# Patient Record
Sex: FEMALE | Race: WHITE | NOT HISPANIC OR LATINO | Employment: UNEMPLOYED | URBAN - METROPOLITAN AREA
[De-identification: names, ages, dates, MRNs, and addresses within clinical notes are randomized per-mention and may not be internally consistent; named-entity substitution may affect disease eponyms.]

---

## 2020-07-23 ENCOUNTER — HISTORICAL (OUTPATIENT)
Dept: ADMINISTRATIVE | Facility: HOSPITAL | Age: 60
End: 2020-07-23

## 2020-11-16 ENCOUNTER — HISTORICAL (OUTPATIENT)
Dept: ADMINISTRATIVE | Facility: HOSPITAL | Age: 60
End: 2020-11-16

## 2021-01-28 LAB
HUMAN PAPILLOMAVIRUS (HPV): NORMAL
PAP RECOMMENDATION EXT: NORMAL
PAP SMEAR: NORMAL

## 2021-06-09 ENCOUNTER — HISTORICAL (OUTPATIENT)
Dept: RADIOLOGY | Facility: HOSPITAL | Age: 61
End: 2021-06-09

## 2021-06-09 LAB — POC CREATININE: 0.7 MG/DL (ref 0.6–1.3)

## 2021-08-04 ENCOUNTER — HISTORICAL (OUTPATIENT)
Dept: RADIOLOGY | Facility: HOSPITAL | Age: 61
End: 2021-08-04

## 2021-08-04 LAB — POC CREATININE: 0.7 MG/DL (ref 0.6–1.3)

## 2021-08-09 ENCOUNTER — HISTORICAL (OUTPATIENT)
Dept: RADIATION THERAPY | Facility: HOSPITAL | Age: 61
End: 2021-08-09

## 2021-08-09 ENCOUNTER — HISTORICAL (OUTPATIENT)
Dept: ADMINISTRATIVE | Facility: HOSPITAL | Age: 61
End: 2021-08-09

## 2021-08-09 LAB
ABS NEUT (OLG): 2.61 X10(3)/MCL (ref 2.1–9.2)
ALBUMIN SERPL-MCNC: 4 GM/DL (ref 3.4–4.8)
ALBUMIN/GLOB SERPL: 1.4 RATIO (ref 1.1–2)
ALP SERPL-CCNC: 84 UNIT/L (ref 40–150)
ALT SERPL-CCNC: 19 UNIT/L (ref 0–55)
AST SERPL-CCNC: 15 UNIT/L (ref 5–34)
BASOPHILS # BLD AUTO: 0 X10(3)/MCL (ref 0–0.2)
BASOPHILS NFR BLD AUTO: 0.8 %
BILIRUB SERPL-MCNC: 0.3 MG/DL
BILIRUBIN DIRECT+TOT PNL SERPL-MCNC: 0.1 MG/DL (ref 0–0.5)
BILIRUBIN DIRECT+TOT PNL SERPL-MCNC: 0.2 MG/DL (ref 0–0.8)
BUN SERPL-MCNC: 15.8 MG/DL (ref 9.8–20.1)
CALCIUM SERPL-MCNC: 9.9 MG/DL (ref 8.4–10.2)
CHLORIDE SERPL-SCNC: 106 MMOL/L (ref 98–107)
CO2 SERPL-SCNC: 26 MMOL/L (ref 23–31)
CREAT SERPL-MCNC: 0.66 MG/DL (ref 0.55–1.02)
EOSINOPHIL # BLD AUTO: 0.3 X10(3)/MCL (ref 0–0.9)
EOSINOPHIL NFR BLD AUTO: 5.2 %
ERYTHROCYTE [DISTWIDTH] IN BLOOD BY AUTOMATED COUNT: 13 % (ref 11.5–17)
GLOBULIN SER-MCNC: 2.8 GM/DL (ref 2.4–3.5)
GLUCOSE SERPL-MCNC: 91 MG/DL (ref 82–115)
HCT VFR BLD AUTO: 41.5 % (ref 37–47)
HGB BLD-MCNC: 13.2 GM/DL (ref 12–16)
LYMPHOCYTES # BLD AUTO: 2.8 X10(3)/MCL (ref 0.6–4.6)
LYMPHOCYTES NFR BLD AUTO: 44.6 %
MCH RBC QN AUTO: 29.5 PG (ref 27–31)
MCHC RBC AUTO-ENTMCNC: 31.8 GM/DL (ref 33–36)
MCV RBC AUTO: 92.6 FL (ref 80–94)
MONOCYTES # BLD AUTO: 0.4 X10(3)/MCL (ref 0.1–1.3)
MONOCYTES NFR BLD AUTO: 7.2 %
NEUTROPHILS # BLD AUTO: 2.6 X10(3)/MCL (ref 2.1–9.2)
NEUTROPHILS NFR BLD AUTO: 42 %
PLATELET # BLD AUTO: 209 X10(3)/MCL (ref 130–400)
PMV BLD AUTO: 10.2 FL (ref 9.4–12.4)
POTASSIUM SERPL-SCNC: 4.5 MMOL/L (ref 3.5–5.1)
PROT SERPL-MCNC: 6.8 GM/DL (ref 5.8–7.6)
RBC # BLD AUTO: 4.48 X10(6)/MCL (ref 4.2–5.4)
SODIUM SERPL-SCNC: 142 MMOL/L (ref 136–145)
WBC # SPEC AUTO: 6.2 X10(3)/MCL (ref 4.5–11.5)

## 2021-08-12 ENCOUNTER — HISTORICAL (OUTPATIENT)
Dept: RADIATION THERAPY | Facility: HOSPITAL | Age: 61
End: 2021-08-12

## 2021-08-25 ENCOUNTER — HISTORICAL (OUTPATIENT)
Dept: RADIATION THERAPY | Facility: HOSPITAL | Age: 61
End: 2021-08-25

## 2021-08-26 ENCOUNTER — HISTORICAL (OUTPATIENT)
Dept: RADIATION THERAPY | Facility: HOSPITAL | Age: 61
End: 2021-08-26

## 2021-08-27 ENCOUNTER — HISTORICAL (OUTPATIENT)
Dept: RADIATION THERAPY | Facility: HOSPITAL | Age: 61
End: 2021-08-27

## 2021-08-31 ENCOUNTER — HISTORICAL (OUTPATIENT)
Dept: RADIATION THERAPY | Facility: HOSPITAL | Age: 61
End: 2021-08-31

## 2021-09-01 ENCOUNTER — HISTORICAL (OUTPATIENT)
Dept: RADIATION THERAPY | Facility: HOSPITAL | Age: 61
End: 2021-09-01

## 2021-09-02 ENCOUNTER — HISTORICAL (OUTPATIENT)
Dept: RADIATION THERAPY | Facility: HOSPITAL | Age: 61
End: 2021-09-02

## 2021-09-03 ENCOUNTER — HISTORICAL (OUTPATIENT)
Dept: RADIATION THERAPY | Facility: HOSPITAL | Age: 61
End: 2021-09-03

## 2021-09-07 ENCOUNTER — HISTORICAL (OUTPATIENT)
Dept: RADIATION THERAPY | Facility: HOSPITAL | Age: 61
End: 2021-09-07

## 2021-09-08 ENCOUNTER — HISTORICAL (OUTPATIENT)
Dept: RADIATION THERAPY | Facility: HOSPITAL | Age: 61
End: 2021-09-08

## 2021-09-09 ENCOUNTER — HISTORICAL (OUTPATIENT)
Dept: RADIATION THERAPY | Facility: HOSPITAL | Age: 61
End: 2021-09-09

## 2021-09-10 ENCOUNTER — HISTORICAL (OUTPATIENT)
Dept: RADIATION THERAPY | Facility: HOSPITAL | Age: 61
End: 2021-09-10

## 2021-09-13 ENCOUNTER — HISTORICAL (OUTPATIENT)
Dept: RADIATION THERAPY | Facility: HOSPITAL | Age: 61
End: 2021-09-13

## 2021-09-14 ENCOUNTER — HISTORICAL (OUTPATIENT)
Dept: RADIATION THERAPY | Facility: HOSPITAL | Age: 61
End: 2021-09-14

## 2021-09-15 ENCOUNTER — HISTORICAL (OUTPATIENT)
Dept: RADIATION THERAPY | Facility: HOSPITAL | Age: 61
End: 2021-09-15

## 2021-09-16 ENCOUNTER — HISTORICAL (OUTPATIENT)
Dept: RADIATION THERAPY | Facility: HOSPITAL | Age: 61
End: 2021-09-16

## 2021-09-16 ENCOUNTER — HISTORICAL (OUTPATIENT)
Dept: ADMINISTRATIVE | Facility: HOSPITAL | Age: 61
End: 2021-09-16

## 2021-09-16 LAB
ABS NEUT (OLG): 3.21 X10(3)/MCL (ref 2.1–9.2)
ALBUMIN SERPL-MCNC: 3.6 GM/DL (ref 3.4–4.8)
ALBUMIN/GLOB SERPL: 1.3 RATIO (ref 1.1–2)
ALP SERPL-CCNC: 175 UNIT/L (ref 40–150)
ALT SERPL-CCNC: 665 UNIT/L (ref 0–55)
AST SERPL-CCNC: 493 UNIT/L (ref 5–34)
BASOPHILS # BLD AUTO: 0 X10(3)/MCL (ref 0–0.2)
BASOPHILS NFR BLD AUTO: 0.7 %
BILIRUB SERPL-MCNC: 0.3 MG/DL
BILIRUBIN DIRECT+TOT PNL SERPL-MCNC: 0.1 MG/DL (ref 0–0.5)
BILIRUBIN DIRECT+TOT PNL SERPL-MCNC: 0.2 MG/DL (ref 0–0.8)
BUN SERPL-MCNC: 11.4 MG/DL (ref 9.8–20.1)
CALCIUM SERPL-MCNC: 9.5 MG/DL (ref 8.4–10.2)
CHLORIDE SERPL-SCNC: 103 MMOL/L (ref 98–107)
CHOLEST SERPL-MCNC: 210 MG/DL
CHOLEST/HDLC SERPL: 4 {RATIO} (ref 0–5)
CO2 SERPL-SCNC: 29 MMOL/L (ref 23–31)
CREAT SERPL-MCNC: 0.61 MG/DL (ref 0.55–1.02)
EOSINOPHIL # BLD AUTO: 0.3 X10(3)/MCL (ref 0–0.9)
EOSINOPHIL NFR BLD AUTO: 6.1 %
ERYTHROCYTE [DISTWIDTH] IN BLOOD BY AUTOMATED COUNT: 13.8 % (ref 11.5–17)
GLOBULIN SER-MCNC: 2.7 GM/DL (ref 2.4–3.5)
GLUCOSE SERPL-MCNC: 96 MG/DL (ref 82–115)
HCT VFR BLD AUTO: 39.1 % (ref 37–47)
HDLC SERPL-MCNC: 52 MG/DL (ref 35–60)
HGB BLD-MCNC: 12.4 GM/DL (ref 12–16)
LDLC SERPL CALC-MCNC: 117 MG/DL (ref 50–140)
LYMPHOCYTES # BLD AUTO: 0.3 X10(3)/MCL (ref 0.6–4.6)
LYMPHOCYTES NFR BLD AUTO: 5.9 %
MCH RBC QN AUTO: 30.4 PG (ref 27–31)
MCHC RBC AUTO-ENTMCNC: 31.7 GM/DL (ref 33–36)
MCV RBC AUTO: 95.8 FL (ref 80–94)
MONOCYTES # BLD AUTO: 0.6 X10(3)/MCL (ref 0.1–1.3)
MONOCYTES NFR BLD AUTO: 14.1 %
NEUTROPHILS # BLD AUTO: 3.2 X10(3)/MCL (ref 2.1–9.2)
NEUTROPHILS NFR BLD AUTO: 72.7 %
PLATELET # BLD AUTO: 192 X10(3)/MCL (ref 130–400)
PMV BLD AUTO: 9.7 FL (ref 9.4–12.4)
POTASSIUM SERPL-SCNC: 4.2 MMOL/L (ref 3.5–5.1)
PROT SERPL-MCNC: 6.3 GM/DL (ref 5.8–7.6)
RBC # BLD AUTO: 4.08 X10(6)/MCL (ref 4.2–5.4)
SODIUM SERPL-SCNC: 142 MMOL/L (ref 136–145)
T4 FREE SERPL-MCNC: 0.72 NG/DL (ref 0.7–1.48)
TRIGL SERPL-MCNC: 205 MG/DL (ref 37–140)
TSH SERPL-ACNC: 0.83 UIU/ML (ref 0.35–4.94)
VLDLC SERPL CALC-MCNC: 41 MG/DL
WBC # SPEC AUTO: 4.4 X10(3)/MCL (ref 4.5–11.5)

## 2021-09-17 ENCOUNTER — HISTORICAL (OUTPATIENT)
Dept: RADIATION THERAPY | Facility: HOSPITAL | Age: 61
End: 2021-09-17

## 2021-09-20 ENCOUNTER — HISTORICAL (OUTPATIENT)
Dept: RADIATION THERAPY | Facility: HOSPITAL | Age: 61
End: 2021-09-20

## 2021-09-21 ENCOUNTER — HISTORICAL (OUTPATIENT)
Dept: RADIATION THERAPY | Facility: HOSPITAL | Age: 61
End: 2021-09-21

## 2021-09-21 ENCOUNTER — HISTORICAL (OUTPATIENT)
Dept: HEMATOLOGY/ONCOLOGY | Facility: CLINIC | Age: 61
End: 2021-09-21

## 2021-09-22 ENCOUNTER — HISTORICAL (OUTPATIENT)
Dept: RADIATION THERAPY | Facility: HOSPITAL | Age: 61
End: 2021-09-22

## 2021-09-23 ENCOUNTER — HISTORICAL (OUTPATIENT)
Dept: RADIATION THERAPY | Facility: HOSPITAL | Age: 61
End: 2021-09-23

## 2021-09-23 ENCOUNTER — HISTORICAL (OUTPATIENT)
Dept: HEMATOLOGY/ONCOLOGY | Facility: CLINIC | Age: 61
End: 2021-09-23

## 2021-09-23 LAB
ALBUMIN SERPL-MCNC: 3.5 GM/DL (ref 3.4–4.8)
ALBUMIN/GLOB SERPL: 1.3 RATIO (ref 1.1–2)
ALP SERPL-CCNC: 248 UNIT/L (ref 40–150)
ALT SERPL-CCNC: 675 UNIT/L (ref 0–55)
AST SERPL-CCNC: 330 UNIT/L (ref 5–34)
BILIRUB SERPL-MCNC: 0.3 MG/DL
BILIRUBIN DIRECT+TOT PNL SERPL-MCNC: 0.1 MG/DL (ref 0–0.5)
BILIRUBIN DIRECT+TOT PNL SERPL-MCNC: 0.2 MG/DL (ref 0–0.8)
BUN SERPL-MCNC: 10.9 MG/DL (ref 9.8–20.1)
CALCIUM SERPL-MCNC: 10.5 MG/DL (ref 8.4–10.2)
CHLORIDE SERPL-SCNC: 105 MMOL/L (ref 98–107)
CO2 SERPL-SCNC: 28 MMOL/L (ref 23–31)
CREAT SERPL-MCNC: 0.61 MG/DL (ref 0.55–1.02)
GLOBULIN SER-MCNC: 2.7 GM/DL (ref 2.4–3.5)
GLUCOSE SERPL-MCNC: 103 MG/DL (ref 82–115)
POTASSIUM SERPL-SCNC: 4.5 MMOL/L (ref 3.5–5.1)
PROT SERPL-MCNC: 6.2 GM/DL (ref 5.8–7.6)
SODIUM SERPL-SCNC: 142 MMOL/L (ref 136–145)

## 2021-09-24 ENCOUNTER — HISTORICAL (OUTPATIENT)
Dept: RADIATION THERAPY | Facility: HOSPITAL | Age: 61
End: 2021-09-24

## 2021-09-27 ENCOUNTER — HISTORICAL (OUTPATIENT)
Dept: RADIATION THERAPY | Facility: HOSPITAL | Age: 61
End: 2021-09-27

## 2021-09-28 ENCOUNTER — HISTORICAL (OUTPATIENT)
Dept: RADIATION THERAPY | Facility: HOSPITAL | Age: 61
End: 2021-09-28

## 2021-09-29 ENCOUNTER — HISTORICAL (OUTPATIENT)
Dept: RADIATION THERAPY | Facility: HOSPITAL | Age: 61
End: 2021-09-29

## 2021-09-30 ENCOUNTER — HISTORICAL (OUTPATIENT)
Dept: RADIATION THERAPY | Facility: HOSPITAL | Age: 61
End: 2021-09-30

## 2021-09-30 ENCOUNTER — HISTORICAL (OUTPATIENT)
Dept: HEMATOLOGY/ONCOLOGY | Facility: CLINIC | Age: 61
End: 2021-09-30

## 2021-09-30 LAB
ALBUMIN SERPL-MCNC: 3.7 GM/DL (ref 3.4–4.8)
ALBUMIN/GLOB SERPL: 1.4 RATIO (ref 1.1–2)
ALP SERPL-CCNC: 202 UNIT/L (ref 40–150)
ALT SERPL-CCNC: 317 UNIT/L (ref 0–55)
AST SERPL-CCNC: 141 UNIT/L (ref 5–34)
BILIRUB SERPL-MCNC: 0.5 MG/DL
BILIRUBIN DIRECT+TOT PNL SERPL-MCNC: 0.2 MG/DL (ref 0–0.5)
BILIRUBIN DIRECT+TOT PNL SERPL-MCNC: 0.3 MG/DL (ref 0–0.8)
BUN SERPL-MCNC: 13.9 MG/DL (ref 9.8–20.1)
CALCIUM SERPL-MCNC: 9.5 MG/DL (ref 8.4–10.2)
CHLORIDE SERPL-SCNC: 106 MMOL/L (ref 98–107)
CO2 SERPL-SCNC: 27 MMOL/L (ref 23–31)
CREAT SERPL-MCNC: 0.63 MG/DL (ref 0.55–1.02)
GLOBULIN SER-MCNC: 2.6 GM/DL (ref 2.4–3.5)
GLUCOSE SERPL-MCNC: 96 MG/DL (ref 82–115)
POTASSIUM SERPL-SCNC: 4.5 MMOL/L (ref 3.5–5.1)
PROT SERPL-MCNC: 6.3 GM/DL (ref 5.8–7.6)
SODIUM SERPL-SCNC: 141 MMOL/L (ref 136–145)

## 2021-10-01 ENCOUNTER — HISTORICAL (OUTPATIENT)
Dept: RADIATION THERAPY | Facility: HOSPITAL | Age: 61
End: 2021-10-01

## 2021-10-04 ENCOUNTER — HISTORICAL (OUTPATIENT)
Dept: RADIATION THERAPY | Facility: HOSPITAL | Age: 61
End: 2021-10-04

## 2021-10-07 ENCOUNTER — HISTORICAL (OUTPATIENT)
Dept: ADMINISTRATIVE | Facility: HOSPITAL | Age: 61
End: 2021-10-07

## 2021-10-07 LAB
ABS NEUT (OLG): 3.49 X10(3)/MCL (ref 2.1–9.2)
ALBUMIN SERPL-MCNC: 3.6 GM/DL (ref 3.4–4.8)
ALBUMIN/GLOB SERPL: 1.4 RATIO (ref 1.1–2)
ALP SERPL-CCNC: 172 UNIT/L (ref 40–150)
ALT SERPL-CCNC: 182 UNIT/L (ref 0–55)
AST SERPL-CCNC: 96 UNIT/L (ref 5–34)
BASOPHILS # BLD AUTO: 0 X10(3)/MCL (ref 0–0.2)
BASOPHILS NFR BLD AUTO: 0.9 %
BILIRUB SERPL-MCNC: 0.3 MG/DL
BILIRUBIN DIRECT+TOT PNL SERPL-MCNC: <0.1 MG/DL (ref 0–0.5)
BILIRUBIN DIRECT+TOT PNL SERPL-MCNC: >0.2 MG/DL (ref 0–0.8)
BUN SERPL-MCNC: 17.5 MG/DL (ref 9.8–20.1)
CALCIUM SERPL-MCNC: 9.6 MG/DL (ref 8.4–10.2)
CHLORIDE SERPL-SCNC: 107 MMOL/L (ref 98–107)
CO2 SERPL-SCNC: 25 MMOL/L (ref 23–31)
CREAT SERPL-MCNC: 0.64 MG/DL (ref 0.55–1.02)
EOSINOPHIL # BLD AUTO: 0.1 X10(3)/MCL (ref 0–0.9)
EOSINOPHIL NFR BLD AUTO: 2.3 %
ERYTHROCYTE [DISTWIDTH] IN BLOOD BY AUTOMATED COUNT: 14.4 % (ref 11.5–17)
GLOBULIN SER-MCNC: 2.6 GM/DL (ref 2.4–3.5)
GLUCOSE SERPL-MCNC: 108 MG/DL (ref 82–115)
HCT VFR BLD AUTO: 39.4 % (ref 37–47)
HGB BLD-MCNC: 12.6 GM/DL (ref 12–16)
LYMPHOCYTES # BLD AUTO: 0.2 X10(3)/MCL (ref 0.6–4.6)
LYMPHOCYTES NFR BLD AUTO: 4.1 %
MCH RBC QN AUTO: 30.7 PG (ref 27–31)
MCHC RBC AUTO-ENTMCNC: 32 GM/DL (ref 33–36)
MCV RBC AUTO: 96.1 FL (ref 80–94)
MONOCYTES # BLD AUTO: 0.6 X10(3)/MCL (ref 0.1–1.3)
MONOCYTES NFR BLD AUTO: 12.6 %
NEUTROPHILS # BLD AUTO: 3.5 X10(3)/MCL (ref 2.1–9.2)
NEUTROPHILS NFR BLD AUTO: 79.6 %
PLATELET # BLD AUTO: 201 X10(3)/MCL (ref 130–400)
PMV BLD AUTO: 10.5 FL (ref 9.4–12.4)
POTASSIUM SERPL-SCNC: 4.5 MMOL/L (ref 3.5–5.1)
PROT SERPL-MCNC: 6.2 GM/DL (ref 5.8–7.6)
RBC # BLD AUTO: 4.1 X10(6)/MCL (ref 4.2–5.4)
SODIUM SERPL-SCNC: 144 MMOL/L (ref 136–145)
WBC # SPEC AUTO: 4.4 X10(3)/MCL (ref 4.5–11.5)

## 2021-10-20 ENCOUNTER — HISTORICAL (OUTPATIENT)
Dept: ADMINISTRATIVE | Facility: HOSPITAL | Age: 61
End: 2021-10-20

## 2021-10-20 LAB
ALBUMIN SERPL-MCNC: 3.5 GM/DL (ref 3.4–4.8)
ALBUMIN/GLOB SERPL: 1.5 RATIO (ref 1.1–2)
ALP SERPL-CCNC: 161 UNIT/L (ref 40–150)
ALT SERPL-CCNC: 306 UNIT/L (ref 0–55)
AST SERPL-CCNC: 208 UNIT/L (ref 5–34)
BILIRUB SERPL-MCNC: 0.4 MG/DL
BILIRUBIN DIRECT+TOT PNL SERPL-MCNC: 0.2 MG/DL (ref 0–0.5)
BILIRUBIN DIRECT+TOT PNL SERPL-MCNC: 0.2 MG/DL (ref 0–0.8)
BUN SERPL-MCNC: 13.5 MG/DL (ref 9.8–20.1)
CALCIUM SERPL-MCNC: 9.2 MG/DL (ref 8.7–10.5)
CHLORIDE SERPL-SCNC: 106 MMOL/L (ref 98–107)
CO2 SERPL-SCNC: 29 MMOL/L (ref 23–31)
CREAT SERPL-MCNC: 0.58 MG/DL (ref 0.55–1.02)
GLOBULIN SER-MCNC: 2.3 GM/DL (ref 2.4–3.5)
GLUCOSE SERPL-MCNC: 97 MG/DL (ref 82–115)
POTASSIUM SERPL-SCNC: 4.1 MMOL/L (ref 3.5–5.1)
PROT SERPL-MCNC: 5.8 GM/DL (ref 5.8–7.6)
SODIUM SERPL-SCNC: 142 MMOL/L (ref 136–145)

## 2021-10-21 ENCOUNTER — HISTORICAL (OUTPATIENT)
Dept: HEMATOLOGY/ONCOLOGY | Facility: CLINIC | Age: 61
End: 2021-10-21

## 2021-10-21 LAB
ANTINUCLEAR ANTIBODY SCREEN (OHS): NEGATIVE
DSDNA ANTIBODY (OHS): POSITIVE
FERRITIN SERPL-MCNC: 755.82 NG/ML (ref 4.63–204)
HAV IGM SERPL QL IA: NONREACTIVE
HBV CORE IGM SERPL QL IA: NONREACTIVE
HBV SURFACE AG SERPL QL IA: NONREACTIVE
HCV AB SERPL QL IA: NONREACTIVE
IRON SATN MFR SERPL: 37 % (ref 20–50)
IRON SERPL-MCNC: 110 UG/DL (ref 50–170)
TIBC SERPL-MCNC: 189 UG/DL (ref 70–310)
TIBC SERPL-MCNC: 299 UG/DL (ref 250–450)
TRANSFERRIN SERPL-MCNC: 275 MG/DL (ref 173–360)

## 2021-10-28 ENCOUNTER — HISTORICAL (OUTPATIENT)
Dept: RADIOLOGY | Facility: HOSPITAL | Age: 61
End: 2021-10-28

## 2021-10-28 ENCOUNTER — HISTORICAL (OUTPATIENT)
Dept: HEMATOLOGY/ONCOLOGY | Facility: CLINIC | Age: 61
End: 2021-10-28

## 2021-10-28 LAB
ALBUMIN SERPL-MCNC: 3.8 GM/DL (ref 3.4–4.8)
ALBUMIN/GLOB SERPL: 1.5 RATIO (ref 1.1–2)
ALP SERPL-CCNC: 197 UNIT/L (ref 40–150)
ALT SERPL-CCNC: 475 UNIT/L (ref 0–55)
AST SERPL-CCNC: 333 UNIT/L (ref 5–34)
BILIRUB SERPL-MCNC: 0.3 MG/DL
BILIRUBIN DIRECT+TOT PNL SERPL-MCNC: 0.1 MG/DL (ref 0–0.8)
BILIRUBIN DIRECT+TOT PNL SERPL-MCNC: 0.2 MG/DL (ref 0–0.5)
BUN SERPL-MCNC: 12.3 MG/DL (ref 9.8–20.1)
CALCIUM SERPL-MCNC: 9.6 MG/DL (ref 8.7–10.5)
CHLORIDE SERPL-SCNC: 109 MMOL/L (ref 98–107)
CO2 SERPL-SCNC: 26 MMOL/L (ref 23–31)
CREAT SERPL-MCNC: 0.61 MG/DL (ref 0.55–1.02)
GLOBULIN SER-MCNC: 2.5 GM/DL (ref 2.4–3.5)
GLUCOSE SERPL-MCNC: 93 MG/DL (ref 82–115)
POTASSIUM SERPL-SCNC: 5.1 MMOL/L (ref 3.5–5.1)
PROT SERPL-MCNC: 6.3 GM/DL (ref 5.8–7.6)
SODIUM SERPL-SCNC: 143 MMOL/L (ref 136–145)

## 2021-11-04 ENCOUNTER — HISTORICAL (OUTPATIENT)
Dept: ADMINISTRATIVE | Facility: HOSPITAL | Age: 61
End: 2021-11-04

## 2021-11-04 LAB
ABS NEUT (OLG): 1.29 X10(3)/MCL (ref 2.1–9.2)
ALBUMIN SERPL-MCNC: 3.7 GM/DL (ref 3.4–4.8)
ALBUMIN/GLOB SERPL: 1.5 RATIO (ref 1.1–2)
ALP SERPL-CCNC: 190 UNIT/L (ref 40–150)
ALT SERPL-CCNC: 495 UNIT/L (ref 0–55)
AST SERPL-CCNC: 331 UNIT/L (ref 5–34)
BASOPHILS # BLD AUTO: 0 X10(3)/MCL (ref 0–0.2)
BASOPHILS NFR BLD AUTO: 1.5 %
BILIRUB SERPL-MCNC: 0.3 MG/DL
BILIRUBIN DIRECT+TOT PNL SERPL-MCNC: 0.1 MG/DL (ref 0–0.5)
BILIRUBIN DIRECT+TOT PNL SERPL-MCNC: 0.2 MG/DL (ref 0–0.8)
BUN SERPL-MCNC: 13.1 MG/DL (ref 9.8–20.1)
CALCIUM SERPL-MCNC: 9.6 MG/DL (ref 8.7–10.5)
CHLORIDE SERPL-SCNC: 108 MMOL/L (ref 98–107)
CO2 SERPL-SCNC: 27 MMOL/L (ref 23–31)
CREAT SERPL-MCNC: 0.61 MG/DL (ref 0.55–1.02)
EOSINOPHIL # BLD AUTO: 0.2 X10(3)/MCL (ref 0–0.9)
EOSINOPHIL NFR BLD AUTO: 4.7 %
ERYTHROCYTE [DISTWIDTH] IN BLOOD BY AUTOMATED COUNT: 14 % (ref 11.5–17)
GLOBULIN SER-MCNC: 2.5 GM/DL (ref 2.4–3.5)
GLUCOSE SERPL-MCNC: 102 MG/DL (ref 82–115)
HCT VFR BLD AUTO: 37.8 % (ref 37–47)
HGB BLD-MCNC: 12.3 GM/DL (ref 12–16)
LYMPHOCYTES # BLD AUTO: 1.4 X10(3)/MCL (ref 0.6–4.6)
LYMPHOCYTES NFR BLD AUTO: 39.9 %
MCH RBC QN AUTO: 30.8 PG (ref 27–31)
MCHC RBC AUTO-ENTMCNC: 32.5 GM/DL (ref 33–36)
MCV RBC AUTO: 94.7 FL (ref 80–94)
MONOCYTES # BLD AUTO: 0.6 X10(3)/MCL (ref 0.1–1.3)
MONOCYTES NFR BLD AUTO: 16 %
NEUTROPHILS # BLD AUTO: 1.3 X10(3)/MCL (ref 2.1–9.2)
NEUTROPHILS NFR BLD AUTO: 37.6 %
PLATELET # BLD AUTO: 183 X10(3)/MCL (ref 130–400)
PMV BLD AUTO: 10 FL (ref 9.4–12.4)
POTASSIUM SERPL-SCNC: 4.1 MMOL/L (ref 3.5–5.1)
PROT SERPL-MCNC: 6.2 GM/DL (ref 5.8–7.6)
RBC # BLD AUTO: 3.99 X10(6)/MCL (ref 4.2–5.4)
SODIUM SERPL-SCNC: 144 MMOL/L (ref 136–145)
WBC # SPEC AUTO: 3.4 X10(3)/MCL (ref 4.5–11.5)

## 2021-11-08 ENCOUNTER — HISTORICAL (OUTPATIENT)
Dept: ADMINISTRATIVE | Facility: HOSPITAL | Age: 61
End: 2021-11-08

## 2021-11-08 LAB — CORTIS SERPL-SCNC: 4.7 UG/DL

## 2021-11-17 ENCOUNTER — HISTORICAL (OUTPATIENT)
Dept: ADMINISTRATIVE | Facility: HOSPITAL | Age: 61
End: 2021-11-17

## 2021-11-17 LAB
ALBUMIN SERPL-MCNC: 3.6 GM/DL (ref 3.4–4.8)
ALBUMIN/GLOB SERPL: 1.6 RATIO (ref 1.1–2)
ALP SERPL-CCNC: 146 UNIT/L (ref 40–150)
ALT SERPL-CCNC: 191 UNIT/L (ref 0–55)
AST SERPL-CCNC: 105 UNIT/L (ref 5–34)
BILIRUB SERPL-MCNC: 0.4 MG/DL
BILIRUBIN DIRECT+TOT PNL SERPL-MCNC: 0.2 MG/DL (ref 0–0.5)
BILIRUBIN DIRECT+TOT PNL SERPL-MCNC: 0.2 MG/DL (ref 0–0.8)
BUN SERPL-MCNC: 16.6 MG/DL (ref 9.8–20.1)
CALCIUM SERPL-MCNC: 9.1 MG/DL (ref 8.7–10.5)
CHLORIDE SERPL-SCNC: 107 MMOL/L (ref 98–107)
CO2 SERPL-SCNC: 30 MMOL/L (ref 23–31)
CREAT SERPL-MCNC: 0.66 MG/DL (ref 0.55–1.02)
GLOBULIN SER-MCNC: 2.3 GM/DL (ref 2.4–3.5)
GLUCOSE SERPL-MCNC: 73 MG/DL (ref 82–115)
POTASSIUM SERPL-SCNC: 4.4 MMOL/L (ref 3.5–5.1)
PROT SERPL-MCNC: 5.9 GM/DL (ref 5.8–7.6)
SODIUM SERPL-SCNC: 144 MMOL/L (ref 136–145)

## 2021-12-02 ENCOUNTER — HISTORICAL (OUTPATIENT)
Dept: ADMINISTRATIVE | Facility: HOSPITAL | Age: 61
End: 2021-12-02

## 2021-12-02 LAB
ABS NEUT (OLG): 4 X10(3)/MCL (ref 2.1–9.2)
ALBUMIN SERPL-MCNC: 3.7 GM/DL (ref 3.4–4.8)
ALBUMIN/GLOB SERPL: 1.4 RATIO (ref 1.1–2)
ALP SERPL-CCNC: 141 UNIT/L (ref 40–150)
ALT SERPL-CCNC: 105 UNIT/L (ref 0–55)
AST SERPL-CCNC: 72 UNIT/L (ref 5–34)
BASOPHILS # BLD AUTO: 0 X10(3)/MCL (ref 0–0.2)
BASOPHILS NFR BLD AUTO: 0.5 %
BILIRUB SERPL-MCNC: 0.3 MG/DL
BILIRUBIN DIRECT+TOT PNL SERPL-MCNC: 0.1 MG/DL (ref 0–0.5)
BILIRUBIN DIRECT+TOT PNL SERPL-MCNC: 0.2 MG/DL (ref 0–0.8)
BUN SERPL-MCNC: 16.5 MG/DL (ref 9.8–20.1)
CALCIUM SERPL-MCNC: 9.2 MG/DL (ref 8.7–10.5)
CHLORIDE SERPL-SCNC: 107 MMOL/L (ref 98–107)
CO2 SERPL-SCNC: 31 MMOL/L (ref 23–31)
CREAT SERPL-MCNC: 0.64 MG/DL (ref 0.55–1.02)
EOSINOPHIL # BLD AUTO: 0.1 X10(3)/MCL (ref 0–0.9)
EOSINOPHIL NFR BLD AUTO: 1.9 %
ERYTHROCYTE [DISTWIDTH] IN BLOOD BY AUTOMATED COUNT: 13.4 % (ref 11.5–17)
GLOBULIN SER-MCNC: 2.6 GM/DL (ref 2.4–3.5)
GLUCOSE SERPL-MCNC: 84 MG/DL (ref 82–115)
HCT VFR BLD AUTO: 41.8 % (ref 37–47)
HGB BLD-MCNC: 13.7 GM/DL (ref 12–16)
LYMPHOCYTES # BLD AUTO: 1 X10(3)/MCL (ref 0.6–4.6)
LYMPHOCYTES NFR BLD AUTO: 18 %
MCH RBC QN AUTO: 31.7 PG (ref 27–31)
MCHC RBC AUTO-ENTMCNC: 32.8 GM/DL (ref 33–36)
MCV RBC AUTO: 96.8 FL (ref 80–94)
MONOCYTES # BLD AUTO: 0.5 X10(3)/MCL (ref 0.1–1.3)
MONOCYTES NFR BLD AUTO: 9.4 %
NEUTROPHILS # BLD AUTO: 4 X10(3)/MCL (ref 2.1–9.2)
NEUTROPHILS NFR BLD AUTO: 70 %
PLATELET # BLD AUTO: 140 X10(3)/MCL (ref 130–400)
PMV BLD AUTO: 10.6 FL (ref 9.4–12.4)
POTASSIUM SERPL-SCNC: 4 MMOL/L (ref 3.5–5.1)
PROT SERPL-MCNC: 6.3 GM/DL (ref 5.8–7.6)
RBC # BLD AUTO: 4.32 X10(6)/MCL (ref 4.2–5.4)
SODIUM SERPL-SCNC: 143 MMOL/L (ref 136–145)
WBC # SPEC AUTO: 5.7 X10(3)/MCL (ref 4.5–11.5)

## 2021-12-16 ENCOUNTER — HISTORICAL (OUTPATIENT)
Dept: ADMINISTRATIVE | Facility: HOSPITAL | Age: 61
End: 2021-12-16

## 2021-12-17 ENCOUNTER — HISTORICAL (OUTPATIENT)
Dept: ADMINISTRATIVE | Facility: HOSPITAL | Age: 61
End: 2021-12-17

## 2021-12-17 LAB
ALBUMIN SERPL-MCNC: 3.6 GM/DL (ref 3.4–4.8)
ALBUMIN/GLOB SERPL: 1.4 RATIO (ref 1.1–2)
ALP SERPL-CCNC: 127 UNIT/L (ref 40–150)
ALT SERPL-CCNC: 57 UNIT/L (ref 0–55)
AST SERPL-CCNC: 46 UNIT/L (ref 5–34)
BILIRUB SERPL-MCNC: 0.3 MG/DL
BILIRUBIN DIRECT+TOT PNL SERPL-MCNC: 0.1 MG/DL (ref 0–0.8)
BILIRUBIN DIRECT+TOT PNL SERPL-MCNC: 0.2 MG/DL (ref 0–0.5)
BUN SERPL-MCNC: 21.6 MG/DL (ref 9.8–20.1)
CALCIUM SERPL-MCNC: 9.4 MG/DL (ref 8.7–10.5)
CHLORIDE SERPL-SCNC: 107 MMOL/L (ref 98–107)
CO2 SERPL-SCNC: 28 MMOL/L (ref 23–31)
CREAT SERPL-MCNC: 0.69 MG/DL (ref 0.55–1.02)
GLOBULIN SER-MCNC: 2.6 GM/DL (ref 2.4–3.5)
GLUCOSE SERPL-MCNC: 69 MG/DL (ref 82–115)
POTASSIUM SERPL-SCNC: 4.2 MMOL/L (ref 3.5–5.1)
PROT SERPL-MCNC: 6.2 GM/DL (ref 5.8–7.6)
SODIUM SERPL-SCNC: 145 MMOL/L (ref 136–145)

## 2022-01-04 ENCOUNTER — HISTORICAL (OUTPATIENT)
Dept: RADIATION THERAPY | Facility: HOSPITAL | Age: 62
End: 2022-01-04

## 2022-01-05 ENCOUNTER — HISTORICAL (OUTPATIENT)
Dept: ADMINISTRATIVE | Facility: HOSPITAL | Age: 62
End: 2022-01-05

## 2022-01-05 LAB
ABS NEUT (OLG): 3.34 X10(3)/MCL (ref 2.1–9.2)
ALBUMIN SERPL-MCNC: 3.6 GM/DL (ref 3.4–4.8)
ALBUMIN/GLOB SERPL: 1.4 RATIO (ref 1.1–2)
ALP SERPL-CCNC: 122 UNIT/L (ref 40–150)
ALT SERPL-CCNC: 38 UNIT/L (ref 0–55)
AST SERPL-CCNC: 36 UNIT/L (ref 5–34)
BASOPHILS # BLD AUTO: 0 X10(3)/MCL (ref 0–0.2)
BASOPHILS NFR BLD AUTO: 1 %
BILIRUB SERPL-MCNC: 0.4 MG/DL
BILIRUBIN DIRECT+TOT PNL SERPL-MCNC: 0.2 MG/DL (ref 0–0.5)
BILIRUBIN DIRECT+TOT PNL SERPL-MCNC: 0.2 MG/DL (ref 0–0.8)
BUN SERPL-MCNC: 18.3 MG/DL (ref 9.8–20.1)
CALCIUM SERPL-MCNC: 9.4 MG/DL (ref 8.7–10.5)
CHLORIDE SERPL-SCNC: 106 MMOL/L (ref 98–107)
CO2 SERPL-SCNC: 26 MMOL/L (ref 23–31)
CREAT SERPL-MCNC: 0.69 MG/DL (ref 0.55–1.02)
EOSINOPHIL # BLD AUTO: 0.1 X10(3)/MCL (ref 0–0.9)
EOSINOPHIL NFR BLD AUTO: 1.9 %
ERYTHROCYTE [DISTWIDTH] IN BLOOD BY AUTOMATED COUNT: 12.9 % (ref 11.5–17)
GLOBULIN SER-MCNC: 2.6 GM/DL (ref 2.4–3.5)
GLUCOSE SERPL-MCNC: 77 MG/DL (ref 82–115)
HCT VFR BLD AUTO: 42.8 % (ref 37–47)
HGB BLD-MCNC: 14 GM/DL (ref 12–16)
LYMPHOCYTES # BLD AUTO: 1.1 X10(3)/MCL (ref 0.6–4.6)
LYMPHOCYTES NFR BLD AUTO: 21.3 %
MCH RBC QN AUTO: 31.4 PG (ref 27–31)
MCHC RBC AUTO-ENTMCNC: 32.7 GM/DL (ref 33–36)
MCV RBC AUTO: 96 FL (ref 80–94)
MONOCYTES # BLD AUTO: 0.6 X10(3)/MCL (ref 0.1–1.3)
MONOCYTES NFR BLD AUTO: 12 %
NEUTROPHILS # BLD AUTO: 3.3 X10(3)/MCL (ref 2.1–9.2)
NEUTROPHILS NFR BLD AUTO: 63.4 %
PLATELET # BLD AUTO: 151 X10(3)/MCL (ref 130–400)
PMV BLD AUTO: 10.2 FL (ref 9.4–12.4)
POTASSIUM SERPL-SCNC: 4.1 MMOL/L (ref 3.5–5.1)
PROT SERPL-MCNC: 6.2 GM/DL (ref 5.8–7.6)
RBC # BLD AUTO: 4.46 X10(6)/MCL (ref 4.2–5.4)
SODIUM SERPL-SCNC: 144 MMOL/L (ref 136–145)
WBC # SPEC AUTO: 5.3 X10(3)/MCL (ref 4.5–11.5)

## 2022-01-20 LAB
ABS NEUT (OLG): 2.74 X10(3)/MCL (ref 2.1–9.2)
BASOPHILS # BLD AUTO: 0 X10(3)/MCL (ref 0–0.2)
BASOPHILS NFR BLD AUTO: 1 %
EOSINOPHIL # BLD AUTO: 0.2 X10(3)/MCL (ref 0–0.9)
EOSINOPHIL NFR BLD AUTO: 3 %
ERYTHROCYTE [DISTWIDTH] IN BLOOD BY AUTOMATED COUNT: 13.2 % (ref 11.5–17)
HCT VFR BLD AUTO: 42.6 % (ref 37–47)
HGB BLD-MCNC: 13.8 GM/DL (ref 12–16)
LYMPHOCYTES # BLD AUTO: 1.4 X10(3)/MCL (ref 0.6–4.6)
LYMPHOCYTES NFR BLD AUTO: 27 %
MCH RBC QN AUTO: 31 PG (ref 27–31)
MCHC RBC AUTO-ENTMCNC: 32.4 GM/DL (ref 33–36)
MCV RBC AUTO: 95.7 FL (ref 80–94)
MONOCYTES # BLD AUTO: 0.7 X10(3)/MCL (ref 0.1–1.3)
MONOCYTES NFR BLD AUTO: 13 %
NEUTROPHILS # BLD AUTO: 2.74 X10(3)/MCL (ref 2.1–9.2)
NEUTROPHILS NFR BLD AUTO: 55 %
PLATELET # BLD AUTO: 190 X10(3)/MCL (ref 130–400)
PMV BLD AUTO: 11.4 FL (ref 9.4–12.4)
RBC # BLD AUTO: 4.45 X10(6)/MCL (ref 4.2–5.4)
WBC # SPEC AUTO: 5 X10(3)/MCL (ref 4.5–11.5)

## 2022-01-31 ENCOUNTER — HISTORICAL (OUTPATIENT)
Dept: HEMATOLOGY/ONCOLOGY | Facility: CLINIC | Age: 62
End: 2022-01-31

## 2022-01-31 ENCOUNTER — HISTORICAL (OUTPATIENT)
Dept: RADIOLOGY | Facility: HOSPITAL | Age: 62
End: 2022-01-31

## 2022-01-31 LAB
ABS NEUT (OLG): 3.52 (ref 2.1–9.2)
ALBUMIN SERPL-MCNC: 3.5 G/DL (ref 3.4–4.8)
ALBUMIN/GLOB SERPL: 1.3 {RATIO} (ref 1.1–2)
ALP SERPL-CCNC: 122 U/L (ref 40–150)
ALT SERPL-CCNC: 41 U/L (ref 0–55)
AST SERPL-CCNC: 30 U/L (ref 5–34)
BASOPHILS # BLD AUTO: 0.1 10*3/UL (ref 0–0.2)
BASOPHILS NFR BLD AUTO: 1.1 %
BILIRUB SERPL-MCNC: 0.4 MG/DL
BILIRUBIN DIRECT+TOT PNL SERPL-MCNC: 0.2 (ref 0–0.5)
BILIRUBIN DIRECT+TOT PNL SERPL-MCNC: 0.2 (ref 0–0.8)
BUN SERPL-MCNC: 14.3 MG/DL (ref 9.8–20.1)
CALCIUM SERPL-MCNC: 9.6 MG/DL (ref 8.7–10.5)
CHLORIDE SERPL-SCNC: 104 MMOL/L (ref 98–107)
CHOLEST SERPL-MCNC: 235 MG/DL
CHOLEST/HDLC SERPL: 3 {RATIO} (ref 0–5)
CO2 SERPL-SCNC: 27 MMOL/L (ref 23–31)
CREAT SERPL-MCNC: 0.65 MG/DL (ref 0.55–1.02)
EOSINOPHIL # BLD AUTO: 0.2 10*3/UL (ref 0–0.9)
EOSINOPHIL NFR BLD AUTO: 3.1 %
ERYTHROCYTE [DISTWIDTH] IN BLOOD BY AUTOMATED COUNT: 12.8 % (ref 11.5–17)
GLOBULIN SER-MCNC: 2.6 G/DL (ref 2.4–3.5)
GLUCOSE SERPL-MCNC: 81 MG/DL (ref 82–115)
HCT VFR BLD AUTO: 41.5 % (ref 37–47)
HDLC SERPL-MCNC: 73 MG/DL (ref 35–60)
HEMOLYSIS INTERF INDEX SERPL-ACNC: 16
HGB BLD-MCNC: 13.8 G/DL (ref 12–16)
ICTERIC INTERF INDEX SERPL-ACNC: 1
LDLC SERPL CALC-MCNC: 128 MG/DL (ref 50–140)
LIPEMIC INTERF INDEX SERPL-ACNC: 3
LYMPHOCYTES # BLD AUTO: 0.8 10*3/UL (ref 0.6–4.6)
LYMPHOCYTES NFR BLD AUTO: 15.1 %
MANUAL DIFF? (OHS): NO
MCH RBC QN AUTO: 31.7 PG (ref 27–31)
MCHC RBC AUTO-ENTMCNC: 33.3 G/DL (ref 33–36)
MCV RBC AUTO: 95.4 FL (ref 80–94)
MONOCYTES # BLD AUTO: 0.7 10*3/UL (ref 0.1–1.3)
MONOCYTES NFR BLD AUTO: 13 %
NEUTROPHILS # BLD AUTO: 3.5 10*3/UL (ref 2.1–9.2)
NEUTROPHILS NFR BLD AUTO: 67.3 %
PLATELET # BLD AUTO: 156 10*3/UL (ref 130–400)
PMV BLD AUTO: 10.5 FL (ref 9.4–12.4)
POTASSIUM SERPL-SCNC: 3.5 MMOL/L (ref 3.5–5.1)
PROT SERPL-MCNC: 6.1 G/DL (ref 5.8–7.6)
RBC # BLD AUTO: 4.35 10*6/UL (ref 4.2–5.4)
SODIUM SERPL-SCNC: 142 MMOL/L (ref 136–145)
TRIGL SERPL-MCNC: 170 MG/DL (ref 37–140)
VLDLC SERPL CALC-MCNC: 34 MG/DL
WBC # SPEC AUTO: 5.2 10*3/UL (ref 4.5–11.5)

## 2022-02-08 ENCOUNTER — HISTORICAL (OUTPATIENT)
Dept: HEMATOLOGY/ONCOLOGY | Facility: CLINIC | Age: 62
End: 2022-02-08

## 2022-02-08 LAB
ALBUMIN SERPL-MCNC: 3.4 G/DL (ref 3.4–4.8)
ALBUMIN/GLOB SERPL: 1.3 {RATIO} (ref 1.1–2)
ALP SERPL-CCNC: 112 U/L (ref 40–150)
ALT SERPL-CCNC: 36 U/L (ref 0–55)
AST SERPL-CCNC: 30 U/L (ref 5–34)
BILIRUB SERPL-MCNC: 0.3 MG/DL
BILIRUBIN DIRECT+TOT PNL SERPL-MCNC: 0.1 (ref 0–0.5)
BILIRUBIN DIRECT+TOT PNL SERPL-MCNC: 0.2 (ref 0–0.8)
BUN SERPL-MCNC: 20.8 MG/DL (ref 9.8–20.1)
CALCIUM SERPL-MCNC: 9.7 MG/DL (ref 8.7–10.5)
CHLORIDE SERPL-SCNC: 107 MMOL/L (ref 98–107)
CO2 SERPL-SCNC: 27 MMOL/L (ref 23–31)
CREAT SERPL-MCNC: 0.69 MG/DL (ref 0.55–1.02)
GLOBULIN SER-MCNC: 2.6 G/DL (ref 2.4–3.5)
GLUCOSE SERPL-MCNC: 85 MG/DL (ref 82–115)
HEMOLYSIS INTERF INDEX SERPL-ACNC: 2
ICTERIC INTERF INDEX SERPL-ACNC: 0
LIPEMIC INTERF INDEX SERPL-ACNC: 12
POTASSIUM SERPL-SCNC: 3.6 MMOL/L (ref 3.5–5.1)
PROT SERPL-MCNC: 6 G/DL (ref 5.8–7.6)
SODIUM SERPL-SCNC: 144 MMOL/L (ref 136–145)

## 2022-02-15 ENCOUNTER — HISTORICAL (OUTPATIENT)
Dept: HEMATOLOGY/ONCOLOGY | Facility: CLINIC | Age: 62
End: 2022-02-15

## 2022-02-15 LAB
ABS NEUT (OLG): 4.17 (ref 2.1–9.2)
ALBUMIN SERPL-MCNC: 3.6 G/DL (ref 3.4–4.8)
ALBUMIN/GLOB SERPL: 1.4 {RATIO} (ref 1.1–2)
ALP SERPL-CCNC: 123 U/L (ref 40–150)
ALT SERPL-CCNC: 38 U/L (ref 0–55)
AST SERPL-CCNC: 33 U/L (ref 5–34)
BASOPHILS # BLD AUTO: 0 10*3/UL (ref 0–0.2)
BASOPHILS NFR BLD AUTO: 0.7 %
BILIRUB SERPL-MCNC: 0.2 MG/DL
BILIRUBIN DIRECT+TOT PNL SERPL-MCNC: 0.1 (ref 0–0.5)
BILIRUBIN DIRECT+TOT PNL SERPL-MCNC: 0.1 (ref 0–0.8)
BUN SERPL-MCNC: 16.9 MG/DL (ref 9.8–20.1)
CALCIUM SERPL-MCNC: 9.4 MG/DL (ref 8.7–10.5)
CHLORIDE SERPL-SCNC: 106 MMOL/L (ref 98–107)
CHOLEST SERPL-MCNC: 239 MG/DL
CHOLEST/HDLC SERPL: 3 {RATIO} (ref 0–5)
CO2 SERPL-SCNC: 29 MMOL/L (ref 23–31)
CORTIS SERPL-SCNC: 21.1 NMOL/L
CREAT SERPL-MCNC: 0.59 MG/DL (ref 0.55–1.02)
EOSINOPHIL # BLD AUTO: 0.1 10*3/UL (ref 0–0.9)
EOSINOPHIL NFR BLD AUTO: 1.7 %
ERYTHROCYTE [DISTWIDTH] IN BLOOD BY AUTOMATED COUNT: 13.1 % (ref 11.5–17)
GLOBULIN SER-MCNC: 2.6 G/DL (ref 2.4–3.5)
GLUCOSE SERPL-MCNC: 88 MG/DL (ref 82–115)
HCT VFR BLD AUTO: 39.1 % (ref 37–47)
HDLC SERPL-MCNC: 72 MG/DL (ref 35–60)
HEMOLYSIS INTERF INDEX SERPL-ACNC: 2
HGB BLD-MCNC: 12.9 G/DL (ref 12–16)
ICTERIC INTERF INDEX SERPL-ACNC: 0
LDLC SERPL CALC-MCNC: 145 MG/DL (ref 50–140)
LIPEMIC INTERF INDEX SERPL-ACNC: 0
LYMPHOCYTES # BLD AUTO: 0.6 10*3/UL (ref 0.6–4.6)
LYMPHOCYTES NFR BLD AUTO: 11.5 %
MANUAL DIFF? (OHS): NO
MCH RBC QN AUTO: 31.6 PG (ref 27–31)
MCHC RBC AUTO-ENTMCNC: 33 G/DL (ref 33–36)
MCV RBC AUTO: 95.8 FL (ref 80–94)
MONOCYTES # BLD AUTO: 0.5 10*3/UL (ref 0.1–1.3)
MONOCYTES NFR BLD AUTO: 8.5 %
NEUTROPHILS # BLD AUTO: 4.2 10*3/UL (ref 2.1–9.2)
NEUTROPHILS NFR BLD AUTO: 77.2 %
PLATELET # BLD AUTO: 199 10*3/UL (ref 130–400)
PMV BLD AUTO: 10.6 FL (ref 9.4–12.4)
POTASSIUM SERPL-SCNC: 3.6 MMOL/L (ref 3.5–5.1)
PROT SERPL-MCNC: 6.2 G/DL (ref 5.8–7.6)
RBC # BLD AUTO: 4.08 10*6/UL (ref 4.2–5.4)
SODIUM SERPL-SCNC: 145 MMOL/L (ref 136–145)
T4 FREE SERPL-MCNC: 0.69 NG/DL (ref 0.7–1.48)
TRIGL SERPL-MCNC: 112 MG/DL (ref 37–140)
TSH SERPL-ACNC: 0.55 M[IU]/L (ref 0.35–4.94)
VLDLC SERPL CALC-MCNC: 22 MG/DL
WBC # SPEC AUTO: 5.4 10*3/UL (ref 4.5–11.5)

## 2022-03-09 ENCOUNTER — HISTORICAL (OUTPATIENT)
Dept: ADMINISTRATIVE | Facility: HOSPITAL | Age: 62
End: 2022-03-09

## 2022-03-09 LAB
ALBUMIN SERPL-MCNC: 3.2 G/DL (ref 3.4–4.8)
ALBUMIN/GLOB SERPL: 1.1 {RATIO} (ref 1.1–2)
ALP SERPL-CCNC: 117 U/L (ref 40–150)
ALT SERPL-CCNC: 32 U/L (ref 0–55)
AST SERPL-CCNC: 35 U/L (ref 5–34)
BILIRUB SERPL-MCNC: 0.3 MG/DL
BILIRUBIN DIRECT+TOT PNL SERPL-MCNC: 0.1 (ref 0–0.5)
BILIRUBIN DIRECT+TOT PNL SERPL-MCNC: 0.2 (ref 0–0.8)
BUN SERPL-MCNC: 19.2 MG/DL (ref 9.8–20.1)
CALCIUM SERPL-MCNC: 9.4 MG/DL (ref 8.7–10.5)
CHLORIDE SERPL-SCNC: 103 MMOL/L (ref 98–107)
CO2 SERPL-SCNC: 28 MMOL/L (ref 23–31)
CREAT SERPL-MCNC: 0.64 MG/DL (ref 0.55–1.02)
DEPRECATED CALCIDIOL+CALCIFEROL SERPL-MC: 47.7 NG/ML (ref 30–80)
GLOBULIN SER-MCNC: 3 G/DL (ref 2.4–3.5)
GLUCOSE SERPL-MCNC: 87 MG/DL (ref 82–115)
HEMOLYSIS INTERF INDEX SERPL-ACNC: 3
ICTERIC INTERF INDEX SERPL-ACNC: 0
LIPEMIC INTERF INDEX SERPL-ACNC: 22
POTASSIUM SERPL-SCNC: 4 MMOL/L (ref 3.5–5.1)
PROT SERPL-MCNC: 6.2 G/DL (ref 5.8–7.6)
SODIUM SERPL-SCNC: 140 MMOL/L (ref 136–145)

## 2022-03-15 ENCOUNTER — HISTORICAL (OUTPATIENT)
Dept: HEMATOLOGY/ONCOLOGY | Facility: CLINIC | Age: 62
End: 2022-03-15

## 2022-03-15 LAB
ABS NEUT (OLG): 2.29 (ref 2.1–9.2)
ALBUMIN SERPL-MCNC: 3.2 G/DL (ref 3.4–4.8)
ALBUMIN/GLOB SERPL: 1.1 {RATIO} (ref 1.1–2)
ALP SERPL-CCNC: 119 U/L (ref 40–150)
ALT SERPL-CCNC: 26 U/L (ref 0–55)
AST SERPL-CCNC: 30 U/L (ref 5–34)
BASOPHILS # BLD AUTO: 0 10*3/UL (ref 0–0.2)
BASOPHILS NFR BLD AUTO: 1 %
BILIRUB SERPL-MCNC: 0.3 MG/DL
BILIRUBIN DIRECT+TOT PNL SERPL-MCNC: 0.1 (ref 0–0.5)
BILIRUBIN DIRECT+TOT PNL SERPL-MCNC: 0.2 (ref 0–0.8)
BUN SERPL-MCNC: 17.6 MG/DL (ref 9.8–20.1)
CALCIUM SERPL-MCNC: 9.2 MG/DL (ref 8.7–10.5)
CHLORIDE SERPL-SCNC: 107 MMOL/L (ref 98–107)
CHOLEST SERPL-MCNC: 208 MG/DL
CHOLEST/HDLC SERPL: 3 {RATIO} (ref 0–5)
CO2 SERPL-SCNC: 28 MMOL/L (ref 23–31)
CORTIS SERPL-SCNC: 3.6 NMOL/L
CREAT SERPL-MCNC: 0.64 MG/DL (ref 0.55–1.02)
EOSINOPHIL # BLD AUTO: 0.2 10*3/UL (ref 0–0.9)
EOSINOPHIL NFR BLD AUTO: 4 %
ERYTHROCYTE [DISTWIDTH] IN BLOOD BY AUTOMATED COUNT: 13.4 % (ref 11.5–17)
GLOBULIN SER-MCNC: 2.8 G/DL (ref 2.4–3.5)
GLUCOSE SERPL-MCNC: 79 MG/DL (ref 82–115)
HCT VFR BLD AUTO: 37.7 % (ref 37–47)
HDLC SERPL-MCNC: 61 MG/DL (ref 35–60)
HEMOLYSIS INTERF INDEX SERPL-ACNC: 2
HGB BLD-MCNC: 12.4 G/DL (ref 12–16)
ICTERIC INTERF INDEX SERPL-ACNC: 0
LDLC SERPL CALC-MCNC: 118 MG/DL (ref 50–140)
LIPEMIC INTERF INDEX SERPL-ACNC: 0
LYMPHOCYTES # BLD AUTO: 0.6 10*3/UL (ref 0.6–4.6)
LYMPHOCYTES NFR BLD AUTO: 16 %
MANUAL DIFF? (OHS): NO
MCH RBC QN AUTO: 31.7 PG (ref 27–31)
MCHC RBC AUTO-ENTMCNC: 32.9 G/DL (ref 33–36)
MCV RBC AUTO: 96.4 FL (ref 80–94)
MONOCYTES # BLD AUTO: 0.5 10*3/UL (ref 0.1–1.3)
MONOCYTES NFR BLD AUTO: 14 %
NEUTROPHILS # BLD AUTO: 2.29 10*3/UL (ref 2.1–9.2)
NEUTROPHILS NFR BLD AUTO: 65 %
PLATELET # BLD AUTO: 244 10*3/UL (ref 130–400)
PMV BLD AUTO: 11 FL (ref 9.4–12.4)
POS ERR2 (OHS): NORMAL
POTASSIUM SERPL-SCNC: 4 MMOL/L (ref 3.5–5.1)
PROT SERPL-MCNC: 6 G/DL (ref 5.8–7.6)
RBC # BLD AUTO: 3.91 10*6/UL (ref 4.2–5.4)
SODIUM SERPL-SCNC: 142 MMOL/L (ref 136–145)
T4 FREE SERPL-MCNC: 0.57 NG/DL (ref 0.7–1.48)
TRIGL SERPL-MCNC: 146 MG/DL (ref 37–140)
TSH SERPL-ACNC: 1.52 M[IU]/L (ref 0.35–4.94)
VLDLC SERPL CALC-MCNC: 29 MG/DL
WBC # SPEC AUTO: 3.6 10*3/UL (ref 4.5–11.5)

## 2022-03-17 ENCOUNTER — HISTORICAL (OUTPATIENT)
Dept: ADMINISTRATIVE | Facility: HOSPITAL | Age: 62
End: 2022-03-17

## 2022-03-23 ENCOUNTER — HISTORICAL (OUTPATIENT)
Dept: ADMINISTRATIVE | Facility: HOSPITAL | Age: 62
End: 2022-03-23

## 2022-03-28 ENCOUNTER — HISTORICAL (OUTPATIENT)
Dept: RADIOLOGY | Facility: HOSPITAL | Age: 62
End: 2022-03-28

## 2022-03-28 LAB — CBG: 92 (ref 70–115)

## 2022-03-31 ENCOUNTER — HISTORICAL (OUTPATIENT)
Dept: INFUSION THERAPY | Facility: HOSPITAL | Age: 62
End: 2022-03-31

## 2022-04-07 ENCOUNTER — HISTORICAL (OUTPATIENT)
Dept: ADMINISTRATIVE | Facility: HOSPITAL | Age: 62
End: 2022-04-07

## 2022-04-07 ENCOUNTER — HISTORICAL (OUTPATIENT)
Dept: HEMATOLOGY/ONCOLOGY | Facility: CLINIC | Age: 62
End: 2022-04-07

## 2022-04-07 LAB
ABS NEUT (OLG): 3.95 (ref 2.1–9.2)
ALBUMIN SERPL-MCNC: 3.3 G/DL (ref 3.4–4.8)
ALBUMIN/GLOB SERPL: 1.1 {RATIO} (ref 1.1–2)
ALP SERPL-CCNC: 160 U/L (ref 40–150)
ALT SERPL-CCNC: 22 U/L (ref 0–55)
APTT PPP: 25.4 S (ref 23.2–33.7)
AST SERPL-CCNC: 27 U/L (ref 5–34)
BASOPHILS # BLD AUTO: 0.1 10*3/UL (ref 0–0.2)
BASOPHILS NFR BLD AUTO: 1.1 %
BILIRUB SERPL-MCNC: 0.2 MG/DL
BILIRUBIN DIRECT+TOT PNL SERPL-MCNC: 0.1 (ref 0–0.5)
BILIRUBIN DIRECT+TOT PNL SERPL-MCNC: 0.1 (ref 0–0.8)
BUN SERPL-MCNC: 21.2 MG/DL (ref 9.8–20.1)
CALCIUM SERPL-MCNC: 9.2 MG/DL (ref 8.7–10.5)
CHLORIDE SERPL-SCNC: 109 MMOL/L (ref 98–107)
CHOLEST SERPL-MCNC: 215 MG/DL
CHOLEST/HDLC SERPL: 4 {RATIO} (ref 0–5)
CO2 SERPL-SCNC: 24 MMOL/L (ref 23–31)
CORTIS SERPL-SCNC: 31.3 NMOL/L
CREAT SERPL-MCNC: 0.67 MG/DL (ref 0.55–1.02)
EOSINOPHIL # BLD AUTO: 0.1 10*3/UL (ref 0–0.9)
EOSINOPHIL NFR BLD AUTO: 2.6 %
ERYTHROCYTE [DISTWIDTH] IN BLOOD BY AUTOMATED COUNT: 13 % (ref 11.5–17)
GLOBULIN SER-MCNC: 3.1 G/DL (ref 2.4–3.5)
GLUCOSE SERPL-MCNC: 84 MG/DL (ref 82–115)
HCT VFR BLD AUTO: 36.3 % (ref 37–47)
HDLC SERPL-MCNC: 58 MG/DL (ref 35–60)
HEMOLYSIS INTERF INDEX SERPL-ACNC: <0
HGB BLD-MCNC: 12 G/DL (ref 12–16)
ICTERIC INTERF INDEX SERPL-ACNC: 0
INR PPP: 1 (ref 0–1.3)
LDLC SERPL CALC-MCNC: 118 MG/DL (ref 50–140)
LIPEMIC INTERF INDEX SERPL-ACNC: 2
LYMPHOCYTES # BLD AUTO: 0.6 10*3/UL (ref 0.6–4.6)
LYMPHOCYTES NFR BLD AUTO: 11.3 %
MANUAL DIFF? (OHS): NO
MCH RBC QN AUTO: 32.2 PG (ref 27–31)
MCHC RBC AUTO-ENTMCNC: 33.1 G/DL (ref 33–36)
MCV RBC AUTO: 97.3 FL (ref 80–94)
MONOCYTES # BLD AUTO: 0.6 10*3/UL (ref 0.1–1.3)
MONOCYTES NFR BLD AUTO: 11.2 %
NEUTROPHILS # BLD AUTO: 4 10*3/UL (ref 2.1–9.2)
NEUTROPHILS NFR BLD AUTO: 73.4 %
PLATELET # BLD AUTO: 251 10*3/UL (ref 130–400)
PMV BLD AUTO: 10 FL (ref 9.4–12.4)
POTASSIUM SERPL-SCNC: 3.8 MMOL/L (ref 3.5–5.1)
PROT SERPL-MCNC: 6.4 G/DL (ref 5.8–7.6)
PROTHROMBIN TIME: 12.7 S (ref 12.5–14.5)
RBC # BLD AUTO: 3.73 10*6/UL (ref 4.2–5.4)
SODIUM SERPL-SCNC: 143 MMOL/L (ref 136–145)
T4 FREE SERPL-MCNC: 0.54 NG/DL (ref 0.7–1.48)
TRIGL SERPL-MCNC: 196 MG/DL (ref 37–140)
TSH SERPL-ACNC: 1.3 M[IU]/L (ref 0.35–4.94)
VLDLC SERPL CALC-MCNC: 39 MG/DL
WBC # SPEC AUTO: 5.4 10*3/UL (ref 4.5–11.5)

## 2022-04-08 ENCOUNTER — HISTORICAL (OUTPATIENT)
Dept: ADMINISTRATIVE | Facility: HOSPITAL | Age: 62
End: 2022-04-08

## 2022-04-12 ENCOUNTER — HISTORICAL (OUTPATIENT)
Dept: ADMINISTRATIVE | Facility: HOSPITAL | Age: 62
End: 2022-04-12

## 2022-04-12 ENCOUNTER — HISTORICAL (OUTPATIENT)
Dept: ADMINISTRATIVE | Facility: HOSPITAL | Age: 62
End: 2022-04-12
Payer: COMMERCIAL

## 2022-04-15 ENCOUNTER — HISTORICAL (OUTPATIENT)
Dept: ADMINISTRATIVE | Facility: HOSPITAL | Age: 62
End: 2022-04-15

## 2022-04-21 ENCOUNTER — HISTORICAL (OUTPATIENT)
Dept: ADMINISTRATIVE | Facility: HOSPITAL | Age: 62
End: 2022-04-21
Payer: COMMERCIAL

## 2022-04-27 ENCOUNTER — HISTORICAL (OUTPATIENT)
Dept: ADMINISTRATIVE | Facility: HOSPITAL | Age: 62
End: 2022-04-27
Payer: COMMERCIAL

## 2022-04-30 VITALS
DIASTOLIC BLOOD PRESSURE: 68 MMHG | WEIGHT: 138.44 LBS | OXYGEN SATURATION: 98 % | HEIGHT: 65 IN | BODY MASS INDEX: 23.07 KG/M2 | SYSTOLIC BLOOD PRESSURE: 116 MMHG

## 2022-04-30 NOTE — OP NOTE
Patient:   Shreya Reyes             MRN: [id number]            FIN: [id number]               Age:   60 years     Sex:  Female     :  1960   Associated Diagnoses:   None   Author:   Jordan Marroquin II, MD      Pre-op Dx:  Cataract of the Left eye    Post-op Dx:  Cataract of the Left eye     Procedure:  Cataract extraction by phacoemulsification   with an toric multifocal IOL    Anes:   Topical    Complications: None    Procedure in detail:   Dilating drops were given in the holding area.  The patient was brought into the surgical suite, identified and the correct eye confirmed.  Topical anesthesia was applied and iol axis marked with pt sitting upright.  The eye was then prepped and draped in a sterile fashion.  A supersharp blade was used to make a paracentesis at the 5 oclock position.  1% lidocaine 1/2 cc + BSS 1/2 cc was injected into the AC thru cannula then Viscoelastic was placed in the anterior chamber.  A clear corneal incision was made at the   018degree position with a keratome blade.  Next, a cystatome and utrata forceps were used to make and remove a 360 degree capsulorrhexis.  The phaco handpiece was placed into the anterior chamber and the lens removed in a divide and conquer fashion.  The remaining cortex was removed with the I & A handpiece.  Viscoelastic was placed into the capsular bag, which remained clear and intact.  An  IOL was placed in the bag and rotated into position.  The remaining viscoelastic was removed with the I &A handpiece.  The incision was hydrated with BSS and checked for leakage.  No leakage was found.  The drapes were removed and antibiotic drops were placed into the eye.  The patient tolerated the procedure well and was moved back to the holding room.  Sunglasses and instructions were personally given to the patient and family.  The patient will follow-up at my office tomorrow.      EFX 26     19.0  TFNT30 IOL @ 090       Kirit Marroquin II, M.D.

## 2022-04-30 NOTE — OP NOTE
Patient:   Shreya Reyes             MRN: [id number]            FIN: [id number]               Age:   60 years     Sex:  Female     :  1960   Associated Diagnoses:   None   Author:   Cara LUTZ MD, Jordan VOGEL      Pre-op Dx:  Mechanical IOL complication of the Left eye    Post-op Dx:  Mechanical IOL complication of the Left eye     Procedure:  IOL exchange    Anes:   Topical    Complications: None    Procedure in detail:   Dilating drops were given in the holding area.  The patient was brought into the surgical suite, identified and the correct eye confirmed.  Topical anesthesia was applied and axis marked.  The eye was then prepped and draped in a sterile fashion.  A supersharp blade was used to make a paracentesis at the 5 oclock position.  1% lidocaine MPF 1cc was injected into AC thru cannula then Viscoelastic was placed in the anterior chamber.  A 2.2 mm clear cornea incision was made at the 45 degree position.  Viscoelastic on a flat cannula was used to hydrodisection the iol from the capsular bag.  The panoptix IOL was then rotated into the AC and cut in half.  The pieces were removed then Viscoelastic was placed into the capsular bag, which remained clear and intact.  An  IOL was placed in the bag and rotated into position.  The remaining viscoelastic was removed with the I &A handpiece.  The incision was hydrated with BSS and checked for leakage.  No leakage was found.  The drapes were removed and antibiotic drops were placed into the eye.  The patient tolerated the procedure well and was moved back to the holding room.  Sunglasses and instructions were personally given to the patient and family.  The patient will follow-up at my office tomorrow.        20.0    [id number] IOL @ 088        Kirit Marroquin II, M.D.

## 2022-04-30 NOTE — OP NOTE
Patient:   Shreya Reyes             MRN: [id number]            FIN: [id number]               Age:   61 years     Sex:  Female     :  1960   Associated Diagnoses:   None   Author:   Cara LUTZ MD, Ricardo R      Pre-op Dx:  Cataract of the Right eye    Post-op Dx:  Cataract of the Right eye     Procedure:  Cataract extraction by Catalys LRI & phacoemulsification with an IOL    Anes:   Topical    Complications: None    Procedure in detail:   Dilating drops were given in the holding area.  Topical anesthesia was applied, axis marked with pt sitting upright and the Catalys laser was used to perform the capsulorrhexis, LRI and lens fragmentation.  The patient was brought into the surgical suite, identified and the correct eye confirmed.  Topical anesthesia was applied.  The eye was then prepped and draped in a sterile fashion.  A supersharp blade was used to make a paracentesis at the 11 oclock position. 1/2 cc of 2% lidocaine + 1/2 cc BSS was injected into AC thru cannula. Viscoelastic was placed in the anterior chamber.  A clear corneal incision was made at the 190 degrees position with a keratome blade.  Next, utrata forceps were used to remove the capsulorrhexis. BSS thru a cannula was used to hydro dissect & rotate the lens material from the capsule. The phaco handpiece was placed into the anterior chamber and the lens removed in a divide and conquer fashion.  The remaining cortex was removed with the I & A handpiece.  Viscoelastic was placed into the capsular bag, which remained clear and intact.  An  IOL was placed in the bag and rotated into position.  The remaining viscoelastic was removed with the I &A handpiece.  The incision was hydrated with BSS and checked for leakage.  No leakage was found.  The drapes were removed and antibiotic drops were placed into the eye.  The patient tolerated the procedure well and was moved back to the holding room.  Sunglasses and instructions were personally given  to the patient and family.  The patient will follow-up at my office tomorrow.      EFX 21    20.0 ZCBOO IOL      Two Arc's @ 90/270 degrees Anterior Penetrating      Kirit Marroquin II, M.D.

## 2022-05-04 ENCOUNTER — HOSPITAL ENCOUNTER (OUTPATIENT)
Dept: RADIOLOGY | Facility: HOSPITAL | Age: 62
Discharge: HOME | End: 2022-05-04
Attending: INTERNAL MEDICINE
Payer: COMMERCIAL

## 2022-05-04 ENCOUNTER — OFFICE VISIT (OUTPATIENT)
Dept: NEUROSURGERY | Facility: CLINIC | Age: 62
End: 2022-05-04
Payer: COMMERCIAL

## 2022-05-04 VITALS
WEIGHT: 130 LBS | BODY MASS INDEX: 22.2 KG/M2 | HEART RATE: 73 BPM | DIASTOLIC BLOOD PRESSURE: 83 MMHG | HEIGHT: 64 IN | SYSTOLIC BLOOD PRESSURE: 137 MMHG | RESPIRATION RATE: 17 BRPM | TEMPERATURE: 98 F

## 2022-05-04 DIAGNOSIS — S32.030S CLOSED COMPRESSION FRACTURE OF L3 LUMBAR VERTEBRA, SEQUELA: ICD-10-CM

## 2022-05-04 DIAGNOSIS — S22.000A COMPRESSION FRACTURE OF THORACIC VERTEBRA: Primary | ICD-10-CM

## 2022-05-04 DIAGNOSIS — C74.02 MALIGNANT NEOPLASM OF CORTEX OF LEFT ADRENAL GLAND: ICD-10-CM

## 2022-05-04 DIAGNOSIS — C74.00 MALIGNANT NEOPLASM OF ADRENAL CORTEX, UNSPECIFIED LATERALITY: ICD-10-CM

## 2022-05-04 DIAGNOSIS — S22.000A COMPRESSION FRACTURE OF THORACIC VERTEBRA: ICD-10-CM

## 2022-05-04 DIAGNOSIS — S32.010D COMPRESSION FRACTURE OF L1 VERTEBRA WITH ROUTINE HEALING, SUBSEQUENT ENCOUNTER: Primary | ICD-10-CM

## 2022-05-04 DIAGNOSIS — S22.080D CLOSED WEDGE COMPRESSION FRACTURE OF T11 VERTEBRA WITH ROUTINE HEALING, SUBSEQUENT ENCOUNTER: ICD-10-CM

## 2022-05-04 DIAGNOSIS — M81.0 OSTEOPOROSIS, UNSPECIFIED OSTEOPOROSIS TYPE, UNSPECIFIED PATHOLOGICAL FRACTURE PRESENCE: ICD-10-CM

## 2022-05-04 PROBLEM — I10 HYPERTENSION: Status: ACTIVE | Noted: 2022-05-04

## 2022-05-04 PROBLEM — S32.000A COMPRESSION FRACTURE OF LUMBAR VERTEBRA: Chronic | Status: ACTIVE | Noted: 2022-05-04

## 2022-05-04 PROBLEM — E78.5 HYPERLIPIDEMIA: Status: ACTIVE | Noted: 2019-01-01

## 2022-05-04 PROBLEM — S32.000A COMPRESSION FRACTURE OF LUMBAR VERTEBRA: Status: ACTIVE | Noted: 2022-05-04

## 2022-05-04 PROBLEM — S22.080A CLOSED WEDGE COMPRESSION FRACTURE OF T11 VERTEBRA: Status: ACTIVE | Noted: 2022-05-04

## 2022-05-04 PROBLEM — R74.8 ELEVATED LIVER ENZYMES: Status: ACTIVE | Noted: 2021-11-02

## 2022-05-04 PROBLEM — M54.9 BACK PAIN: Status: ACTIVE | Noted: 2022-05-04

## 2022-05-04 PROCEDURE — 99024 PR POST-OP FOLLOW-UP VISIT: ICD-10-PCS | Mod: ,,, | Performed by: NURSE PRACTITIONER

## 2022-05-04 PROCEDURE — 1159F PR MEDICATION LIST DOCUMENTED IN MEDICAL RECORD: ICD-10-PCS | Mod: CPTII,,, | Performed by: NURSE PRACTITIONER

## 2022-05-04 PROCEDURE — 4010F ACE/ARB THERAPY RXD/TAKEN: CPT | Mod: CPTII,,, | Performed by: NURSE PRACTITIONER

## 2022-05-04 PROCEDURE — 3079F DIAST BP 80-89 MM HG: CPT | Mod: CPTII,,, | Performed by: NURSE PRACTITIONER

## 2022-05-04 PROCEDURE — 72070 X-RAY EXAM THORAC SPINE 2VWS: CPT | Mod: TC

## 2022-05-04 PROCEDURE — 3075F PR MOST RECENT SYSTOLIC BLOOD PRESS GE 130-139MM HG: ICD-10-PCS | Mod: CPTII,,, | Performed by: NURSE PRACTITIONER

## 2022-05-04 PROCEDURE — 4010F PR ACE/ARB THEARPY RXD/TAKEN: ICD-10-PCS | Mod: CPTII,,, | Performed by: NURSE PRACTITIONER

## 2022-05-04 PROCEDURE — 3075F SYST BP GE 130 - 139MM HG: CPT | Mod: CPTII,,, | Performed by: NURSE PRACTITIONER

## 2022-05-04 PROCEDURE — 3079F PR MOST RECENT DIASTOLIC BLOOD PRESSURE 80-89 MM HG: ICD-10-PCS | Mod: CPTII,,, | Performed by: NURSE PRACTITIONER

## 2022-05-04 PROCEDURE — 3008F PR BODY MASS INDEX (BMI) DOCUMENTED: ICD-10-PCS | Mod: CPTII,,, | Performed by: NURSE PRACTITIONER

## 2022-05-04 PROCEDURE — 3008F BODY MASS INDEX DOCD: CPT | Mod: CPTII,,, | Performed by: NURSE PRACTITIONER

## 2022-05-04 PROCEDURE — 1159F MED LIST DOCD IN RCRD: CPT | Mod: CPTII,,, | Performed by: NURSE PRACTITIONER

## 2022-05-04 PROCEDURE — 99024 POSTOP FOLLOW-UP VISIT: CPT | Mod: ,,, | Performed by: NURSE PRACTITIONER

## 2022-05-04 RX ORDER — HYDROCORTISONE 10 MG/1
20 TABLET ORAL
COMMUNITY
Start: 2022-02-01 | End: 2022-05-19 | Stop reason: SDUPTHER

## 2022-05-04 RX ORDER — CHOLECALCIFEROL (VITAMIN D3) 125 MCG
250 CAPSULE ORAL
COMMUNITY
Start: 2022-04-21

## 2022-05-04 RX ORDER — CALCIUM CARBONATE 600 MG
600 TABLET ORAL
COMMUNITY
Start: 2022-03-24 | End: 2022-05-18 | Stop reason: SDUPTHER

## 2022-05-04 RX ORDER — LOSARTAN POTASSIUM 25 MG/1
50 TABLET ORAL
COMMUNITY
Start: 2022-03-24 | End: 2022-05-19 | Stop reason: SDUPTHER

## 2022-05-04 NOTE — ASSESSMENT & PLAN NOTE
The patient is doing well with improving lower back pain. She will continue with outpatient physical therapy. She can f/u with Dr. Lori BENSON. She will call with any issues.

## 2022-05-04 NOTE — PROGRESS NOTES
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  She sustained an L1 compression fracture a couple of months ago, but then fell at a [employer] [place] [place] a few weeks later resulting in increased pain.  Imaging study showed progression of L1 fracture and a new fracture at L3 because of her history of active metastatic adrenal carcinoma, PET/CT scans were done that showed increased activity at both L1 and L3 as well as a couple other osseous locations.  Because of the severe compression of L1 with near vertebra plana resulting in early kyphotic deformity, kyphoplasty with internal reduction using the Spine Brice was carried out on April 8, 2022. She did not have any significant improvement in her back pain.  After review of imaging, she was noted to have additional fracture at T11.  Thoracic MRI was carried out on April 15, 2022 which revealed the acute T11 fracture, there were no other acute changes.  She was taken to the OR on April 21, 2022 for T11 internal reduction with Spine Brice device and T12 kyphoplasty.      The patient presents today for 2 week post-op appointment. She is doing well. Her back pain continues to improve. She has been able to increase her activity some. She is in outpatient therapy. She has completed 3 days so far. She reports some lower back soreness after therapy. She is no longer wearing the Ganesh brace. She is pleased with her recovery thus far. She denies any changes in bowel or bladder function. She is ambulating well. She denies any lower extremity pain, numbness or tingling. Her ,  Dion Amy, is present with her today in the office.     Vital Signs  Temp: 97.8 °F (36.6 °C)  Temp src: Oral  Pulse: 73  Resp: 17  BP: 137/83  BP Location: Other (Comment)  Patient Position: Sitting  Pain Score:   3  Height and Weight  Height: 5' 4" (162.6 cm)  Weight: 59 kg (130 lb)  BSA (Calculated - sq m): 1.63 sq meters  BMI " (Calculated): 22.3  Weight in (lb) to have BMI = 25: 145.3]      Physical Exam:  In no acute distress  Patient is awake, alert and oriented x 4  MAEW, no focal deficits on exam  Gait normal  Incision is well-healed, glue removed    Imaging:  XRs performed on 5/4/2022 reveal unchanged appearance of compression deformities s/p kyphoplasty at T12 and L3, and SpineJack at T11 and L1. Alignment is maintained.     Assessment/Plan:  The patient is doing well. She will continue with outpatient therapy. She will f/u PRN with Dr. Sandoval.

## 2022-05-05 ENCOUNTER — LAB VISIT (OUTPATIENT)
Dept: LAB | Facility: HOSPITAL | Age: 62
End: 2022-05-05
Attending: INTERNAL MEDICINE
Payer: COMMERCIAL

## 2022-05-05 DIAGNOSIS — S22.000A COMPRESSION FRACTURE OF THORACIC VERTEBRA: ICD-10-CM

## 2022-05-05 DIAGNOSIS — C74.02 MALIGNANT NEOPLASM OF CORTEX OF LEFT ADRENAL GLAND: ICD-10-CM

## 2022-05-05 LAB
ALBUMIN SERPL-MCNC: 2.9 GM/DL (ref 3.4–4.8)
ALBUMIN/GLOB SERPL: 1.1 RATIO (ref 1.1–2)
ALP SERPL-CCNC: 110 UNIT/L (ref 40–150)
ALT SERPL-CCNC: 15 UNIT/L (ref 0–55)
AST SERPL-CCNC: 20 UNIT/L (ref 5–34)
BASOPHILS # BLD AUTO: 0.03 X10(3)/MCL (ref 0–0.2)
BASOPHILS NFR BLD AUTO: 0.7 %
BILIRUBIN DIRECT+TOT PNL SERPL-MCNC: 0.1 MG/DL (ref 0–0.5)
BILIRUBIN DIRECT+TOT PNL SERPL-MCNC: 0.1 MG/DL (ref 0–0.8)
BILIRUBIN DIRECT+TOT PNL SERPL-MCNC: 0.2 MG/DL
BUN SERPL-MCNC: 20.2 MG/DL (ref 9.8–20.1)
CALCIUM SERPL-MCNC: 8.6 MG/DL (ref 8.4–10.2)
CHLORIDE SERPL-SCNC: 110 MMOL/L (ref 98–107)
CHOLEST SERPL-MCNC: 204 MG/DL
CHOLEST/HDLC SERPL: 4 {RATIO} (ref 0–5)
CO2 SERPL-SCNC: 25 MMOL/L (ref 23–31)
CORTIS SERPL-SCNC: 3.2 UG/DL
CORTIS SERPL-SCNC: 3.6 UG/DL
CREAT SERPL-MCNC: 0.62 MG/DL (ref 0.55–1.02)
EOSINOPHIL # BLD AUTO: 0.07 X10(3)/MCL (ref 0–0.9)
EOSINOPHIL NFR BLD AUTO: 1.7 %
ERYTHROCYTE [DISTWIDTH] IN BLOOD BY AUTOMATED COUNT: 13.1 % (ref 11.5–17)
GLOBULIN SER-MCNC: 2.7 GM/DL (ref 2.4–3.5)
GLUCOSE SERPL-MCNC: 66 MG/DL (ref 82–115)
HCT VFR BLD AUTO: 34.5 % (ref 37–47)
HDLC SERPL-MCNC: 53 MG/DL (ref 35–60)
HGB BLD-MCNC: 11.3 GM/DL (ref 12–16)
IMM GRANULOCYTES # BLD AUTO: 0.02 X10(3)/MCL (ref 0–0.02)
IMM GRANULOCYTES NFR BLD AUTO: 0.5 % (ref 0–0.43)
LDLC SERPL CALC-MCNC: 123 MG/DL (ref 50–140)
LYMPHOCYTES # BLD AUTO: 0.91 X10(3)/MCL (ref 0.6–4.6)
LYMPHOCYTES NFR BLD AUTO: 22.6 %
MCH RBC QN AUTO: 32.5 PG (ref 27–31)
MCHC RBC AUTO-ENTMCNC: 32.8 MG/DL (ref 33–36)
MCV RBC AUTO: 99.1 FL (ref 80–94)
MONOCYTES # BLD AUTO: 0.4 X10(3)/MCL (ref 0.1–1.3)
MONOCYTES NFR BLD AUTO: 10 %
NEUTROPHILS # BLD AUTO: 2.6 X10(3)/MCL (ref 2.1–9.2)
NEUTROPHILS NFR BLD AUTO: 64.5 %
PLATELET # BLD AUTO: 199 X10(3)/MCL (ref 130–400)
PMV BLD AUTO: 11.1 FL (ref 9.4–12.4)
POTASSIUM SERPL-SCNC: 3.8 MMOL/L (ref 3.5–5.1)
PROT SERPL-MCNC: 5.6 GM/DL (ref 5.8–7.6)
RBC # BLD AUTO: 3.48 X10(6)/MCL (ref 4.2–5.4)
SODIUM SERPL-SCNC: 145 MMOL/L (ref 136–145)
T4 FREE SERPL-MCNC: 0.59 NG/DL (ref 0.7–1.48)
TRIGL SERPL-MCNC: 139 MG/DL (ref 37–140)
TSH SERPL-ACNC: 1.51 UIU/ML (ref 0.35–4.94)
VLDLC SERPL CALC-MCNC: 28 MG/DL
WBC # SPEC AUTO: 4 X10(3)/MCL (ref 4.5–11.5)

## 2022-05-05 PROCEDURE — 82088 ASSAY OF ALDOSTERONE: CPT

## 2022-05-05 PROCEDURE — 84481 FREE ASSAY (FT-3): CPT

## 2022-05-05 PROCEDURE — 85025 COMPLETE CBC W/AUTO DIFF WBC: CPT

## 2022-05-05 PROCEDURE — 80299 QUANTITATIVE ASSAY DRUG: CPT

## 2022-05-05 PROCEDURE — 84443 ASSAY THYROID STIM HORMONE: CPT

## 2022-05-05 PROCEDURE — 82024 ASSAY OF ACTH: CPT

## 2022-05-05 PROCEDURE — 80061 LIPID PANEL: CPT

## 2022-05-05 PROCEDURE — 36415 COLL VENOUS BLD VENIPUNCTURE: CPT

## 2022-05-05 PROCEDURE — 80053 COMPREHEN METABOLIC PANEL: CPT

## 2022-05-05 PROCEDURE — 82533 TOTAL CORTISOL: CPT

## 2022-05-05 PROCEDURE — 84439 ASSAY OF FREE THYROXINE: CPT

## 2022-05-06 LAB — ACTH PLAS-MCNC: 22 PG/ML

## 2022-05-09 LAB
ALDOST SERPL-MCNC: <4 NG/DL
T3FREE SERPL-MCNC: 2.1 PG/ML (ref 1.57–3.91)

## 2022-05-09 PROCEDURE — 82530 CORTISOL FREE: CPT | Performed by: NURSE PRACTITIONER

## 2022-05-11 LAB
COLLECT DURATION TIME UR: 24 H
CORTIS 24H UR-MRATE: 266 MCG/24 H (ref 3.5–45)
SPECIMEN VOL 24H UR: 1480 ML

## 2022-05-14 NOTE — OP NOTE
Patient:   Shreya Reyes            MRN: [id number]            FIN: [id number]               Age:   62 years     Sex:  Female     :  1960   Associated Diagnoses:   None   Author:   Nam Sandoval MD      Operative Note   DATE OF OPERATION:  2022    PREOPERATIVE DIAGNOSIS:  1.  T11 compression fracture  2.  Severe osteoporosis    POSTOPERATIVE DIAGNOSIS:  1.  T11 compression fracture  2.  Severe osteoporosis    SURGEON:  Nam Sandoval M.D.   ASSISTANT: TATY Cueto    PROCEDURE:  1.  T11 internal reduction and cement augmentation with the SpineJack device  2.  T11 vertebral body biopsy  3.  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  She sustained an L1 compression fracture a couple of months ago, but then fell at a [employer] [place] a few weeks later resulting in increased pain.  Imaging study showed progression of the L1 fracture and a new fracture at L3.  Because of her history of active metastatic adrenal carcinoma, PET/CT was done that does show increase activity at both L1 and L3 as well as a couple other osseous locations.  Because of the severe compression of L1 with near-vertebra plana resulting in early kyphotic deformity, kyphoplasty with attempted internal reduction using the SpineJack was carried out on 2022.  Unfortunately, she only had modest improvement in her back pain.  Upon reviewing her preop x-rays from 2022 a severe compression fracture at T11 was seen.  We missed this as did the radiologist who read those films.  Thoracic MRI was carried out 4/15/2022 showing only the new T11 fracture.  Internal reduction and vertebral augmentation was recommended for T11 and prophylactic kyphoplasty at T12 was recommended due to  the presence of previously treated level at L1.  The patient and her  understood and accepted the nature of this surgery as well as its attendant risks.    FINDINGS:  There was excellent internal reduction and restoration of vertebral body height of the T11 vertebral body without any extravasation of cement.  Excellent fill of the T12 vertebral body was carried out, also without extravasation.  Biopsies were sent from the T11 fracture.  10 cc of Exparel were injected on either side of the T11 pedicle entry.  The patient tolerated the procedure well.    PROCEDURE IN DETAIL:  After endotracheal intubation and induction of general anesthesia, the patient was positioned prone on the spinal frame with pressure points appropriately padded. The patient received intravenous antibiotics prior to the start of the procedure. The back was prepped and draped in the usual fashion. The C-arm image intensifier was brought in to position and the pedicles of the appropriate levels were identified and marked on the skin. An appropriate entry point was chosen and a small stab incision was created after infiltrating the subcutaneous tissue and down to the entry point of the pedicle with local anesthetic containing epinephrine. The tip of the pedicle access Jamshidi needle was placed at the junction of the pedicle and vertebral body. Position was confirmed in the AP and lateral plane. The needle was then advanced into the vertebral body. The procedure was carried out on the opposite side. Following satisfactory placement of the needles, the stylettes were removed and K wires placed through the anterior margin of the vertebral body at T11.  The pedicle access cannula was removed and then the drill was placed over the Jamshidi needle and also advanced toward the anterior margin of the vertebral body.  The drill was removed and a bone plug placed into the left-sided cannula.  The devices were then placed through each cannula toward  the anterior portion of the vertebral body.  Then, under continuous fluoroscopic guidance, the spine barbara devices were elevated with significant restoration of vertebral body height.  Core biopsies were sent from the drill.  Attention was then paid to the T12 level where the pedicles were accessed bilaterally, channel drilled and then inflatable bone tamp was placed.  These were then inflated under fluoroscopic control and then returning back to the T11 level the devices were then filled with cement.  The bone voids were filled with cement at T12.  The cannulas were removed.  10 cc of Exparel were injected into each pedicle access tract at T11.  At the completion of the procedure, all stab incisions were closed with Dermabond glue. Patient was then taken to the post anesthetic care unit in satisfactory condition with correct sponge and needle counts.

## 2022-05-14 NOTE — H&P
Patient:   Shreya Reyes            MRN: [id number]            FIN: [id number]               Age:   62 years     Sex:  Female     :  1960   Associated Diagnoses:   None   Author:   Jyothi CAMERON, Shweta Hall      Health Status   The H&P was reviewed, the patient was examined, and there are no changes to the patient's condition..

## 2022-05-14 NOTE — H&P
Patient:   Shreya Reyes            MRN: [id number]            FIN: [id number]               Age:   62 years     Sex:  Female     :  1960   Associated Diagnoses:   None   Author:   Jyothi CAMERON, Shweta Hall      Health Status   The H&P was reviewed, the patient was examined, and there are no changes to the patient's condition..

## 2022-05-14 NOTE — OP NOTE
Patient:   Shreya Reyes            MRN: [id number]            FIN: [id number]               Age:   62 years     Sex:  Female     :  1960   Associated Diagnoses:   None   Author:   Nam Sandoval MD      Operative Note   DATE OF OPERATION:  2022    PREOPERATIVE DIAGNOSIS:  1.  L1, L3 compression fractures  2.  Metastatic adrenal carcinoma    POSTOPERATIVE DIAGNOSIS:  1.  L1, L3 compression fractures  2.  Metastatic adrenal carcinoma    SURGEON:  Nam Sandoval M.D.   ASSISTANT: TATY Cueto    PROCEDURE:  1.  L1 kyphoplasty and internal reduction of fracture with the SpineJack device  2.  L3 kyphoplasty  2.  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  She sustained an L1 compression fracture a couple of months ago, but then fell at a [employer] [place] [place] a few weeks later resulting in increased pain.  Imaging study showed progression of the L1 fracture and a new fracture at L3.  Because of her history of active metastatic adrenal carcinoma, PET/CT was done that does show increase activity at both L1 and L3 as well as a couple other osseous locations.  Because of the severe compression of L1 with near-vertebra plana resulting in early kyphotic deformity, kyphoplasty with attempted internal reduction using the SpineJack was recommended.  Vertebral body biopsy would also be carried out.  The patient understood and accepted the nature of this surgery as well as its attendant risks.    FINDINGS:  There was nice internal reduction of the L1 fracture achieving significant restoration of vertebral body height.  There was excellent fill of the L1 and L3 vertebral bodies with cement.  Biopsy was sent from the L3 vertebral body prior to kyphoplasty.   Undiluted Exparel was placed at both the L1 and L3 injection sites, but predominantly at L1 the patient tolerated the procedure well.    PROCEDURE IN DETAIL:  After endotracheal intubation and induction of general anesthesia, the patient was positioned prone on the spinal frame with pressure points appropriately padded. The patient received intravenous antibiotics prior to the start of the procedure. The back was prepped and draped in the usual fashion. The C-arm image intensifier was brought in to position and the pedicles of the appropriate levels were identified and marked on the skin. An appropriate entry point was chosen and a small stab incision was created after infiltrating the subcutaneous tissue and down to the entry point of the pedicle with local anesthetic containing epinephrine. The tip of the pedicle access Nallatechshidi needle was placed at the junction of the pedicle and vertebral body. Position was confirmed in the AP and lateral plane. The needle was then advanced into the vertebral body. The procedure was carried out on the opposite side. Following satisfactory placement of the needles, the stylettes were removed and, when appropriate, a core biopsy was obtained from at least one side prior to drilling. Under continuous fluoroscopic guidance, the hand drill was then used to create a channel through the vertebral body down to the anterior portion of the vertebral body. Then, appropriate sized inflatable bone tamps were inserted through the cannula sheath and advanced under fluoroscopic guidance into the vertebral body. Then, under continuous fluoroscopic guidance, both bone tamps were inflated in 0.25-0.5 cc increments of contrast per side with careful attention being paid to the inflation pressures and balloon positioning on AP and lateral imaging. Care was taken to avoid breach of the lateral wall, anterior cortex or superior/inferior endplates of vertebral body during fracture reduction. Under  continuous fluoroscopic guidance, internal fixation was achieved with low-pressure injection of bone void filler (methylmethacrylate).  At L1 a larger channel was created for the reduction devices with excellent placement.  These were raised symmetrically and then cement advanced into the L1 vertebral body.  There is no significant leakage intradiscal he or otherwise from either level.  8 cc of Exparel were injected at each L1 entry site toward the pedicle and 2 cc on each side at the L3 level.  The cannulas were then removed. At the completion of the procedure, all stab incisions were closed with Dermabond glue. Patient was then taken to the post anesthetic care unit in satisfactory condition with correct sponge and needle counts.

## 2022-05-17 NOTE — PROGRESS NOTES
Subjective:       Patient ID: Shreya Reyes is a 62 y.o. female.    Chief Complaint:  My back pain is better    Diagnosis: Metastatic adrenocortical carcinoma                    Multiple osteoporotic vertebral compression fractures    Treatment History: Adjuvant XRT (completed 9/30/21)    Current Treatment: Mitotane resumed 1/25/22 (ON HOLD since 5/3/22)                                  Hydrocortisone 20 mg q.a.m., 10 mg q.p.m.                                  Levothyroxine 75 mcg/d    Patient initially presented to the [facility] 3/16/21 with acute left flank pain. CT A/P showed a heterogeneous enhancing mass of the left adrenal gland measuring 5.5 x 4.8 x 5 cm (24 Hu), with no definite microscopic fat. There was mild displacement of the left renal vein. Right adrenal gland was unremarkable.  Hormonal workup was negative for pheochromocytoma. She was felt to have had an acute adrenal hemorrhage and close observation was recommended. Follow-up CT A/P 6/9/21 showed increased size of the adrenal mass to 8.0 x 4.5 cm appearing hypervascular with some central necrosis. She underwent a laparoscopic/robotic left adrenalectomy 6/11/21.  Final pathology showed an adrenal cortical carcinoma predominant oncocytic/trabecular morphology with focally marked cytologic atypia and increased mitotic rate (up to 10/50 HPF's). There were large areas of necrosis and multiple foci of capsular and lymphovascular invasion. Ki-67 expression was 10-15%.    She had a Telemedicine consultation with Dr. Dion Lynch at [facility] 8/3/21 who specializes in treatment of adrenocortical carcinoma. Her case was reviewed and discussed in their tumor board. Recommendations were for treatment with adjuvant radiation therapy and systemic treatment with Mitotane. Baseline postoperative CT scans of the chest, abdomen and pelvis 8/4/21 showed postoperative changes with no evidence of measurable metastatic disease.    She was seen as a new patient  at [facility] 8/9/21.  She had recovered well from her surgery and was asymptomatic.  Treatment recommendations from the [facility] were reviewed and discussed.  Treatment was started with Mitotane 1000 mg BID on 8/16/2021.  No dose escalation was recommended until she completed radiation therapy.  She was started on replacement hydrocortisone 20 mg Q AM and 10 mg Q PM.  Surveillance CT scans were recommended in 3 months.      She was seen for an office visit 9/16/21 after completing 4 weeks of treatment with Mitotane.  She was not having any significant side effects associated with her therapy.  She was skilled nursing through her adjuvant radiation therapy.  Laboratory testing showed marked transaminase elevations with an AST of 493,  and alkaline phosphatase 175.  Bilirubin was normal.  The remainder of her CMP was unremarkable.  Baseline mitotane level drawn at that visit was 2.5 mcg/mL.  Mitotane was held and adjuvant radiation therapy was continued.  Weekly laboratory monitoring showed gradual improvement in her LFTs.  Although mitotane had been associated with transaminase elevations, >5x elevations are rare occurring in <1% of patients.  She was also instructed to hold her lovastatin.  She completed adjuvant radiation therapy 9/30/2021.     Follow-up laboratory continued to show transaminase elevations.  GI was consulted and ordered additional laboratory testing which did not reveal evidence of viral or autoimmune etiologies for her hepatitis.  Mitotane was not resumed due to her persistent transaminase elevations. Follow-up CT scans of the chest, abdomen and pelvis 10/28/21 showed a few stable subcentimeter pulmonary nodules and changes related to her previous left adrenalectomy with no evidence of disease recurrence.  Following normalization of her LFTs, treatment was resumed with Mitotane 1/25/22.  Her LFTs remained normal even during dose escalation.    She had an injury at home  in mid March after carrying in several arms of [employer] with acute mid back pain.  Plain x-ray 3/17/22 showed a compression deformity at L2 of questionable age.  MRI of the lumbar spine 3/23/22 showed a subacute severe compression fracture deformity of the L1 vertebral body and mild superior endplate compression fracture of L3 vertebral body with osteoporotic appearance and no findings suspicious for pathologic fracture.  However, there were scattered small osseous lesions in the right L3 vertebral body and L5 vertebral body concerning for metastatic disease.  CT PET scan 3/28/22 showed mildly hypermetabolic osseous lesions in the lumbar spine, pelvis and proximal left femur consistent with metastatic disease.  There was no significant hypermetabolic uptake at the sites of her compression fractures.  She did not have pain at any of the sites prior to the acute compression fracture.  Bone density exam showed osteoporosis of the lumbar spine with a T score of -3.4, left femoral neck -3.4 and left total hip -2.7.  She was started on Prolia 3/31/22 and underwent L1 and L3 kyphoplasty 4//8/22.  Biopsies of the L3 vertebral body showed no evidence of metastatic carcinoma.  She continued to have significant pain following the kyphoplasty.  Further review of her films showed a possible compression fracture at T11.  MRI of the thoracic spine 4/15/22 showed a compression fracture of T11 with 50% loss of vertebral body height.  She underwent kyphoplasty 4/21/22 with symptomatic improvement.    CT scans of the chest, abdomen and pelvis 4/27/22 showed sclerotic osseous lesions at L4, right ischium and left femur correlating with the hypermetabolic lesions on CT-PET scan.  There were stable sub-6 mm pulmonary nodules in the RML and LLL unchanged from previous imaging.       Interval History  She returns to the office today by herself for a 4 week follow-up visit.  She is out of her TLSO brace following her T11 kyphoplasty on  04/21/22.  She reports at least 50% improvement in her back pain.  She has also been doing outpatient physical therapy and is experiencing intermittent discomfort in both hips.  She has no pain on weight-bearing.  Her mitotane has been on hold since 05/03/22 due to a level of 20.7 mcg/ml.  Follow-up level drawn 5/5/22 had decreased to 12.9.  Results were not posted in Epic and I just received the lab report today.  Her last urine cortisol level was elevated and her replacement hydrocortisone was adjusted.  She had a follow-up telemedicine appointment with Dr. Lynch at [facility] 5/10/22.  Levothyroxine was added to her replacement regimen.  Recommendations were to continue mitotane with a therapeutic goal of 14-20 mcg/ml with follow-up CT imaging 3 months from her previous scans.    Review of Systems   Constitutional: Negative for appetite change, fatigue, fever and unexpected weight change.   HENT: Negative for mouth sores, sore throat and trouble swallowing.    Eyes: Negative.  Negative for visual disturbance.   Respiratory: Negative for cough, chest tightness and shortness of breath.    Cardiovascular: Negative for chest pain, palpitations and leg swelling.   Gastrointestinal: Negative for abdominal distention, abdominal pain, blood in stool, change in bowel habit, constipation, diarrhea, nausea, vomiting and change in bowel habit.   Genitourinary: Negative for dysuria, frequency and urgency.   Musculoskeletal: Positive for arthralgias and back pain (Improved).        Bilateral hip discomfort no bone pain   Integumentary:  Negative for rash and mole/lesion.   Neurological: Negative for headaches.   Hematological: Negative for adenopathy. Does not bruise/bleed easily.   Psychiatric/Behavioral:        Anxiety improved     PMHx:  Hyperlipidemia, osteoporosis    PSHx:  Tonsils, left cataract, ORIF left tibia/fib, left adrenalectomy, multiple vertebral kyphoplasties    SH:  Former smoker 1 PPD, quit 2009.  "+ Social alcohol use. Lives in [place] with her . 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    FH:  Her mother had lymphoma.       Objective:        BP (!) 166/90 (BP Location: Right leg)   Pulse 69   Temp 97 °F (36.1 °C)   Ht 5' 4.96" (1.65 m)   Wt 60.8 kg (134 lb 0.6 oz)   BMI 22.33 kg/m²    Physical Exam  Constitutional:       General: She is not in acute distress.     Appearance: Normal appearance.   HENT:      Head: Normocephalic.      Nose: Nose normal.      Mouth/Throat:      Mouth: Mucous membranes are moist.      Pharynx: Oropharynx is clear. No posterior oropharyngeal erythema.   Eyes:      Extraocular Movements: Extraocular movements intact.      Conjunctiva/sclera: Conjunctivae normal.      Pupils: Pupils are equal, round, and reactive to light.   Cardiovascular:      Rate and Rhythm: Normal rate and regular rhythm.      Heart sounds: No murmur heard.  Pulmonary:      Breath sounds: Normal breath sounds.   Abdominal:      General: Abdomen is flat. Bowel sounds are normal. There is no distension.      Palpations: Abdomen is soft. There is no mass.      Tenderness: There is no abdominal tenderness.   Musculoskeletal:         General: No swelling or tenderness. Normal range of motion.      Cervical back: Neck supple. No tenderness.   Lymphadenopathy:      Cervical: No cervical adenopathy.   Skin:     General: Skin is warm and dry.      Findings: No rash.   Neurological:      General: No focal deficit present.      Mental Status: She is alert and oriented to person, place, and time.      Cranial Nerves: No cranial nerve deficit.      Gait: Gait normal.       ECOG SCORE    1 - Restricted in strenuous activity-ambulatory and able to carry out work of a light nature        LABORATORY   Latest Reference Range & Units 05/05/22 09:05 05/09/22 06:47   WBC 4.5 - 11.5 x10(3)/mcL 4.0 (L)    RBC 4.20 - 5.40 x10(6)/mcL 3.48 (L)    Hemoglobin 12.0 - 16.0 gm/dL 11.3 (L)    Hematocrit 37.0 - " 47.0 % 34.5 (L)    MCV 80.0 - 94.0 fL 99.1 (H)    MCH 27.0 - 31.0 pg 32.5 (H)    MCHC 33.0 - 36.0 mg/dL 32.8 (L)    RDW 11.5 - 17.0 % 13.1    Platelets 130 - 400 x10(3)/mcL 199    MPV 9.4 - 12.4 fL 11.1    Neut % % 64.5    LYMPH % % 22.6    Mono % % 10.0    Eosinophil % % 1.7    Basophil % % 0.7    Immature Granulocytes 0 - 0.43 % 0.5 (H)    Neut # 2.1 - 9.2 x10(3)/mcL 2.6    Lymph # 0.6 - 4.6 x10(3)/mcL 0.91    Mono # 0.1 - 1.3 x10(3)/mcL 0.40    Eos # 0 - 0.9 x10(3)/mcL 0.07    Baso # 0 - 0.2 x10(3)/mcL 0.03    Immature Grans (Abs) 0 - 0.0155 x10(3)/mcL 0.02 (H)    Sodium 136 - 145 mmol/L 145    Potassium 3.5 - 5.1 mmol/L 3.8    Chloride 98 - 107 mmol/L 110 (H)    CO2 23 - 31 mmol/L 25    BUN 9.8 - 20.1 mg/dL 20.2 (H)    Creatinine 0.55 - 1.02 mg/dL 0.62    EGFR if non African American mls/min/1.73/m2 >60    Glucose 82 - 115 mg/dL 66 (L)    Calcium 8.4 - 10.2 mg/dL 8.6    Alkaline Phosphatase 40 - 150 unit/L 110    PROTEIN TOTAL 5.8 - 7.6 gm/dL 5.6 (L)    Albumin 3.4 - 4.8 gm/dL 2.9 (L)    Albumin/Globulin Ratio 1.1 - 2.0 ratio 1.1    BILIRUBIN TOTAL <=1.5 mg/dL 0.2    Bilirubin, Direct 0.0 - 0.5 mg/dL 0.1    Bilirubin, Indirect 0.00 - 0.80 mg/dL 0.10    AST 5 - 34 unit/L 20    ALT 0 - 55 unit/L 15    Globulin, Total 2.4 - 3.5 gm/dL 2.7    Cholesterol <=200 mg/dL 204 (H)    HDL 35 - 60 mg/dL 53    LDL Cholesterol External 50.00 - 140.00 mg/dL 123.00    Total Cholesterol/HDL Ratio 0 - 5  4    Triglycerides 37 - 140 mg/dL 139    Cortisol ug/dL 3.2  3.6    T3, Free 1.57 - 3.91 pg/mL 2.10    Free T4 0.70 - 1.48 ng/dL 0.59 (L)    Urine Volume mL  1480 [1]   Collection Duration h  24   Adrenocorticotropic Hormone, P pg/mL 22 [2]    Aldosterone, S <=21 ng/dL <4.0 [3]    Cortisol 3.5 - 45 mcg/24 h  266 (H)   Thyroid Stimulating Hormone 0.3500 - 4.9400 uIU/mL 1.5121    Very Low Density Lipoprotein  28        Assessment:   Metastatic adrenocortical carcinoma  Multiple osteoporotic vertebral body compression  fractures      Plan:   She is finally starting to get relief from her severe back pain following the T11 kyphoplasty.  She does not have any definite symptoms associated with her metastatic disease.  She will contact the [facility] with her most recent mitotane level to discuss dosing recommendations.  Continue replacement hydrocortisone 20/10 and levothyroxine 75 mcg/d pending follow-up laboratory.  Repeat laboratory testing in 2 weeks for therapeutic monitoring.  Monthly laboratory testing to include:  CMP, CBC, mitotane level, TSH, FT4, cholesterol panel, aldosterone, renin, ACTH, cortisol, DHEAS and 24 hour urine free cortisol.  RTC in 3 weeks for a follow-up visit, clinical exam and laboratory review.  She will be scheduled for follow-up CT scans of the chest, abdomen and pelvis at the beginning of June.      Robert Arzate MD    Other Physicians  Dr. Dion Lynch ([facility])   Pulses equal bilaterally, no edema present.

## 2022-05-18 RX ORDER — ALPRAZOLAM 0.25 MG/1
0.25 TABLET ORAL EVERY 8 HOURS PRN
COMMUNITY
Start: 2022-04-06 | End: 2022-11-09 | Stop reason: SDUPTHER

## 2022-05-19 ENCOUNTER — OFFICE VISIT (OUTPATIENT)
Dept: HEMATOLOGY/ONCOLOGY | Facility: CLINIC | Age: 62
End: 2022-05-19
Payer: COMMERCIAL

## 2022-05-19 VITALS
HEART RATE: 69 BPM | SYSTOLIC BLOOD PRESSURE: 166 MMHG | WEIGHT: 134.06 LBS | BODY MASS INDEX: 22.34 KG/M2 | TEMPERATURE: 97 F | DIASTOLIC BLOOD PRESSURE: 90 MMHG | HEIGHT: 65 IN

## 2022-05-19 DIAGNOSIS — C79.51 SECONDARY MALIGNANT NEOPLASM OF BONE: ICD-10-CM

## 2022-05-19 DIAGNOSIS — C74.02 MALIGNANT NEOPLASM OF CORTEX OF LEFT ADRENAL GLAND: Primary | ICD-10-CM

## 2022-05-19 PROCEDURE — 3080F PR MOST RECENT DIASTOLIC BLOOD PRESSURE >= 90 MM HG: ICD-10-PCS | Mod: CPTII,S$GLB,, | Performed by: INTERNAL MEDICINE

## 2022-05-19 PROCEDURE — 99999 PR PBB SHADOW E&M-EST. PATIENT-LVL III: ICD-10-PCS | Mod: PBBFAC,,, | Performed by: INTERNAL MEDICINE

## 2022-05-19 PROCEDURE — 3077F PR MOST RECENT SYSTOLIC BLOOD PRESSURE >= 140 MM HG: ICD-10-PCS | Mod: CPTII,S$GLB,, | Performed by: INTERNAL MEDICINE

## 2022-05-19 PROCEDURE — 3008F PR BODY MASS INDEX (BMI) DOCUMENTED: ICD-10-PCS | Mod: CPTII,S$GLB,, | Performed by: INTERNAL MEDICINE

## 2022-05-19 PROCEDURE — 4010F ACE/ARB THERAPY RXD/TAKEN: CPT | Mod: CPTII,S$GLB,, | Performed by: INTERNAL MEDICINE

## 2022-05-19 PROCEDURE — 1159F PR MEDICATION LIST DOCUMENTED IN MEDICAL RECORD: ICD-10-PCS | Mod: CPTII,S$GLB,, | Performed by: INTERNAL MEDICINE

## 2022-05-19 PROCEDURE — 1159F MED LIST DOCD IN RCRD: CPT | Mod: CPTII,S$GLB,, | Performed by: INTERNAL MEDICINE

## 2022-05-19 PROCEDURE — 3008F BODY MASS INDEX DOCD: CPT | Mod: CPTII,S$GLB,, | Performed by: INTERNAL MEDICINE

## 2022-05-19 PROCEDURE — 99214 OFFICE O/P EST MOD 30 MIN: CPT | Mod: S$GLB,,, | Performed by: INTERNAL MEDICINE

## 2022-05-19 PROCEDURE — 99999 PR PBB SHADOW E&M-EST. PATIENT-LVL III: CPT | Mod: PBBFAC,,, | Performed by: INTERNAL MEDICINE

## 2022-05-19 PROCEDURE — 3080F DIAST BP >= 90 MM HG: CPT | Mod: CPTII,S$GLB,, | Performed by: INTERNAL MEDICINE

## 2022-05-19 PROCEDURE — 4010F PR ACE/ARB THEARPY RXD/TAKEN: ICD-10-PCS | Mod: CPTII,S$GLB,, | Performed by: INTERNAL MEDICINE

## 2022-05-19 PROCEDURE — 99214 PR OFFICE/OUTPT VISIT, EST, LEVL IV, 30-39 MIN: ICD-10-PCS | Mod: S$GLB,,, | Performed by: INTERNAL MEDICINE

## 2022-05-19 PROCEDURE — 3077F SYST BP >= 140 MM HG: CPT | Mod: CPTII,S$GLB,, | Performed by: INTERNAL MEDICINE

## 2022-05-19 RX ORDER — LOSARTAN POTASSIUM 50 MG/1
1 TABLET ORAL DAILY
COMMUNITY
Start: 2022-05-11 | End: 2023-04-11 | Stop reason: SDUPTHER

## 2022-05-19 RX ORDER — HYDROCORTISONE 10 MG/1
20 TABLET ORAL DAILY
COMMUNITY
Start: 2022-02-01 | End: 2022-07-08 | Stop reason: SDUPTHER

## 2022-05-19 RX ORDER — HYDROCORTISONE 20 MG/1
10 TABLET ORAL 2 TIMES DAILY
Status: ON HOLD | COMMUNITY
Start: 2022-05-11 | End: 2023-02-01 | Stop reason: HOSPADM

## 2022-05-19 RX ORDER — LEVOTHYROXINE SODIUM 75 UG/1
1 TABLET ORAL DAILY
COMMUNITY
Start: 2022-05-11

## 2022-05-19 NOTE — LETTER
May 19, 2022        Robert Holt MD  [address] [address]             [facility]  [address], [address]  [place] [address]  Phone: [phone]   Patient: Shreya Reyes   MR Number: [id number]   YOB: 1960   Date of Visit: 5/19/2022       Dear Dr. Holt:    Thank you for referring Shreya Reyes to me for evaluation. Below are the relevant portions of my assessment and plan of care.            If you have questions, please do not hesitate to call me. I look forward to following Shreya along with you.    Sincerely,      MD BETI Das MD

## 2022-06-01 ENCOUNTER — LAB VISIT (OUTPATIENT)
Dept: LAB | Facility: HOSPITAL | Age: 62
End: 2022-06-01
Attending: INTERNAL MEDICINE
Payer: COMMERCIAL

## 2022-06-01 DIAGNOSIS — C79.51 SECONDARY MALIGNANT NEOPLASM OF BONE: ICD-10-CM

## 2022-06-01 DIAGNOSIS — C74.02 MALIGNANT NEOPLASM OF CORTEX OF LEFT ADRENAL GLAND: ICD-10-CM

## 2022-06-01 DIAGNOSIS — S22.000A COMPRESSION FRACTURE OF THORACIC VERTEBRA: ICD-10-CM

## 2022-06-01 LAB
ALBUMIN SERPL-MCNC: 3 GM/DL (ref 3.4–4.8)
ALBUMIN/GLOB SERPL: 1.1 RATIO (ref 1.1–2)
ALP SERPL-CCNC: 117 UNIT/L (ref 40–150)
ALT SERPL-CCNC: 15 UNIT/L (ref 0–55)
AST SERPL-CCNC: 21 UNIT/L (ref 5–34)
BASOPHILS # BLD AUTO: 0.04 X10(3)/MCL (ref 0–0.2)
BASOPHILS NFR BLD AUTO: 1.3 %
BILIRUBIN DIRECT+TOT PNL SERPL-MCNC: 0.2 MG/DL
BUN SERPL-MCNC: 22.1 MG/DL (ref 9.8–20.1)
CALCIUM SERPL-MCNC: 8.7 MG/DL (ref 8.4–10.2)
CHLORIDE SERPL-SCNC: 107 MMOL/L (ref 98–107)
CHOLEST SERPL-MCNC: 200 MG/DL
CHOLEST/HDLC SERPL: 3 {RATIO} (ref 0–5)
CO2 SERPL-SCNC: 26 MMOL/L (ref 23–31)
CORTIS SERPL-SCNC: 3.5 UG/DL
CREAT SERPL-MCNC: 0.62 MG/DL (ref 0.55–1.02)
EOSINOPHIL # BLD AUTO: 0.08 X10(3)/MCL (ref 0–0.9)
EOSINOPHIL NFR BLD AUTO: 2.6 %
ERYTHROCYTE [DISTWIDTH] IN BLOOD BY AUTOMATED COUNT: 12.4 % (ref 11.5–17)
GLOBULIN SER-MCNC: 2.8 GM/DL (ref 2.4–3.5)
GLUCOSE SERPL-MCNC: 77 MG/DL (ref 82–115)
HCT VFR BLD AUTO: 36.5 % (ref 37–47)
HDLC SERPL-MCNC: 61 MG/DL (ref 35–60)
HGB BLD-MCNC: 11.9 GM/DL (ref 12–16)
IMM GRANULOCYTES # BLD AUTO: 0.02 X10(3)/MCL (ref 0–0.02)
IMM GRANULOCYTES NFR BLD AUTO: 0.7 % (ref 0–0.43)
LDLC SERPL CALC-MCNC: 109 MG/DL (ref 50–140)
LYMPHOCYTES # BLD AUTO: 0.91 X10(3)/MCL (ref 0.6–4.6)
LYMPHOCYTES NFR BLD AUTO: 30.1 %
MCH RBC QN AUTO: 32 PG (ref 27–31)
MCHC RBC AUTO-ENTMCNC: 32.6 MG/DL (ref 33–36)
MCV RBC AUTO: 98.1 FL (ref 80–94)
MONOCYTES # BLD AUTO: 0.4 X10(3)/MCL (ref 0.1–1.3)
MONOCYTES NFR BLD AUTO: 13.2 %
NEUTROPHILS # BLD AUTO: 1.6 X10(3)/MCL (ref 2.1–9.2)
NEUTROPHILS NFR BLD AUTO: 52.1 %
PLATELET # BLD AUTO: 187 X10(3)/MCL (ref 130–400)
PMV BLD AUTO: 10.7 FL (ref 9.4–12.4)
POTASSIUM SERPL-SCNC: 3.8 MMOL/L (ref 3.5–5.1)
PROT SERPL-MCNC: 5.8 GM/DL (ref 5.8–7.6)
RBC # BLD AUTO: 3.72 X10(6)/MCL (ref 4.2–5.4)
SODIUM SERPL-SCNC: 142 MMOL/L (ref 136–145)
T4 FREE SERPL-MCNC: 0.8 NG/DL (ref 0.7–1.48)
TRIGL SERPL-MCNC: 149 MG/DL (ref 37–140)
TSH SERPL-ACNC: 1.05 UIU/ML (ref 0.35–4.94)
VLDLC SERPL CALC-MCNC: 30 MG/DL
WBC # SPEC AUTO: 3 X10(3)/MCL (ref 4.5–11.5)

## 2022-06-01 PROCEDURE — 85025 COMPLETE CBC W/AUTO DIFF WBC: CPT

## 2022-06-01 PROCEDURE — 82533 TOTAL CORTISOL: CPT

## 2022-06-01 PROCEDURE — 80061 LIPID PANEL: CPT

## 2022-06-01 PROCEDURE — 82530 CORTISOL FREE: CPT | Performed by: INTERNAL MEDICINE

## 2022-06-01 PROCEDURE — 84244 ASSAY OF RENIN: CPT | Performed by: INTERNAL MEDICINE

## 2022-06-01 PROCEDURE — 82626 DEHYDROEPIANDROSTERONE: CPT | Performed by: NURSE PRACTITIONER

## 2022-06-01 PROCEDURE — 82088 ASSAY OF ALDOSTERONE: CPT | Performed by: INTERNAL MEDICINE

## 2022-06-01 PROCEDURE — 80053 COMPREHEN METABOLIC PANEL: CPT

## 2022-06-01 PROCEDURE — 82024 ASSAY OF ACTH: CPT | Performed by: INTERNAL MEDICINE

## 2022-06-01 PROCEDURE — 36415 COLL VENOUS BLD VENIPUNCTURE: CPT

## 2022-06-01 PROCEDURE — 84439 ASSAY OF FREE THYROXINE: CPT

## 2022-06-01 PROCEDURE — 80299 QUANTITATIVE ASSAY DRUG: CPT

## 2022-06-01 PROCEDURE — 84443 ASSAY THYROID STIM HORMONE: CPT

## 2022-06-02 LAB — ACTH PLAS-MCNC: 63 PG/ML

## 2022-06-03 LAB — ALDOST SERPL-MCNC: <4 NG/DL

## 2022-06-05 LAB — RENIN PLAS-CCNC: 0.8 NG/ML/H

## 2022-06-06 ENCOUNTER — PATIENT MESSAGE (OUTPATIENT)
Dept: HEMATOLOGY/ONCOLOGY | Facility: CLINIC | Age: 62
End: 2022-06-06
Payer: COMMERCIAL

## 2022-06-06 LAB — DHEA SERPL-MCNC: <0.5 NG/ML

## 2022-06-07 ENCOUNTER — PATIENT MESSAGE (OUTPATIENT)
Dept: HEMATOLOGY/ONCOLOGY | Facility: CLINIC | Age: 62
End: 2022-06-07
Payer: COMMERCIAL

## 2022-06-07 LAB
COLLECT DURATION TIME UR: 24 H
CORTIS 24H UR-MRATE: 30 MCG/24 H (ref 3.5–45)
SPECIMEN VOL 24H UR: 1200 ML

## 2022-06-10 ENCOUNTER — OFFICE VISIT (OUTPATIENT)
Dept: HEMATOLOGY/ONCOLOGY | Facility: CLINIC | Age: 62
End: 2022-06-10
Payer: COMMERCIAL

## 2022-06-10 VITALS
HEIGHT: 65 IN | HEART RATE: 88 BPM | WEIGHT: 134.25 LBS | SYSTOLIC BLOOD PRESSURE: 164 MMHG | BODY MASS INDEX: 22.37 KG/M2 | DIASTOLIC BLOOD PRESSURE: 96 MMHG | RESPIRATION RATE: 20 BRPM | TEMPERATURE: 99 F

## 2022-06-10 DIAGNOSIS — C74.00 MALIGNANT NEOPLASM OF ADRENAL CORTEX, UNSPECIFIED LATERALITY: Primary | ICD-10-CM

## 2022-06-10 LAB — MAYO GENERIC ORDERABLE RESULT: NORMAL

## 2022-06-10 PROCEDURE — 4010F PR ACE/ARB THEARPY RXD/TAKEN: ICD-10-PCS | Mod: CPTII,S$GLB,, | Performed by: NURSE PRACTITIONER

## 2022-06-10 PROCEDURE — 1160F RVW MEDS BY RX/DR IN RCRD: CPT | Mod: CPTII,S$GLB,, | Performed by: NURSE PRACTITIONER

## 2022-06-10 PROCEDURE — 99999 PR PBB SHADOW E&M-EST. PATIENT-LVL III: ICD-10-PCS | Mod: PBBFAC,,, | Performed by: NURSE PRACTITIONER

## 2022-06-10 PROCEDURE — 1159F PR MEDICATION LIST DOCUMENTED IN MEDICAL RECORD: ICD-10-PCS | Mod: CPTII,S$GLB,, | Performed by: NURSE PRACTITIONER

## 2022-06-10 PROCEDURE — 99999 PR PBB SHADOW E&M-EST. PATIENT-LVL III: CPT | Mod: PBBFAC,,, | Performed by: NURSE PRACTITIONER

## 2022-06-10 PROCEDURE — 99214 PR OFFICE/OUTPT VISIT, EST, LEVL IV, 30-39 MIN: ICD-10-PCS | Mod: S$GLB,,, | Performed by: NURSE PRACTITIONER

## 2022-06-10 PROCEDURE — 1160F PR REVIEW ALL MEDS BY PRESCRIBER/CLIN PHARMACIST DOCUMENTED: ICD-10-PCS | Mod: CPTII,S$GLB,, | Performed by: NURSE PRACTITIONER

## 2022-06-10 PROCEDURE — 3077F PR MOST RECENT SYSTOLIC BLOOD PRESSURE >= 140 MM HG: ICD-10-PCS | Mod: CPTII,S$GLB,, | Performed by: NURSE PRACTITIONER

## 2022-06-10 PROCEDURE — 3008F PR BODY MASS INDEX (BMI) DOCUMENTED: ICD-10-PCS | Mod: CPTII,S$GLB,, | Performed by: NURSE PRACTITIONER

## 2022-06-10 PROCEDURE — 4010F ACE/ARB THERAPY RXD/TAKEN: CPT | Mod: CPTII,S$GLB,, | Performed by: NURSE PRACTITIONER

## 2022-06-10 PROCEDURE — 99214 OFFICE O/P EST MOD 30 MIN: CPT | Mod: S$GLB,,, | Performed by: NURSE PRACTITIONER

## 2022-06-10 PROCEDURE — 1159F MED LIST DOCD IN RCRD: CPT | Mod: CPTII,S$GLB,, | Performed by: NURSE PRACTITIONER

## 2022-06-10 PROCEDURE — 3077F SYST BP >= 140 MM HG: CPT | Mod: CPTII,S$GLB,, | Performed by: NURSE PRACTITIONER

## 2022-06-10 PROCEDURE — 3080F DIAST BP >= 90 MM HG: CPT | Mod: CPTII,S$GLB,, | Performed by: NURSE PRACTITIONER

## 2022-06-10 PROCEDURE — 3080F PR MOST RECENT DIASTOLIC BLOOD PRESSURE >= 90 MM HG: ICD-10-PCS | Mod: CPTII,S$GLB,, | Performed by: NURSE PRACTITIONER

## 2022-06-10 PROCEDURE — 3008F BODY MASS INDEX DOCD: CPT | Mod: CPTII,S$GLB,, | Performed by: NURSE PRACTITIONER

## 2022-06-10 RX ORDER — IBUPROFEN 800 MG/1
800 TABLET ORAL 3 TIMES DAILY PRN
Status: ON HOLD | COMMUNITY
Start: 2022-05-20 | End: 2023-02-09 | Stop reason: HOSPADM

## 2022-06-10 NOTE — PROGRESS NOTES
Subjective:       Patient ID: Shreya Reyes is a 62 y.o. female.    Chief Complaint:  Feeling better, participating in PT    Diagnosis: Metastatic adrenocortical carcinoma                    Multiple osteoporotic vertebral compression fractures    Treatment History: Adjuvant XRT (completed 9/30/21)    Current Treatment: Mitotane resumed 1/25/22 - current dose 2000 mg BID                                  Hydrocortisone 20 mg q.a.m., 20 mg q.p.m.                                  Levothyroxine 75 mcg/d    Patient initially presented to the [facility] 3/16/21 with acute left flank pain. CT A/P showed a heterogeneous enhancing mass of the left adrenal gland measuring 5.5 x 4.8 x 5 cm (24 Hu), with no definite microscopic fat. There was mild displacement of the left renal vein. Right adrenal gland was unremarkable.  Hormonal workup was negative for pheochromocytoma. She was felt to have had an acute adrenal hemorrhage and close observation was recommended. Follow-up CT A/P 6/9/21 showed increased size of the adrenal mass to 8.0 x 4.5 cm appearing hypervascular with some central necrosis. She underwent a laparoscopic/robotic left adrenalectomy 6/11/21.  Final pathology showed an adrenal cortical carcinoma predominant oncocytic/trabecular morphology with focally marked cytologic atypia and increased mitotic rate (up to 10/50 HPF's). There were large areas of necrosis and multiple foci of capsular and lymphovascular invasion. Ki-67 expression was 10-15%.    She had a Telemedicine consultation with Dr. Dion Lynch at [facility] 8/3/21 who specializes in treatment of adrenocortical carcinoma. Her case was reviewed and discussed in their tumor board. Recommendations were for treatment with adjuvant radiation therapy and systemic treatment with Mitotane. Baseline postoperative CT scans of the chest, abdomen and pelvis 8/4/21 showed postoperative changes with no evidence of measurable metastatic disease.    She was seen  as a new patient at [facility] 8/9/21.  She had recovered well from her surgery and was asymptomatic.  Treatment recommendations from the [facility] were reviewed and discussed.  Treatment was started with Mitotane 1000 mg BID on 8/16/2021.  No dose escalation was recommended until she completed radiation therapy.  She was started on replacement hydrocortisone 20 mg Q AM and 10 mg Q PM.  Surveillance CT scans were recommended in 3 months.      She was seen for an office visit 9/16/21 after completing 4 weeks of treatment with Mitotane.  She was not having any significant side effects associated with her therapy.  She was penitentiary through her adjuvant radiation therapy.  Laboratory testing showed marked transaminase elevations with an AST of 493,  and alkaline phosphatase 175.  Bilirubin was normal.  The remainder of her CMP was unremarkable.  Baseline mitotane level drawn at that visit was 2.5 mcg/mL.  Mitotane was held and adjuvant radiation therapy was continued.  Weekly laboratory monitoring showed gradual improvement in her LFTs.  Although mitotane had been associated with transaminase elevations, >5x elevations are rare occurring in <1% of patients.  She was also instructed to hold her lovastatin.  She completed adjuvant radiation therapy 9/30/2021.     Follow-up laboratory continued to show transaminase elevations.  GI was consulted and ordered additional laboratory testing which did not reveal evidence of viral or autoimmune etiologies for her hepatitis.  Mitotane was not resumed due to her persistent transaminase elevations. Follow-up CT scans of the chest, abdomen and pelvis 10/28/21 showed a few stable subcentimeter pulmonary nodules and changes related to her previous left adrenalectomy with no evidence of disease recurrence.  Following normalization of her LFTs, treatment was resumed with Mitotane 1/25/22.  Her LFTs remained normal even during dose escalation.    She had  an injury at home in mid March after carrying in several arms of [employer] with acute mid back pain.  Plain x-ray 3/17/22 showed a compression deformity at L2 of questionable age.  MRI of the lumbar spine 3/23/22 showed a subacute severe compression fracture deformity of the L1 vertebral body and mild superior endplate compression fracture of L3 vertebral body with osteoporotic appearance and no findings suspicious for pathologic fracture.  However, there were scattered small osseous lesions in the right L3 vertebral body and L5 vertebral body concerning for metastatic disease.  CT PET scan 3/28/22 showed mildly hypermetabolic osseous lesions in the lumbar spine, pelvis and proximal left femur consistent with metastatic disease.  There was no significant hypermetabolic uptake at the sites of her compression fractures.  She did not have pain at any of the sites prior to the acute compression fracture.  Bone density exam showed osteoporosis of the lumbar spine with a T score of -3.4, left femoral neck -3.4 and left total hip -2.7.  She was started on Prolia 3/31/22 and underwent L1 and L3 kyphoplasty 4//8/22.  Biopsies of the L3 vertebral body showed no evidence of metastatic carcinoma.  She continued to have significant pain following the kyphoplasty.  Further review of her films showed a possible compression fracture at T11.  MRI of the thoracic spine 4/15/22 showed a compression fracture of T11 with 50% loss of vertebral body height.  She underwent kyphoplasty 4/21/22 with symptomatic improvement.    CT scans of the chest, abdomen and pelvis 4/27/22 showed sclerotic osseous lesions at L4, right ischium and left femur correlating with the hypermetabolic lesions on CT-PET scan.  There were stable sub-6 mm pulmonary nodules in the RML and LLL unchanged from previous imaging.       Interval History  Mrs. Reyes is here today by herself for a four week on treatment follow-up visit.  She resumed Mitotane 5/19/22 after  Mitotane level drawn 5/5/22 resulted at 12.9.  Her dose is currently 4000 mg daily (goal is 4-5 g/d).  She is now on Levothyroxine 75 mcg/day in addition to Hydrocortisone 20 mg BID.  She is fatigued and having some emotional lability.  Overall, her back pain is improved.  She is no longer in a brace and is participating in outpatient physical therapy.  She is receiving Prolia every 6 months, last dose was 3/22.  Laboratory drawn 06/01/2022 was reviewed and has been forwarded to [facility].  She has mild leukopenia secondary to treatment.  LFTs are stable.  Mitotane level resulted today was 22.2.  She will be due for follow-up imaging in late July.    Review of Systems   Constitutional: Positive for fatigue. Negative for appetite change, fever and unexpected weight change.   HENT: Negative for mouth sores, sore throat and trouble swallowing.    Eyes: Negative.  Negative for visual disturbance.   Respiratory: Negative for cough, chest tightness and shortness of breath.    Cardiovascular: Negative for chest pain, palpitations and leg swelling.   Gastrointestinal: Negative for abdominal distention, abdominal pain, blood in stool, change in bowel habit, constipation, diarrhea, nausea, vomiting and change in bowel habit.   Genitourinary: Negative for dysuria, frequency and urgency.   Musculoskeletal: Positive for arthralgias and back pain (Improved).        Bilateral hip discomfort no bone pain   Integumentary:  Negative for rash and mole/lesion.   Neurological: Negative for headaches.   Hematological: Negative for adenopathy. Does not bruise/bleed easily.   Psychiatric/Behavioral:        Emotional lability     PMHx:  Hyperlipidemia, osteoporosis    PSHx:  Tonsils, left cataract, ORIF left tibia/fib, left adrenalectomy, multiple vertebral kyphoplasties    SH:  Former smoker 1 PPD, quit 2009. + Social alcohol use. Lives in [place] with her . 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    FH:  Her mother had lymphoma.       Objective:        BP (!) 164/96 (BP Location: Right arm)   Pulse 88   Temp 98.8 °F (37.1 °C) (Oral)   Resp 20   Ht 5' 4.96" (1.65 m)   Wt 60.9 kg (134 lb 4.2 oz)   BMI 22.37 kg/m²    Physical Exam  Constitutional:       General: She is not in acute distress.     Appearance: Normal appearance.   HENT:      Head: Normocephalic.      Nose: Nose normal.      Mouth/Throat:      Mouth: Mucous membranes are moist.      Pharynx: Oropharynx is clear. No posterior oropharyngeal erythema.   Eyes:      Extraocular Movements: Extraocular movements intact.      Conjunctiva/sclera: Conjunctivae normal.      Pupils: Pupils are equal, round, and reactive to light.   Cardiovascular:      Rate and Rhythm: Normal rate and regular rhythm.      Heart sounds: No murmur heard.  Pulmonary:      Breath sounds: Normal breath sounds.   Abdominal:      General: Abdomen is flat. Bowel sounds are normal. There is no distension.      Palpations: Abdomen is soft. There is no mass.      Tenderness: There is no abdominal tenderness.   Musculoskeletal:         General: No swelling or tenderness. Normal range of motion.      Cervical back: Neck supple. No tenderness.   Lymphadenopathy:      Cervical: No cervical adenopathy.   Skin:     General: Skin is warm and dry.      Findings: No rash.   Neurological:      General: No focal deficit present.      Mental Status: She is alert and oriented to person, place, and time.      Cranial Nerves: No cranial nerve deficit.      Gait: Gait normal.       ECOG SCORE    1 - Restricted in strenuous activity-ambulatory and able to carry out work of a light nature        LABORATORY  Recent Results (from the past 336 hour(s))   Comprehensive Metabolic Panel    Collection Time: 06/01/22  7:24 AM   Result Value Ref Range    Sodium Level 142 136 - 145 mmol/L    Potassium Level 3.8 3.5 - 5.1 mmol/L    Chloride 107 98 - 107 mmol/L    Carbon Dioxide 26 23 - 31 mmol/L    " Glucose Level 77 (L) 82 - 115 mg/dL    Blood Urea Nitrogen 22.1 (H) 9.8 - 20.1 mg/dL    Creatinine 0.62 0.55 - 1.02 mg/dL    Calcium Level Total 8.7 8.4 - 10.2 mg/dL    Protein Total 5.8 5.8 - 7.6 gm/dL    Albumin Level 3.0 (L) 3.4 - 4.8 gm/dL    Globulin 2.8 2.4 - 3.5 gm/dL    Albumin/Globulin Ratio 1.1 1.1 - 2.0 ratio    Bilirubin Total 0.2 <=1.5 mg/dL    Alkaline Phosphatase 117 40 - 150 unit/L    Alanine Aminotransferase 15 0 - 55 unit/L    Aspartate Aminotransferase 21 5 - 34 unit/L    Estimated GFR-Non  >60 mls/min/1.73/m2   TSH    Collection Time: 06/01/22  7:24 AM   Result Value Ref Range    Thyroid Stimulating Hormone 1.0535 0.3500 - 4.9400 uIU/mL   T4, Free    Collection Time: 06/01/22  7:24 AM   Result Value Ref Range    Thyroxine Free 0.80 0.70 - 1.48 ng/dL   Lipid Panel    Collection Time: 06/01/22  7:24 AM   Result Value Ref Range    Cholesterol Total 200 <=200 mg/dL    HDL Cholesterol 61 (H) 35 - 60 mg/dL    Triglyceride 149 (H) 37 - 140 mg/dL    Cholesterol/HDL Ratio 3 0 - 5    Very Low Density Lipoprotein 30     LDL Cholesterol 109.00 50.00 - 140.00 mg/dL   Aldosterone    Collection Time: 06/01/22  7:24 AM   Result Value Ref Range    Aldosterone, S <4.0 <=21 ng/dL   Renin    Collection Time: 06/01/22  7:24 AM   Result Value Ref Range    Renin Activity, P 0.8 ng/mL/h   Cortisol    Collection Time: 06/01/22  7:24 AM   Result Value Ref Range    Cortisol Level 3.5 ug/dL   ACTH    Collection Time: 06/01/22  7:24 AM   Result Value Ref Range    Adrenocorticotropic Hormone, P 63 pg/mL   Cortisol, Urine, Free    Collection Time: 06/01/22  7:24 AM   Result Value Ref Range    Cortisol 30 3.5 - 45 mcg/24 h    Collection Duration 24 h    Urine Volume 1200 mL   CBC with Differential    Collection Time: 06/01/22  7:24 AM   Result Value Ref Range    WBC 3.0 (L) 4.5 - 11.5 x10(3)/mcL    RBC 3.72 (L) 4.20 - 5.40 x10(6)/mcL    Hgb 11.9 (L) 12.0 - 16.0 gm/dL    Hct 36.5 (L) 37.0 - 47.0 %    MCV 98.1 (H)  80.0 - 94.0 fL    MCH 32.0 (H) 27.0 - 31.0 pg    MCHC 32.6 (L) 33.0 - 36.0 mg/dL    RDW 12.4 11.5 - 17.0 %    Platelet 187 130 - 400 x10(3)/mcL    MPV 10.7 9.4 - 12.4 fL    Neut % 52.1 %    Lymph % 30.1 %    Mono % 13.2 %    Eos % 2.6 %    Basophil % 1.3 %    Lymph # 0.91 0.6 - 4.6 x10(3)/mcL    Neut # 1.6 (L) 2.1 - 9.2 x10(3)/mcL    Mono # 0.40 0.1 - 1.3 x10(3)/mcL    Eos # 0.08 0 - 0.9 x10(3)/mcL    Baso # 0.04 0 - 0.2 x10(3)/mcL    IG# 0.02 (H) 0 - 0.0155 x10(3)/mcL    IG% 0.7 (H) 0 - 0.43 %   Dehydroepiandrosterone, Serum    Collection Time: 06/01/22  7:58 AM   Result Value Ref Range    Dehydroepiandrosterone <0.5 <5.0 ng/mL   [place] GENERIC ORDERABLE FMITO (Mitotane)    Collection Time: 06/01/22  8:24 AM   Result Value Ref Range    [place] Generic Orderable SEE COMMENTS         Assessment:   Metastatic adrenocortical carcinoma  Multiple osteoporotic vertebral body compression fractures  Mild treatment induced leukopenia    Plan:   Ms. Reyes will forward her mitotane level to [facility] for recommendations regarding her Mitotane dosing.  Goal is to obtain a therapeutic mitotane level between 14-20 ug/ml and tolerable dose.    Continue hydrocortisone and levothyroxine replacement.  Monthly laboratory testing to include:  CMP, CBC, mitotane level, TSH, FT4, cholesterol panel, aldosterone, renin, ACTH, cortisol, DHEAS and 24 hour urine free cortisol.  Monitor for worsening leukopenia.  Continue Prolia every 6 months.  Next dose due 9/22.  RTC 4 weeks for follow-up.  CT scans of the chest, abdomen and pelvis are due in late July and will be scheduled at the next visit.      CRISTINA WILSON, FNP-C    Other Physicians  Dr. Dion Dimasy Hammer ([facility])

## 2022-06-20 ENCOUNTER — OFFICE VISIT (OUTPATIENT)
Dept: NEUROSURGERY | Facility: CLINIC | Age: 62
End: 2022-06-20
Payer: COMMERCIAL

## 2022-06-20 VITALS
BODY MASS INDEX: 22.36 KG/M2 | WEIGHT: 131 LBS | SYSTOLIC BLOOD PRESSURE: 181 MMHG | HEIGHT: 64 IN | RESPIRATION RATE: 16 BRPM | DIASTOLIC BLOOD PRESSURE: 101 MMHG | HEART RATE: 76 BPM

## 2022-06-20 DIAGNOSIS — M47.22 CERVICAL SPONDYLOSIS WITH RADICULOPATHY: Primary | ICD-10-CM

## 2022-06-20 PROCEDURE — 99213 OFFICE O/P EST LOW 20 MIN: CPT | Mod: ,,, | Performed by: PHYSICIAN ASSISTANT

## 2022-06-20 PROCEDURE — 3077F SYST BP >= 140 MM HG: CPT | Mod: CPTII,,, | Performed by: PHYSICIAN ASSISTANT

## 2022-06-20 PROCEDURE — 3008F BODY MASS INDEX DOCD: CPT | Mod: CPTII,,, | Performed by: PHYSICIAN ASSISTANT

## 2022-06-20 PROCEDURE — 3008F PR BODY MASS INDEX (BMI) DOCUMENTED: ICD-10-PCS | Mod: CPTII,,, | Performed by: PHYSICIAN ASSISTANT

## 2022-06-20 PROCEDURE — 3077F PR MOST RECENT SYSTOLIC BLOOD PRESSURE >= 140 MM HG: ICD-10-PCS | Mod: CPTII,,, | Performed by: PHYSICIAN ASSISTANT

## 2022-06-20 PROCEDURE — 3080F DIAST BP >= 90 MM HG: CPT | Mod: CPTII,,, | Performed by: PHYSICIAN ASSISTANT

## 2022-06-20 PROCEDURE — 3080F PR MOST RECENT DIASTOLIC BLOOD PRESSURE >= 90 MM HG: ICD-10-PCS | Mod: CPTII,,, | Performed by: PHYSICIAN ASSISTANT

## 2022-06-20 PROCEDURE — 4010F ACE/ARB THERAPY RXD/TAKEN: CPT | Mod: CPTII,,, | Performed by: PHYSICIAN ASSISTANT

## 2022-06-20 PROCEDURE — 1159F MED LIST DOCD IN RCRD: CPT | Mod: CPTII,,, | Performed by: PHYSICIAN ASSISTANT

## 2022-06-20 PROCEDURE — 4010F PR ACE/ARB THEARPY RXD/TAKEN: ICD-10-PCS | Mod: CPTII,,, | Performed by: PHYSICIAN ASSISTANT

## 2022-06-20 PROCEDURE — 1159F PR MEDICATION LIST DOCUMENTED IN MEDICAL RECORD: ICD-10-PCS | Mod: CPTII,,, | Performed by: PHYSICIAN ASSISTANT

## 2022-06-20 PROCEDURE — 99213 PR OFFICE/OUTPT VISIT, EST, LEVL III, 20-29 MIN: ICD-10-PCS | Mod: ,,, | Performed by: PHYSICIAN ASSISTANT

## 2022-06-20 RX ORDER — GABAPENTIN 300 MG/1
300 CAPSULE ORAL DAILY
Qty: 30 CAPSULE | Refills: 5 | Status: SHIPPED | OUTPATIENT
Start: 2022-06-20 | End: 2022-07-07 | Stop reason: ALTCHOICE

## 2022-06-20 NOTE — PROGRESS NOTES
[facility]  History & Physical  Neurosurgery      Shreya Reyes   [id number]   1960       CHIEF COMPLAINT:  Right      HPI:  Shreya Reyes is a 62 y.o. female who presents for neurosurgical evaluation.  The patient is known to Dr. Sandoval for having osteoporosis and multiple thoracic and lumbar compression fractures that were treated with kyphoplasty.  She called this morning to report the arm pain and concerns for cervical radiculopathy.     She does not have neck pain.  She started with pain on 2022 at the right chest.  The pain has progressively worsened to include the left trapezius muscle, chest, inner arm, and dorsal forearm.  She describes the pain as achy.  She denies numbness or tingling in either upper extremity.  Subjectively, she does not have weakness in either upper extremity.  The pain is improved with resting arm on something at 90 shoulder abduction & holding pillow within her underarm.   The patient denies disturbances in bowel or bladder function.  There is no difficulty with balance.      Past Medical History:   Diagnosis Date    Hyperlipidemia     Osteoporosis        Past Surgical History:   Procedure Laterality Date    ADRENALECTOMY Left     left cataract Left     ORIF TIBIA & FIBULA FRACTURES Left     TONSILLECTOMY         Family History   Problem Relation Age of Onset    Lymphoma Mother        Social History     Socioeconomic History    Marital status:    Occupational History    Occupation: RN on leave   Tobacco Use    Smoking status: Former Smoker     Packs/day: 1.00     Types: Cigarettes     Quit date: 2009     Years since quittin.4    Smokeless tobacco: Never Used   Substance and Sexual Activity    Alcohol use: Yes     Comment: social       Current Outpatient Medications   Medication Sig Dispense Refill    ALPRAZolam (XANAX) 0.25 MG tablet Take 0.25 mg by mouth every 8 (eight) hours as needed.      cholecalciferol, vitamin D3, 125 mcg  "(5,000 unit) capsule Take 250 mcg by mouth.      hydrocortisone (CORTEF) 10 MG Tab Take 20 mg by mouth once daily. 20 mg AM,20 mg PM      ibuprofen (ADVIL,MOTRIN) 800 MG tablet Take 800 mg by mouth 3 (three) times daily as needed.      levothyroxine (SYNTHROID) 75 MCG tablet Take 1 tablet by mouth once daily.      losartan (COZAAR) 50 MG tablet Take 1 tablet by mouth once daily.      hydrocortisone (CORTEF) 20 MG Tab Take 20 mg by mouth 2 (two) times daily.      LYSODREN 500 mg tablet TAKE 5 TABLETS BY MOUTH TWICE DAILY (Patient not taking: Reported on 6/20/2022) 300 tablet 11     No current facility-administered medications for this visit.       Review of patient's allergies indicates:  No Known Allergies       Review of Systems   Constitutional: Negative for chills and fever.   HENT: Negative for nosebleeds and sore throat.    Eyes: Negative for pain and visual disturbance.   Respiratory: Negative for cough, chest tightness and shortness of breath.    Cardiovascular: Negative for chest pain.   Gastrointestinal: Negative for diarrhea, nausea and vomiting.   Genitourinary: Negative for difficulty urinating, dysuria and hematuria.   Musculoskeletal: Negative for back pain, gait problem, myalgias and neck pain.   Skin: Negative for rash.   Neurological: Negative for dizziness, facial asymmetry, weakness, numbness and headaches.   Psychiatric/Behavioral: Negative for confusion and sleep disturbance. The patient is not nervous/anxious.        Physical Examination:    BP (!) 181/101 (BP Location: Other (Comment), Patient Position: Sitting)   Pulse 76   Resp 16   Ht 5' 4" (1.626 m)   Wt 59.4 kg (131 lb)   BMI 22.49 kg/m²     General:  Pleasant. Well-nourished. Well-groomed. No acute distress.    Lungs:  Breathing is quiet, non-labored     Musculoskeletal:  Cervical range of motion is full.  There is no pain with motion.  Tinel's is negative at bilateral wrist and elbows.  Spurling's maneuver is negative " bilaterally.    Neurological:    Oriented to Person, Place, Time   Muscle strength against resistance:  Strength  Deltoids Triceps Biceps Wrist Extension Wrist Flexion Hand    Upper: R 5/5 5/5 5-/5 5/5 5/5 5/5    L 5/5 5/5 5/5 5/5 5/5 5/5   Sensation is intact to primary modalities in bilateral upper extremities.  Reflexes:   Right Left   Triceps 2+ 2+   Biceps 2+ 2+   Brachioradialis 2+ 2+   Patellar 2+ 2+   Achilles 2+ 2+   Sandhu's sign is positive on the left, negative on the right.  There is no clonus at the ankles.  Gait is normal.    Coordination is normal.    Imaging:  Cervical x-rays were obtained today after the visit.  There is straightening of the cervical spine.  There is slight anterolisthesis of C4 on 5 that increases slightly with flexion.  There is severe disc degeneration and spondylosis at C 5 6 and C6-7 with foraminal narrowing bilaterally.    ASSESSMENT/PLAN:     1. Cervical spondylosis with radiculopathy  Ambulatory referral/consult to Physical/Occupational Therapy    gabapentin (NEURONTIN) 300 MG capsule    X-Ray Cervical Spine 5 View W Flex Extxt       Options were discussed at length with the patient.  She is going to physical therapy for the lower back.  We will add treatment to her cervical spine.  She was provided with a prescription of gabapentin with instructions to titrate up as she tolerates it.  She voiced understanding.  She will touch base with me in a few weeks to see how she is doing.  If her symptoms do not improve, we will obtain cervical MRI without contrast.

## 2022-06-21 ENCOUNTER — TELEPHONE (OUTPATIENT)
Dept: NEUROSURGERY | Facility: CLINIC | Age: 62
End: 2022-06-21
Payer: COMMERCIAL

## 2022-06-21 DIAGNOSIS — M54.50 ACUTE MIDLINE LOW BACK PAIN WITHOUT SCIATICA: ICD-10-CM

## 2022-06-21 DIAGNOSIS — S32.020A CLOSED COMPRESSION FRACTURE OF L2 LUMBAR VERTEBRA, INITIAL ENCOUNTER: Primary | ICD-10-CM

## 2022-06-21 DIAGNOSIS — M54.6 ACUTE MIDLINE THORACIC BACK PAIN: Primary | ICD-10-CM

## 2022-06-21 PROBLEM — E78.5 HYPERLIPIDEMIA: Status: RESOLVED | Noted: 2019-01-01 | Resolved: 2022-06-21

## 2022-06-21 PROBLEM — I10 HYPERTENSION: Status: RESOLVED | Noted: 2022-05-04 | Resolved: 2022-06-21

## 2022-06-21 PROBLEM — R74.8 ELEVATED LIVER ENZYMES: Status: RESOLVED | Noted: 2021-11-02 | Resolved: 2022-06-21

## 2022-06-21 PROBLEM — M47.22 CERVICAL SPONDYLOSIS WITH RADICULOPATHY: Status: ACTIVE | Noted: 2022-06-21

## 2022-06-21 NOTE — TELEPHONE ENCOUNTER
Called and scheduled patient accordingly per Juana's request for 07/07 @ 9:00am. Patient is having XR done on 07/06 @ 9:00 @ [facility]. She was compliant of appt date & time as well as XR.

## 2022-06-21 NOTE — TELEPHONE ENCOUNTER
I received a call from the patient.  While in bed last night, she sneezed three times.  She developed lower back pain after.  The pain is severe.  She needed help ambulating to the bathroom.      I sent her for x-rays.  She has a superior endplate fracture at L2 comparing x-rays to 4/7/2022.  This is the level in between two vertebrae treated with kyphoplasty.  She will get back into the [place] brace.  I will put x-ray orders in the chart.  She will need to come in, my schedule, 2 weeks with x-rays just prior.    She is using advil for pain which helps.

## 2022-06-22 DIAGNOSIS — S32.020A CLOSED COMPRESSION FRACTURE OF L2 LUMBAR VERTEBRA, INITIAL ENCOUNTER: Primary | ICD-10-CM

## 2022-06-22 RX ORDER — OXYCODONE AND ACETAMINOPHEN 5; 325 MG/1; MG/1
1 TABLET ORAL EVERY 4 HOURS PRN
Qty: 40 TABLET | Refills: 0 | Status: SHIPPED | OUTPATIENT
Start: 2022-06-22 | End: 2022-07-07 | Stop reason: SDUPTHER

## 2022-07-01 ENCOUNTER — LAB VISIT (OUTPATIENT)
Dept: LAB | Facility: HOSPITAL | Age: 62
End: 2022-07-01
Attending: INTERNAL MEDICINE
Payer: COMMERCIAL

## 2022-07-01 DIAGNOSIS — C74.02 MALIGNANT NEOPLASM OF CORTEX OF LEFT ADRENAL GLAND: ICD-10-CM

## 2022-07-01 DIAGNOSIS — C79.51 SECONDARY MALIGNANT NEOPLASM OF BONE: ICD-10-CM

## 2022-07-01 LAB
ALBUMIN SERPL-MCNC: 2.9 GM/DL (ref 3.4–4.8)
ALBUMIN/GLOB SERPL: 1 RATIO (ref 1.1–2)
ALP SERPL-CCNC: 97 UNIT/L (ref 40–150)
ALT SERPL-CCNC: 13 UNIT/L (ref 0–55)
AST SERPL-CCNC: 21 UNIT/L (ref 5–34)
BASOPHILS # BLD AUTO: 0.05 X10(3)/MCL (ref 0–0.2)
BASOPHILS NFR BLD AUTO: 1.2 %
BILIRUBIN DIRECT+TOT PNL SERPL-MCNC: 0.2 MG/DL
BUN SERPL-MCNC: 15 MG/DL (ref 9.8–20.1)
CALCIUM SERPL-MCNC: 9.1 MG/DL (ref 8.4–10.2)
CHLORIDE SERPL-SCNC: 105 MMOL/L (ref 98–107)
CHOLEST SERPL-MCNC: 187 MG/DL
CHOLEST/HDLC SERPL: 3 {RATIO} (ref 0–5)
CO2 SERPL-SCNC: 28 MMOL/L (ref 23–31)
CORTIS SERPL-SCNC: 3 UG/DL
CREAT SERPL-MCNC: 0.61 MG/DL (ref 0.55–1.02)
EOSINOPHIL # BLD AUTO: 0.13 X10(3)/MCL (ref 0–0.9)
EOSINOPHIL NFR BLD AUTO: 3.1 %
ERYTHROCYTE [DISTWIDTH] IN BLOOD BY AUTOMATED COUNT: 12.7 % (ref 11.5–17)
GLOBULIN SER-MCNC: 2.8 GM/DL (ref 2.4–3.5)
GLUCOSE SERPL-MCNC: 77 MG/DL (ref 82–115)
HCT VFR BLD AUTO: 37.3 % (ref 37–47)
HDLC SERPL-MCNC: 55 MG/DL (ref 35–60)
HGB BLD-MCNC: 12.3 GM/DL (ref 12–16)
IMM GRANULOCYTES # BLD AUTO: 0.02 X10(3)/MCL (ref 0–0.04)
IMM GRANULOCYTES NFR BLD AUTO: 0.5 %
LDLC SERPL CALC-MCNC: 89 MG/DL (ref 50–140)
LYMPHOCYTES # BLD AUTO: 1.05 X10(3)/MCL (ref 0.6–4.6)
LYMPHOCYTES NFR BLD AUTO: 25.1 %
MCH RBC QN AUTO: 31.8 PG (ref 27–31)
MCHC RBC AUTO-ENTMCNC: 33 MG/DL (ref 33–36)
MCV RBC AUTO: 96.4 FL (ref 80–94)
MONOCYTES # BLD AUTO: 0.51 X10(3)/MCL (ref 0.1–1.3)
MONOCYTES NFR BLD AUTO: 12.2 %
NEUTROPHILS # BLD AUTO: 2.4 X10(3)/MCL (ref 2.1–9.2)
NEUTROPHILS NFR BLD AUTO: 57.9 %
PLATELET # BLD AUTO: 255 X10(3)/MCL (ref 130–400)
PMV BLD AUTO: 10.5 FL (ref 7.4–10.4)
POTASSIUM SERPL-SCNC: 3.6 MMOL/L (ref 3.5–5.1)
PROT SERPL-MCNC: 5.7 GM/DL (ref 5.8–7.6)
RBC # BLD AUTO: 3.87 X10(6)/MCL (ref 4.2–5.4)
SODIUM SERPL-SCNC: 142 MMOL/L (ref 136–145)
T4 FREE SERPL-MCNC: 0.78 NG/DL (ref 0.7–1.48)
TRIGL SERPL-MCNC: 216 MG/DL (ref 37–140)
TSH SERPL-ACNC: 0.72 UIU/ML (ref 0.35–4.94)
VLDLC SERPL CALC-MCNC: 43 MG/DL
WBC # SPEC AUTO: 4.2 X10(3)/MCL (ref 4.5–11.5)

## 2022-07-01 PROCEDURE — 85025 COMPLETE CBC W/AUTO DIFF WBC: CPT

## 2022-07-01 PROCEDURE — 80061 LIPID PANEL: CPT

## 2022-07-01 PROCEDURE — 84439 ASSAY OF FREE THYROXINE: CPT

## 2022-07-01 PROCEDURE — 84443 ASSAY THYROID STIM HORMONE: CPT

## 2022-07-01 PROCEDURE — 82533 TOTAL CORTISOL: CPT

## 2022-07-01 PROCEDURE — 84075 ASSAY ALKALINE PHOSPHATASE: CPT

## 2022-07-01 PROCEDURE — 80053 COMPREHEN METABOLIC PANEL: CPT

## 2022-07-01 PROCEDURE — 80299 QUANTITATIVE ASSAY DRUG: CPT

## 2022-07-01 PROCEDURE — 36415 COLL VENOUS BLD VENIPUNCTURE: CPT

## 2022-07-02 LAB — ACTH PLAS-MCNC: 65 PG/ML

## 2022-07-05 DIAGNOSIS — C74.00 MALIGNANT NEOPLASM OF ADRENAL CORTEX, UNSPECIFIED LATERALITY: Primary | ICD-10-CM

## 2022-07-05 LAB — ALDOST SERPL-MCNC: <4 NG/DL

## 2022-07-05 RX ORDER — LEVOFLOXACIN 500 MG/1
500 TABLET, FILM COATED ORAL DAILY
COMMUNITY
Start: 2022-06-25 | End: 2022-07-08 | Stop reason: ALTCHOICE

## 2022-07-06 DIAGNOSIS — C74.00 MALIGNANT NEOPLASM OF ADRENAL CORTEX, UNSPECIFIED LATERALITY: Primary | ICD-10-CM

## 2022-07-06 LAB — RENIN PLAS-CCNC: 2.2 NG/ML/H

## 2022-07-06 RX ORDER — DIAZEPAM 5 MG/1
5 TABLET ORAL EVERY 8 HOURS PRN
Qty: 60 TABLET | Refills: 1 | Status: SHIPPED | OUTPATIENT
Start: 2022-07-06 | End: 2022-09-28

## 2022-07-07 ENCOUNTER — PATIENT MESSAGE (OUTPATIENT)
Dept: HEMATOLOGY/ONCOLOGY | Facility: CLINIC | Age: 62
End: 2022-07-07
Payer: COMMERCIAL

## 2022-07-07 ENCOUNTER — OFFICE VISIT (OUTPATIENT)
Dept: NEUROSURGERY | Facility: CLINIC | Age: 62
End: 2022-07-07
Payer: COMMERCIAL

## 2022-07-07 VITALS
WEIGHT: 131 LBS | BODY MASS INDEX: 22.36 KG/M2 | HEART RATE: 88 BPM | DIASTOLIC BLOOD PRESSURE: 92 MMHG | RESPIRATION RATE: 16 BRPM | SYSTOLIC BLOOD PRESSURE: 177 MMHG | HEIGHT: 64 IN

## 2022-07-07 DIAGNOSIS — M47.22 CERVICAL SPONDYLOSIS WITH RADICULOPATHY: Primary | ICD-10-CM

## 2022-07-07 DIAGNOSIS — S32.020A CLOSED COMPRESSION FRACTURE OF L2 LUMBAR VERTEBRA, INITIAL ENCOUNTER: ICD-10-CM

## 2022-07-07 PROBLEM — S22.080A CLOSED WEDGE COMPRESSION FRACTURE OF T11 VERTEBRA: Status: RESOLVED | Noted: 2022-05-04 | Resolved: 2022-07-07

## 2022-07-07 PROBLEM — S32.030S CLOSED COMPRESSION FRACTURE OF L3 LUMBAR VERTEBRA, SEQUELA: Status: RESOLVED | Noted: 2022-05-04 | Resolved: 2022-07-07

## 2022-07-07 LAB
COLLECT DURATION TIME UR: 24 H
CORTIS 24H UR-MRATE: 71 MCG/24 H (ref 3.5–45)
MAYO GENERIC ORDERABLE RESULT: NORMAL
SPECIMEN VOL 24H UR: 1650 ML

## 2022-07-07 PROCEDURE — 3077F SYST BP >= 140 MM HG: CPT | Mod: CPTII,,, | Performed by: PHYSICIAN ASSISTANT

## 2022-07-07 PROCEDURE — 3077F PR MOST RECENT SYSTOLIC BLOOD PRESSURE >= 140 MM HG: ICD-10-PCS | Mod: CPTII,,, | Performed by: PHYSICIAN ASSISTANT

## 2022-07-07 PROCEDURE — 4010F PR ACE/ARB THEARPY RXD/TAKEN: ICD-10-PCS | Mod: CPTII,,, | Performed by: PHYSICIAN ASSISTANT

## 2022-07-07 PROCEDURE — 3080F DIAST BP >= 90 MM HG: CPT | Mod: CPTII,,, | Performed by: PHYSICIAN ASSISTANT

## 2022-07-07 PROCEDURE — 99214 PR OFFICE/OUTPT VISIT, EST, LEVL IV, 30-39 MIN: ICD-10-PCS | Mod: ,,, | Performed by: PHYSICIAN ASSISTANT

## 2022-07-07 PROCEDURE — 99214 OFFICE O/P EST MOD 30 MIN: CPT | Mod: ,,, | Performed by: PHYSICIAN ASSISTANT

## 2022-07-07 PROCEDURE — 4010F ACE/ARB THERAPY RXD/TAKEN: CPT | Mod: CPTII,,, | Performed by: PHYSICIAN ASSISTANT

## 2022-07-07 PROCEDURE — 3008F BODY MASS INDEX DOCD: CPT | Mod: CPTII,,, | Performed by: PHYSICIAN ASSISTANT

## 2022-07-07 PROCEDURE — 3008F PR BODY MASS INDEX (BMI) DOCUMENTED: ICD-10-PCS | Mod: CPTII,,, | Performed by: PHYSICIAN ASSISTANT

## 2022-07-07 PROCEDURE — 3080F PR MOST RECENT DIASTOLIC BLOOD PRESSURE >= 90 MM HG: ICD-10-PCS | Mod: CPTII,,, | Performed by: PHYSICIAN ASSISTANT

## 2022-07-07 RX ORDER — METHYLPREDNISOLONE 4 MG/1
TABLET ORAL
Qty: 21 EACH | Refills: 0 | Status: SHIPPED | OUTPATIENT
Start: 2022-07-07 | End: 2022-07-08 | Stop reason: ALTCHOICE

## 2022-07-07 RX ORDER — OXYCODONE AND ACETAMINOPHEN 5; 325 MG/1; MG/1
1 TABLET ORAL EVERY 6 HOURS PRN
Qty: 28 TABLET | Refills: 0 | Status: ON HOLD | OUTPATIENT
Start: 2022-07-07 | End: 2022-07-13 | Stop reason: HOSPADM

## 2022-07-07 RX ORDER — PREGABALIN 75 MG/1
75 CAPSULE ORAL 2 TIMES DAILY
Qty: 30 CAPSULE | Refills: 0 | Status: SHIPPED | OUTPATIENT
Start: 2022-07-07 | End: 2022-08-08

## 2022-07-07 NOTE — PROGRESS NOTES
[facility]  History & Physical  Neurosurgery      Shreya Reyes   [id number]   1960       CHIEF COMPLAINT:  Right arm pain     HPI:  Shreya Reyes is a 62 y.o. female who presents for follow up in regards to cervical radiculopathy and new L2 compression fracture.  Her lower back pain is controlled at this time.  She is wearing the Ganesh brace.    Her main complaint is of severe right arm pain.  The pain is no better, no worse than when she was seen last.  This pain is mostly constant, and making her miserable.  She complains of pain at the right axilla going into the inner upper arm.  The worse pain is at the dorsal forearm.  It also runs through the hand and 1st-3rd fingers.  There is tingling in the 1st through 3rd fingers.  Subjectively, she has weakness in the right arm, and notes she is loosing muscle mass.  The pain is made worse with all activity and even with doing nothing at all.  The pain is improved with right shoulder abduction and external rotation.  She is to begin physical therapy tomorrow.  She was waiting to obtain a home traction unit until after she tried it at therapy.  Gabapentin was of no benefit to her so she stopped using it.  The cervical radiculopathy began after working on her golf swing in physical therapy.  She was there for treatment of the lower back.  The patient denies disturbances in bowel or bladder function.  There is no difficulty with balance.      Past Medical History:   Diagnosis Date    Adrenal cancer     Compression fracture of L1 vertebra     Compression fracture of L3 vertebra     Elevated liver enzymes 11/02/2021    Hyperlipidemia     Hypertension 05/04/2022    Osteoporosis     Thoracic compression fracture        Past Surgical History:   Procedure Laterality Date    ABDOMINOPLASTY      ADRENALECTOMY Left     L1 kyphoplasty w/ spine barbara, L3 kyphoplasty      Laproscopy for excision of adrenal mass      left cataract Left     ORIF TIBIA &  FIBULA FRACTURES Left     TONSILLECTOMY         Family History   Problem Relation Age of Onset    Lymphoma Mother     Hyperlipidemia Father     Heart disease Father     Heart disease Sister     Heart disease Brother        Social History     Socioeconomic History    Marital status:    Occupational History    Occupation: RN on leave   Tobacco Use    Smoking status: Former Smoker     Packs/day: 1.00     Types: Cigarettes     Quit date: 2009     Years since quittin.5    Smokeless tobacco: Never Used   Substance and Sexual Activity    Alcohol use: Yes     Comment: social       Current Outpatient Medications   Medication Sig Dispense Refill    ALPRAZolam (XANAX) 0.25 MG tablet Take 0.25 mg by mouth every 8 (eight) hours as needed.      cholecalciferol, vitamin D3, 125 mcg (5,000 unit) capsule Take 250 mcg by mouth.      hydrocortisone (CORTEF) 10 MG Tab Take 20 mg by mouth once daily. 20 mg AM,20 mg PM      hydrocortisone (CORTEF) 20 MG Tab Take 20 mg by mouth 2 (two) times daily.      ibuprofen (ADVIL,MOTRIN) 800 MG tablet Take 800 mg by mouth 3 (three) times daily as needed.      levoFLOXacin (LEVAQUIN) 500 MG tablet Take 500 mg by mouth once daily.      levothyroxine (SYNTHROID) 75 MCG tablet Take 1 tablet by mouth once daily.      losartan (COZAAR) 50 MG tablet Take 1 tablet by mouth once daily.      LYSODREN 500 mg tablet TAKE 5 TABLETS BY MOUTH TWICE DAILY 300 tablet 11    oxyCODONE-acetaminophen (PERCOCET) 5-325 mg per tablet Take 1 tablet by mouth every 4 (four) hours as needed for Pain. (Patient taking differently: Take 1 tablet by mouth daily as needed for Pain.) 40 tablet 0    diazePAM (VALIUM) 5 MG tablet Take 1 tablet (5 mg total) by mouth every 8 (eight) hours as needed for Anxiety (muscle spasms). (Patient not taking: Reported on 2022) 60 tablet 1    methylPREDNISolone (MEDROL DOSEPACK) 4 mg tablet use as directed 21 each 0    pregabalin (LYRICA) 75 MG capsule  "Take 1 capsule (75 mg total) by mouth 2 (two) times daily. 30 capsule 0     No current facility-administered medications for this visit.       Review of patient's allergies indicates:  No Known Allergies       Review of Systems   Constitutional: Negative for chills and fever.   HENT: Negative for nosebleeds and sore throat.    Eyes: Negative for pain and visual disturbance.   Respiratory: Negative for cough, chest tightness and shortness of breath.    Cardiovascular: Negative for chest pain.   Gastrointestinal: Negative for diarrhea, nausea and vomiting.   Genitourinary: Negative for difficulty urinating, dysuria and hematuria.   Musculoskeletal: Positive for back pain. Negative for gait problem, myalgias and neck pain.   Skin: Negative for rash.   Neurological: Positive for weakness and numbness. Negative for dizziness, facial asymmetry and headaches.   Psychiatric/Behavioral: Negative for confusion and sleep disturbance. The patient is not nervous/anxious.        Physical Examination:    BP (!) 177/92 (BP Location: Right arm, Patient Position: Sitting)   Pulse 88   Resp 16   Ht 5' 4" (1.626 m)   Wt 59.4 kg (131 lb)   BMI 22.49 kg/m²       General:  Well-nourished. Well-groomed. In obvious discomfort.    Lungs:  Breathing is quiet, non-labored     Musculoskeletal:  Cervical ROM: Pain in extension.  ROM is full in all four spheres.  Spurling's: Positive for Spurling's: right.  Spurling's on the left causes pain in the right trapezius muscle.  Tinel's is positive at the right wrist.  Tinel's is negative at the left wrist and bilateral elbows.    Neurological:    Oriented to Person, Place, Time   Muscle strength against resistance:  Strength  Deltoids Triceps Biceps Wrist Extension Wrist Flexion Hand    Upper: R 5/5 5-/5 5/5 5-/5 5-/5 5/5    L 5/5 5/5 5/5 5/5 5/5 5/5   Sensation is intact to primary modalities in bilateral upper extremities with the exception of right C6 dermatome.  Reflexes:   Right Left "   Triceps 1+ 2+   Biceps 2+ 2+   Brachioradialis 2+ 2+   Sandhu's sign is negative bilaterally.  Gait is antalgic    Imaging:  Cervical MRI without contrast was obtained on 07/05/2022.  This study is of poor quality.  However, there is disc/osteophyte complex at C5-6 and C6-7 which likely causes foraminal stenosis bilaterally.  MRI of the lumbar spine was obtained on the same date.  There is edema at the superior endplate of L2.  Lumbar x-rays from the same date showed there is increased compression of the superior endplate compared to x-rays from 6/21/2022.    ASSESSMENT/PLAN:     1. Cervical spondylosis with radiculopathy  pregabalin (LYRICA) 75 MG capsule    methylPREDNISolone (MEDROL DOSEPACK) 4 mg tablet    oxyCODONE-acetaminophen (PERCOCET) 5-325 mg per tablet   2. Closed compression fracture of L2 lumbar vertebra, initial encounter  oxyCODONE-acetaminophen (PERCOCET) 5-325 mg per tablet    X-Ray Lumbar Spine 2 Or 3 Views         The patient was seen and examined by Dr. Sandoval.  Options were discussed at length.  She did not receive any benefit from gabapentin.  She was provided a prescription for Lyrica and refill of Percocet.  Dr. Sandoval also provided a prescription for a Medrol Dosepak.  She is to begin physical therapy tomorrow.  She will obtain a home cervical traction unit.  She will touch base with us in the next week or so follow-up will be dependent on how she does.

## 2022-07-08 ENCOUNTER — TELEPHONE (OUTPATIENT)
Dept: HEMATOLOGY/ONCOLOGY | Facility: CLINIC | Age: 62
End: 2022-07-08
Payer: COMMERCIAL

## 2022-07-08 ENCOUNTER — OFFICE VISIT (OUTPATIENT)
Dept: HEMATOLOGY/ONCOLOGY | Facility: CLINIC | Age: 62
End: 2022-07-08
Payer: COMMERCIAL

## 2022-07-08 DIAGNOSIS — M54.50 LOW BACK PAIN, UNSPECIFIED BACK PAIN LATERALITY, UNSPECIFIED CHRONICITY, UNSPECIFIED WHETHER SCIATICA PRESENT: ICD-10-CM

## 2022-07-08 DIAGNOSIS — C74.00 MALIGNANT NEOPLASM OF ADRENAL CORTEX, UNSPECIFIED LATERALITY: Primary | ICD-10-CM

## 2022-07-08 DIAGNOSIS — S32.020A CLOSED COMPRESSION FRACTURE OF L2 LUMBAR VERTEBRA, INITIAL ENCOUNTER: ICD-10-CM

## 2022-07-08 PROCEDURE — 99214 OFFICE O/P EST MOD 30 MIN: CPT | Mod: 95,,, | Performed by: NURSE PRACTITIONER

## 2022-07-08 PROCEDURE — 4010F ACE/ARB THERAPY RXD/TAKEN: CPT | Mod: CPTII,95,, | Performed by: NURSE PRACTITIONER

## 2022-07-08 PROCEDURE — 4010F PR ACE/ARB THEARPY RXD/TAKEN: ICD-10-PCS | Mod: CPTII,95,, | Performed by: NURSE PRACTITIONER

## 2022-07-08 PROCEDURE — 1159F MED LIST DOCD IN RCRD: CPT | Mod: CPTII,95,, | Performed by: NURSE PRACTITIONER

## 2022-07-08 PROCEDURE — 99214 PR OFFICE/OUTPT VISIT, EST, LEVL IV, 30-39 MIN: ICD-10-PCS | Mod: 95,,, | Performed by: NURSE PRACTITIONER

## 2022-07-08 PROCEDURE — 1160F PR REVIEW ALL MEDS BY PRESCRIBER/CLIN PHARMACIST DOCUMENTED: ICD-10-PCS | Mod: CPTII,95,, | Performed by: NURSE PRACTITIONER

## 2022-07-08 PROCEDURE — 1159F PR MEDICATION LIST DOCUMENTED IN MEDICAL RECORD: ICD-10-PCS | Mod: CPTII,95,, | Performed by: NURSE PRACTITIONER

## 2022-07-08 PROCEDURE — 1160F RVW MEDS BY RX/DR IN RCRD: CPT | Mod: CPTII,95,, | Performed by: NURSE PRACTITIONER

## 2022-07-08 RX ORDER — CALCIUM CARBONATE 600 MG
600 TABLET ORAL 2 TIMES DAILY
COMMUNITY

## 2022-07-13 ENCOUNTER — ANESTHESIA EVENT (OUTPATIENT)
Dept: SURGERY | Facility: HOSPITAL | Age: 62
End: 2022-07-13
Payer: COMMERCIAL

## 2022-07-13 ENCOUNTER — HOSPITAL ENCOUNTER (OUTPATIENT)
Dept: RADIOLOGY | Facility: HOSPITAL | Age: 62
Discharge: HOME | End: 2022-07-13
Attending: NEUROLOGICAL SURGERY
Payer: COMMERCIAL

## 2022-07-13 ENCOUNTER — HOSPITAL ENCOUNTER (OUTPATIENT)
Facility: HOSPITAL | Age: 62
Discharge: HOME | End: 2022-07-13
Attending: NEUROLOGICAL SURGERY | Admitting: NEUROLOGICAL SURGERY
Payer: COMMERCIAL

## 2022-07-13 VITALS
SYSTOLIC BLOOD PRESSURE: 149 MMHG | DIASTOLIC BLOOD PRESSURE: 97 MMHG | OXYGEN SATURATION: 97 % | RESPIRATION RATE: 24 BRPM | HEART RATE: 90 BPM | TEMPERATURE: 98 F | BODY MASS INDEX: 21.97 KG/M2 | WEIGHT: 128 LBS

## 2022-07-13 DIAGNOSIS — M47.22 CERVICAL SPONDYLOSIS WITH RADICULOPATHY: ICD-10-CM

## 2022-07-13 LAB
ALBUMIN SERPL-MCNC: 3.1 GM/DL (ref 3.4–4.8)
ALBUMIN/GLOB SERPL: 1 RATIO (ref 1.1–2)
ALP SERPL-CCNC: 124 UNIT/L (ref 40–150)
ALT SERPL-CCNC: 18 UNIT/L (ref 0–55)
APPEARANCE UR: CLEAR
APTT PPP: 27.5 SECONDS (ref 23.2–33.7)
AST SERPL-CCNC: 25 UNIT/L (ref 5–34)
BACTERIA #/AREA URNS AUTO: NORMAL /HPF
BASOPHILS # BLD AUTO: 0.03 X10(3)/MCL (ref 0–0.2)
BASOPHILS NFR BLD AUTO: 0.7 %
BILIRUB UR QL STRIP.AUTO: NEGATIVE MG/DL
BILIRUBIN DIRECT+TOT PNL SERPL-MCNC: 0.2 MG/DL
BUN SERPL-MCNC: 20.1 MG/DL (ref 9.8–20.1)
CALCIUM SERPL-MCNC: 8.7 MG/DL (ref 8.4–10.2)
CHLORIDE SERPL-SCNC: 106 MMOL/L (ref 98–107)
CO2 SERPL-SCNC: 27 MMOL/L (ref 23–31)
COLOR UR AUTO: YELLOW
CREAT SERPL-MCNC: 0.71 MG/DL (ref 0.55–1.02)
EOSINOPHIL # BLD AUTO: 0.12 X10(3)/MCL (ref 0–0.9)
EOSINOPHIL NFR BLD AUTO: 2.9 %
ERYTHROCYTE [DISTWIDTH] IN BLOOD BY AUTOMATED COUNT: 12.6 % (ref 11.5–17)
GLOBULIN SER-MCNC: 3.1 GM/DL (ref 2.4–3.5)
GLUCOSE SERPL-MCNC: 86 MG/DL (ref 82–115)
GLUCOSE UR QL STRIP.AUTO: NEGATIVE MG/DL
HCT VFR BLD AUTO: 42 % (ref 37–47)
HGB BLD-MCNC: 13.4 GM/DL (ref 12–16)
IMM GRANULOCYTES # BLD AUTO: 0.01 X10(3)/MCL (ref 0–0.04)
IMM GRANULOCYTES NFR BLD AUTO: 0.2 %
INR BLD: 0.93 (ref 0–1.3)
KETONES UR QL STRIP.AUTO: NEGATIVE MG/DL
LEUKOCYTE ESTERASE UR QL STRIP.AUTO: NEGATIVE UNIT/L
LYMPHOCYTES # BLD AUTO: 1.1 X10(3)/MCL (ref 0.6–4.6)
LYMPHOCYTES NFR BLD AUTO: 26.1 %
MCH RBC QN AUTO: 31.1 PG (ref 27–31)
MCHC RBC AUTO-ENTMCNC: 31.9 MG/DL (ref 33–36)
MCV RBC AUTO: 97.4 FL (ref 80–94)
MONOCYTES # BLD AUTO: 0.51 X10(3)/MCL (ref 0.1–1.3)
MONOCYTES NFR BLD AUTO: 12.1 %
NEUTROPHILS # BLD AUTO: 2.4 X10(3)/MCL (ref 2.1–9.2)
NEUTROPHILS NFR BLD AUTO: 58 %
NITRITE UR QL STRIP.AUTO: NEGATIVE
NRBC BLD AUTO-RTO: 0 %
PH UR STRIP.AUTO: 6 [PH]
PLATELET # BLD AUTO: 219 X10(3)/MCL (ref 130–400)
PMV BLD AUTO: 11 FL (ref 7.4–10.4)
POTASSIUM SERPL-SCNC: 4.3 MMOL/L (ref 3.5–5.1)
PROT SERPL-MCNC: 6.2 GM/DL (ref 5.8–7.6)
PROT UR QL STRIP.AUTO: NEGATIVE MG/DL
PROTHROMBIN TIME: 12.4 SECONDS (ref 12.5–14.5)
RBC # BLD AUTO: 4.31 X10(6)/MCL (ref 4.2–5.4)
RBC #/AREA URNS AUTO: <5 /HPF
RBC UR QL AUTO: NEGATIVE UNIT/L
SARS-COV-2 RDRP RESP QL NAA+PROBE: NEGATIVE
SODIUM SERPL-SCNC: 140 MMOL/L (ref 136–145)
SP GR UR STRIP.AUTO: 1.02 (ref 1–1.03)
SQUAMOUS #/AREA URNS AUTO: <5 /HPF
UROBILINOGEN UR STRIP-ACNC: 0.2 MG/DL
WBC # SPEC AUTO: 4.2 X10(3)/MCL (ref 4.5–11.5)
WBC #/AREA URNS AUTO: <5 /HPF

## 2022-07-13 PROCEDURE — 76000 FLUOROSCOPY <1 HR PHYS/QHP: CPT | Mod: TC

## 2022-07-13 PROCEDURE — 81001 URINALYSIS AUTO W/SCOPE: CPT | Performed by: STUDENT IN AN ORGANIZED HEALTH CARE EDUCATION/TRAINING PROGRAM

## 2022-07-13 PROCEDURE — 63600175 PHARM REV CODE 636 W HCPCS: Performed by: NEUROLOGICAL SURGERY

## 2022-07-13 PROCEDURE — 99024 PR POST-OP FOLLOW-UP VISIT: ICD-10-PCS | Mod: ,,, | Performed by: NEUROLOGICAL SURGERY

## 2022-07-13 PROCEDURE — 99024 POSTOP FOLLOW-UP VISIT: CPT | Mod: ,,, | Performed by: NURSE PRACTITIONER

## 2022-07-13 PROCEDURE — 71000015 HC POSTOP RECOV 1ST HR: Performed by: NEUROLOGICAL SURGERY

## 2022-07-13 PROCEDURE — 36000710: Performed by: NEUROLOGICAL SURGERY

## 2022-07-13 PROCEDURE — 63048 LAM FACETEC &FORAMOT EA ADDL: CPT | Mod: ,,, | Performed by: NEUROLOGICAL SURGERY

## 2022-07-13 PROCEDURE — 63600175 PHARM REV CODE 636 W HCPCS: Performed by: ANESTHESIOLOGY

## 2022-07-13 PROCEDURE — 27201423 OPTIME MED/SURG SUP & DEVICES STERILE SUPPLY: Performed by: NEUROLOGICAL SURGERY

## 2022-07-13 PROCEDURE — 36000711: Performed by: NEUROLOGICAL SURGERY

## 2022-07-13 PROCEDURE — 63048 PR LAMINECT/FACETECT/FORAMINOT, EA ADDTL VERTEBRAL SEGM: ICD-10-PCS | Mod: ,,, | Performed by: NEUROLOGICAL SURGERY

## 2022-07-13 PROCEDURE — 99024 POSTOP FOLLOW-UP VISIT: CPT | Mod: ,,, | Performed by: NEUROLOGICAL SURGERY

## 2022-07-13 PROCEDURE — 27800903 OPTIME MED/SURG SUP & DEVICES OTHER IMPLANTS: Performed by: NEUROLOGICAL SURGERY

## 2022-07-13 PROCEDURE — 37000008 HC ANESTHESIA 1ST 15 MINUTES: Performed by: NEUROLOGICAL SURGERY

## 2022-07-13 PROCEDURE — 71000016 HC POSTOP RECOV ADDL HR: Performed by: NEUROLOGICAL SURGERY

## 2022-07-13 PROCEDURE — 99285 EMERGENCY DEPT VISIT HI MDM: CPT | Mod: 25

## 2022-07-13 PROCEDURE — 63045 LAM FACETEC & FORAMOT CRV: CPT | Mod: ,,, | Performed by: NEUROLOGICAL SURGERY

## 2022-07-13 PROCEDURE — 63600175 PHARM REV CODE 636 W HCPCS: Performed by: NURSE ANESTHETIST, CERTIFIED REGISTERED

## 2022-07-13 PROCEDURE — 25000003 PHARM REV CODE 250: Performed by: NURSE ANESTHETIST, CERTIFIED REGISTERED

## 2022-07-13 PROCEDURE — 99024 PR POST-OP FOLLOW-UP VISIT: ICD-10-PCS | Mod: ,,, | Performed by: NURSE PRACTITIONER

## 2022-07-13 PROCEDURE — 85730 THROMBOPLASTIN TIME PARTIAL: CPT | Performed by: STUDENT IN AN ORGANIZED HEALTH CARE EDUCATION/TRAINING PROGRAM

## 2022-07-13 PROCEDURE — 63045 LAM FACETEC & FORAMOT CRV: CPT | Mod: AS,,, | Performed by: NURSE PRACTITIONER

## 2022-07-13 PROCEDURE — G0378 HOSPITAL OBSERVATION PER HR: HCPCS

## 2022-07-13 PROCEDURE — 63048 LAM FACETEC &FORAMOT EA ADDL: CPT | Mod: AS,,, | Performed by: NURSE PRACTITIONER

## 2022-07-13 PROCEDURE — 63045 PR LAMINEC/FACETECT/FORAMIN,CERVICAL 1 SEG: ICD-10-PCS | Mod: ,,, | Performed by: NEUROLOGICAL SURGERY

## 2022-07-13 PROCEDURE — 63045 PR LAMINEC/FACETECT/FORAMIN,CERVICAL 1 SEG: ICD-10-PCS | Mod: AS,,, | Performed by: NURSE PRACTITIONER

## 2022-07-13 PROCEDURE — 25000003 PHARM REV CODE 250: Performed by: ANESTHESIOLOGY

## 2022-07-13 PROCEDURE — 87635 SARS-COV-2 COVID-19 AMP PRB: CPT | Performed by: PHYSICIAN ASSISTANT

## 2022-07-13 PROCEDURE — 85025 COMPLETE CBC W/AUTO DIFF WBC: CPT | Performed by: STUDENT IN AN ORGANIZED HEALTH CARE EDUCATION/TRAINING PROGRAM

## 2022-07-13 PROCEDURE — 36415 COLL VENOUS BLD VENIPUNCTURE: CPT | Performed by: STUDENT IN AN ORGANIZED HEALTH CARE EDUCATION/TRAINING PROGRAM

## 2022-07-13 PROCEDURE — 37000009 HC ANESTHESIA EA ADD 15 MINS: Performed by: NEUROLOGICAL SURGERY

## 2022-07-13 PROCEDURE — 25000003 PHARM REV CODE 250: Performed by: NEUROLOGICAL SURGERY

## 2022-07-13 PROCEDURE — 71000033 HC RECOVERY, INTIAL HOUR: Performed by: NEUROLOGICAL SURGERY

## 2022-07-13 PROCEDURE — 85610 PROTHROMBIN TIME: CPT | Performed by: STUDENT IN AN ORGANIZED HEALTH CARE EDUCATION/TRAINING PROGRAM

## 2022-07-13 PROCEDURE — P9045 ALBUMIN (HUMAN), 5%, 250 ML: HCPCS | Mod: JG | Performed by: NURSE ANESTHETIST, CERTIFIED REGISTERED

## 2022-07-13 PROCEDURE — 80053 COMPREHEN METABOLIC PANEL: CPT | Performed by: STUDENT IN AN ORGANIZED HEALTH CARE EDUCATION/TRAINING PROGRAM

## 2022-07-13 PROCEDURE — 63048 PR LAMINECT/FACETECT/FORAMINOT, EA ADDTL VERTEBRAL SEGM: ICD-10-PCS | Mod: AS,,, | Performed by: NURSE PRACTITIONER

## 2022-07-13 PROCEDURE — 36000 PLACE NEEDLE IN VEIN: CPT

## 2022-07-13 PROCEDURE — G0379 DIRECT REFER HOSPITAL OBSERV: HCPCS

## 2022-07-13 RX ORDER — ONDANSETRON HYDROCHLORIDE 2 MG/ML
INJECTION, SOLUTION INTRAMUSCULAR; INTRAVENOUS
Status: DISCONTINUED | OUTPATIENT
Start: 2022-07-13 | End: 2022-07-13

## 2022-07-13 RX ORDER — METHOCARBAMOL 100 MG/ML
1000 INJECTION, SOLUTION INTRAMUSCULAR; INTRAVENOUS EVERY 8 HOURS
Status: DISCONTINUED | OUTPATIENT
Start: 2022-07-13 | End: 2022-07-13 | Stop reason: HOSPADM

## 2022-07-13 RX ORDER — ONDANSETRON 2 MG/ML
4 INJECTION INTRAMUSCULAR; INTRAVENOUS DAILY PRN
Status: DISCONTINUED | OUTPATIENT
Start: 2022-07-13 | End: 2022-07-13

## 2022-07-13 RX ORDER — CEFAZOLIN SODIUM 1 G/3ML
INJECTION, POWDER, FOR SOLUTION INTRAMUSCULAR; INTRAVENOUS
Status: DISCONTINUED | OUTPATIENT
Start: 2022-07-13 | End: 2022-07-13

## 2022-07-13 RX ORDER — OXYCODONE AND ACETAMINOPHEN 5; 325 MG/1; MG/1
1 TABLET ORAL EVERY 4 HOURS PRN
Qty: 40 TABLET | Refills: 0 | Status: SHIPPED | OUTPATIENT
Start: 2022-07-13 | End: 2022-09-28

## 2022-07-13 RX ORDER — PHENYLEPHRINE HCL IN 0.9% NACL 1 MG/10 ML
SYRINGE (ML) INTRAVENOUS
Status: DISCONTINUED | OUTPATIENT
Start: 2022-07-13 | End: 2022-07-13

## 2022-07-13 RX ORDER — MORPHINE SULFATE 4 MG/ML
4 INJECTION, SOLUTION INTRAMUSCULAR; INTRAVENOUS
Status: DISCONTINUED | OUTPATIENT
Start: 2022-07-13 | End: 2022-07-13

## 2022-07-13 RX ORDER — HYDROMORPHONE HYDROCHLORIDE 2 MG/ML
0.4 INJECTION, SOLUTION INTRAMUSCULAR; INTRAVENOUS; SUBCUTANEOUS EVERY 5 MIN PRN
Status: DISCONTINUED | OUTPATIENT
Start: 2022-07-13 | End: 2022-07-13

## 2022-07-13 RX ORDER — CEFAZOLIN SODIUM 1 G/50ML
1 SOLUTION INTRAVENOUS ONCE
Status: CANCELLED | OUTPATIENT
Start: 2022-07-13

## 2022-07-13 RX ORDER — SODIUM CHLORIDE, SODIUM GLUCONATE, SODIUM ACETATE, POTASSIUM CHLORIDE AND MAGNESIUM CHLORIDE 30; 37; 368; 526; 502 MG/100ML; MG/100ML; MG/100ML; MG/100ML; MG/100ML
1000 INJECTION, SOLUTION INTRAVENOUS CONTINUOUS
Status: CANCELLED | OUTPATIENT
Start: 2022-07-13 | End: 2022-08-12

## 2022-07-13 RX ORDER — CEFAZOLIN SODIUM 2 G/50ML
SOLUTION INTRAVENOUS
Status: DISCONTINUED
Start: 2022-07-13 | End: 2022-07-13 | Stop reason: HOSPADM

## 2022-07-13 RX ORDER — PROPOFOL 10 MG/ML
VIAL (ML) INTRAVENOUS CONTINUOUS PRN
Status: DISCONTINUED | OUTPATIENT
Start: 2022-07-13 | End: 2022-07-13

## 2022-07-13 RX ORDER — SODIUM CHLORIDE, SODIUM LACTATE, POTASSIUM CHLORIDE, CALCIUM CHLORIDE 600; 310; 30; 20 MG/100ML; MG/100ML; MG/100ML; MG/100ML
INJECTION, SOLUTION INTRAVENOUS CONTINUOUS
Status: CANCELLED | OUTPATIENT
Start: 2022-07-13

## 2022-07-13 RX ORDER — ALBUMIN HUMAN 50 G/1000ML
SOLUTION INTRAVENOUS CONTINUOUS PRN
Status: DISCONTINUED | OUTPATIENT
Start: 2022-07-13 | End: 2022-07-13

## 2022-07-13 RX ORDER — ONDANSETRON 2 MG/ML
4 INJECTION INTRAMUSCULAR; INTRAVENOUS EVERY 6 HOURS PRN
Status: DISCONTINUED | OUTPATIENT
Start: 2022-07-13 | End: 2022-07-13

## 2022-07-13 RX ORDER — EPHEDRINE SULFATE 50 MG/ML
INJECTION, SOLUTION INTRAVENOUS
Status: DISCONTINUED | OUTPATIENT
Start: 2022-07-13 | End: 2022-07-13

## 2022-07-13 RX ORDER — MIDAZOLAM HYDROCHLORIDE 1 MG/ML
INJECTION INTRAMUSCULAR; INTRAVENOUS
Status: DISCONTINUED | OUTPATIENT
Start: 2022-07-13 | End: 2022-07-13

## 2022-07-13 RX ORDER — ALBUMIN HUMAN 50 G/1000ML
SOLUTION INTRAVENOUS
Status: COMPLETED
Start: 2022-07-13 | End: 2022-07-13

## 2022-07-13 RX ORDER — FENTANYL CITRATE 50 UG/ML
INJECTION, SOLUTION INTRAMUSCULAR; INTRAVENOUS
Status: DISCONTINUED | OUTPATIENT
Start: 2022-07-13 | End: 2022-07-13

## 2022-07-13 RX ORDER — ACETAMINOPHEN 10 MG/ML
INJECTION, SOLUTION INTRAVENOUS
Status: DISCONTINUED | OUTPATIENT
Start: 2022-07-13 | End: 2022-07-13

## 2022-07-13 RX ORDER — ALPRAZOLAM 0.25 MG/1
0.25 TABLET ORAL EVERY 8 HOURS PRN
Status: DISCONTINUED | OUTPATIENT
Start: 2022-07-13 | End: 2022-07-13 | Stop reason: HOSPADM

## 2022-07-13 RX ORDER — LOSARTAN POTASSIUM 50 MG/1
50 TABLET ORAL DAILY
Status: DISCONTINUED | OUTPATIENT
Start: 2022-07-14 | End: 2022-07-13 | Stop reason: HOSPADM

## 2022-07-13 RX ORDER — OXYCODONE AND ACETAMINOPHEN 5; 325 MG/1; MG/1
1 TABLET ORAL EVERY 4 HOURS PRN
Status: DISCONTINUED | OUTPATIENT
Start: 2022-07-13 | End: 2022-07-13 | Stop reason: HOSPADM

## 2022-07-13 RX ORDER — ROCURONIUM BROMIDE 10 MG/ML
INJECTION, SOLUTION INTRAVENOUS
Status: DISCONTINUED | OUTPATIENT
Start: 2022-07-13 | End: 2022-07-13

## 2022-07-13 RX ORDER — PROPOFOL 10 MG/ML
VIAL (ML) INTRAVENOUS
Status: DISCONTINUED | OUTPATIENT
Start: 2022-07-13 | End: 2022-07-13

## 2022-07-13 RX ORDER — ONDANSETRON 4 MG/1
4 TABLET, ORALLY DISINTEGRATING ORAL
Status: DISCONTINUED | OUTPATIENT
Start: 2022-07-13 | End: 2022-07-13

## 2022-07-13 RX ORDER — ONDANSETRON 4 MG/1
8 TABLET, ORALLY DISINTEGRATING ORAL EVERY 6 HOURS PRN
Status: DISCONTINUED | OUTPATIENT
Start: 2022-07-13 | End: 2022-07-13 | Stop reason: HOSPADM

## 2022-07-13 RX ORDER — PROCHLORPERAZINE EDISYLATE 5 MG/ML
5 INJECTION INTRAMUSCULAR; INTRAVENOUS EVERY 30 MIN PRN
Status: DISCONTINUED | OUTPATIENT
Start: 2022-07-13 | End: 2022-07-13

## 2022-07-13 RX ORDER — BACITRACIN 500 [USP'U]/G
OINTMENT TOPICAL
Status: DISCONTINUED | OUTPATIENT
Start: 2022-07-13 | End: 2022-07-13 | Stop reason: HOSPADM

## 2022-07-13 RX ORDER — CEFAZOLIN SODIUM 2 G/50ML
2 SOLUTION INTRAVENOUS
Status: CANCELLED | OUTPATIENT
Start: 2022-07-13

## 2022-07-13 RX ORDER — MAG HYDROX/ALUMINUM HYD/SIMETH 200-200-20
30 SUSPENSION, ORAL (FINAL DOSE FORM) ORAL EVERY 4 HOURS PRN
Status: DISCONTINUED | OUTPATIENT
Start: 2022-07-13 | End: 2022-07-13 | Stop reason: HOSPADM

## 2022-07-13 RX ORDER — LEVOTHYROXINE SODIUM 25 UG/1
75 TABLET ORAL DAILY
Status: DISCONTINUED | OUTPATIENT
Start: 2022-07-14 | End: 2022-07-13 | Stop reason: HOSPADM

## 2022-07-13 RX ORDER — ONDANSETRON 2 MG/ML
4 INJECTION INTRAMUSCULAR; INTRAVENOUS EVERY 4 HOURS PRN
Status: DISCONTINUED | OUTPATIENT
Start: 2022-07-13 | End: 2022-07-13 | Stop reason: HOSPADM

## 2022-07-13 RX ORDER — OXYCODONE AND ACETAMINOPHEN 5; 325 MG/1; MG/1
2 TABLET ORAL EVERY 4 HOURS PRN
Status: DISCONTINUED | OUTPATIENT
Start: 2022-07-13 | End: 2022-07-13 | Stop reason: HOSPADM

## 2022-07-13 RX ORDER — LIDOCAINE HYDROCHLORIDE AND EPINEPHRINE 20; 10 MG/ML; UG/ML
INJECTION, SOLUTION INFILTRATION; PERINEURAL
Status: DISCONTINUED | OUTPATIENT
Start: 2022-07-13 | End: 2022-07-13 | Stop reason: HOSPADM

## 2022-07-13 RX ORDER — MORPHINE SULFATE 4 MG/ML
2 INJECTION, SOLUTION INTRAMUSCULAR; INTRAVENOUS
Status: DISCONTINUED | OUTPATIENT
Start: 2022-07-13 | End: 2022-07-13 | Stop reason: HOSPADM

## 2022-07-13 RX ORDER — PROCHLORPERAZINE EDISYLATE 5 MG/ML
5 INJECTION INTRAMUSCULAR; INTRAVENOUS EVERY 6 HOURS PRN
Status: DISCONTINUED | OUTPATIENT
Start: 2022-07-13 | End: 2022-07-13 | Stop reason: HOSPADM

## 2022-07-13 RX ORDER — AMOXICILLIN 250 MG
2 CAPSULE ORAL NIGHTLY PRN
Status: DISCONTINUED | OUTPATIENT
Start: 2022-07-13 | End: 2022-07-13 | Stop reason: HOSPADM

## 2022-07-13 RX ORDER — HYDROCORTISONE 10 MG/1
20 TABLET ORAL 2 TIMES DAILY
Status: DISCONTINUED | OUTPATIENT
Start: 2022-07-13 | End: 2022-07-13 | Stop reason: HOSPADM

## 2022-07-13 RX ADMIN — FENTANYL CITRATE 100 MCG: 50 INJECTION, SOLUTION INTRAMUSCULAR; INTRAVENOUS at 03:07

## 2022-07-13 RX ADMIN — SODIUM CHLORIDE 100 MG: 9 INJECTION, SOLUTION INTRAVENOUS at 03:07

## 2022-07-13 RX ADMIN — PHENYLEPHRINE HYDROCHLORIDE 20 MCG/MIN: 10 INJECTION INTRAVENOUS at 03:07

## 2022-07-13 RX ADMIN — EPHEDRINE SULFATE 5 MG: 50 INJECTION INTRAVENOUS at 04:07

## 2022-07-13 RX ADMIN — MIDAZOLAM 2 MG: 1 INJECTION INTRAMUSCULAR; INTRAVENOUS at 03:07

## 2022-07-13 RX ADMIN — ACETAMINOPHEN 1000 MG: 10 INJECTION, SOLUTION INTRAVENOUS at 06:07

## 2022-07-13 RX ADMIN — ROCURONIUM BROMIDE 50 MG: 10 SOLUTION INTRAVENOUS at 03:07

## 2022-07-13 RX ADMIN — ONDANSETRON 4 MG: 2 INJECTION INTRAMUSCULAR; INTRAVENOUS at 06:07

## 2022-07-13 RX ADMIN — EPHEDRINE SULFATE 15 MG: 50 INJECTION INTRAVENOUS at 04:07

## 2022-07-13 RX ADMIN — SUGAMMADEX 200 MG: 100 INJECTION, SOLUTION INTRAVENOUS at 06:07

## 2022-07-13 RX ADMIN — Medication 100 MCG: at 04:07

## 2022-07-13 RX ADMIN — CEFAZOLIN 2 G: 330 INJECTION, POWDER, FOR SOLUTION INTRAMUSCULAR; INTRAVENOUS at 04:07

## 2022-07-13 RX ADMIN — PROPOFOL 150 MG: 10 INJECTION, EMULSION INTRAVENOUS at 03:07

## 2022-07-13 RX ADMIN — ROCURONIUM BROMIDE 20 MG: 10 SOLUTION INTRAVENOUS at 04:07

## 2022-07-13 RX ADMIN — PROPOFOL 50 MG: 10 INJECTION, EMULSION INTRAVENOUS at 04:07

## 2022-07-13 RX ADMIN — SODIUM CHLORIDE, SODIUM GLUCONATE, SODIUM ACETATE, POTASSIUM CHLORIDE AND MAGNESIUM CHLORIDE: 526; 502; 368; 37; 30 INJECTION, SOLUTION INTRAVENOUS at 03:07

## 2022-07-13 RX ADMIN — Medication 100 MCG: at 03:07

## 2022-07-13 RX ADMIN — ROCURONIUM BROMIDE 10 MG: 10 SOLUTION INTRAVENOUS at 05:07

## 2022-07-13 RX ADMIN — PROPOFOL 50 MCG/KG/MIN: 10 INJECTION, EMULSION INTRAVENOUS at 04:07

## 2022-07-13 RX ADMIN — ALBUMIN (HUMAN): 12.5 INJECTION, SOLUTION INTRAVENOUS at 05:07

## 2022-07-13 NOTE — DISCHARGE SUMMARY
[facility] - Periop Services  Neurosurgery  Discharge Summary      Patient Name: Shreya Reyes  MRN: [id number]  Admission Date: 7/13/2022  Hospital Length of Stay: 0 days  Discharge Date and Time:  07/13/2022 6:59 PM  Attending Physician: Nam Sandoval MD   Discharging Provider: Shweta Roe [facility]  Primary Care Provider: Robert Holt MD    HPI:   No notes on file    Procedure(s) (LRB):  FORAMINOTOMY, SPINE, CERVICAL, MINIMALLY INVASIVE (Right)     Hospital Course: The patient presented to the ED today on 07/13/2022 for intractable right arm pain.  Updated MRI cervical spine was obtained, which revealed foraminal stenosis secondary to disc osteophyte on the right at C5-6 and C6-7.  She was taken to the OR for right C5-6, C6-7 posterior foraminotomies.  She tolerated the procedure well.  She was transferred from recovery to outpatient surgery and was monitored postoperatively.  She  was stable for discharge home in an improved state with a good prognosis.       Significant Diagnostic Studies: Labs:   BMP:   Recent Labs   Lab 07/13/22  0949      K 4.3   CO2 27   BUN 20.1   CREATININE 0.71   CALCIUM 8.7    and CMP   Recent Labs   Lab 07/13/22  0949      K 4.3   CO2 27   BUN 20.1   CREATININE 0.71   CALCIUM 8.7   ALBUMIN 3.1*   BILITOT 0.2   ALKPHOS 124   AST 25   ALT 18   EGFRNONAA >60       Pending Diagnostic Studies:     None        Final Active Diagnoses:    Diagnosis Date Noted POA    PRINCIPAL PROBLEM:  Cervical spondylosis with radiculopathy [M47.22] 06/21/2022 Yes      Problems Resolved During this Admission:      Discharged Condition: good     Disposition: Home or Self Care    Follow Up:   Follow-up Information     Nam Sandoval MD. Go on 8/1/2022.    Specialty: Neurosurgery  Contact information:  [address] [place] [address]  [address] [place] [id number] [phone]                       Patient Instructions:   No discharge procedures on file.  Medications:  Reconciled  Home Medications:      Medication List      CHANGE how you take these medications    oxyCODONE-acetaminophen 5-325 mg per tablet  Commonly known as: PERCOCET  Take 1 tablet by mouth every 4 (four) hours as needed for Pain.  What changed: when to take this        CONTINUE taking these medications    ALPRAZolam 0.25 MG tablet  Commonly known as: XANAX  Take 0.25 mg by mouth every 8 (eight) hours as needed.     calcium carbonate 600 mg calcium (1,500 mg) Tab  Commonly known as: OS-ORAL  Take 600 mg by mouth 2 (two) times daily.     cholecalciferol (vitamin D3) 125 mcg (5,000 unit) capsule  Take 250 mcg by mouth.     hydrocortisone 20 MG Tab  Commonly known as: CORTEF  Take 20 mg by mouth 2 (two) times daily.     ibuprofen 800 MG tablet  Commonly known as: ADVIL,MOTRIN  Take 800 mg by mouth 3 (three) times daily as needed.     levothyroxine 75 MCG tablet  Commonly known as: SYNTHROID  Take 1 tablet by mouth once daily.     losartan 50 MG tablet  Commonly known as: COZAAR  Take 1 tablet by mouth once daily.     LYSODREN 500 mg tablet  Generic drug: mitotane  TAKE 5 TABLETS BY MOUTH TWICE DAILY     pregabalin 75 MG capsule  Commonly known as: LYRICA  Take 1 capsule (75 mg total) by mouth 2 (two) times daily.        STOP taking these medications    diazePAM 5 MG tablet  Commonly known as: VALIUM            Shweta Roe [facility]  Neurosurgery  [facility] - Periop Services

## 2022-07-13 NOTE — ANESTHESIA PROCEDURE NOTES
Intubation    Date/Time: 7/13/2022 3:55 PM  Performed by: Sara Newman  Authorized by: Larry Brown Jr., MD     Intubation:     Induction:  Intravenous    Intubated:  Postinduction    Mask Ventilation:  Easy mask    Attempts:  1    Attempted By:  CRNA    Method of Intubation:  Video laryngoscopy    Blade:  Arambula 3    Laryngeal View Grade: Grade I - full view of cords      Difficult Airway Encountered?: No      Complications:  None    Airway Device:  Oral endotracheal tube    Airway Device Size:  7.0    Style/Cuff Inflation:  Cuffed    Tube secured:  22    Secured at:  The lips    Placement Verified By:  Capnometry    Complicating Factors:  None    Findings Post-Intubation:  BS equal bilateral and atraumatic/condition of teeth unchanged  Notes:      Head/neck neutral throughout BVM ventilation and intubation

## 2022-07-13 NOTE — DISCHARGE INSTRUCTIONS
OK to shower. Do not submerge incision under water. Ice packs QID/prn for pain. Please call with any recurrent/new arm pain, numbness, tingling, or weakness.

## 2022-07-13 NOTE — H&P
[facility]  History & Physical  Neurosurgery      Shreya Reyes   [id number]   1960       CHIEF COMPLAINT:  Right arm pain     HPI:  Shreya Reyes is a 62 y.o. female is known to Dr. Sandoval following L1 kyphoplasty with spine barbara and L3 kyphoplasty on 4/8/2022; and T11, T12 kyphoplasty on 4/21/2022. She began with cervical radiculopathy last month after working on her golf swing in physical therapy.  She was there for treatment of the lower back. She was seen in the office on 7/7/2022 with MRI cervical and lumbar spine. She was prescribed Lyrica with instructions on titrating up to 300mg BID as well as a medrol dose pack. She was planned to start PT the following day. She was also given instructions for home cervical traction. She has not had any significant improvement in her pain with medications. The pain has become constant and severe, severely limiting her activities. She was sent to the ED for pain control and to obtain updating imaging of cervical spine, as previous MRI was poor quality.     She continues with severe right arm pain. The pain is constant. She complains of pain at the right axilla going into the inner upper arm.  The worst pain is at the dorsal forearm.  It also runs through the hand and 1st-3rd fingers.  There is tingling in the 1st-3rd fingers.  Subjectively, she has weakness in the right arm, and notes she is loosing muscle mass.  The pain is made worse with all activity and even with doing nothing at all.  She is unable to find a comfortable position. The patient denies disturbances in bowel or bladder function.  There is no difficulty with balance.    Of note, the patient does have an acute L2 compression fracture. She is wearing Ganesh brace. Her back pain remains controlled.     Past Medical History:   Diagnosis Date    Adrenal cancer     Closed compression fracture of L3 lumbar vertebra, sequela 5/4/2022    Closed wedge compression fracture of T11 vertebra 5/4/2022     Compression fracture of L1 vertebra     Compression fracture of L3 vertebra     Elevated liver enzymes 2021    Hyperlipidemia     Hypertension 2022    Osteoporosis     Thoracic compression fracture        Past Surgical History:   Procedure Laterality Date    ABDOMINOPLASTY      ADRENALECTOMY Left     L1 kyphoplasty w/ spine barbara, L3 kyphoplasty      Laproscopy for excision of adrenal mass      left cataract Left     ORIF TIBIA & FIBULA FRACTURES Left     TONSILLECTOMY         Family History   Problem Relation Age of Onset    Lymphoma Mother     Hyperlipidemia Father     Heart disease Father     Heart disease Sister     Heart disease Brother        Social History     Socioeconomic History    Marital status:    Occupational History    Occupation: RN on leave   Tobacco Use    Smoking status: Former Smoker     Packs/day: 1.00     Types: Cigarettes     Quit date: 2009     Years since quittin.5    Smokeless tobacco: Never Used   Substance and Sexual Activity    Alcohol use: Yes     Comment: social       Current Facility-Administered Medications   Medication Dose Route Frequency Provider Last Rate Last Admin    ceFAZolin 2 g/50mL Dextrose IVPB (ANCEF) 2 gram/50 mL IVPB PgBk             methocarbamoL injection 1,000 mg  1,000 mg Intravenous Q8H Juana Nash PA-C        morphine injection 2 mg  2 mg Intravenous Q2H PRN Juana Nash PA-C        morphine injection 4 mg  4 mg Intravenous ED 1 Time Reji Zaragoza MD        ondansetron disintegrating tablet 4 mg  4 mg Oral ED 1 Time Reji Zaragoza MD        ondansetron injection 4 mg  4 mg Intravenous Q6H PRN Juana Nash PA-C        oxyCODONE-acetaminophen 5-325 mg per tablet 1 tablet  1 tablet Oral Q4H PRN Juana Nash PA-C        oxyCODONE-acetaminophen 5-325 mg per tablet 2 tablet  2 tablet Oral Q4H PRN Juana Nash PA-C         Current Outpatient Medications   Medication Sig Dispense Refill     ALPRAZolam (XANAX) 0.25 MG tablet Take 0.25 mg by mouth every 8 (eight) hours as needed.      calcium carbonate (OS-ORAL) 600 mg calcium (1,500 mg) Tab Take 600 mg by mouth 2 (two) times daily.      cholecalciferol, vitamin D3, 125 mcg (5,000 unit) capsule Take 250 mcg by mouth.      diazePAM (VALIUM) 5 MG tablet Take 1 tablet (5 mg total) by mouth every 8 (eight) hours as needed for Anxiety (muscle spasms). (Patient not taking: Reported on 7/7/2022) 60 tablet 1    hydrocortisone (CORTEF) 20 MG Tab Take 20 mg by mouth 2 (two) times daily.      ibuprofen (ADVIL,MOTRIN) 800 MG tablet Take 800 mg by mouth 3 (three) times daily as needed.      levothyroxine (SYNTHROID) 75 MCG tablet Take 1 tablet by mouth once daily.      losartan (COZAAR) 50 MG tablet Take 1 tablet by mouth once daily.      LYSODREN 500 mg tablet TAKE 5 TABLETS BY MOUTH TWICE DAILY 300 tablet 11    oxyCODONE-acetaminophen (PERCOCET) 5-325 mg per tablet Take 1 tablet by mouth every 6 (six) hours as needed for Pain. 28 tablet 0    pregabalin (LYRICA) 75 MG capsule Take 1 capsule (75 mg total) by mouth 2 (two) times daily. 30 capsule 0       Review of patient's allergies indicates:  No Known Allergies       Review of Systems   Constitutional: Negative for chills and fever.   HENT: Negative for nosebleeds and sore throat.    Eyes: Negative for pain and visual disturbance.   Respiratory: Negative for cough, chest tightness and shortness of breath.    Cardiovascular: Negative for chest pain.   Gastrointestinal: Negative for diarrhea, nausea and vomiting.   Genitourinary: Negative for difficulty urinating, dysuria and hematuria.   Musculoskeletal: Positive for back pain and neck pain. Negative for gait problem and myalgias.   Skin: Negative for rash.   Neurological: Positive for weakness and numbness. Negative for dizziness, facial asymmetry and headaches.   Psychiatric/Behavioral: Negative for confusion and sleep disturbance. The patient is not  nervous/anxious.        Physical Examination:    BP (!) 165/110   Pulse 94   Temp 97.5 °F (36.4 °C) (Oral)   Resp 18   Wt 58.1 kg (128 lb)   SpO2 99%   BMI 21.97 kg/m²       General:  Well-nourished. Well-groomed. In obvious discomfort.    Cardiac:  RRR, no edema    Lungs:  Breathing is quiet, non-labored     Musculoskeletal:  Cervical ROM: Pain in extension.  ROM is full in all four spheres.  Spurling's: Positive for Spurling's: right.  Spurling's on the left causes pain in the right trapezius muscle.  Tinel's is positive at the right wrist.  Tinel's is negative at the left wrist and bilateral elbows.    Neurological:    Oriented to Person, Place, Time   Muscle strength against resistance:  Strength  Deltoids Triceps Biceps Wrist Extension Wrist Flexion Hand    Upper: R 5/5 5-/5 5/5 5-/5 5-/5 5/5    L 5/5 5/5 5/5 5/5 5/5 5/5   Sensation is intact to primary modalities in bilateral upper extremities with the exception of right C6 dermatome.  Reflexes:   Right Left   Triceps 1+ 2+   Biceps 2+ 2+   Brachioradialis 2+ 2+   Sandhu's sign is negative bilaterally.  Gait is antalgic    Imaging:  Cervical MRI without contrast was obtained today. Study reveals disc/osteophyte complex at C5-6 and C6-7 with R>L foraminal stenosis.    ASSESSMENT/PLAN:     1. Cervical spondylosis and stenosis with radiculopathy  2. Intractable pain    PLAN  The patient was seen and examined by Dr. Sandoval. Imaging was reviewed. He is recommending right C5-6, C6-7 laminoforaminotomies. The nature of the procedure, as well as its attendant risks, were discussed in detail with the patient.  All questions were answered.  They are agreement with proceeding with surgery as planned, and are tentatively scheduled for this afternoon.     She has not been able to sleep or find any comfortable position. The best thing for her at this point is surgery and I would recommend a posterior decompression. Both levels show foraminal stenosis, but I  suspect her main pain generator is C6-7. I will address both levels.

## 2022-07-13 NOTE — BRIEF OP NOTE
[facility] - Periop Services  Brief Operative Note    SUMMARY     Surgery Date: 7/13/2022     Surgeon(s) and Role:     * Nam Sandoval MD - Primary    Assisting Surgeon: None    Pre-op Diagnosis:  * No pre-op diagnosis entered *    Post-op Diagnosis:  Post-Op Diagnosis Codes:     * Cervical spondylosis with radiculopathy [M47.22]    Procedure(s) (LRB):  Right C5-6, C6-7 laminoforaminotomies  Microdissection for spinal procedure  c-arm     Anesthesia: General    Operative Findings: Patient tolerated procedure well and was transferred to PACU.       Estimated Blood Loss: 50 mL    Estimated Blood Loss has been documented.         Specimens:   Specimen (24h ago, onward)            None          [id number]

## 2022-07-13 NOTE — HOSPITAL COURSE
The patient presented to the ED today on 07/13/2022 for intractable right arm pain.  Updated MRI cervical spine was obtained, which revealed foraminal stenosis secondary to disc osteophyte on the right at C5-6 and C6-7.  She was taken to the OR for right C5-6, C6-7 posterior foraminotomies.  She tolerated the procedure well.  She was transferred from recovery to outpatient surgery and was monitored postoperatively.  She  was stable for discharge home in an improved state with a good prognosis.

## 2022-07-13 NOTE — ANESTHESIA PREPROCEDURE EVALUATION
07/13/2022  Shreya Reyes is a 62 y.o., female.                                                                                                            07/13/2022  Shreya Reyes is a 62 y.o., female with Past Medical History:  Adrenal cancer  Closed compression fracture of L3 lumbar vertebra, sequela  Closed wedge compression fracture of T11 vertebra  Compression fracture of L1 vertebra  Compression fracture of L3 vertebra  Elevated liver enzymes  Hyperlipidemia  Hypertension  Osteoporosis  Thoracic compression fracture    And Past Surgical History:  Abdominoplasty  Adrenalectomy  L1 kyphoplasty w/ spine barbara, l3 kyphoplasty  Laproscopy for excision of adrenal mass  Left cataract  Orif tibia & fibula fractures  Tonsillectomy    Assessment:   Metastatic adrenocortical carcinoma  Multiple osteoporotic vertebral body compression fractures  Mild treatment induced leukopenia  Cervical disc disease with paresthesias of RUE     Plan:   Continue Mitotane and Hydrocortisone at current dose/schedules.  Continue physical therapy and medical management of her cervical disc disease and lumbar compression fractures as recommended by neurosurgery.  Patient will undergo restaging CT scans of the chest, abdomen and pelvis in late July.  Orders are placed.  She has standing orders in place for monthly CBC, CMP, Mitotane level, urine cortisol, acth, serum cortisol, renin, aldosterone, lipid panel, T4, TSH.       Here today for MRI of Cervical spine under anesthesia.    She has been NPO and pain is controlled -   Pre-op Assessment     I have reviewed the Nursing Notes. I have reviewed the NPO Status.      Review of Systems      Physical Exam  General: Cooperative, Alert and Oriented  Appears ch  Airway:  Mallampati: II   Mouth Opening: Normal  TM Distance: Normal  Tongue: Normal  Neck ROM: Normal  ROM    Dental:  Intact    Chest/Lungs:  Clear to auscultation, Normal Respiratory Rate    Heart:  Rate: Normal  Rhythm: Regular Rhythm        Anesthesia Plan  Type of Anesthesia, risks & benefits discussed:    Anesthesia Type: Gen Supraglottic Airway  Intra-op Monitoring Plan: Standard ASA Monitors  Post Op Pain Control Plan: IV/PO Opioids PRN and multimodal analgesia  Induction:  IV  Airway Plan: Direct  Informed Consent: Informed consent signed with the Patient and all parties understand the risks and agree with anesthesia plan.  All questions answered. Patient consented to blood products? No  ASA Score: 3  Day of Surgery Review of History & Physical: H&P Update referred to the surgeon/provider.  Anesthesia Plan Notes: Discussed with both patient and her   She is comfortable right now and does not wish to be sedated for MRI  We will be on standby if that status changes in order to complete her test today  For the planned C5-6, C6-7 foraminotomy will plan on GETA   Premedication with Midazolam  Technique:GETA - TIVA if required for monitoring  PONV Prophylaxis   PACU Postop       Ready For Surgery From Anesthesia Perspective.       Pre-op Assessment          Review of Systems         Anesthesia Plan  Type of Anesthesia, risks & benefits discussed:    Anesthesia Type: Gen ETT  Intra-op Monitoring Plan: Standard ASA Monitors  Post Op Pain Control Plan: IV/PO Opioids PRN and multimodal analgesia  Induction:  IV  Airway Plan: Direct  Informed Consent: Informed consent signed with the Patient and all parties understand the risks and agree with anesthesia plan.  All questions answered. Patient consented to blood products? No  ASA Score: 3  Day of Surgery Review of History & Physical: H&P Update referred to the surgeon/provider.  Anesthesia Plan Notes: Premedication with Midazolam  Technique: GETA   PONV Prophylaxis   PACU Postop       Ready For Surgery From Anesthesia Perspective.     .

## 2022-07-13 NOTE — TRANSFER OF CARE
Anesthesia Transfer of Care Note    Patient: Shreya Reyes    Procedure(s) Performed: Procedure(s) (LRB):  FORAMINOTOMY, SPINE, CERVICAL, MINIMALLY INVASIVE (Right)    Patient location: PACU    Anesthesia Type: general    Transport from OR: Transported from OR on room air with adequate spontaneous ventilation    Post pain: adequate analgesia    Post assessment: no apparent anesthetic complications    Post vital signs: stable    Level of consciousness: sedated, awake and responds to stimulation    Nausea/Vomiting: no nausea/vomiting    Complications: none    Transfer of care protocol was followed      Last vitals:   Visit Vitals  BP (!) 165/110   Pulse 94   Temp 36.4 °C (97.5 °F) (Oral)   Resp 18   Wt 58.1 kg (128 lb)   SpO2 99%   BMI 21.97 kg/m²

## 2022-07-14 ENCOUNTER — ANESTHESIA (OUTPATIENT)
Dept: SURGERY | Facility: HOSPITAL | Age: 62
End: 2022-07-14
Payer: COMMERCIAL

## 2022-07-14 NOTE — OP NOTE
DATE OF OPERATION:   July 13, 2022     PREOPERATIVE DIAGNOSIS:   1.  Cervical spondylosis and foraminal stenosis with radiculopathy     POSTOPERATIVE DIAGNOSIS:   1.  Cervical spondylosis and foraminal stenosis with radiculopathy     SURGEON:  Nam Sandoval M.D.    ASSISTANT: NATHALY Cueto     PROCEDURE:   1.  Right C5-6 lamino-foraminotomy  1.  Right C6-7 lamino-foraminotomy   2.  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  Then, after working on her golf swing with physical therapy about 6 weeks ago, she developed right-sided neck, shoulder and arm pain that progressed to right triceps and wrist extensor weakness.  Her 1st MRI was of extremely poor quality and nondiagnostic except that it did show degenerative changes at C5-6 and C6-7.  Her pain worsened to the point where she really cannot find any comfortable position and seemed to have more weakness in the arm. MRI from today showed severe foraminal stenosis on the right at C6-7 greater than C5-6.  Options were discussed and right-sided foraminotomies at these levels wasrecommended  The patient and her  understood and accepted the nature of this surgery as well as its attendant risks.     FINDINGS:   As expected, there was severe spondylosis at both C5-6 and C6-7 with significant nerve root impingement especially at C6-7.  There was excellent nerve root decompression from pedicle to pedicle and out the foramen.  The patient tolerated the procedure well.     PROCEDURE IN DETAIL:   After endotracheal intubation and induction of general anesthesia, the patient was placed in the [place] three-point pin fixation headrest.  The patient was turned to the prone  position on chest rolls with pressure points appropriately padded.  The patient received intravenous antibiotics prior to the start of the procedure.  The neck was prepped and draped in the usual fashion.  The C-arm was used to localize the appropriate interspace.  A 2 cm incision was made through the skin, subcutaneous tissues and fascia on the appropriate side after infiltrating the skin and muscle with 0.25% Marcaine.  Then progressive dilators were used down to the lamina, and then a 14 mm guide tube was put into place and positioned appropriately, according to the C-arm.  Then the operating microscope was brought into place.  Muscle was cleared off of the lateral inferior portion of the lamina, and then the high speed drill, curettes and micro-Kerrison rongeurs were used to create a hemilaminotomy and foraminotomy at C5-6.  The periradicular meningorachidian vein-bearing soft tissue was then cauterized with bipolar cautery and incised with micro scissors.  Attention was then paid to the right C6-7 level.  At this level the nerve was much more dorsally displaced and really seem to fill out into the foraminotomy defect at the completion of the procedure.  Exploration ventral to the nerve root did not reveal any soft disc fragments.    The wound was irrigated copiously with antibiotic irrigating solution.  The dilator was removed and a slowly rotating fashion, cauterizing the musculature with bipolar cautery and placing Surgi-[place] along the tract.  The wound was closed with 0 Vicryl for the fascia and 2-0 Vicryl for the subcutaneous tissue.  Dermabond skin glue was used for the skin edges and the patient was returned to the supine position. The patient was then taken to the postanesthetic care unit in satisfactory condition with correct sponge and needle counts.

## 2022-07-14 NOTE — ANESTHESIA POSTPROCEDURE EVALUATION
Anesthesia Post Evaluation    Patient: Shreya Reyes    Procedure(s) Performed: Procedure(s) (LRB):  FORAMINOTOMY, SPINE, CERVICAL, MINIMALLY INVASIVE (Right)    Final Anesthesia Type: general      Patient location during evaluation: floor  Patient participation: Yes- Able to Participate  Level of consciousness: awake and alert  Post-procedure vital signs: reviewed and stable  Pain management: adequate  Airway patency: patent    PONV status at discharge: No PONV  Anesthetic complications: no      Cardiovascular status: blood pressure returned to baseline  Respiratory status: spontaneous ventilation and room air  Hydration status: euvolemic  Follow-up not needed.          Vitals Value Taken Time   /93 07/13/22 1950   Temp 36.7 °C (98.1 °F) 07/13/22 1830   Pulse 90 07/13/22 1956   Resp 25 07/13/22 1940   SpO2 96 % 07/13/22 1956   Vitals shown include unvalidated device data.      Event Time   Out of Recovery 19:20:00         Pain/Singh Score: Singh Score: 10 (7/13/2022  7:52 PM)

## 2022-07-25 ENCOUNTER — TELEPHONE (OUTPATIENT)
Dept: NEUROSURGERY | Facility: CLINIC | Age: 62
End: 2022-07-25
Payer: COMMERCIAL

## 2022-07-26 NOTE — TELEPHONE ENCOUNTER
Spoke with the patient.  She is aware of her new appointment on 8/3/22 @ 9:30am.  She is also aware that she does not need x-rays for this appointment.

## 2022-07-29 ENCOUNTER — TELEPHONE (OUTPATIENT)
Dept: HEMATOLOGY/ONCOLOGY | Facility: CLINIC | Age: 62
End: 2022-07-29
Payer: COMMERCIAL

## 2022-08-01 ENCOUNTER — HOSPITAL ENCOUNTER (OUTPATIENT)
Dept: RADIOLOGY | Facility: HOSPITAL | Age: 62
Discharge: HOME | End: 2022-08-01
Attending: INTERNAL MEDICINE
Payer: COMMERCIAL

## 2022-08-01 DIAGNOSIS — C74.00 MALIGNANT NEOPLASM OF ADRENAL CORTEX, UNSPECIFIED LATERALITY: Primary | ICD-10-CM

## 2022-08-01 DIAGNOSIS — C74.02 MALIGNANT NEOPLASM OF CORTEX OF LEFT ADRENAL GLAND: ICD-10-CM

## 2022-08-01 DIAGNOSIS — C74.00 MALIGNANT NEOPLASM OF ADRENAL CORTEX, UNSPECIFIED LATERALITY: ICD-10-CM

## 2022-08-01 PROCEDURE — 74177 CT ABD & PELVIS W/CONTRAST: CPT | Mod: TC

## 2022-08-01 PROCEDURE — 71260 CT THORAX DX C+: CPT | Mod: TC

## 2022-08-01 PROCEDURE — 25500020 PHARM REV CODE 255: Performed by: INTERNAL MEDICINE

## 2022-08-01 RX ADMIN — IOPAMIDOL 100 ML: 755 INJECTION, SOLUTION INTRAVENOUS at 01:08

## 2022-08-02 DIAGNOSIS — C74.02: Primary | ICD-10-CM

## 2022-08-03 ENCOUNTER — OFFICE VISIT (OUTPATIENT)
Dept: NEUROSURGERY | Facility: CLINIC | Age: 62
End: 2022-08-03
Payer: COMMERCIAL

## 2022-08-03 VITALS
DIASTOLIC BLOOD PRESSURE: 98 MMHG | RESPIRATION RATE: 16 BRPM | HEART RATE: 84 BPM | WEIGHT: 123 LBS | BODY MASS INDEX: 20.49 KG/M2 | SYSTOLIC BLOOD PRESSURE: 165 MMHG | HEIGHT: 65 IN

## 2022-08-03 DIAGNOSIS — M47.22 CERVICAL SPONDYLOSIS WITH RADICULOPATHY: Primary | ICD-10-CM

## 2022-08-03 PROCEDURE — 1160F RVW MEDS BY RX/DR IN RCRD: CPT | Mod: CPTII,,, | Performed by: NEUROLOGICAL SURGERY

## 2022-08-03 PROCEDURE — 99024 POSTOP FOLLOW-UP VISIT: CPT | Mod: ,,, | Performed by: NEUROLOGICAL SURGERY

## 2022-08-03 PROCEDURE — 3080F PR MOST RECENT DIASTOLIC BLOOD PRESSURE >= 90 MM HG: ICD-10-PCS | Mod: CPTII,,, | Performed by: NEUROLOGICAL SURGERY

## 2022-08-03 PROCEDURE — 3077F PR MOST RECENT SYSTOLIC BLOOD PRESSURE >= 140 MM HG: ICD-10-PCS | Mod: CPTII,,, | Performed by: NEUROLOGICAL SURGERY

## 2022-08-03 PROCEDURE — 1160F PR REVIEW ALL MEDS BY PRESCRIBER/CLIN PHARMACIST DOCUMENTED: ICD-10-PCS | Mod: CPTII,,, | Performed by: NEUROLOGICAL SURGERY

## 2022-08-03 PROCEDURE — 3077F SYST BP >= 140 MM HG: CPT | Mod: CPTII,,, | Performed by: NEUROLOGICAL SURGERY

## 2022-08-03 PROCEDURE — 3008F BODY MASS INDEX DOCD: CPT | Mod: CPTII,,, | Performed by: NEUROLOGICAL SURGERY

## 2022-08-03 PROCEDURE — 1159F MED LIST DOCD IN RCRD: CPT | Mod: CPTII,,, | Performed by: NEUROLOGICAL SURGERY

## 2022-08-03 PROCEDURE — 4010F PR ACE/ARB THEARPY RXD/TAKEN: ICD-10-PCS | Mod: CPTII,,, | Performed by: NEUROLOGICAL SURGERY

## 2022-08-03 PROCEDURE — 3008F PR BODY MASS INDEX (BMI) DOCUMENTED: ICD-10-PCS | Mod: CPTII,,, | Performed by: NEUROLOGICAL SURGERY

## 2022-08-03 PROCEDURE — 4010F ACE/ARB THERAPY RXD/TAKEN: CPT | Mod: CPTII,,, | Performed by: NEUROLOGICAL SURGERY

## 2022-08-03 PROCEDURE — 3080F DIAST BP >= 90 MM HG: CPT | Mod: CPTII,,, | Performed by: NEUROLOGICAL SURGERY

## 2022-08-03 PROCEDURE — 99024 PR POST-OP FOLLOW-UP VISIT: ICD-10-PCS | Mod: ,,, | Performed by: NEUROLOGICAL SURGERY

## 2022-08-03 PROCEDURE — 1159F PR MEDICATION LIST DOCUMENTED IN MEDICAL RECORD: ICD-10-PCS | Mod: CPTII,,, | Performed by: NEUROLOGICAL SURGERY

## 2022-08-04 NOTE — PROGRESS NOTES
Subjective:       Patient ID: Shreya Reyes is a 62 y.o. female.    Chief Complaint:  Persistent back pain    Diagnosis: Metastatic adrenocortical carcinoma                    Multiple osteoporotic vertebral compression fractures    Treatment History  Adjuvant XRT (completed 9/30/21)  Mitotane 1/25/22-7/26/22    Current Treatment: Hydrocortisone 20 mg q.a.m., 10 mg q.p.m.                                  Levothyroxine 75 mcg/d    Clinical History:  Patient initially presented to the [facility] ER 3/16/21 with acute left flank pain. CT A/P showed a heterogeneous enhancing mass of the left adrenal gland measuring 5.5 x 4.8 x 5 cm (24 Hu), with no definite microscopic fat. There was mild displacement of the left renal vein. Right adrenal gland was unremarkable.  Hormonal workup was negative for pheochromocytoma. She was felt to have had an acute adrenal hemorrhage and close observation was recommended. Follow-up CT A/P 6/9/21 showed increased size of the adrenal mass to 8.0 x 4.5 cm appearing hypervascular with some central necrosis. She underwent a laparoscopic/robotic left adrenalectomy 6/11/21.  Final pathology showed an adrenal cortical carcinoma predominant oncocytic/trabecular morphology with focally marked cytologic atypia and increased mitotic rate (up to 10/50 HPF's). There were large areas of necrosis and multiple foci of capsular and lymphovascular invasion. Ki-67 expression was 10-15%.    She had a Telemedicine consultation with Dr. Dion Lynch at [facility] 8/3/21 who specializes in treatment of adrenocortical carcinoma. Her case was reviewed and discussed in their tumor board. Recommendations were for treatment with adjuvant radiation therapy and systemic treatment with Mitotane. Baseline postoperative CT scans of the chest, abdomen and pelvis 8/4/21 showed postoperative changes with no evidence of measurable metastatic disease.    She was seen as a new patient at [facility] 8/9/21.   She had recovered well from her surgery and was asymptomatic.  Treatment recommendations from the [facility] were reviewed and discussed.  Treatment was started with Mitotane 1000 mg BID on 8/16/2021.  No dose escalation was recommended until she completed radiation therapy.  She was started on replacement hydrocortisone 20 mg Q AM and 10 mg Q PM.  Surveillance CT scans were recommended in 3 months.      She was seen for an office visit 9/16/21 after completing 4 weeks of treatment with Mitotane.  She was not having any significant side effects associated with her therapy.  She was intermediate through her adjuvant radiation therapy.  Laboratory testing showed marked transaminase elevations with an AST of 493,  and alkaline phosphatase 175.  Bilirubin was normal.  The remainder of her CMP was unremarkable.  Baseline mitotane level drawn at that visit was 2.5 mcg/mL.  Mitotane was held and adjuvant radiation therapy was continued.  Weekly laboratory monitoring showed gradual improvement in her LFTs.  Although mitotane had been associated with transaminase elevations, >5x elevations are rare occurring in <1% of patients.  She was also instructed to hold her lovastatin.  She completed adjuvant radiation therapy 9/30/2021.     Follow-up laboratory continued to show transaminase elevations.  GI was consulted and ordered additional laboratory testing which did not reveal evidence of viral or autoimmune etiologies for her hepatitis.  Mitotane was not resumed due to her persistent transaminase elevations. Follow-up CT scans of the chest, abdomen and pelvis 10/28/21 showed a few stable subcentimeter pulmonary nodules and changes related to her previous left adrenalectomy with no evidence of disease recurrence.  Following normalization of her LFTs, treatment was resumed with Mitotane 1/25/22.  Her LFTs remained normal even during dose escalation.    She had an injury at home in mid March after carrying in  several arms of [employer] with acute mid back pain.  Plain x-ray 3/17/22 showed a compression deformity at L2 of questionable age.  MRI of the lumbar spine 3/23/22 showed a subacute severe compression fracture deformity of the L1 vertebral body and mild superior endplate compression fracture of L3 vertebral body with osteoporotic appearance and no findings suspicious for pathologic fracture.  However, there were scattered small osseous lesions in the right L3 vertebral body and L5 vertebral body concerning for metastatic disease.  CT PET scan 3/28/22 showed mildly hypermetabolic osseous lesions in the lumbar spine, pelvis and proximal left femur consistent with metastatic disease.  There was no significant hypermetabolic uptake at the sites of her compression fractures.  She did not have pain at any of the sites prior to the acute compression fracture.  Bone density exam showed osteoporosis of the lumbar spine with a T score of -3.4, left femoral neck -3.4 and left total hip -2.7.  She was started on Prolia 3/31/22 and underwent L1 and L3 kyphoplasty 4//8/22.  Biopsies of the L3 vertebral body showed no evidence of metastatic carcinoma.  She continued to have significant pain following the kyphoplasty.  Further review of her films showed a possible compression fracture at T11.  MRI of the thoracic spine 4/15/22 showed a compression fracture of T11 with 50% loss of vertebral body height.  She underwent kyphoplasty 4/21/22 with symptomatic improvement.    CT scans of the chest, abdomen and pelvis 4/27/22 showed sclerotic osseous lesions at L4, right ischium and left femur correlating with the hypermetabolic lesions on CT-PET scan.  There were stable sub-6 mm pulmonary nodules in the RML and LLL unchanged from previous imaging.  MRI of the cervical and lumbar spine 7/5/22 showed moderate disc disease at C5-6 and C6-7 without significant spinal canal stenosis or foraminal stenosis.  She developed progressive symptoms of  right arm pain, numbness and tingling and updated MRI 7/13/22 showed foraminal stenosis secondary to disc osteophyte on the right side at C5-6 and C6-7.  She underwent a right foraminotomy with relief of her symptoms.    CT C/A/P 08/01/2022 showed multiple compression fractures and postsurgical changes.  Area of sclerosis in the left sacrum was stable compared to the prior exam.  There was no evidence of visceral metastatic disease.  She had a teleconference with [facility] 8/2/22 and a follow-up PET scan was recommended.  Mitotane was discontinued 7/26/22. CT-PET 08/05/22 hypermetabolic osseous metastatic disease involving L5, right ischium, left femoral neck and a new lesion in the inferior right scapula.  There was a 12 mm area of hypermetabolic activity adjacent to the right adrenal gland without a definite CT correlate.    Interval History  A telemedicine visit was conducted today with Mrs. Reyes.  She consented to the virtual encounter.  She was not attended by any family.    She developed pain and eventually paresthesias involving the right arm, along with worsening low back pain in late June.  She was referred for MRI of the cervical and lumbar spine, which were performed 07/05/2022.  There was moderate disc disease at C5-C6 and C6-C7.  No spinal canal stenosis or intervertebral foraminal stenosis was noted.  There was an acute or subacute appearing shallow superior endplate depression at L2 consistent with an insufficiency fracture.  She was evaluated by Neurosurgery yesterday.  She was prescribed Lyrica, a Medrol Dosepak and Percocet.  She will begin utilizing a home cervical traction unit and continue utilizing a back brace.  She is also participating in physical therapy.  She rested well overnight.  Her pain is well controlled at this time.  Laboratory drawn 07/01/2022 for this visit was remarkable for a mitotane level of 18, ACTH level of 65,  urine cortisol level of 71.  Patient submitted  these results to [facility] and was instructed to decrease her mitotane to 2500 mg daily.  Her hydrocortisone was decreased to 15 mg in the morning and 10 mg in the evening.  She remains very fatigued.  She is utilizing MiraLax for her narcotic induced constipation.  She is due for restaging scans at the end of the month and has a virtual an appointment with [facility] 08/16/2022.    Review of Systems   Constitutional: Positive for fatigue. Negative for appetite change, fever and unexpected weight change.   HENT: Negative for mouth sores, sore throat and trouble swallowing.    Eyes: Negative.  Negative for visual disturbance.   Respiratory: Negative for cough, chest tightness and shortness of breath.    Cardiovascular: Negative for chest pain, palpitations and leg swelling.   Gastrointestinal: Negative for abdominal distention, abdominal pain, blood in stool, change in bowel habit, constipation, diarrhea, nausea, vomiting and change in bowel habit.   Genitourinary: Negative for dysuria, frequency and urgency.   Musculoskeletal: Positive for arthralgias, back pain (Improved) and neck pain.        Bilateral hip discomfort no bone pain   Integumentary:  Negative for rash and mole/lesion.   Neurological: Positive for numbness (Right arm - improved). Negative for headaches.   Hematological: Negative for adenopathy. Does not bruise/bleed easily.   Psychiatric/Behavioral: The patient is not nervous/anxious.         Emotional lability     PMHx:  Hyperlipidemia, osteoporosis  PSHx:  Tonsils, left cataract, ORIF left tibia/fib, left adrenalectomy, multiple vertebral kyphoplasties  SH:  Former smoker 1 PPD, quit 2009. + Social alcohol use. Lives in [place] with her . 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  FH:  Her mother had lymphoma.       Objective:        /86 (BP Location: Right arm, Patient Position: Sitting, BP Method: Small (Automatic))   Pulse 89   Temp 98.1  "°F (36.7 °C)   Resp 18   Ht 5' 5" (1.651 m)   Wt 57.9 kg (127 lb 9.6 oz)   SpO2 99%   BMI 21.23 kg/m²    Physical Exam  Constitutional:       General: She is not in acute distress.     Appearance: Normal appearance.   HENT:      Head: Normocephalic.      Nose: Nose normal.      Mouth/Throat:      Pharynx: No posterior oropharyngeal erythema.   Eyes:      General: No scleral icterus.     Pupils: Pupils are equal, round, and reactive to light.   Cardiovascular:      Rate and Rhythm: Normal rate and regular rhythm.      Heart sounds: No murmur heard.  Pulmonary:      Effort: Pulmonary effort is normal.   Abdominal:      General: Abdomen is flat. Bowel sounds are normal. There is no distension.      Palpations: There is no mass.      Tenderness: There is no abdominal tenderness.   Musculoskeletal:         General: No swelling or tenderness.      Cervical back: Normal range of motion and neck supple. No tenderness.   Skin:     Coloration: Skin is not pale.      Findings: No rash.   Neurological:      General: No focal deficit present.      Mental Status: She is alert and oriented to person, place, and time.      Comments: Paresthesias of right arm (Axilla, forearm, fingers)   Psychiatric:         Mood and Affect: Mood normal.         Behavior: Behavior normal.       ECOG SCORE          LABORATORY  Recent Results (from the past 336 hour(s))   Comprehensive Metabolic Panel    Collection Time: 08/01/22  9:00 AM   Result Value Ref Range    Sodium Level 143 136 - 145 mmol/L    Potassium Level 3.8 3.5 - 5.1 mmol/L    Chloride 108 (H) 98 - 107 mmol/L    Carbon Dioxide 24 23 - 31 mmol/L    Glucose Level 81 (L) 82 - 115 mg/dL    Blood Urea Nitrogen 18.7 9.8 - 20.1 mg/dL    Creatinine 0.58 0.55 - 1.02 mg/dL    Calcium Level Total 8.8 8.4 - 10.2 mg/dL    Protein Total 5.9 5.8 - 7.6 gm/dL    Albumin Level 3.1 (L) 3.4 - 4.8 gm/dL    Globulin 2.8 2.4 - 3.5 gm/dL    Albumin/Globulin Ratio 1.1 1.1 - 2.0 ratio    Bilirubin Total 0.3 " <=1.5 mg/dL    Alkaline Phosphatase 105 40 - 150 unit/L    Alanine Aminotransferase 17 0 - 55 unit/L    Aspartate Aminotransferase 29 5 - 34 unit/L    Estimated GFR-Non  >60 mls/min/1.73/m2   TSH    Collection Time: 08/01/22  9:00 AM   Result Value Ref Range    Thyroid Stimulating Hormone 0.7211 0.3500 - 4.9400 uIU/mL   T4, Free    Collection Time: 08/01/22  9:00 AM   Result Value Ref Range    Thyroxine Free 0.76 0.70 - 1.48 ng/dL   Lipid Panel    Collection Time: 08/01/22  9:00 AM   Result Value Ref Range    Cholesterol Total 209 (H) <=200 mg/dL    HDL Cholesterol 58 35 - 60 mg/dL    Triglyceride 148 (H) 37 - 140 mg/dL    Cholesterol/HDL Ratio 4 0 - 5    Very Low Density Lipoprotein 30     LDL Cholesterol 121.00 50.00 - 140.00 mg/dL   Aldosterone    Collection Time: 08/01/22  9:00 AM   Result Value Ref Range    Aldosterone, S <4.0 <=21 ng/dL   Renin    Collection Time: 08/01/22  9:00 AM   Result Value Ref Range    Renin Activity, P 1.6 ng/mL/h   Cortisol    Collection Time: 08/01/22  9:00 AM   Result Value Ref Range    Cortisol Level 2.2 ug/dL   ACTH    Collection Time: 08/01/22  9:00 AM   Result Value Ref Range    Adrenocorticotropic Hormone, P 317 (H) pg/mL   CBC with Differential    Collection Time: 08/01/22  9:00 AM   Result Value Ref Range    WBC 3.8 (L) 4.5 - 11.5 x10(3)/mcL    RBC 3.55 (L) 4.20 - 5.40 x10(6)/mcL    Hgb 11.3 (L) 12.0 - 16.0 gm/dL    Hct 34.7 (L) 37.0 - 47.0 %    MCV 97.7 (H) 80.0 - 94.0 fL    MCH 31.8 (H) 27.0 - 31.0 pg    MCHC 32.6 (L) 33.0 - 36.0 mg/dL    RDW 13.2 11.5 - 17.0 %    Platelet 213 130 - 400 x10(3)/mcL    MPV 10.8 (H) 7.4 - 10.4 fL    Neut % 56.3 %    Lymph % 26.7 %    Mono % 12.4 %    Eos % 3.2 %    Basophil % 1.1 %    Lymph # 1.01 0.6 - 4.6 x10(3)/mcL    Neut # 2.1 2.1 - 9.2 x10(3)/mcL    Mono # 0.47 0.1 - 1.3 x10(3)/mcL    Eos # 0.12 0 - 0.9 x10(3)/mcL    Baso # 0.04 0 - 0.2 x10(3)/mcL    IG# 0.01 0 - 0.04 x10(3)/mcL    IG% 0.3 %   Cortisol, Urine, Free     Collection Time: 08/01/22  9:01 AM   Result Value Ref Range    Cortisol 59 (H) 3.5 - 45 mcg/24 h    Collection Duration 24 h    Urine Volume 1150 mL        Assessment:   Metastatic adrenocortical carcinoma  Multiple osteoporotic vertebral body compression fractures  Mild treatment induced leukopenia  Cervical disc disease with paresthesias of RUE    Plan:   CT-PET scan shows persistent hypermetabolic bone metastases on mitotane.  Her symptoms are predominantly due to her previous compression fractures and not her metastatic sites.  Mitotane therapy has been held by [facility].  Level drawn on 08/01/22 is still pending.  She has an appointment tomorrow with Radiation Oncology to discuss stereotactic radiation therapy to the metastatic bone lesions.  There is no role at this time for systemic therapy until she completes stereotactic radiation therapy and undergoes follow-up imaging.  RTC in 6 weeks for a follow-up visit and clinical assessment.      MARY NORMAN MD      Other Physicians  Dr. Dion Lynch ([facility])

## 2022-08-05 ENCOUNTER — HOSPITAL ENCOUNTER (OUTPATIENT)
Dept: RADIOLOGY | Facility: HOSPITAL | Age: 62
Discharge: HOME | End: 2022-08-05
Attending: RADIOLOGY
Payer: COMMERCIAL

## 2022-08-05 DIAGNOSIS — C79.51 SECONDARY MALIGNANT NEOPLASM OF BONE: ICD-10-CM

## 2022-08-05 DIAGNOSIS — C74.02 MALIGNANT NEOPLASM OF CORTEX OF LEFT ADRENAL GLAND: Primary | ICD-10-CM

## 2022-08-05 DIAGNOSIS — C74.02: ICD-10-CM

## 2022-08-05 PROCEDURE — A9552 F18 FDG: HCPCS

## 2022-08-08 ENCOUNTER — OFFICE VISIT (OUTPATIENT)
Dept: HEMATOLOGY/ONCOLOGY | Facility: CLINIC | Age: 62
End: 2022-08-08
Payer: COMMERCIAL

## 2022-08-08 VITALS
DIASTOLIC BLOOD PRESSURE: 86 MMHG | WEIGHT: 127.63 LBS | HEIGHT: 65 IN | BODY MASS INDEX: 21.26 KG/M2 | RESPIRATION RATE: 18 BRPM | SYSTOLIC BLOOD PRESSURE: 136 MMHG | TEMPERATURE: 98 F | OXYGEN SATURATION: 99 % | HEART RATE: 89 BPM

## 2022-08-08 DIAGNOSIS — C74.02 MALIGNANT NEOPLASM OF CORTEX OF LEFT ADRENAL GLAND: Primary | ICD-10-CM

## 2022-08-08 DIAGNOSIS — C79.51 SECONDARY MALIGNANT NEOPLASM OF BONE: ICD-10-CM

## 2022-08-08 PROCEDURE — 4010F ACE/ARB THERAPY RXD/TAKEN: CPT | Mod: CPTII,S$GLB,, | Performed by: INTERNAL MEDICINE

## 2022-08-08 PROCEDURE — 3079F PR MOST RECENT DIASTOLIC BLOOD PRESSURE 80-89 MM HG: ICD-10-PCS | Mod: CPTII,S$GLB,, | Performed by: INTERNAL MEDICINE

## 2022-08-08 PROCEDURE — 1159F PR MEDICATION LIST DOCUMENTED IN MEDICAL RECORD: ICD-10-PCS | Mod: CPTII,S$GLB,, | Performed by: INTERNAL MEDICINE

## 2022-08-08 PROCEDURE — 3008F BODY MASS INDEX DOCD: CPT | Mod: CPTII,S$GLB,, | Performed by: INTERNAL MEDICINE

## 2022-08-08 PROCEDURE — 3075F SYST BP GE 130 - 139MM HG: CPT | Mod: CPTII,S$GLB,, | Performed by: INTERNAL MEDICINE

## 2022-08-08 PROCEDURE — 99999 PR PBB SHADOW E&M-EST. PATIENT-LVL IV: ICD-10-PCS | Mod: PBBFAC,,, | Performed by: INTERNAL MEDICINE

## 2022-08-08 PROCEDURE — 99999 PR PBB SHADOW E&M-EST. PATIENT-LVL IV: CPT | Mod: PBBFAC,,, | Performed by: INTERNAL MEDICINE

## 2022-08-08 PROCEDURE — 4010F PR ACE/ARB THEARPY RXD/TAKEN: ICD-10-PCS | Mod: CPTII,S$GLB,, | Performed by: INTERNAL MEDICINE

## 2022-08-08 PROCEDURE — 3079F DIAST BP 80-89 MM HG: CPT | Mod: CPTII,S$GLB,, | Performed by: INTERNAL MEDICINE

## 2022-08-08 PROCEDURE — 3075F PR MOST RECENT SYSTOLIC BLOOD PRESS GE 130-139MM HG: ICD-10-PCS | Mod: CPTII,S$GLB,, | Performed by: INTERNAL MEDICINE

## 2022-08-08 PROCEDURE — 99214 PR OFFICE/OUTPT VISIT, EST, LEVL IV, 30-39 MIN: ICD-10-PCS | Mod: S$GLB,,, | Performed by: INTERNAL MEDICINE

## 2022-08-08 PROCEDURE — 1160F RVW MEDS BY RX/DR IN RCRD: CPT | Mod: CPTII,S$GLB,, | Performed by: INTERNAL MEDICINE

## 2022-08-08 PROCEDURE — 99214 OFFICE O/P EST MOD 30 MIN: CPT | Mod: S$GLB,,, | Performed by: INTERNAL MEDICINE

## 2022-08-08 PROCEDURE — 1160F PR REVIEW ALL MEDS BY PRESCRIBER/CLIN PHARMACIST DOCUMENTED: ICD-10-PCS | Mod: CPTII,S$GLB,, | Performed by: INTERNAL MEDICINE

## 2022-08-08 PROCEDURE — 3008F PR BODY MASS INDEX (BMI) DOCUMENTED: ICD-10-PCS | Mod: CPTII,S$GLB,, | Performed by: INTERNAL MEDICINE

## 2022-08-08 PROCEDURE — 1159F MED LIST DOCD IN RCRD: CPT | Mod: CPTII,S$GLB,, | Performed by: INTERNAL MEDICINE

## 2022-08-09 ENCOUNTER — APPOINTMENT (OUTPATIENT)
Dept: RADIATION THERAPY | Facility: HOSPITAL | Age: 62
End: 2022-08-09
Attending: RADIOLOGY
Payer: COMMERCIAL

## 2022-08-09 PROCEDURE — 77334 RADIATION TREATMENT AID(S): CPT | Performed by: RADIOLOGY

## 2022-08-09 PROCEDURE — 77290 THER RAD SIMULAJ FIELD CPLX: CPT | Performed by: RADIOLOGY

## 2022-08-11 ENCOUNTER — TELEPHONE (OUTPATIENT)
Dept: INTERVENTIONAL RADIOLOGY/VASCULAR | Facility: HOSPITAL | Age: 62
End: 2022-08-11
Payer: COMMERCIAL

## 2022-08-12 PROCEDURE — 77300 RADIATION THERAPY DOSE PLAN: CPT | Performed by: RADIOLOGY

## 2022-08-12 PROCEDURE — 77334 RADIATION TREATMENT AID(S): CPT | Performed by: RADIOLOGY

## 2022-08-15 PROCEDURE — 77280 THER RAD SIMULAJ FIELD SMPL: CPT | Performed by: RADIOLOGY

## 2022-08-15 PROCEDURE — 77412 RADIATION TX DELIVERY LVL 3: CPT | Performed by: RADIOLOGY

## 2022-08-15 PROCEDURE — 77295 3-D RADIOTHERAPY PLAN: CPT | Performed by: RADIOLOGY

## 2022-08-16 PROCEDURE — 77387 GUIDANCE FOR RADJ TX DLVR: CPT | Performed by: RADIOLOGY

## 2022-08-16 PROCEDURE — 77412 RADIATION TX DELIVERY LVL 3: CPT | Performed by: RADIOLOGY

## 2022-08-17 PROCEDURE — 77412 RADIATION TX DELIVERY LVL 3: CPT | Performed by: RADIOLOGY

## 2022-08-17 PROCEDURE — 77387 GUIDANCE FOR RADJ TX DLVR: CPT | Performed by: RADIOLOGY

## 2022-08-18 PROCEDURE — 77412 RADIATION TX DELIVERY LVL 3: CPT | Performed by: RADIOLOGY

## 2022-08-18 PROCEDURE — 77387 GUIDANCE FOR RADJ TX DLVR: CPT | Performed by: RADIOLOGY

## 2022-08-19 PROCEDURE — 77387 GUIDANCE FOR RADJ TX DLVR: CPT | Performed by: RADIOLOGY

## 2022-08-19 PROCEDURE — 77412 RADIATION TX DELIVERY LVL 3: CPT | Performed by: RADIOLOGY

## 2022-08-22 PROCEDURE — 77336 RADIATION PHYSICS CONSULT: CPT | Performed by: RADIOLOGY

## 2022-08-22 PROCEDURE — 77412 RADIATION TX DELIVERY LVL 3: CPT | Performed by: RADIOLOGY

## 2022-08-22 PROCEDURE — 77387 GUIDANCE FOR RADJ TX DLVR: CPT | Performed by: RADIOLOGY

## 2022-08-23 PROCEDURE — 77412 RADIATION TX DELIVERY LVL 3: CPT | Performed by: RADIOLOGY

## 2022-08-23 PROCEDURE — 77387 GUIDANCE FOR RADJ TX DLVR: CPT | Performed by: RADIOLOGY

## 2022-08-24 PROCEDURE — 77412 RADIATION TX DELIVERY LVL 3: CPT | Performed by: RADIOLOGY

## 2022-08-24 PROCEDURE — 77387 GUIDANCE FOR RADJ TX DLVR: CPT | Performed by: RADIOLOGY

## 2022-08-25 PROCEDURE — 77412 RADIATION TX DELIVERY LVL 3: CPT | Performed by: RADIOLOGY

## 2022-08-25 PROCEDURE — 77387 GUIDANCE FOR RADJ TX DLVR: CPT | Performed by: RADIOLOGY

## 2022-08-26 PROCEDURE — 77412 RADIATION TX DELIVERY LVL 3: CPT | Performed by: RADIOLOGY

## 2022-08-26 PROCEDURE — 77387 GUIDANCE FOR RADJ TX DLVR: CPT | Performed by: RADIOLOGY

## 2022-08-29 PROCEDURE — 77336 RADIATION PHYSICS CONSULT: CPT | Performed by: RADIOLOGY

## 2022-08-30 LAB — BEAKER SEE SCANNED REPORT: NORMAL

## 2022-09-07 ENCOUNTER — LAB VISIT (OUTPATIENT)
Dept: LAB | Facility: HOSPITAL | Age: 62
End: 2022-09-07
Attending: INTERNAL MEDICINE
Payer: COMMERCIAL

## 2022-09-07 DIAGNOSIS — C79.51 SECONDARY MALIGNANT NEOPLASM OF BONE: ICD-10-CM

## 2022-09-07 DIAGNOSIS — C74.02 MALIGNANT NEOPLASM OF CORTEX OF LEFT ADRENAL GLAND: ICD-10-CM

## 2022-09-07 PROCEDURE — 82530 CORTISOL FREE: CPT

## 2022-09-07 PROCEDURE — 36415 COLL VENOUS BLD VENIPUNCTURE: CPT

## 2022-09-07 PROCEDURE — 80299 QUANTITATIVE ASSAY DRUG: CPT

## 2022-09-09 LAB
COLLECT DURATION TIME UR: 24 H
CORTIS 24H UR-MRATE: 225 MCG/24 H (ref 3.5–45)
SPECIMEN VOL 24H UR: 1500 ML

## 2022-09-17 LAB — MAYO GENERIC ORDERABLE RESULT: NORMAL

## 2022-09-19 NOTE — PROGRESS NOTES
Subjective:       Patient ID: Shreya Reyes is a 62 y.o. female.    Chief Complaint:  Skin irritation from radiation    Diagnosis: Metastatic adrenocortical carcinoma                    Multiple osteoporotic vertebral compression fractures    Treatment History  Adjuvant XRT (completed 9/30/21)  Mitotane 1/25/22-7/26/22, Resumed 8/22  Palliative XRT right scapula, L5,  R ischium, L femoral neck completed 8/26/22    Current Treatment: Hydrocortisone 10 mg q.a.m., 10 mg q.p.m.                                  Levothyroxine 75 mcg/d    Clinical History:  Patient initially presented to the [facility] 3/16/21 with acute left flank pain. CT A/P showed a heterogeneous enhancing mass of the left adrenal gland measuring 5.5 x 4.8 x 5 cm (24 Hu), with no definite microscopic fat. There was mild displacement of the left renal vein. Right adrenal gland was unremarkable.  Hormonal workup was negative for pheochromocytoma. She was felt to have had an acute adrenal hemorrhage and close observation was recommended. Follow-up CT A/P 6/9/21 showed increased size of the adrenal mass to 8.0 x 4.5 cm appearing hypervascular with some central necrosis. She underwent a laparoscopic/robotic left adrenalectomy 6/11/21.  Final pathology showed an adrenal cortical carcinoma predominant oncocytic/trabecular morphology with focally marked cytologic atypia and increased mitotic rate (up to 10/50 HPF's). There were large areas of necrosis and multiple foci of capsular and lymphovascular invasion. Ki-67 expression was 10-15%.    She had a Telemedicine consultation with Dr. Dion Lynch at [facility] 8/3/21 who specializes in treatment of adrenocortical carcinoma. Her case was reviewed and discussed in their tumor board. Recommendations were for treatment with adjuvant radiation therapy and systemic treatment with Mitotane. Baseline postoperative CT scans of the chest, abdomen and pelvis 8/4/21 showed postoperative changes with no evidence  of measurable metastatic disease.    She was seen as a [facility] 8[facility]/21.  She had recovered well from her surgery and was asymptomatic.  Treatment recommendations from the [facility] were reviewed and discussed.  Treatment was started with Mitotane 1000 mg BID on 8/16/2021.  No dose escalation was recommended until she completed radiation therapy.  She was started on replacement hydrocortisone 20 mg Q AM and 10 mg Q PM.  Surveillance CT scans were recommended in 3 months.      She was seen for an office visit 9/16/21 after completing 4 weeks of treatment with Mitotane.  She was not having any significant side effects associated with her therapy.  She was half-way through her adjuvant radiation therapy.  Laboratory testing showed marked transaminase elevations with an AST of 493,  and alkaline phosphatase 175.  Bilirubin was normal.  The remainder of her CMP was unremarkable.  Baseline mitotane level drawn at that visit was 2.5 mcg/mL.  Mitotane was held and adjuvant radiation therapy was continued.  Weekly laboratory monitoring showed gradual improvement in her LFTs.  Although mitotane had been associated with transaminase elevations, >5x elevations are rare occurring in <1% of patients.  She was also instructed to hold her lovastatin.  She completed adjuvant radiation therapy 9/30/2021.     Follow-up laboratory continued to show transaminase elevations.  GI was consulted and ordered additional laboratory testing which did not reveal evidence of viral or autoimmune etiologies for her hepatitis.  Mitotane was not resumed due to her persistent transaminase elevations. Follow-up CT scans of the chest, abdomen and pelvis 10/28/21 showed a few stable subcentimeter pulmonary nodules and changes related to her previous left adrenalectomy with no evidence of disease recurrence.  Following normalization of her LFTs, treatment was resumed with Mitotane 1/25/22.  Her LFTs  remained normal even during dose escalation.    She had an injury at home in mid March after carrying in several arms of [employer] with acute mid back pain.  Plain x-ray 3/17/22 showed a compression deformity at L2 of questionable age.  MRI of the lumbar spine 3/23/22 showed a subacute severe compression fracture deformity of the L1 vertebral body and mild superior endplate compression fracture of L3 vertebral body with osteoporotic appearance and no findings suspicious for pathologic fracture.  However, there were scattered small osseous lesions in the right L3 vertebral body and L5 vertebral body concerning for metastatic disease.  CT PET scan 3/28/22 showed mildly hypermetabolic osseous lesions in the lumbar spine, pelvis and proximal left femur consistent with metastatic disease.  There was no significant hypermetabolic uptake at the sites of her compression fractures.  She did not have pain at any of the sites prior to the acute compression fracture.  Bone density exam showed osteoporosis of the lumbar spine with a T score of -3.4, left femoral neck -3.4 and left total hip -2.7.  She was started on Prolia 3/31/22 and underwent L1 and L3 kyphoplasty 4//8/22.  Biopsies of the L3 vertebral body showed no evidence of metastatic carcinoma.  She continued to have significant pain following the kyphoplasty.  Further review of her films showed a possible compression fracture at T11.  MRI of the thoracic spine 4/15/22 showed a compression fracture of T11 with 50% loss of vertebral body height.  She underwent kyphoplasty 4/21/22 with symptomatic improvement.    CT scans of the chest, abdomen and pelvis 4/27/22 showed sclerotic osseous lesions at L4, right ischium and left femur correlating with the hypermetabolic lesions on CT-PET scan.  There were stable sub-6 mm pulmonary nodules in the RML and LLL unchanged from previous imaging.  MRI of the cervical and lumbar spine 7/5/22 showed moderate disc disease at C5-6 and C6-7  without significant spinal canal stenosis or foraminal stenosis.  She developed progressive symptoms of right arm pain, numbness and tingling and updated MRI 7/13/22 showed foraminal stenosis secondary to disc osteophyte on the right side at C5-6 and C6-7.  She underwent a right foraminotomy with relief of her symptoms.    CT C/A/P 08/01/2022 showed multiple compression fractures and postsurgical changes.  Area of sclerosis in the left sacrum was stable compared to the prior exam.  There was no evidence of visceral metastatic disease.  She had a teleconference with [facility] 8/2/22 and a follow-up PET scan was recommended.  Mitotane was discontinued 7/26/22. CT-PET 08/05/22 hypermetabolic osseous metastatic disease involving L5, right ischium, left femoral neck and a new lesion in the inferior right scapula.  There was a 12 mm area of hypermetabolic activity adjacent to the right adrenal gland without a definite CT correlate.    Interval History  Mrs. Reyes is here today by herself for a six week follow-up visit.  She completed palliative radiation therapy to the right scapula, L5, R ischium and left femoral neck on 8/26/22.  Treatment was well tolerated overall.  She does have some skin irritation involving the perineum.  She was prescribed Silvadene by [facility] and feels the skin is improving.  She had a telemedicine follow-up appointment with  (KAT Nguyen NP) 08/30/2022.  She resumed mitotane at 1000 mg b.i.d. 8/29 22 with recommendations to maintain that dose based on her mitotane level of 12.8 performed 09/06/2022.  Her hydrocortisone was also decreased to 10 mg b.i.d..  She was instructed to continue monthly labs and repeat her PET scan in November.  She is taking all of her medications as directed.  Her mitotane associated side effects (fatigue, nausea, emotional lability) are mild and tolerable at this time.  She taking Percocet sparingly.  All laboratory results were reviewed  "and discussed today with the patient.  The abnormalities had already been addressed by [facility] as documented above.  Her next set of labs are due 10/07/2022.      Review of Systems   Constitutional:  Positive for fatigue. Negative for appetite change, fever and unexpected weight change.   HENT:  Negative for mouth sores, sore throat and trouble swallowing.    Eyes: Negative.  Negative for visual disturbance.   Respiratory:  Negative for cough, chest tightness and shortness of breath.    Cardiovascular:  Negative for chest pain, palpitations and leg swelling.   Gastrointestinal:  Negative for abdominal distention, abdominal pain, blood in stool, change in bowel habit, constipation, diarrhea, nausea, vomiting and change in bowel habit.   Genitourinary:  Negative for dysuria, frequency and urgency.   Musculoskeletal:  Positive for arthralgias, back pain (Improved) and neck pain.        Bilateral hip discomfort no bone pain   Integumentary:  Positive for rash (Perineum). Negative for mole/lesion.   Neurological:  Negative for numbness and headaches.   Hematological:  Negative for adenopathy. Does not bruise/bleed easily.   Psychiatric/Behavioral:  The patient is not nervous/anxious.         Emotional lability     PMHx:  Hyperlipidemia, osteoporosis  PSHx:  Tonsils, left cataract, ORIF left tibia/fib, left adrenalectomy, multiple vertebral kyphoplasties  SH:  Former smoker 1 PPD, quit 2009. + Social alcohol use. Lives in [place] with her . 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  FH:  Her mother had lymphoma.       Objective:        /82 (BP Location: Right arm, Patient Position: Sitting, BP Method: Large (Automatic))   Pulse 88   Temp 98.1 °F (36.7 °C)   Resp 18   Ht 5' 4" (1.626 m)   Wt 56.5 kg (124 lb 8 oz)   SpO2 99%   BMI 21.37 kg/m²    Physical Exam  Constitutional:       General: She is not in acute distress.     Appearance: Normal appearance.   HENT:      " Head: Normocephalic.      Nose: Nose normal.      Mouth/Throat:      Pharynx: No posterior oropharyngeal erythema.   Eyes:      General: No scleral icterus.     Pupils: Pupils are equal, round, and reactive to light.   Cardiovascular:      Rate and Rhythm: Normal rate and regular rhythm.      Heart sounds: No murmur heard.  Pulmonary:      Effort: Pulmonary effort is normal.   Abdominal:      General: Abdomen is flat. Bowel sounds are normal. There is no distension.      Palpations: There is no mass.      Tenderness: There is no abdominal tenderness.   Musculoskeletal:         General: No swelling or tenderness.      Cervical back: Normal range of motion and neck supple. No tenderness.   Skin:     Coloration: Skin is not pale.      Findings: No rash.   Neurological:      General: No focal deficit present.      Mental Status: She is alert and oriented to person, place, and time.   Psychiatric:         Mood and Affect: Mood normal.         Behavior: Behavior normal.     ECOG SCORE            LABORATORY  Recent Results (from the past 336 hour(s))   Comprehensive Metabolic Panel    Collection Time: 09/07/22  8:33 AM   Result Value Ref Range    Sodium Level 141 136 - 145 mmol/L    Potassium Level 3.3 (L) 3.5 - 5.1 mmol/L    Chloride 106 98 - 107 mmol/L    Carbon Dioxide 27 23 - 31 mmol/L    Glucose Level 92 82 - 115 mg/dL    Blood Urea Nitrogen 13.6 9.8 - 20.1 mg/dL    Creatinine 0.57 0.55 - 1.02 mg/dL    Calcium Level Total 8.6 8.4 - 10.2 mg/dL    Protein Total 5.6 (L) 5.8 - 7.6 gm/dL    Albumin Level 2.8 (L) 3.4 - 4.8 gm/dL    Globulin 2.8 2.4 - 3.5 gm/dL    Albumin/Globulin Ratio 1.0 (L) 1.1 - 2.0 ratio    Bilirubin Total 0.2 <=1.5 mg/dL    Alkaline Phosphatase 78 40 - 150 unit/L    Alanine Aminotransferase 14 0 - 55 unit/L    Aspartate Aminotransferase 22 5 - 34 unit/L    eGFR >60 mls/min/1.73/m2   TSH    Collection Time: 09/07/22  8:33 AM   Result Value Ref Range    Thyroid Stimulating Hormone 0.9009 0.3500 - 4.9400  uIU/mL   T4, Free    Collection Time: 09/07/22  8:33 AM   Result Value Ref Range    Thyroxine Free 0.61 (L) 0.70 - 1.48 ng/dL   Lipid Panel    Collection Time: 09/07/22  8:33 AM   Result Value Ref Range    Cholesterol Total 195 <=200 mg/dL    HDL Cholesterol 60 35 - 60 mg/dL    Triglyceride 162 (H) 37 - 140 mg/dL    Cholesterol/HDL Ratio 3 0 - 5    Very Low Density Lipoprotein 32     LDL Cholesterol 103.00 50.00 - 140.00 mg/dL   Aldosterone    Collection Time: 09/07/22  8:33 AM   Result Value Ref Range    Aldosterone, S <4.0 <=21 ng/dL   Renin    Collection Time: 09/07/22  8:33 AM   Result Value Ref Range    Renin Activity, P 0.8 ng/mL/h   Cortisol    Collection Time: 09/07/22  8:33 AM   Result Value Ref Range    Cortisol Level 39.2 ug/dL   ACTH    Collection Time: 09/07/22  8:33 AM   Result Value Ref Range    Adrenocorticotropic Hormone, P 7.0 (L) pg/mL   CBC with Differential    Collection Time: 09/07/22  8:33 AM   Result Value Ref Range    WBC 2.6 (L) 4.5 - 11.5 x10(3)/mcL    RBC 3.31 (L) 4.20 - 5.40 x10(6)/mcL    Hgb 10.7 (L) 12.0 - 16.0 gm/dL    Hct 33.1 (L) 37.0 - 47.0 %    .0 (H) 80.0 - 94.0 fL    MCH 32.3 (H) 27.0 - 31.0 pg    MCHC 32.3 (L) 33.0 - 36.0 mg/dL    RDW 14.5 11.5 - 17.0 %    Platelet 180 130 - 400 x10(3)/mcL    MPV 10.2 7.4 - 10.4 fL    IG# 0.02 0 - 0.04 x10(3)/mcL    IG% 0.8 %   Manual Differential    Collection Time: 09/07/22  8:33 AM   Result Value Ref Range    Neut Man 71 47 - 80 %    Lymph Man 20 13 - 40 %    Monocyte Man 6 2 - 11 %    Eos Man 2 0 - 8 %    Basophil Man 1 0 - 2 %    Abs Neut calc 1.846 (L) 2.1 - 9.2 x10(3)/mcL    Abs Baso 0.026 0 - 0.2 x10(3)/mcL    Abs Mono 0.156 0.1 - 1.3 x10(3)/mcL    Abs Lymp 0.52 (L) 0.6 - 4.6 x10(3)/mcL    Abs Eos  0.052 0 - 0.9 x10(3)/mcL    RBC Morph Normal Normal    Platelet Est Normal Normal, Adequate   LOVELACE GENERIC ORDERABLE FMITO (Mitotane (Lysodren))    Collection Time: 09/07/22  8:53 AM   Result Value Ref Range    Lovelace Generic Orderable  SEE COMMENTS    Cortisol, Urine, Free    Collection Time: 09/07/22  9:20 AM   Result Value Ref Range    Cortisol 225 (H) 3.5 - 45 mcg/24 h    Collection Duration 24 h    Urine Volume 1500 mL        Assessment:   Metastatic adrenocortical carcinoma  Multiple osteoporotic vertebral body compression fractures  Mild treatment induced leukopenia  Cervical disc disease with paresthesias of RUE - now resolved    Plan:   Patient completed her course of palliative radiation therapy.    She does have some skin toxicities which she is treating with Silvadene as directed by [facility].    Continue mitotane 1000 mg b.i.d..    Next lab draw scheduled 10/07/2022.    Return to clinic in 6 weeks for a toxicity check.    Repeat CT PET scan in November.    She already has follow-up at [facility] 11/28/2022.    All questions answered to the satisfaction of the patient.    CRISTINA WILSON, FNP-C    Other Physicians  Dr. Dion Lynch ([facility])

## 2022-09-20 ENCOUNTER — OFFICE VISIT (OUTPATIENT)
Dept: HEMATOLOGY/ONCOLOGY | Facility: CLINIC | Age: 62
End: 2022-09-20
Payer: COMMERCIAL

## 2022-09-20 VITALS
OXYGEN SATURATION: 99 % | WEIGHT: 124.5 LBS | HEART RATE: 88 BPM | SYSTOLIC BLOOD PRESSURE: 129 MMHG | DIASTOLIC BLOOD PRESSURE: 82 MMHG | TEMPERATURE: 98 F | RESPIRATION RATE: 18 BRPM | BODY MASS INDEX: 21.25 KG/M2 | HEIGHT: 64 IN

## 2022-09-20 DIAGNOSIS — E07.9 THYROID DISEASE: ICD-10-CM

## 2022-09-20 DIAGNOSIS — C74.00 MALIGNANT NEOPLASM OF ADRENAL CORTEX, UNSPECIFIED LATERALITY: Primary | ICD-10-CM

## 2022-09-20 PROCEDURE — 3008F BODY MASS INDEX DOCD: CPT | Mod: CPTII,S$GLB,, | Performed by: NURSE PRACTITIONER

## 2022-09-20 PROCEDURE — 1159F MED LIST DOCD IN RCRD: CPT | Mod: CPTII,S$GLB,, | Performed by: NURSE PRACTITIONER

## 2022-09-20 PROCEDURE — 3079F PR MOST RECENT DIASTOLIC BLOOD PRESSURE 80-89 MM HG: ICD-10-PCS | Mod: CPTII,S$GLB,, | Performed by: NURSE PRACTITIONER

## 2022-09-20 PROCEDURE — 3008F PR BODY MASS INDEX (BMI) DOCUMENTED: ICD-10-PCS | Mod: CPTII,S$GLB,, | Performed by: NURSE PRACTITIONER

## 2022-09-20 PROCEDURE — 3074F SYST BP LT 130 MM HG: CPT | Mod: CPTII,S$GLB,, | Performed by: NURSE PRACTITIONER

## 2022-09-20 PROCEDURE — 4010F PR ACE/ARB THEARPY RXD/TAKEN: ICD-10-PCS | Mod: CPTII,S$GLB,, | Performed by: NURSE PRACTITIONER

## 2022-09-20 PROCEDURE — 1160F RVW MEDS BY RX/DR IN RCRD: CPT | Mod: CPTII,S$GLB,, | Performed by: NURSE PRACTITIONER

## 2022-09-20 PROCEDURE — 3074F PR MOST RECENT SYSTOLIC BLOOD PRESSURE < 130 MM HG: ICD-10-PCS | Mod: CPTII,S$GLB,, | Performed by: NURSE PRACTITIONER

## 2022-09-20 PROCEDURE — 3079F DIAST BP 80-89 MM HG: CPT | Mod: CPTII,S$GLB,, | Performed by: NURSE PRACTITIONER

## 2022-09-20 PROCEDURE — 99214 OFFICE O/P EST MOD 30 MIN: CPT | Mod: S$GLB,,, | Performed by: NURSE PRACTITIONER

## 2022-09-20 PROCEDURE — 99999 PR PBB SHADOW E&M-EST. PATIENT-LVL IV: CPT | Mod: PBBFAC,,, | Performed by: NURSE PRACTITIONER

## 2022-09-20 PROCEDURE — 99214 PR OFFICE/OUTPT VISIT, EST, LEVL IV, 30-39 MIN: ICD-10-PCS | Mod: S$GLB,,, | Performed by: NURSE PRACTITIONER

## 2022-09-20 PROCEDURE — 1160F PR REVIEW ALL MEDS BY PRESCRIBER/CLIN PHARMACIST DOCUMENTED: ICD-10-PCS | Mod: CPTII,S$GLB,, | Performed by: NURSE PRACTITIONER

## 2022-09-20 PROCEDURE — 1159F PR MEDICATION LIST DOCUMENTED IN MEDICAL RECORD: ICD-10-PCS | Mod: CPTII,S$GLB,, | Performed by: NURSE PRACTITIONER

## 2022-09-20 PROCEDURE — 99999 PR PBB SHADOW E&M-EST. PATIENT-LVL IV: ICD-10-PCS | Mod: PBBFAC,,, | Performed by: NURSE PRACTITIONER

## 2022-09-20 PROCEDURE — 4010F ACE/ARB THERAPY RXD/TAKEN: CPT | Mod: CPTII,S$GLB,, | Performed by: NURSE PRACTITIONER

## 2022-09-22 ENCOUNTER — PATIENT MESSAGE (OUTPATIENT)
Dept: HEMATOLOGY/ONCOLOGY | Facility: CLINIC | Age: 62
End: 2022-09-22
Payer: COMMERCIAL

## 2022-09-28 ENCOUNTER — OFFICE VISIT (OUTPATIENT)
Dept: INTERNAL MEDICINE | Facility: CLINIC | Age: 62
End: 2022-09-28
Payer: COMMERCIAL

## 2022-09-28 VITALS
DIASTOLIC BLOOD PRESSURE: 67 MMHG | OXYGEN SATURATION: 99 % | SYSTOLIC BLOOD PRESSURE: 119 MMHG | HEIGHT: 64 IN | RESPIRATION RATE: 18 BRPM | BODY MASS INDEX: 21.68 KG/M2 | WEIGHT: 127 LBS | HEART RATE: 72 BPM

## 2022-09-28 DIAGNOSIS — E03.9 HYPOTHYROIDISM, UNSPECIFIED TYPE: ICD-10-CM

## 2022-09-28 DIAGNOSIS — M81.0 OSTEOPOROSIS, UNSPECIFIED OSTEOPOROSIS TYPE, UNSPECIFIED PATHOLOGICAL FRACTURE PRESENCE: ICD-10-CM

## 2022-09-28 DIAGNOSIS — M54.50 LOW BACK PAIN, UNSPECIFIED BACK PAIN LATERALITY, UNSPECIFIED CHRONICITY, UNSPECIFIED WHETHER SCIATICA PRESENT: Primary | ICD-10-CM

## 2022-09-28 DIAGNOSIS — I10 HYPERTENSION, UNSPECIFIED TYPE: ICD-10-CM

## 2022-09-28 DIAGNOSIS — C74.00 MALIGNANT NEOPLASM OF ADRENAL CORTEX, UNSPECIFIED LATERALITY: ICD-10-CM

## 2022-09-28 PROBLEM — R53.83 OTHER FATIGUE: Status: ACTIVE | Noted: 2022-08-02

## 2022-09-28 PROCEDURE — 3008F BODY MASS INDEX DOCD: CPT | Mod: CPTII,,, | Performed by: INTERNAL MEDICINE

## 2022-09-28 PROCEDURE — 3008F PR BODY MASS INDEX (BMI) DOCUMENTED: ICD-10-PCS | Mod: CPTII,,, | Performed by: INTERNAL MEDICINE

## 2022-09-28 PROCEDURE — 3074F SYST BP LT 130 MM HG: CPT | Mod: CPTII,,, | Performed by: INTERNAL MEDICINE

## 2022-09-28 PROCEDURE — 3074F PR MOST RECENT SYSTOLIC BLOOD PRESSURE < 130 MM HG: ICD-10-PCS | Mod: CPTII,,, | Performed by: INTERNAL MEDICINE

## 2022-09-28 PROCEDURE — 99213 OFFICE O/P EST LOW 20 MIN: CPT | Mod: ,,, | Performed by: INTERNAL MEDICINE

## 2022-09-28 PROCEDURE — 3078F DIAST BP <80 MM HG: CPT | Mod: CPTII,,, | Performed by: INTERNAL MEDICINE

## 2022-09-28 PROCEDURE — 1159F MED LIST DOCD IN RCRD: CPT | Mod: CPTII,,, | Performed by: INTERNAL MEDICINE

## 2022-09-28 PROCEDURE — 4010F PR ACE/ARB THEARPY RXD/TAKEN: ICD-10-PCS | Mod: CPTII,,, | Performed by: INTERNAL MEDICINE

## 2022-09-28 PROCEDURE — 3078F PR MOST RECENT DIASTOLIC BLOOD PRESSURE < 80 MM HG: ICD-10-PCS | Mod: CPTII,,, | Performed by: INTERNAL MEDICINE

## 2022-09-28 PROCEDURE — 1159F PR MEDICATION LIST DOCUMENTED IN MEDICAL RECORD: ICD-10-PCS | Mod: CPTII,,, | Performed by: INTERNAL MEDICINE

## 2022-09-28 PROCEDURE — 4010F ACE/ARB THERAPY RXD/TAKEN: CPT | Mod: CPTII,,, | Performed by: INTERNAL MEDICINE

## 2022-09-28 PROCEDURE — 99213 PR OFFICE/OUTPT VISIT, EST, LEVL III, 20-29 MIN: ICD-10-PCS | Mod: ,,, | Performed by: INTERNAL MEDICINE

## 2022-09-28 NOTE — PROGRESS NOTES
Subjective:       Patient ID: Shreya Reyes is a 62 y.o. female.    Chief Complaint: Follow-up      The patient is a 62-year-old woman in for follow-up of multiple medical problems.  Since I saw her last she had kyphoplasty at least twice.  Currently the pain is fairly well controlled.  She had numerous osteoporotic compression fractures but also was found to have metastatic disease in the bone.  She is now on a new medication for the adrenal cortical malignancy.    She is on Prolia for the osteoporosis.  She has been on Forteo in the past and that failed to help.    Follow-up    Review of Systems     Current Outpatient Medications on File Prior to Visit   Medication Sig Dispense Refill    ALPRAZolam (XANAX) 0.25 MG tablet Take 0.25 mg by mouth every 8 (eight) hours as needed.      calcium carbonate (OS-ORAL) 600 mg calcium (1,500 mg) Tab Take 600 mg by mouth 2 (two) times daily.      cholecalciferol, vitamin D3, 125 mcg (5,000 unit) capsule Take 250 mcg by mouth.      hydrocortisone (CORTEF) 20 MG Tab Take 20 mg by mouth 2 (two) times daily.      ibuprofen (ADVIL,MOTRIN) 800 MG tablet Take 800 mg by mouth 3 (three) times daily as needed.      levothyroxine (SYNTHROID) 75 MCG tablet Take 1 tablet by mouth once daily.      losartan (COZAAR) 50 MG tablet Take 1 tablet by mouth once daily.      mitotane (LYSODREN) 500 mg tablet Take 1,000 mg by mouth once daily.      [DISCONTINUED] diazePAM (VALIUM) 5 MG tablet Take 1 tablet (5 mg total) by mouth every 8 (eight) hours as needed for Anxiety (muscle spasms). (Patient not taking: Reported on 7/7/2022) 60 tablet 1    [DISCONTINUED] oxyCODONE-acetaminophen (PERCOCET) 5-325 mg per tablet Take 1 tablet by mouth every 4 (four) hours as needed for Pain. (Patient not taking: Reported on 9/28/2022) 40 tablet 0     No current facility-administered medications on file prior to visit.     Objective:      /67 (BP Location: Right arm, Patient Position: Sitting, BP Method: Large  "(Manual))   Pulse 72   Resp 18   Ht 5' 4" (1.626 m)   Wt 57.6 kg (127 lb)   SpO2 99%   BMI 21.80 kg/m²     Physical Exam  Vitals reviewed.   Constitutional:       Appearance: Normal appearance.   HENT:      Head: Normocephalic.      Nose: Nose normal.      Mouth/Throat:      Pharynx: Oropharynx is clear.   Eyes:      Pupils: Pupils are equal, round, and reactive to light.   Neck:      Thyroid: No thyromegaly.   Cardiovascular:      Rate and Rhythm: Normal rate and regular rhythm.      Pulses: Normal pulses.   Abdominal:      General: Abdomen is flat. Bowel sounds are normal.      Palpations: Abdomen is soft. There is no hepatomegaly, splenomegaly or mass.      Tenderness: There is no abdominal tenderness.   Musculoskeletal:      Cervical back: Neck supple.   Lymphadenopathy:      Cervical: No cervical adenopathy.   Skin:     General: Skin is warm and dry.   Neurological:      Mental Status: She is alert.     Lab work reviewed.  Numbers okay.  T4 slightly low but TSH normal.  Potassium a bit low but acceptable.  Assessment:       1. Low back pain, unspecified back pain laterality, unspecified chronicity, unspecified whether sciatica present    2. Malignant neoplasm of adrenal cortex, unspecified laterality    3. Osteoporosis, unspecified osteoporosis type, unspecified pathological fracture presence    4. Hypertension, unspecified type    5. Hypothyroidism, unspecified type        1. Osteoporosis.  On Prolia.  Failed Forteo in the past.  She is also on vitamin-D and calcium supplements     2. Adrenal cortex malignancy.  Metastatic.  On treatment     3. Controlled hypertension    4. Controlled hypothyroidism    Continue same meds TLC etc. in follow-up 6 months with CBC CMP lipid TSH prior   Plan:                   "

## 2022-10-07 ENCOUNTER — LAB VISIT (OUTPATIENT)
Dept: LAB | Facility: HOSPITAL | Age: 62
End: 2022-10-07
Attending: INTERNAL MEDICINE
Payer: COMMERCIAL

## 2022-10-07 DIAGNOSIS — C74.02 MALIGNANT NEOPLASM OF CORTEX OF LEFT ADRENAL GLAND: ICD-10-CM

## 2022-10-07 DIAGNOSIS — C74.00 MALIGNANT NEOPLASM OF ADRENAL CORTEX, UNSPECIFIED LATERALITY: Primary | ICD-10-CM

## 2022-10-07 DIAGNOSIS — C74.00 MALIGNANT NEOPLASM OF ADRENAL CORTEX, UNSPECIFIED LATERALITY: ICD-10-CM

## 2022-10-07 DIAGNOSIS — E07.9 THYROID DISEASE: ICD-10-CM

## 2022-10-07 LAB
ALBUMIN SERPL-MCNC: 3 GM/DL (ref 3.4–4.8)
ALBUMIN/GLOB SERPL: 1 RATIO (ref 1.1–2)
ALP SERPL-CCNC: 110 UNIT/L (ref 40–150)
ALT SERPL-CCNC: 17 UNIT/L (ref 0–55)
AST SERPL-CCNC: 27 UNIT/L (ref 5–34)
BASOPHILS # BLD AUTO: 0.04 X10(3)/MCL (ref 0–0.2)
BASOPHILS NFR BLD AUTO: 0.6 %
BILIRUBIN DIRECT+TOT PNL SERPL-MCNC: 0.3 MG/DL
BUN SERPL-MCNC: 16.7 MG/DL (ref 9.8–20.1)
CALCIUM SERPL-MCNC: 9.1 MG/DL (ref 8.4–10.2)
CHLORIDE SERPL-SCNC: 108 MMOL/L (ref 98–107)
CHOLEST SERPL-MCNC: 186 MG/DL
CHOLEST/HDLC SERPL: 3 {RATIO} (ref 0–5)
CO2 SERPL-SCNC: 26 MMOL/L (ref 23–31)
CORTIS SERPL-SCNC: 26.6 UG/DL
CREAT SERPL-MCNC: 0.64 MG/DL (ref 0.55–1.02)
EOSINOPHIL # BLD AUTO: 0.13 X10(3)/MCL (ref 0–0.9)
EOSINOPHIL NFR BLD AUTO: 2 %
ERYTHROCYTE [DISTWIDTH] IN BLOOD BY AUTOMATED COUNT: 13.6 % (ref 11.5–17)
GFR SERPLBLD CREATININE-BSD FMLA CKD-EPI: >60 MLS/MIN/1.73/M2
GLOBULIN SER-MCNC: 3 GM/DL (ref 2.4–3.5)
GLUCOSE SERPL-MCNC: 93 MG/DL (ref 82–115)
HCT VFR BLD AUTO: 37.2 % (ref 37–47)
HDLC SERPL-MCNC: 56 MG/DL (ref 35–60)
HGB BLD-MCNC: 11.9 GM/DL (ref 12–16)
IMM GRANULOCYTES # BLD AUTO: 0.02 X10(3)/MCL (ref 0–0.04)
IMM GRANULOCYTES NFR BLD AUTO: 0.3 %
LDLC SERPL CALC-MCNC: 95 MG/DL (ref 50–140)
LYMPHOCYTES # BLD AUTO: 0.62 X10(3)/MCL (ref 0.6–4.6)
LYMPHOCYTES NFR BLD AUTO: 9.3 %
MCH RBC QN AUTO: 32.2 PG (ref 27–31)
MCHC RBC AUTO-ENTMCNC: 32 MG/DL (ref 33–36)
MCV RBC AUTO: 100.8 FL (ref 80–94)
MONOCYTES # BLD AUTO: 0.74 X10(3)/MCL (ref 0.1–1.3)
MONOCYTES NFR BLD AUTO: 11.1 %
NEUTROPHILS # BLD AUTO: 5.1 X10(3)/MCL (ref 2.1–9.2)
NEUTROPHILS NFR BLD AUTO: 76.7 %
PLATELET # BLD AUTO: 228 X10(3)/MCL (ref 130–400)
PMV BLD AUTO: 11 FL (ref 7.4–10.4)
POTASSIUM SERPL-SCNC: 4 MMOL/L (ref 3.5–5.1)
PROT SERPL-MCNC: 6 GM/DL (ref 5.8–7.6)
RBC # BLD AUTO: 3.69 X10(6)/MCL (ref 4.2–5.4)
SODIUM SERPL-SCNC: 142 MMOL/L (ref 136–145)
T4 FREE SERPL-MCNC: 0.82 NG/DL (ref 0.7–1.48)
TRIGL SERPL-MCNC: 176 MG/DL (ref 37–140)
TSH SERPL-ACNC: 0.93 UIU/ML (ref 0.35–4.94)
VLDLC SERPL CALC-MCNC: 35 MG/DL
WBC # SPEC AUTO: 6.7 X10(3)/MCL (ref 4.5–11.5)

## 2022-10-07 PROCEDURE — 36415 COLL VENOUS BLD VENIPUNCTURE: CPT | Performed by: NURSE PRACTITIONER

## 2022-10-07 PROCEDURE — 80061 LIPID PANEL: CPT

## 2022-10-07 PROCEDURE — 82626 DEHYDROEPIANDROSTERONE: CPT

## 2022-10-07 PROCEDURE — 80299 QUANTITATIVE ASSAY DRUG: CPT

## 2022-10-07 PROCEDURE — 85025 COMPLETE CBC W/AUTO DIFF WBC: CPT

## 2022-10-07 PROCEDURE — 82533 TOTAL CORTISOL: CPT

## 2022-10-07 PROCEDURE — 84439 ASSAY OF FREE THYROXINE: CPT

## 2022-10-07 PROCEDURE — 80053 COMPREHEN METABOLIC PANEL: CPT

## 2022-10-07 PROCEDURE — 30000890 MAYO GENERIC ORDERABLE

## 2022-10-07 PROCEDURE — 82024 ASSAY OF ACTH: CPT

## 2022-10-07 PROCEDURE — 84443 ASSAY THYROID STIM HORMONE: CPT

## 2022-10-07 PROCEDURE — 84244 ASSAY OF RENIN: CPT

## 2022-10-08 LAB — ACTH PLAS-MCNC: 621 PG/ML

## 2022-10-10 ENCOUNTER — APPOINTMENT (OUTPATIENT)
Dept: LAB | Facility: HOSPITAL | Age: 62
End: 2022-10-10
Attending: INTERNAL MEDICINE
Payer: COMMERCIAL

## 2022-10-10 LAB — ALDOST SERPL-MCNC: <4 NG/DL

## 2022-10-13 LAB — DHEA SERPL-MCNC: <0.5 NG/ML

## 2022-10-14 ENCOUNTER — INFUSION (OUTPATIENT)
Dept: INFUSION THERAPY | Facility: HOSPITAL | Age: 62
End: 2022-10-14
Attending: RADIOLOGY
Payer: COMMERCIAL

## 2022-10-14 VITALS
BODY MASS INDEX: 21.68 KG/M2 | WEIGHT: 127 LBS | HEIGHT: 64 IN | SYSTOLIC BLOOD PRESSURE: 131 MMHG | HEART RATE: 94 BPM | TEMPERATURE: 98 F | DIASTOLIC BLOOD PRESSURE: 83 MMHG | RESPIRATION RATE: 20 BRPM

## 2022-10-14 DIAGNOSIS — M81.0 OSTEOPOROSIS, UNSPECIFIED OSTEOPOROSIS TYPE, UNSPECIFIED PATHOLOGICAL FRACTURE PRESENCE: Primary | ICD-10-CM

## 2022-10-14 LAB
COLLECT DURATION TIME UR: 24 H
CORTIS 24H UR-MRATE: 19 MCG/24 H (ref 3.5–45)
RENIN PLAS-CCNC: 2.8 NG/ML/H
SPECIMEN VOL 24H UR: 1200 ML

## 2022-10-14 PROCEDURE — 96372 THER/PROPH/DIAG INJ SC/IM: CPT

## 2022-10-14 PROCEDURE — 63600175 PHARM REV CODE 636 W HCPCS: Mod: JG | Performed by: NURSE PRACTITIONER

## 2022-10-14 RX ADMIN — DENOSUMAB 60 MG: 60 INJECTION SUBCUTANEOUS at 11:10

## 2022-10-19 LAB — MAYO GENERIC ORDERABLE RESULT: NORMAL

## 2022-11-04 ENCOUNTER — DOCUMENTATION ONLY (OUTPATIENT)
Dept: ADMINISTRATIVE | Facility: HOSPITAL | Age: 62
End: 2022-11-04
Payer: COMMERCIAL

## 2022-11-04 ENCOUNTER — LAB VISIT (OUTPATIENT)
Dept: LAB | Facility: HOSPITAL | Age: 62
End: 2022-11-04
Attending: INTERNAL MEDICINE
Payer: COMMERCIAL

## 2022-11-04 DIAGNOSIS — C74.00 MALIGNANT NEOPLASM OF ADRENAL CORTEX, UNSPECIFIED LATERALITY: ICD-10-CM

## 2022-11-04 DIAGNOSIS — E07.9 THYROID DISEASE: ICD-10-CM

## 2022-11-04 DIAGNOSIS — C74.00 MALIGNANT NEOPLASM OF ADRENAL CORTEX, UNSPECIFIED LATERALITY: Primary | ICD-10-CM

## 2022-11-04 DIAGNOSIS — C74.02 MALIGNANT NEOPLASM OF CORTEX OF LEFT ADRENAL GLAND: ICD-10-CM

## 2022-11-04 LAB
ALBUMIN SERPL-MCNC: 2.8 GM/DL (ref 3.4–4.8)
ALBUMIN/GLOB SERPL: 1 RATIO (ref 1.1–2)
ALP SERPL-CCNC: 100 UNIT/L (ref 40–150)
ALT SERPL-CCNC: 12 UNIT/L (ref 0–55)
AST SERPL-CCNC: 16 UNIT/L (ref 5–34)
BASOPHILS # BLD AUTO: 0.04 X10(3)/MCL (ref 0–0.2)
BASOPHILS NFR BLD AUTO: 1 %
BILIRUBIN DIRECT+TOT PNL SERPL-MCNC: 0.2 MG/DL
BUN SERPL-MCNC: 18.1 MG/DL (ref 9.8–20.1)
CALCIUM SERPL-MCNC: 9.2 MG/DL (ref 8.4–10.2)
CHLORIDE SERPL-SCNC: 109 MMOL/L (ref 98–107)
CHOLEST SERPL-MCNC: 192 MG/DL
CHOLEST/HDLC SERPL: 4 {RATIO} (ref 0–5)
CO2 SERPL-SCNC: 27 MMOL/L (ref 23–31)
CORTIS SERPL-SCNC: 2.1 UG/DL
CREAT SERPL-MCNC: 0.6 MG/DL (ref 0.55–1.02)
EOSINOPHIL # BLD AUTO: 0.22 X10(3)/MCL (ref 0–0.9)
EOSINOPHIL NFR BLD AUTO: 5.7 %
ERYTHROCYTE [DISTWIDTH] IN BLOOD BY AUTOMATED COUNT: 13.5 % (ref 11.5–17)
GFR SERPLBLD CREATININE-BSD FMLA CKD-EPI: >60 MLS/MIN/1.73/M2
GLOBULIN SER-MCNC: 2.7 GM/DL (ref 2.4–3.5)
GLUCOSE SERPL-MCNC: 83 MG/DL (ref 82–115)
HCT VFR BLD AUTO: 34.8 % (ref 37–47)
HDLC SERPL-MCNC: 54 MG/DL (ref 35–60)
HGB BLD-MCNC: 11.3 GM/DL (ref 12–16)
IMM GRANULOCYTES # BLD AUTO: 0.02 X10(3)/MCL (ref 0–0.04)
IMM GRANULOCYTES NFR BLD AUTO: 0.5 %
LDLC SERPL CALC-MCNC: 100 MG/DL (ref 50–140)
LYMPHOCYTES # BLD AUTO: 0.85 X10(3)/MCL (ref 0.6–4.6)
LYMPHOCYTES NFR BLD AUTO: 21.9 %
MCH RBC QN AUTO: 32.5 PG (ref 27–31)
MCHC RBC AUTO-ENTMCNC: 32.5 MG/DL (ref 33–36)
MCV RBC AUTO: 100 FL (ref 80–94)
MONOCYTES # BLD AUTO: 0.4 X10(3)/MCL (ref 0.1–1.3)
MONOCYTES NFR BLD AUTO: 10.3 %
NEUTROPHILS # BLD AUTO: 2.4 X10(3)/MCL (ref 2.1–9.2)
NEUTROPHILS NFR BLD AUTO: 60.6 %
PLATELET # BLD AUTO: 243 X10(3)/MCL (ref 130–400)
PMV BLD AUTO: 10.3 FL (ref 7.4–10.4)
POTASSIUM SERPL-SCNC: 3.4 MMOL/L (ref 3.5–5.1)
PROT SERPL-MCNC: 5.5 GM/DL (ref 5.8–7.6)
RBC # BLD AUTO: 3.48 X10(6)/MCL (ref 4.2–5.4)
SODIUM SERPL-SCNC: 144 MMOL/L (ref 136–145)
T4 FREE SERPL-MCNC: 0.74 NG/DL (ref 0.7–1.48)
TRIGL SERPL-MCNC: 189 MG/DL (ref 37–140)
TSH SERPL-ACNC: 0.59 UIU/ML (ref 0.35–4.94)
VLDLC SERPL CALC-MCNC: 38 MG/DL
WBC # SPEC AUTO: 3.9 X10(3)/MCL (ref 4.5–11.5)

## 2022-11-04 PROCEDURE — 85025 COMPLETE CBC W/AUTO DIFF WBC: CPT

## 2022-11-04 PROCEDURE — 82088 ASSAY OF ALDOSTERONE: CPT

## 2022-11-04 PROCEDURE — 36415 COLL VENOUS BLD VENIPUNCTURE: CPT

## 2022-11-04 PROCEDURE — 82533 TOTAL CORTISOL: CPT

## 2022-11-04 PROCEDURE — 80053 COMPREHEN METABOLIC PANEL: CPT

## 2022-11-04 PROCEDURE — 84244 ASSAY OF RENIN: CPT

## 2022-11-04 PROCEDURE — 82530 CORTISOL FREE: CPT

## 2022-11-04 PROCEDURE — 82626 DEHYDROEPIANDROSTERONE: CPT

## 2022-11-04 PROCEDURE — 84439 ASSAY OF FREE THYROXINE: CPT

## 2022-11-04 PROCEDURE — 80299 QUANTITATIVE ASSAY DRUG: CPT

## 2022-11-04 PROCEDURE — 82024 ASSAY OF ACTH: CPT

## 2022-11-04 PROCEDURE — 84443 ASSAY THYROID STIM HORMONE: CPT

## 2022-11-04 PROCEDURE — 80061 LIPID PANEL: CPT

## 2022-11-07 LAB — ACTH PLAS-MCNC: 275 PG/ML

## 2022-11-07 NOTE — PROGRESS NOTES
Subjective:       Patient ID: Shreya Reyes is a 62 y.o. female.    Chief Complaint:  Pain in the legs/thighs    Diagnosis: Metastatic adrenocortical carcinoma                    Multiple osteoporotic vertebral compression fractures    Treatment History  Adjuvant XRT (completed 9/30/21)  Mitotane 1/25/22-7/26/22, Resumed 8/22  Palliative XRT right scapula, L5,  R ischium, L femoral neck completed 8/26/22    Current Treatment: Hydrocortisone 10 mg q.a.m., 10 mg q.p.m.                                  Levothyroxine 75 mcg/d            Mitotane 1000 mg BID resumed 8/22    Clinical History:  Patient initially presented to the [facility] [place]16/21 with acute left flank pain. CT A/P showed a heterogeneous enhancing mass of the left adrenal gland measuring 5.5 x 4.8 x 5 cm (24 Hu), with no definite microscopic fat. There was mild displacement of the left renal vein. Right adrenal gland was unremarkable.  Hormonal workup was negative for pheochromocytoma. She was felt to have had an acute adrenal hemorrhage and close observation was recommended. Follow-up CT A/P 6/9/21 showed increased size of the adrenal mass to 8.0 x 4.5 cm appearing hypervascular with some central necrosis. She underwent a laparoscopic/robotic left adrenalectomy 6/11/21.  Final pathology showed an adrenal cortical carcinoma predominant oncocytic/trabecular morphology with focally marked cytologic atypia and increased mitotic rate (up to 10/50 HPF's). There were large areas of necrosis and multiple foci of capsular and lymphovascular invasion. Ki-67 expression was 10-15%.    She had a Telemedicine consultation with Dr. Dion Lynch at [facility] 8/3/21 who specializes in treatment of adrenocortical carcinoma. Her case was reviewed and discussed in their tumor board. Recommendations were for treatment with adjuvant radiation therapy and systemic treatment with Mitotane. Baseline postoperative CT scans of the chest, abdomen and pelvis 8/4/21 showed  postoperative changes with no evidence of measurable metastatic disease.    She was seen as a new patient at [facility] 8/9/21.  She had recovered well from her surgery and was asymptomatic.  Treatment recommendations from the [facility] were reviewed and discussed.  Treatment was started with Mitotane 1000 mg BID on 8/16/2021.  No dose escalation was recommended until she completed radiation therapy.  She was started on replacement hydrocortisone 20 mg Q AM and 10 mg Q PM.  Surveillance CT scans were recommended in 3 months.      She was seen for an office visit 9/16/21 after completing 4 weeks of treatment with Mitotane.  She was not having any significant side effects associated with her therapy.  She was detention through her adjuvant radiation therapy.  Laboratory testing showed marked transaminase elevations with an AST of 493,  and alkaline phosphatase 175.  Bilirubin was normal.  The remainder of her CMP was unremarkable.  Baseline mitotane level drawn at that visit was 2.5 mcg/mL.  Mitotane was held and adjuvant radiation therapy was continued.  Weekly laboratory monitoring showed gradual improvement in her LFTs.  Although mitotane had been associated with transaminase elevations, >5x elevations are rare occurring in <1% of patients.  She was also instructed to hold her lovastatin.  She completed adjuvant radiation therapy 9/30/2021.     Follow-up laboratory continued to show transaminase elevations.  GI was consulted and ordered additional laboratory testing which did not reveal evidence of viral or autoimmune etiologies for her hepatitis.  Mitotane was not resumed due to her persistent transaminase elevations. Follow-up CT scans of the chest, abdomen and pelvis 10/28/21 showed a few stable subcentimeter pulmonary nodules and changes related to her previous left adrenalectomy with no evidence of disease recurrence.  Following normalization of her LFTs, treatment was resumed  with Mitotane 1/25/22.  Her LFTs remained normal even during dose escalation.    She had an injury at home in mid March after carrying in several arms of firewood with acute mid back pain.  Plain x-ray 3/17/22 showed a compression deformity at L2 of questionable age.  MRI of the lumbar spine 3/23/22 showed a subacute severe compression fracture deformity of the L1 vertebral body and mild superior endplate compression fracture of L3 vertebral body with osteoporotic appearance and no findings suspicious for pathologic fracture.  However, there were scattered small osseous lesions in the right L3 vertebral body and L5 vertebral body concerning for metastatic disease.  CT PET scan 3/28/22 showed mildly hypermetabolic osseous lesions in the lumbar spine, pelvis and proximal left femur consistent with metastatic disease.  There was no significant hypermetabolic uptake at the sites of her compression fractures.  She did not have pain at any of the sites prior to the acute compression fracture.  Bone density exam showed osteoporosis of the lumbar spine with a T score of -3.4, left femoral neck -3.4 and left total hip -2.7.  She was started on Prolia 3/31/22 and underwent L1 and L3 kyphoplasty 4//8/22.  Biopsies of the L3 vertebral body showed no evidence of metastatic carcinoma.  She continued to have significant pain following the kyphoplasty.  Further review of her films showed a possible compression fracture at T11.  MRI of the thoracic spine 4/15/22 showed a compression fracture of T11 with 50% loss of vertebral body height.  She underwent kyphoplasty 4/21/22 with symptomatic improvement.    CT scans of the chest, abdomen and pelvis 4/27/22 showed sclerotic osseous lesions at L4, right ischium and left femur correlating with the hypermetabolic lesions on CT-PET scan.  There were stable sub-6 mm pulmonary nodules in the RML and LLL unchanged from previous imaging.  MRI of the cervical and lumbar spine 7/5/22 showed  moderate disc disease at C5-6 and C6-7 without significant spinal canal stenosis or foraminal stenosis.  She developed progressive symptoms of right arm pain, numbness and tingling and updated MRI 7/13/22 showed foraminal stenosis secondary to disc osteophyte on the right side at C5-6 and C6-7.  She underwent a right foraminotomy with relief of her symptoms.    CT C/A/P 08/01/2022 showed multiple compression fractures and postsurgical changes.  Area of sclerosis in the left sacrum was stable compared to the prior exam.  There was no evidence of visceral metastatic disease.  She had a teleconference with [facility] 8/2/22 and a follow-up PET scan was recommended.  Mitotane was discontinued 7/26/22. CT-PET 08/05/22 hypermetabolic osseous metastatic disease involving L5, right ischium, left femoral neck and a new lesion in the inferior right scapula.  There was a 12 mm area of hypermetabolic activity adjacent to the right adrenal gland without a definite CT correlate.    She completed palliative radiation therapy to the right scapula, L5, R ischium and left femoral neck on 8/26/22.  She resumed Mitotane at 1000 mg BID on 8/29/22.      Interval History  Mrs. Cash is here today in follow-up accompanied by one of her sisters.  She has been under more stress lately as another sister was recently admitted to inpatient Hospice.  Otherwise, she has been doing fairly well.  She travelled to [place] recently with her  and walked a good bit.  Since returning, she has been experiencing BLE pain, mostly in the thighs.  She feels the pain is muscular.  She denies any associated weakness, and reports minimal back pain.  She is currently holding her Mitotane because myalgias are a known side effect.  Her hydrocortisone and Synthroid have been maintained at the same dose/schedule.  She denies any N/V and manages occasional constipation with Miralax.  Laboratory shows mild treatment associated leukopenia and  "anemia.  Mitotane level is pending.  She will forward all results to Dr. Lynch's team once available.  She is currently scheduled for a CT PET scan 11/21/22 and telemedicine follow-up with Dr. Lynch on 11/28/22    Review of Systems   Constitutional:  Positive for fatigue. Negative for appetite change, fever and unexpected weight change.   HENT:  Negative for mouth sores, sore throat and trouble swallowing.    Eyes: Negative.  Negative for visual disturbance.   Respiratory:  Negative for cough, chest tightness and shortness of breath.    Cardiovascular:  Negative for chest pain, palpitations and leg swelling.   Gastrointestinal:  Negative for abdominal distention, abdominal pain, blood in stool, change in bowel habit, constipation, diarrhea, nausea, vomiting and change in bowel habit.   Genitourinary:  Negative for dysuria, frequency and urgency.   Musculoskeletal:  Positive for arthralgias, back pain (Improved), leg pain, myalgias and neck pain.        Bilateral hip discomfort no bone pain   Integumentary:  Negative for pallor, rash and mole/lesion.   Neurological:  Negative for numbness and headaches.   Hematological:  Negative for adenopathy. Does not bruise/bleed easily.   Psychiatric/Behavioral:  The patient is not nervous/anxious.         Emotional lability     PMHx:  Hyperlipidemia, osteoporosis  PSHx:  Tonsils, left cataract, ORIF left tibia/fib, left adrenalectomy, multiple vertebral kyphoplasties  SH:  Former smoker 1 PPD, quit 2009. + Social alcohol use. Lives in [place] with her . 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  FH:  Her mother had lymphoma.       Objective:        /88 (BP Location: Right arm, Patient Position: Sitting, BP Method: Medium (Automatic))   Pulse 76   Temp 97.7 °F (36.5 °C)   Resp 18   Ht 5' 5" (1.651 m)   Wt 56.9 kg (125 lb 6.4 oz)   SpO2 99%   BMI 20.87 kg/m²    Physical Exam  Constitutional:       General: She is not in acute distress.     " Appearance: Normal appearance.   HENT:      Head: Normocephalic.      Nose: Nose normal.      Mouth/Throat:      Pharynx: No posterior oropharyngeal erythema.   Eyes:      General: No scleral icterus.     Pupils: Pupils are equal, round, and reactive to light.   Cardiovascular:      Rate and Rhythm: Normal rate and regular rhythm.      Heart sounds: No murmur heard.  Pulmonary:      Effort: Pulmonary effort is normal.   Abdominal:      General: Abdomen is flat. Bowel sounds are normal. There is no distension.      Palpations: There is no mass.      Tenderness: There is no abdominal tenderness.   Musculoskeletal:         General: No swelling or tenderness.      Cervical back: Normal range of motion and neck supple. No tenderness.   Skin:     Coloration: Skin is not pale.      Findings: No rash.   Neurological:      General: No focal deficit present.      Mental Status: She is alert and oriented to person, place, and time.   Psychiatric:         Mood and Affect: Mood normal.         Behavior: Behavior normal.     ECOG SCORE    1 - Restricted in strenuous activity-ambulatory and able to carry out work of a light nature        LABORATORY  Recent Results (from the past 336 hour(s))   Renin    Collection Time: 11/04/22  7:42 AM   Result Value Ref Range    Renin Activity, P 0.7 ng/mL/h   Lipid Panel    Collection Time: 11/04/22  7:42 AM   Result Value Ref Range    Cholesterol Total 192 <=200 mg/dL    HDL Cholesterol 54 35 - 60 mg/dL    Triglyceride 189 (H) 37 - 140 mg/dL    Cholesterol/HDL Ratio 4 0 - 5    Very Low Density Lipoprotein 38     LDL Cholesterol 100.00 50.00 - 140.00 mg/dL   Aldosterone    Collection Time: 11/04/22  7:42 AM   Result Value Ref Range    Aldosterone, S <4.0 <=21 ng/dL   CBC with Differential    Collection Time: 11/04/22  7:42 AM   Result Value Ref Range    WBC 3.9 (L) 4.5 - 11.5 x10(3)/mcL    RBC 3.48 (L) 4.20 - 5.40 x10(6)/mcL    Hgb 11.3 (L) 12.0 - 16.0 gm/dL    Hct 34.8 (L) 37.0 - 47.0 %    MCV  100.0 (H) 80.0 - 94.0 fL    MCH 32.5 (H) 27.0 - 31.0 pg    MCHC 32.5 (L) 33.0 - 36.0 mg/dL    RDW 13.5 11.5 - 17.0 %    Platelet 243 130 - 400 x10(3)/mcL    MPV 10.3 7.4 - 10.4 fL    Neut % 60.6 %    Lymph % 21.9 %    Mono % 10.3 %    Eos % 5.7 %    Basophil % 1.0 %    Lymph # 0.85 0.6 - 4.6 x10(3)/mcL    Neut # 2.4 2.1 - 9.2 x10(3)/mcL    Mono # 0.40 0.1 - 1.3 x10(3)/mcL    Eos # 0.22 0 - 0.9 x10(3)/mcL    Baso # 0.04 0 - 0.2 x10(3)/mcL    IG# 0.02 0 - 0.04 x10(3)/mcL    IG% 0.5 %   Comprehensive Metabolic Panel    Collection Time: 11/04/22  7:45 AM   Result Value Ref Range    Sodium Level 144 136 - 145 mmol/L    Potassium Level 3.4 (L) 3.5 - 5.1 mmol/L    Chloride 109 (H) 98 - 107 mmol/L    Carbon Dioxide 27 23 - 31 mmol/L    Glucose Level 83 82 - 115 mg/dL    Blood Urea Nitrogen 18.1 9.8 - 20.1 mg/dL    Creatinine 0.60 0.55 - 1.02 mg/dL    Calcium Level Total 9.2 8.4 - 10.2 mg/dL    Protein Total 5.5 (L) 5.8 - 7.6 gm/dL    Albumin Level 2.8 (L) 3.4 - 4.8 gm/dL    Globulin 2.7 2.4 - 3.5 gm/dL    Albumin/Globulin Ratio 1.0 (L) 1.1 - 2.0 ratio    Bilirubin Total 0.2 <=1.5 mg/dL    Alkaline Phosphatase 100 40 - 150 unit/L    Alanine Aminotransferase 12 0 - 55 unit/L    Aspartate Aminotransferase 16 5 - 34 unit/L    eGFR >60 mls/min/1.73/m2   ACTH    Collection Time: 11/04/22  7:45 AM   Result Value Ref Range    Adrenocorticotropic Hormone, P 275 (H) pg/mL   Cortisol    Collection Time: 11/04/22  7:45 AM   Result Value Ref Range    Cortisol Level 2.1 ug/dL   T4, Free    Collection Time: 11/04/22  7:45 AM   Result Value Ref Range    Thyroxine Free 0.74 0.70 - 1.48 ng/dL   TSH    Collection Time: 11/04/22  7:46 AM   Result Value Ref Range    Thyroid Stimulating Hormone 0.5859 0.3500 - 4.9400 uIU/mL        Assessment:   Metastatic adrenocortical carcinoma  Multiple osteoporotic vertebral body compression fractures  Mild treatment induced leukopenia  Cervical disc disease with paresthesias of RUE - now resolved    Plan:    Patient currently holding Mitotane s/t myalgias.  Patient has mild anemia and hypokalemia, which should not be contributing.  Patient will increase dietary potassium, decline prescription replacement for now.  Forward all labs to UI.  Continue Synthroid and Hydrocortisone.  Repeat CT PET scan 11/21/22   She already has follow-up at [facility] 11/28/2022 and with Dr. Arzate 11/30/22.   All questions answered to the satisfaction of the patient.    CRISTINA WILSON, FNP-C    Other Physicians  Dr. Dion Lynch ([facility])

## 2022-11-08 LAB
ALDOST SERPL-MCNC: <4 NG/DL
RENIN PLAS-CCNC: 0.7 NG/ML/H

## 2022-11-09 ENCOUNTER — OFFICE VISIT (OUTPATIENT)
Dept: HEMATOLOGY/ONCOLOGY | Facility: CLINIC | Age: 62
End: 2022-11-09
Payer: COMMERCIAL

## 2022-11-09 VITALS
HEIGHT: 65 IN | SYSTOLIC BLOOD PRESSURE: 138 MMHG | OXYGEN SATURATION: 99 % | HEART RATE: 76 BPM | DIASTOLIC BLOOD PRESSURE: 88 MMHG | WEIGHT: 125.38 LBS | TEMPERATURE: 98 F | RESPIRATION RATE: 18 BRPM | BODY MASS INDEX: 20.89 KG/M2

## 2022-11-09 DIAGNOSIS — C74.00 MALIGNANT NEOPLASM OF ADRENAL CORTEX, UNSPECIFIED LATERALITY: Primary | ICD-10-CM

## 2022-11-09 PROCEDURE — 1160F RVW MEDS BY RX/DR IN RCRD: CPT | Mod: CPTII,S$GLB,, | Performed by: NURSE PRACTITIONER

## 2022-11-09 PROCEDURE — 3075F PR MOST RECENT SYSTOLIC BLOOD PRESS GE 130-139MM HG: ICD-10-PCS | Mod: CPTII,S$GLB,, | Performed by: NURSE PRACTITIONER

## 2022-11-09 PROCEDURE — 99213 PR OFFICE/OUTPT VISIT, EST, LEVL III, 20-29 MIN: ICD-10-PCS | Mod: S$GLB,,, | Performed by: NURSE PRACTITIONER

## 2022-11-09 PROCEDURE — 1159F MED LIST DOCD IN RCRD: CPT | Mod: CPTII,S$GLB,, | Performed by: NURSE PRACTITIONER

## 2022-11-09 PROCEDURE — 4010F ACE/ARB THERAPY RXD/TAKEN: CPT | Mod: CPTII,S$GLB,, | Performed by: NURSE PRACTITIONER

## 2022-11-09 PROCEDURE — 1160F PR REVIEW ALL MEDS BY PRESCRIBER/CLIN PHARMACIST DOCUMENTED: ICD-10-PCS | Mod: CPTII,S$GLB,, | Performed by: NURSE PRACTITIONER

## 2022-11-09 PROCEDURE — 1159F PR MEDICATION LIST DOCUMENTED IN MEDICAL RECORD: ICD-10-PCS | Mod: CPTII,S$GLB,, | Performed by: NURSE PRACTITIONER

## 2022-11-09 PROCEDURE — 3079F PR MOST RECENT DIASTOLIC BLOOD PRESSURE 80-89 MM HG: ICD-10-PCS | Mod: CPTII,S$GLB,, | Performed by: NURSE PRACTITIONER

## 2022-11-09 PROCEDURE — 3075F SYST BP GE 130 - 139MM HG: CPT | Mod: CPTII,S$GLB,, | Performed by: NURSE PRACTITIONER

## 2022-11-09 PROCEDURE — 4010F PR ACE/ARB THEARPY RXD/TAKEN: ICD-10-PCS | Mod: CPTII,S$GLB,, | Performed by: NURSE PRACTITIONER

## 2022-11-09 PROCEDURE — 3008F BODY MASS INDEX DOCD: CPT | Mod: CPTII,S$GLB,, | Performed by: NURSE PRACTITIONER

## 2022-11-09 PROCEDURE — 99999 PR PBB SHADOW E&M-EST. PATIENT-LVL IV: ICD-10-PCS | Mod: PBBFAC,,, | Performed by: NURSE PRACTITIONER

## 2022-11-09 PROCEDURE — 3079F DIAST BP 80-89 MM HG: CPT | Mod: CPTII,S$GLB,, | Performed by: NURSE PRACTITIONER

## 2022-11-09 PROCEDURE — 99213 OFFICE O/P EST LOW 20 MIN: CPT | Mod: S$GLB,,, | Performed by: NURSE PRACTITIONER

## 2022-11-09 PROCEDURE — 99999 PR PBB SHADOW E&M-EST. PATIENT-LVL IV: CPT | Mod: PBBFAC,,, | Performed by: NURSE PRACTITIONER

## 2022-11-09 PROCEDURE — 3008F PR BODY MASS INDEX (BMI) DOCUMENTED: ICD-10-PCS | Mod: CPTII,S$GLB,, | Performed by: NURSE PRACTITIONER

## 2022-11-09 RX ORDER — ALPRAZOLAM 0.25 MG/1
0.25 TABLET ORAL EVERY 8 HOURS PRN
Qty: 60 TABLET | Refills: 1 | Status: SHIPPED | OUTPATIENT
Start: 2022-11-09 | End: 2023-03-06 | Stop reason: SDUPTHER

## 2022-11-10 LAB
COLLECT DURATION TIME UR: 24 H
CORTIS 24H UR-MRATE: 11 MCG/24 H (ref 3.5–45)
SPECIMEN VOL 24H UR: 1000 ML

## 2022-11-11 LAB — DHEA SERPL-MCNC: 0.6 NG/ML

## 2022-11-14 ENCOUNTER — PATIENT MESSAGE (OUTPATIENT)
Dept: HEMATOLOGY/ONCOLOGY | Facility: CLINIC | Age: 62
End: 2022-11-14
Payer: COMMERCIAL

## 2022-11-14 LAB — MAYO GENERIC ORDERABLE RESULT: NORMAL

## 2022-11-21 ENCOUNTER — HOSPITAL ENCOUNTER (OUTPATIENT)
Dept: RADIOLOGY | Facility: HOSPITAL | Age: 62
Discharge: HOME | End: 2022-11-21
Attending: NURSE PRACTITIONER
Payer: COMMERCIAL

## 2022-11-21 DIAGNOSIS — C74.00 MALIGNANT NEOPLASM OF ADRENAL CORTEX, UNSPECIFIED LATERALITY: ICD-10-CM

## 2022-11-21 PROCEDURE — 78815 PET IMAGE W/CT SKULL-THIGH: CPT | Mod: TC

## 2022-11-21 PROCEDURE — A9552 F18 FDG: HCPCS

## 2022-11-23 NOTE — PROGRESS NOTES
Subjective:       Patient ID: Shreya Reyes is a 62 y.o. female.    Chief Complaint:  Chronic pains in the legs, back pain improved    Diagnosis: Metastatic adrenocortical carcinoma                    Multiple osteoporotic vertebral compression fractures    Treatment History  Adjuvant XRT (completed 9/30/21)  Mitotane 1/25/22-7/26/22, Resumed 8/22  XRT right scapula, L5,  R ischium, L femoral neck completed 8/26/22    Current Treatment: Hydrocortisone 10 mg q.a.m., 10 mg q.p.m.                                  Levothyroxine 75 mcg/d            Mitotane 1000 mg BID resumed 8/22    Clinical History:  Patient initially presented to the [facility] 3/16/21 with acute left flank pain. CT A/P showed a heterogeneous enhancing mass of the left adrenal gland measuring 5.5 x 4.8 x 5 cm (24 Hu), with no definite microscopic fat. There was mild displacement of the left renal vein. Right adrenal gland was unremarkable.  Hormonal workup was negative for pheochromocytoma. She was felt to have had an acute adrenal hemorrhage and close observation was recommended. Follow-up CT A/P 6/9/21 showed increased size of the adrenal mass to 8.0 x 4.5 cm appearing hypervascular with some central necrosis. She underwent a laparoscopic/robotic left adrenalectomy 6/11/21.  Final pathology showed an adrenal cortical carcinoma predominant oncocytic/trabecular morphology with focally marked cytologic atypia and increased mitotic rate (up to 10/50 HPF's). There were large areas of necrosis and multiple foci of capsular and lymphovascular invasion. Ki-67 expression was 10-15%.    She had a Telemedicine consultation with Dr. Dion Lynch at [facility] 8/3/21 who specializes in treatment of adrenocortical carcinoma. Her case was reviewed and discussed in their tumor board. Recommendations were for treatment with adjuvant radiation therapy and systemic treatment with Mitotane. Baseline postoperative CT scans of the chest, abdomen and pelvis  8/4/21 showed postoperative changes with no evidence of measurable metastatic disease.    She was seen as a new patient at [facility] 8/9/21.  She had recovered well from her surgery and was asymptomatic.  Treatment recommendations from the [facility] were reviewed and discussed.  Treatment was started with Mitotane 1000 mg BID on 8/16/2021.  No dose escalation was recommended until she completed radiation therapy.  She was started on replacement hydrocortisone 20 mg Q AM and 10 mg Q PM.  Surveillance CT scans were recommended in 3 months.      She was seen for an office visit 9/16/21 after completing 4 weeks of treatment with Mitotane.  She was not having any significant side effects associated with her therapy.  She was [facility] through her adjuvant radiation therapy.  Laboratory testing showed marked transaminase elevations with an AST of 493,  and alkaline phosphatase 175.  Bilirubin was normal.  The remainder of her CMP was unremarkable.  Baseline mitotane level drawn at that visit was 2.5 mcg/mL.  Mitotane was held and adjuvant radiation therapy was continued.  Weekly laboratory monitoring showed gradual improvement in her LFTs.  Although mitotane had been associated with transaminase elevations, >5x elevations are rare occurring in <1% of patients.  She was also instructed to hold her lovastatin.  She completed adjuvant radiation therapy 9/30/2021.     Follow-up laboratory continued to show transaminase elevations.  GI was consulted and ordered additional laboratory testing which did not reveal evidence of viral or autoimmune etiologies for her hepatitis.  Mitotane was not resumed due to her persistent transaminase elevations. Follow-up CT scans of the chest, abdomen and pelvis 10/28/21 showed a few stable subcentimeter pulmonary nodules and changes related to her previous left adrenalectomy with no evidence of disease recurrence.  Following normalization of her LFTs,  treatment was resumed with Mitotane 1/25/22.  Her LFTs remained normal even during dose escalation.    She had an injury at home in mid March after carrying in several arms of firewood with acute mid back pain.  Plain x-ray 3/17/22 showed a compression deformity at L2 of questionable age.  MRI of the lumbar spine 3/23/22 showed a subacute severe compression fracture deformity of the L1 vertebral body and mild superior endplate compression fracture of L3 vertebral body with osteoporotic appearance and no findings suspicious for pathologic fracture.  However, there were scattered small osseous lesions in the right L3 vertebral body and L5 vertebral body concerning for metastatic disease.  CT PET scan 3/28/22 showed mildly hypermetabolic osseous lesions in the lumbar spine, pelvis and proximal left femur consistent with metastatic disease.  There was no significant hypermetabolic uptake at the sites of her compression fractures.  She did not have pain at any of the sites prior to the acute compression fracture.  Bone density exam showed osteoporosis of the lumbar spine with a T score of -3.4, left femoral neck -3.4 and left total hip -2.7.  She was started on Prolia 3/31/22 and underwent L1 and L3 kyphoplasty 4//8/22.  Biopsies of the L3 vertebral body showed no evidence of metastatic carcinoma.  She continued to have significant pain following the kyphoplasty.  Further review of her films showed a possible compression fracture at T11.  MRI of the thoracic spine 4/15/22 showed a compression fracture of T11 with 50% loss of vertebral body height.  She underwent kyphoplasty 4/21/22 with symptomatic improvement.    CT scans of the chest, abdomen and pelvis 4/27/22 showed sclerotic osseous lesions at L4, right ischium and left femur correlating with the hypermetabolic lesions on CT-PET scan.  There were stable sub-6 mm pulmonary nodules in the RML and LLL unchanged from previous imaging.  MRI of the cervical and lumbar  spine 7/5/22 showed moderate disc disease at C5-6 and C6-7 without significant spinal canal stenosis or foraminal stenosis.  She developed progressive symptoms of right arm pain, numbness and tingling and updated MRI 7/13/22 showed foraminal stenosis secondary to disc osteophyte on the right side at C5-6 and C6-7.  She underwent a right foraminotomy with relief of her symptoms.    CT C/A/P 08/01/2022 showed multiple compression fractures and postsurgical changes.  Area of sclerosis in the left sacrum was stable compared to the prior exam.  There was no evidence of visceral metastatic disease.  She had a teleconference with [facility] 8/2/22 and a follow-up PET scan was recommended.  Mitotane was discontinued 7/26/22. CT-PET 08/05/22 hypermetabolic osseous metastatic disease involving L5, right ischium, left femoral neck and a new lesion in the inferior right scapula.  There was a 12 mm area of hypermetabolic activity adjacent to the right adrenal gland without a definite CT correlate.    She completed palliative radiation therapy to the right scapula, L5, R ischium and left femoral neck on 8/26/22.  She resumed Mitotane at 1000 mg BID on 8/29/22.  CT-PET scan 11/21/22 showed improved FDG uptake in all of the treated sites.  Right scapula SUV decreased from 4.2 to 2.1, left femoral neck 9.1 to 5, right ischium 13 to 2.4 and L5 8.2 to 4.6.  Hypermetabolic activity at the site of the previous compression fractures had also improved.  There were no findings suspicious for new sites of disease or visceral metastatic disease.    Interval History  She returns to the office today by herself for a 3 week follow-up visit.  Her back pain has improved.  She is ambulatory without assistance, she is not requiring a brace.  She does not have any significant pain at the sites of her treated metastatic disease.  CT PET scan images were reviewed and discussed.  She complains of diffuse myalgias involving her upper legs.   "Symptoms started soon after her Prolia injection in October.  She stopped her mitotane for approximately 2 weeks with no change in her systems.  She is currently on 2000 mg twice a day.  She has a telemedicine appointment scheduled for next week with the treatment team at [facility].  A disc of her CT-PET scan was forwarded to them for review.      Review of Systems   Constitutional:  Positive for fatigue. Negative for appetite change, fever and unexpected weight change.   HENT:  Negative for mouth sores, sore throat and trouble swallowing.    Eyes: Negative.    Respiratory:  Negative for cough, chest tightness and shortness of breath.    Cardiovascular:  Negative for chest pain, palpitations and leg swelling.   Gastrointestinal:  Negative for abdominal distention, abdominal pain, change in bowel habit, constipation, diarrhea, nausea and change in bowel habit.   Genitourinary:  Negative for dysuria, frequency and urgency.   Musculoskeletal:  Positive for arthralgias, back pain (Improved) and myalgias (bilateral legs). Negative for neck pain.        Bilateral hip discomfort no bone pain   Integumentary:  Negative for rash and mole/lesion.   Neurological:  Negative for numbness and headaches.   Hematological:  Negative for adenopathy. Does not bruise/bleed easily.   Psychiatric/Behavioral: Negative.       PMHx:  Hyperlipidemia, osteoporosis  PSHx:  Tonsils, left cataract, ORIF left tibia/fib, left adrenalectomy, multiple vertebral kyphoplasties  SH:  Former smoker 1 PPD, quit 2009. + Social alcohol use. Lives in [place] with her . 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  FH:  Her mother had lymphoma.       Objective:        BP (!) 154/89 (BP Location: Right arm)   Pulse 85   Temp 97.6 °F (36.4 °C) (Oral)   Resp 20   Ht 5' 5" (1.651 m)   Wt 58.6 kg (129 lb 3 oz)   BMI 21.50 kg/m²    Physical Exam  Constitutional:       Comments: Well-developed white female in NAD.   HENT:    "   Head: Normocephalic.      Mouth/Throat:      Pharynx: No posterior oropharyngeal erythema.   Eyes:      General: No scleral icterus.     Extraocular Movements: Extraocular movements intact.      Pupils: Pupils are equal, round, and reactive to light.   Cardiovascular:      Rate and Rhythm: Normal rate and regular rhythm.      Heart sounds: No murmur heard.  Pulmonary:      Comments: Lungs clear to auscultation  Abdominal:      General: Abdomen is flat. Bowel sounds are normal. There is no distension.      Palpations: There is no mass.      Tenderness: There is no abdominal tenderness.   Musculoskeletal:         General: No swelling.      Cervical back: Neck supple. No tenderness.      Comments: No tenderness over right scapula, pelvis or hips   Lymphadenopathy:      Cervical: No cervical adenopathy.   Skin:     General: Skin is warm and dry.      Findings: No rash.   Neurological:      General: No focal deficit present.      Mental Status: She is alert and oriented to person, place, and time.      Motor: No weakness.     ECOG SCORE    1 - Restricted in strenuous activity-ambulatory and able to carry out work of a light nature        LABORATORY  No results found for this or any previous visit (from the past 336 hour(s)).    Laboratory from 11/4/22 reviewed, Mitotane level 10.3 ug/ml     Assessment:   Metastatic adrenocortical carcinoma  Multiple osteoporotic vertebral body compression fractures  Mild treatment induced leukopenia      Plan:   Patient's lower extremity myalgias felt secondary to Prolia therapy.  Hopefully, symptoms will gradually improve.  She has resumed her Mitotane therapy pending her follow-up with [facility].  Continue laboratory monitoring every 4 weeks.  RTC in 6 weeks for a follow-up visit and clinical exam.  Repeat CT-PET scan in 3-4 months.      MARY NORMAN MD    Other Physicians  Dr. Dion Lynch ([facility]  [place])

## 2022-11-30 ENCOUNTER — OFFICE VISIT (OUTPATIENT)
Dept: HEMATOLOGY/ONCOLOGY | Facility: CLINIC | Age: 62
End: 2022-11-30
Payer: COMMERCIAL

## 2022-11-30 VITALS
BODY MASS INDEX: 21.52 KG/M2 | RESPIRATION RATE: 20 BRPM | TEMPERATURE: 98 F | DIASTOLIC BLOOD PRESSURE: 89 MMHG | WEIGHT: 129.19 LBS | HEART RATE: 85 BPM | HEIGHT: 65 IN | SYSTOLIC BLOOD PRESSURE: 154 MMHG

## 2022-11-30 DIAGNOSIS — C74.02 MALIGNANT NEOPLASM OF CORTEX OF LEFT ADRENAL GLAND: Primary | ICD-10-CM

## 2022-11-30 DIAGNOSIS — C79.51 SECONDARY MALIGNANT NEOPLASM OF BONE: ICD-10-CM

## 2022-11-30 PROCEDURE — 3079F PR MOST RECENT DIASTOLIC BLOOD PRESSURE 80-89 MM HG: ICD-10-PCS | Mod: CPTII,S$GLB,, | Performed by: INTERNAL MEDICINE

## 2022-11-30 PROCEDURE — 4010F PR ACE/ARB THEARPY RXD/TAKEN: ICD-10-PCS | Mod: CPTII,S$GLB,, | Performed by: INTERNAL MEDICINE

## 2022-11-30 PROCEDURE — 99999 PR PBB SHADOW E&M-EST. PATIENT-LVL IV: ICD-10-PCS | Mod: PBBFAC,,, | Performed by: INTERNAL MEDICINE

## 2022-11-30 PROCEDURE — 3008F PR BODY MASS INDEX (BMI) DOCUMENTED: ICD-10-PCS | Mod: CPTII,S$GLB,, | Performed by: INTERNAL MEDICINE

## 2022-11-30 PROCEDURE — 1160F RVW MEDS BY RX/DR IN RCRD: CPT | Mod: CPTII,S$GLB,, | Performed by: INTERNAL MEDICINE

## 2022-11-30 PROCEDURE — 99214 OFFICE O/P EST MOD 30 MIN: CPT | Mod: S$GLB,,, | Performed by: INTERNAL MEDICINE

## 2022-11-30 PROCEDURE — 3079F DIAST BP 80-89 MM HG: CPT | Mod: CPTII,S$GLB,, | Performed by: INTERNAL MEDICINE

## 2022-11-30 PROCEDURE — 3077F SYST BP >= 140 MM HG: CPT | Mod: CPTII,S$GLB,, | Performed by: INTERNAL MEDICINE

## 2022-11-30 PROCEDURE — 1159F PR MEDICATION LIST DOCUMENTED IN MEDICAL RECORD: ICD-10-PCS | Mod: CPTII,S$GLB,, | Performed by: INTERNAL MEDICINE

## 2022-11-30 PROCEDURE — 1159F MED LIST DOCD IN RCRD: CPT | Mod: CPTII,S$GLB,, | Performed by: INTERNAL MEDICINE

## 2022-11-30 PROCEDURE — 99214 PR OFFICE/OUTPT VISIT, EST, LEVL IV, 30-39 MIN: ICD-10-PCS | Mod: S$GLB,,, | Performed by: INTERNAL MEDICINE

## 2022-11-30 PROCEDURE — 99999 PR PBB SHADOW E&M-EST. PATIENT-LVL IV: CPT | Mod: PBBFAC,,, | Performed by: INTERNAL MEDICINE

## 2022-11-30 PROCEDURE — 4010F ACE/ARB THERAPY RXD/TAKEN: CPT | Mod: CPTII,S$GLB,, | Performed by: INTERNAL MEDICINE

## 2022-11-30 PROCEDURE — 3008F BODY MASS INDEX DOCD: CPT | Mod: CPTII,S$GLB,, | Performed by: INTERNAL MEDICINE

## 2022-11-30 PROCEDURE — 3077F PR MOST RECENT SYSTOLIC BLOOD PRESSURE >= 140 MM HG: ICD-10-PCS | Mod: CPTII,S$GLB,, | Performed by: INTERNAL MEDICINE

## 2022-11-30 PROCEDURE — 1160F PR REVIEW ALL MEDS BY PRESCRIBER/CLIN PHARMACIST DOCUMENTED: ICD-10-PCS | Mod: CPTII,S$GLB,, | Performed by: INTERNAL MEDICINE

## 2022-12-02 DIAGNOSIS — C74.02 MALIGNANT NEOPLASM OF CORTEX OF LEFT ADRENAL GLAND: Primary | ICD-10-CM

## 2022-12-02 DIAGNOSIS — C79.51 SECONDARY MALIGNANT NEOPLASM OF BONE: ICD-10-CM

## 2022-12-07 ENCOUNTER — LAB VISIT (OUTPATIENT)
Dept: LAB | Facility: HOSPITAL | Age: 62
End: 2022-12-07
Attending: INTERNAL MEDICINE
Payer: COMMERCIAL

## 2022-12-07 DIAGNOSIS — C74.02 MALIGNANT NEOPLASM OF CORTEX OF LEFT ADRENAL GLAND: ICD-10-CM

## 2022-12-07 DIAGNOSIS — C79.51 SECONDARY MALIGNANT NEOPLASM OF BONE: ICD-10-CM

## 2022-12-07 LAB
ALBUMIN SERPL-MCNC: 2.9 GM/DL (ref 3.4–4.8)
ALBUMIN/GLOB SERPL: 1.1 RATIO (ref 1.1–2)
ALP SERPL-CCNC: 81 UNIT/L (ref 40–150)
ALT SERPL-CCNC: 10 UNIT/L (ref 0–55)
AST SERPL-CCNC: 19 UNIT/L (ref 5–34)
BASOPHILS # BLD AUTO: 0.03 X10(3)/MCL (ref 0–0.2)
BASOPHILS NFR BLD AUTO: 0.9 %
BILIRUBIN DIRECT+TOT PNL SERPL-MCNC: 0.2 MG/DL
BUN SERPL-MCNC: 20 MG/DL (ref 9.8–20.1)
CALCIUM SERPL-MCNC: 8.6 MG/DL (ref 8.4–10.2)
CHLORIDE SERPL-SCNC: 107 MMOL/L (ref 98–107)
CO2 SERPL-SCNC: 23 MMOL/L (ref 23–31)
CORTIS SERPL-SCNC: 31.4 UG/DL
CREAT SERPL-MCNC: 0.63 MG/DL (ref 0.55–1.02)
EOSINOPHIL # BLD AUTO: 0.13 X10(3)/MCL (ref 0–0.9)
EOSINOPHIL NFR BLD AUTO: 3.9 %
ERYTHROCYTE [DISTWIDTH] IN BLOOD BY AUTOMATED COUNT: 13.3 % (ref 11.5–17)
GFR SERPLBLD CREATININE-BSD FMLA CKD-EPI: >60 MLS/MIN/1.73/M2
GLOBULIN SER-MCNC: 2.6 GM/DL (ref 2.4–3.5)
GLUCOSE SERPL-MCNC: 84 MG/DL (ref 82–115)
HCT VFR BLD AUTO: 34.5 % (ref 37–47)
HGB BLD-MCNC: 11 GM/DL (ref 12–16)
IMM GRANULOCYTES # BLD AUTO: 0.01 X10(3)/MCL (ref 0–0.04)
IMM GRANULOCYTES NFR BLD AUTO: 0.3 %
LYMPHOCYTES # BLD AUTO: 0.68 X10(3)/MCL (ref 0.6–4.6)
LYMPHOCYTES NFR BLD AUTO: 20.4 %
MCH RBC QN AUTO: 32.6 PG (ref 27–31)
MCHC RBC AUTO-ENTMCNC: 31.9 MG/DL (ref 33–36)
MCV RBC AUTO: 102.4 FL (ref 80–94)
MONOCYTES # BLD AUTO: 0.5 X10(3)/MCL (ref 0.1–1.3)
MONOCYTES NFR BLD AUTO: 15 %
NEUTROPHILS # BLD AUTO: 2 X10(3)/MCL (ref 2.1–9.2)
NEUTROPHILS NFR BLD AUTO: 59.5 %
PLATELET # BLD AUTO: 223 X10(3)/MCL (ref 130–400)
PMV BLD AUTO: 10.7 FL (ref 7.4–10.4)
POTASSIUM SERPL-SCNC: 4 MMOL/L (ref 3.5–5.1)
PROT SERPL-MCNC: 5.5 GM/DL (ref 5.8–7.6)
RBC # BLD AUTO: 3.37 X10(6)/MCL (ref 4.2–5.4)
SODIUM SERPL-SCNC: 141 MMOL/L (ref 136–145)
T4 FREE SERPL-MCNC: 0.83 NG/DL (ref 0.7–1.48)
TSH SERPL-ACNC: 1.34 UIU/ML (ref 0.35–4.94)
WBC # SPEC AUTO: 3.3 X10(3)/MCL (ref 4.5–11.5)

## 2022-12-07 PROCEDURE — 82533 TOTAL CORTISOL: CPT

## 2022-12-07 PROCEDURE — 85025 COMPLETE CBC W/AUTO DIFF WBC: CPT

## 2022-12-07 PROCEDURE — 82627 DEHYDROEPIANDROSTERONE: CPT

## 2022-12-07 PROCEDURE — 82088 ASSAY OF ALDOSTERONE: CPT

## 2022-12-07 PROCEDURE — 84244 ASSAY OF RENIN: CPT

## 2022-12-07 PROCEDURE — 84439 ASSAY OF FREE THYROXINE: CPT

## 2022-12-07 PROCEDURE — 80375 DRUG/SUBSTANCE NOS 1-3: CPT

## 2022-12-07 PROCEDURE — 80053 COMPREHEN METABOLIC PANEL: CPT

## 2022-12-07 PROCEDURE — 36415 COLL VENOUS BLD VENIPUNCTURE: CPT

## 2022-12-07 PROCEDURE — 82024 ASSAY OF ACTH: CPT

## 2022-12-07 PROCEDURE — 84443 ASSAY THYROID STIM HORMONE: CPT

## 2022-12-08 LAB
ACTH PLAS-MCNC: 25 PG/ML
DHEA-S SERPL-MCNC: <5 MCG/DL (ref 9.7–159)

## 2022-12-09 LAB — ALDOST SERPL-MCNC: <4 NG/DL

## 2022-12-11 LAB — RENIN PLAS-CCNC: 2.5 NG/ML/H

## 2022-12-12 DIAGNOSIS — C74.02 MALIGNANT NEOPLASM OF CORTEX OF LEFT ADRENAL GLAND: Primary | ICD-10-CM

## 2022-12-12 DIAGNOSIS — M54.50 LOW BACK PAIN, UNSPECIFIED BACK PAIN LATERALITY, UNSPECIFIED CHRONICITY, UNSPECIFIED WHETHER SCIATICA PRESENT: ICD-10-CM

## 2022-12-12 DIAGNOSIS — C79.51 SECONDARY MALIGNANT NEOPLASM OF BONE: ICD-10-CM

## 2022-12-13 LAB
COLLECT DURATION TIME UR: 24 H
CORTIS 24H UR-MRATE: 13 MCG/24 H (ref 3.5–45)
MITOTANE SERPL-MCNC: 15.2 UG/ML
SPECIMEN VOL 24H UR: 600 ML

## 2023-01-06 ENCOUNTER — LAB VISIT (OUTPATIENT)
Dept: LAB | Facility: HOSPITAL | Age: 63
End: 2023-01-06
Attending: INTERNAL MEDICINE
Payer: COMMERCIAL

## 2023-01-06 DIAGNOSIS — E03.9 HYPOTHYROIDISM, UNSPECIFIED TYPE: ICD-10-CM

## 2023-01-06 DIAGNOSIS — C79.51 SECONDARY MALIGNANT NEOPLASM OF BONE: ICD-10-CM

## 2023-01-06 DIAGNOSIS — C74.02 MALIGNANT NEOPLASM OF CORTEX OF LEFT ADRENAL GLAND: Primary | ICD-10-CM

## 2023-01-06 DIAGNOSIS — M81.0 OSTEOPOROSIS, UNSPECIFIED OSTEOPOROSIS TYPE, UNSPECIFIED PATHOLOGICAL FRACTURE PRESENCE: ICD-10-CM

## 2023-01-06 DIAGNOSIS — C74.02 MALIGNANT NEOPLASM OF CORTEX OF LEFT ADRENAL GLAND: ICD-10-CM

## 2023-01-06 DIAGNOSIS — I10 HYPERTENSION, UNSPECIFIED TYPE: ICD-10-CM

## 2023-01-06 DIAGNOSIS — C74.00 MALIGNANT NEOPLASM OF ADRENAL CORTEX, UNSPECIFIED LATERALITY: ICD-10-CM

## 2023-01-06 LAB
ALBUMIN SERPL-MCNC: 2.7 G/DL (ref 3.4–4.8)
ALBUMIN/GLOB SERPL: 0.9 RATIO (ref 1.1–2)
ALP SERPL-CCNC: 98 UNIT/L (ref 40–150)
ALT SERPL-CCNC: 16 UNIT/L (ref 0–55)
AST SERPL-CCNC: 24 UNIT/L (ref 5–34)
BASOPHILS # BLD AUTO: 0.06 X10(3)/MCL (ref 0–0.2)
BASOPHILS NFR BLD AUTO: 1.6 %
BILIRUBIN DIRECT+TOT PNL SERPL-MCNC: 0.2 MG/DL
BUN SERPL-MCNC: 20.1 MG/DL (ref 9.8–20.1)
CALCIUM SERPL-MCNC: 8.7 MG/DL (ref 8.4–10.2)
CHLORIDE SERPL-SCNC: 106 MMOL/L (ref 98–107)
CO2 SERPL-SCNC: 26 MMOL/L (ref 23–31)
CORTIS SERPL-SCNC: 19.1 UG/DL
CREAT SERPL-MCNC: 0.59 MG/DL (ref 0.55–1.02)
EOSINOPHIL # BLD AUTO: 0.22 X10(3)/MCL (ref 0–0.9)
EOSINOPHIL NFR BLD AUTO: 5.8 %
ERYTHROCYTE [DISTWIDTH] IN BLOOD BY AUTOMATED COUNT: 13.5 % (ref 11–14.5)
GFR SERPLBLD CREATININE-BSD FMLA CKD-EPI: >90 MLS/MIN/1.73/M2
GLOBULIN SER-MCNC: 3 GM/DL (ref 2.4–3.5)
GLUCOSE SERPL-MCNC: 78 MG/DL (ref 82–115)
HCT VFR BLD AUTO: 34.8 % (ref 37–47)
HGB BLD-MCNC: 11.1 GM/DL (ref 12–16)
IMM GRANULOCYTES # BLD AUTO: 0.01 X10(3)/MCL (ref 0–0.04)
IMM GRANULOCYTES NFR BLD AUTO: 0.3 %
LYMPHOCYTES # BLD AUTO: 0.74 X10(3)/MCL (ref 0.6–4.6)
LYMPHOCYTES NFR BLD AUTO: 19.4 %
MCH RBC QN AUTO: 32.5 PG
MCHC RBC AUTO-ENTMCNC: 31.9 MG/DL (ref 33–36)
MCV RBC AUTO: 101.8 FL (ref 80–94)
MONOCYTES # BLD AUTO: 0.46 X10(3)/MCL (ref 0.1–1.3)
MONOCYTES NFR BLD AUTO: 12 %
NEUTROPHILS # BLD AUTO: 2.33 X10(3)/MCL (ref 2.1–9.2)
NEUTROPHILS NFR BLD AUTO: 60.9 %
PLATELET # BLD AUTO: 283 X10(3)/MCL (ref 140–371)
PMV BLD AUTO: 10.4 FL (ref 9.4–12.4)
POTASSIUM SERPL-SCNC: 4.2 MMOL/L (ref 3.5–5.1)
PROT SERPL-MCNC: 5.7 GM/DL (ref 5.8–7.6)
RBC # BLD AUTO: 3.42 X10(6)/MCL (ref 4.2–5.4)
SODIUM SERPL-SCNC: 140 MMOL/L (ref 136–145)
T4 FREE SERPL-MCNC: 0.76 NG/DL (ref 0.7–1.48)
TSH SERPL-ACNC: 1.67 UIU/ML (ref 0.35–4.94)
WBC # SPEC AUTO: 3.8 X10(3)/MCL (ref 4.5–11.5)

## 2023-01-06 PROCEDURE — 82024 ASSAY OF ACTH: CPT

## 2023-01-06 PROCEDURE — 82627 DEHYDROEPIANDROSTERONE: CPT

## 2023-01-06 PROCEDURE — 82533 TOTAL CORTISOL: CPT

## 2023-01-06 PROCEDURE — 82088 ASSAY OF ALDOSTERONE: CPT

## 2023-01-06 PROCEDURE — 80053 COMPREHEN METABOLIC PANEL: CPT

## 2023-01-06 PROCEDURE — 85025 COMPLETE CBC W/AUTO DIFF WBC: CPT

## 2023-01-06 PROCEDURE — 84439 ASSAY OF FREE THYROXINE: CPT

## 2023-01-06 PROCEDURE — 84244 ASSAY OF RENIN: CPT

## 2023-01-06 PROCEDURE — 84443 ASSAY THYROID STIM HORMONE: CPT

## 2023-01-06 PROCEDURE — 36415 COLL VENOUS BLD VENIPUNCTURE: CPT

## 2023-01-07 LAB
ACTH PLAS-MCNC: 25 PG/ML
DHEA-S SERPL-MCNC: <5 MCG/DL (ref 9.7–159)

## 2023-01-09 ENCOUNTER — LAB VISIT (OUTPATIENT)
Dept: LAB | Facility: HOSPITAL | Age: 63
End: 2023-01-09
Attending: INTERNAL MEDICINE
Payer: COMMERCIAL

## 2023-01-09 DIAGNOSIS — C79.51 SECONDARY MALIGNANT NEOPLASM OF BONE: ICD-10-CM

## 2023-01-09 DIAGNOSIS — C74.00 MALIGNANT NEOPLASM OF ADRENAL CORTEX, UNSPECIFIED LATERALITY: ICD-10-CM

## 2023-01-09 DIAGNOSIS — C74.02 MALIGNANT NEOPLASM OF CORTEX OF LEFT ADRENAL GLAND: Primary | ICD-10-CM

## 2023-01-09 PROCEDURE — 82530 CORTISOL FREE: CPT

## 2023-01-10 LAB
ALDOST SERPL-MCNC: <4 NG/DL
RENIN PLAS-CCNC: 1.7 NG/ML/H

## 2023-01-11 LAB
COLLECT DURATION TIME UR: 24 H
CORTIS 24H UR-MRATE: 42 MCG/24 H (ref 3.5–45)
SPECIMEN VOL 24H UR: 1390 ML

## 2023-01-11 NOTE — PROGRESS NOTES
Subjective:       Patient ID: Shreya Reyes is a 63 y.o. female.    Chief Complaint:  Pain in legs slightly improved    Diagnosis: Metastatic adrenocortical carcinoma                    Multiple osteoporotic vertebral compression fractures    Treatment History  Adjuvant XRT (completed 9/30/21)  Mitotane 1/25/22-7/26/22, Resumed 8/22  XRT right scapula, L5,  R ischium, L femoral neck completed 8/26/22    Current Treatment: Hydrocortisone 10 mg q.a.m., 10 mg q.p.m.                                  Levothyroxine 75 mcg/d            Mitotane 500 mg QAM & 1000 mg QPM resumed 8/22    Clinical History:  Patient initially presented to the [facility] 3/16/21 with acute left flank pain. CT A/P showed a heterogeneous enhancing mass of the left adrenal gland measuring 5.5 x 4.8 x 5 cm (24 Hu), with no definite microscopic fat. There was mild displacement of the left renal vein. Right adrenal gland was unremarkable.  Hormonal workup was negative for pheochromocytoma. She was felt to have had an acute adrenal hemorrhage and close observation was recommended. Follow-up CT A/P 6/9/21 showed increased size of the adrenal mass to 8.0 x 4.5 cm appearing hypervascular with some central necrosis. She underwent a laparoscopic/robotic left adrenalectomy 6/11/21.  Final pathology showed an adrenal cortical carcinoma predominant oncocytic/trabecular morphology with focally marked cytologic atypia and increased mitotic rate (up to 10/50 HPF's). There were large areas of necrosis and multiple foci of capsular and lymphovascular invasion. Ki-67 expression was 10-15%.    She had a Telemedicine consultation with Dr. Dion Lynch at [facility] 8/3/21 who specializes in treatment of adrenocortical carcinoma. Her case was reviewed and discussed in their tumor board. Recommendations were for treatment with adjuvant radiation therapy and systemic treatment with Mitotane. Baseline postoperative CT scans of the chest, abdomen and pelvis 8/4/21  showed postoperative changes with no evidence of measurable metastatic disease.    She was seen as a new patient at [facility] 8/9/21.  She had recovered well from her surgery and was asymptomatic.  Treatment recommendations from the [facility] were reviewed and discussed.  Treatment was started with Mitotane 1000 mg BID on 8/16/2021.  No dose escalation was recommended until she completed radiation therapy.  She was started on replacement hydrocortisone 20 mg Q AM and 10 mg Q PM.  Surveillance CT scans were recommended in 3 months.      She was seen for an office visit 9/16/21 after completing 4 weeks of treatment with Mitotane.  She was not having any significant side effects associated with her therapy.  She was [facility] through her adjuvant radiation therapy.  Laboratory testing showed marked transaminase elevations with an AST of 493,  and alkaline phosphatase 175.  Bilirubin was normal.  The remainder of her CMP was unremarkable.  Baseline mitotane level drawn at that visit was 2.5 mcg/mL.  Mitotane was held and adjuvant radiation therapy was continued.  Weekly laboratory monitoring showed gradual improvement in her LFTs.  Although mitotane had been associated with transaminase elevations, >5x elevations are rare occurring in <1% of patients.  She was also instructed to hold her lovastatin.  She completed adjuvant radiation therapy 9/30/2021.     Follow-up laboratory continued to show transaminase elevations.  GI was consulted and ordered additional laboratory testing which did not reveal evidence of viral or autoimmune etiologies for her hepatitis.  Mitotane was not resumed due to her persistent transaminase elevations. Follow-up CT scans of the chest, abdomen and pelvis 10/28/21 showed a few stable subcentimeter pulmonary nodules and changes related to her previous left adrenalectomy with no evidence of disease recurrence.  Following normalization of her LFTs, treatment was  resumed with Mitotane 1/25/22.  Her LFTs remained normal even during dose escalation.    She had an injury at home in mid March after carrying in several arms of firewood with acute mid back pain.  Plain x-ray 3/17/22 showed a compression deformity at L2 of questionable age.  MRI of the lumbar spine 3/23/22 showed a subacute severe compression fracture deformity of the L1 vertebral body and mild superior endplate compression fracture of L3 vertebral body with osteoporotic appearance and no findings suspicious for pathologic fracture.  However, there were scattered small osseous lesions in the right L3 vertebral body and L5 vertebral body concerning for metastatic disease.  CT PET scan 3/28/22 showed mildly hypermetabolic osseous lesions in the lumbar spine, pelvis and proximal left femur consistent with metastatic disease.  There was no significant hypermetabolic uptake at the sites of her compression fractures.  She did not have pain at any of the sites prior to the acute compression fracture.  Bone density exam showed osteoporosis of the lumbar spine with a T score of -3.4, left femoral neck -3.4 and left total hip -2.7.  She was started on Prolia 3/31/22 and underwent L1 and L3 kyphoplasty 4//8/22.  Biopsies of the L3 vertebral body showed no evidence of metastatic carcinoma.  She continued to have significant pain following the kyphoplasty.  Further review of her films showed a possible compression fracture at T11.  MRI of the thoracic spine 4/15/22 showed a compression fracture of T11 with 50% loss of vertebral body height.  She underwent kyphoplasty 4/21/22 with symptomatic improvement.    CT scans of the chest, abdomen and pelvis 4/27/22 showed sclerotic osseous lesions at L4, right ischium and left femur correlating with the hypermetabolic lesions on CT-PET scan.  There were stable sub-6 mm pulmonary nodules in the RML and LLL unchanged from previous imaging.  MRI of the cervical and lumbar spine 7/5/22  showed moderate disc disease at C5-6 and C6-7 without significant spinal canal stenosis or foraminal stenosis.  She developed progressive symptoms of right arm pain, numbness and tingling and updated MRI 7/13/22 showed foraminal stenosis secondary to disc osteophyte on the right side at C5-6 and C6-7.  She underwent a right foraminotomy with relief of her symptoms.    CT C/A/P 08/01/2022 showed multiple compression fractures and postsurgical changes.  Area of sclerosis in the left sacrum was stable compared to the prior exam.  There was no evidence of visceral metastatic disease.  She had a teleconference with [facility] 8/2/22 and a follow-up PET scan was recommended.  Mitotane was discontinued 7/26/22. CT-PET 08/05/22 hypermetabolic osseous metastatic disease involving L5, right ischium, left femoral neck and a new lesion in the inferior right scapula.  There was a 12 mm area of hypermetabolic activity adjacent to the right adrenal gland without a definite CT correlate.    She completed palliative radiation therapy to the right scapula, L5, R ischium and left femoral neck on 8/26/22.  She resumed Mitotane at 1000 mg BID on 8/29/22.      CT-PET scan 11/21/22 showed improved FDG uptake in all of the treated sites.  Right scapula SUV decreased from 4.2 to 2.1, left femoral neck 9.1 to 5, right ischium 13 to 2.4 and L5 8.2 to 4.6.  Hypermetabolic activity at the site of the previous compression fractures had also improved.  There were no findings suspicious for new sites of disease or visceral metastatic disease.    Interval History  Mrs. Reyes returns today by herself for an on treatment follow-up visit.  She had telemedicine follow-up with [facility] 12/06/2022 with recommendations to continue mitotane with repeat PET scan in [place] February.  Mitotane dose is currently 500 mg in the morning and 1000 mg in the evening.  She is also on replacement hydrocortisone.  At the time of her last  "visit she reported significant myalgias involving her upper legs which began after her Prolia injection in October.  She stopped her mitotane for a few weeks with no change in her symptoms.  She consulted with Radiation Oncology who did not feel that her symptoms could be related to her previous therapy.  After much consideration and research, she feels that the Prolia was the cause.  Slowly the myalgias or improving.  She is participating in physical therapy twice a week.  She still has intermittent back pain and hip pain both of which are tolerable.  She is performing monthly laboratory.  Mitotane level is still pending at this time.  I reviewed all other results with the patient today.      Review of Systems   Constitutional:  Negative for appetite change and unexpected weight change.   HENT:  Negative for mouth sores.    Eyes:  Negative for visual disturbance.   Respiratory:  Negative for cough and shortness of breath.    Cardiovascular:  Negative for chest pain.   Gastrointestinal:  Negative for abdominal pain and diarrhea.   Genitourinary:  Negative for frequency.   Musculoskeletal:  Positive for back pain and myalgias (legs, improving).   Integumentary:  Negative for rash.   Neurological:  Negative for headaches.   Hematological:  Negative for adenopathy.   Psychiatric/Behavioral:  The patient is nervous/anxious.      PMHx:  Hyperlipidemia, osteoporosis  PSHx:  Tonsils, left cataract, ORIF left tibia/fib, left adrenalectomy, multiple vertebral kyphoplasties  SH:  Former smoker 1 PPD, quit 2009. + Social alcohol use. Lives in [place] with her . 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  FH:  Her mother had lymphoma.       Objective:        BP (!) 166/81   Pulse 73   Temp 98.6 °F (37 °C)   Resp 18   Ht 5' 5" (1.651 m)   Wt 57.6 kg (127 lb)   SpO2 97%   BMI 21.13 kg/m²    Physical Exam  Constitutional:       Comments: Well-developed white female in NAD.   HENT:      Head: " Normocephalic.      Mouth/Throat:      Pharynx: No posterior oropharyngeal erythema.   Eyes:      General: No scleral icterus.     Extraocular Movements: Extraocular movements intact.      Pupils: Pupils are equal, round, and reactive to light.   Cardiovascular:      Rate and Rhythm: Normal rate and regular rhythm.      Heart sounds: No murmur heard.  Pulmonary:      Comments: Lungs clear to auscultation  Abdominal:      General: Abdomen is flat. Bowel sounds are normal. There is no distension.      Palpations: There is no mass.      Tenderness: There is no abdominal tenderness.   Musculoskeletal:         General: No swelling.      Cervical back: Neck supple. No tenderness.      Comments: No tenderness over right scapula, pelvis or hips   Lymphadenopathy:      Cervical: No cervical adenopathy.   Skin:     General: Skin is warm and dry.      Findings: No rash.   Neurological:      General: No focal deficit present.      Mental Status: She is alert and oriented to person, place, and time.      Motor: No weakness.     ECOG SCORE    1 - Restricted in strenuous activity-ambulatory and able to carry out work of a light nature        LABORATORY  Recent Results (from the past 336 hour(s))   Renin    Collection Time: 01/06/23  8:07 AM   Result Value Ref Range    Renin Activity, P 1.7 ng/mL/h   Comprehensive Metabolic Panel    Collection Time: 01/06/23  8:07 AM   Result Value Ref Range    Sodium Level 140 136 - 145 mmol/L    Potassium Level 4.2 3.5 - 5.1 mmol/L    Chloride 106 98 - 107 mmol/L    Carbon Dioxide 26 23 - 31 mmol/L    Glucose Level 78 (L) 82 - 115 mg/dL    Blood Urea Nitrogen 20.1 9.8 - 20.1 mg/dL    Creatinine 0.59 0.55 - 1.02 mg/dL    Calcium Level Total 8.7 8.4 - 10.2 mg/dL    Protein Total 5.7 (L) 5.8 - 7.6 gm/dL    Albumin Level 2.7 (L) 3.4 - 4.8 g/dL    Globulin 3.0 2.4 - 3.5 gm/dL    Albumin/Globulin Ratio 0.9 (L) 1.1 - 2.0 ratio    Bilirubin Total 0.2 <=1.5 mg/dL    Alkaline Phosphatase 98 40 - 150 unit/L     Alanine Aminotransferase 16 0 - 55 unit/L    Aspartate Aminotransferase 24 5 - 34 unit/L    eGFR >90 mls/min/1.73/m2   Cortisol    Collection Time: 01/06/23  8:07 AM   Result Value Ref Range    Cortisol Level 19.1 ug/dL   FREE T4    Collection Time: 01/06/23  8:07 AM   Result Value Ref Range    Thyroxine Free 0.76 0.70 - 1.48 ng/dL   TSH    Collection Time: 01/06/23  8:07 AM   Result Value Ref Range    Thyroid Stimulating Hormone 1.665 0.350 - 4.940 uIU/mL   CBC with Differential    Collection Time: 01/06/23  8:07 AM   Result Value Ref Range    WBC 3.8 (L) 4.5 - 11.5 x10(3)/mcL    RBC 3.42 (L) 4.20 - 5.40 x10(6)/mcL    Hgb 11.1 (L) 12.0 - 16.0 gm/dL    Hct 34.8 (L) 37.0 - 47.0 %    .8 (H) 80.0 - 94.0 fL    MCH 32.5 pg    MCHC 31.9 (L) 33.0 - 36.0 mg/dL    RDW 13.5 11.0 - 14.5 %    Platelet 283 140 - 371 x10(3)/mcL    MPV 10.4 9.4 - 12.4 fL    Neut % 60.9 %    Lymph % 19.4 %    Mono % 12.0 %    Eos % 5.8 %    Basophil % 1.6 %    Lymph # 0.74 0.6 - 4.6 x10(3)/mcL    Neut # 2.33 2.1 - 9.2 x10(3)/mcL    Mono # 0.46 0.1 - 1.3 x10(3)/mcL    Eos # 0.22 0 - 0.9 x10(3)/mcL    Baso # 0.06 0 - 0.2 x10(3)/mcL    IG# 0.01 0 - 0.04 x10(3)/mcL    IG% 0.3 %   Aldosterone    Collection Time: 01/06/23  8:07 AM   Result Value Ref Range    Aldosterone, S <4.0 <=21 ng/dL   ACTH    Collection Time: 01/06/23  8:28 AM   Result Value Ref Range    Adrenocorticotropic Hormone, P 25 pg/mL   DHEA-Sulfate    Collection Time: 01/06/23  9:02 AM   Result Value Ref Range    DHEA SULFATE <5.0 (L) 9.7 - 159 mcg/dL   Cortisol, Urine, Free    Collection Time: 01/09/23  8:15 AM   Result Value Ref Range    Cortisol 42 3.5 - 45 mcg/24 h    Collection Duration 24 h    Urine Volume 1390 mL        Assessment:   Metastatic adrenocortical carcinoma  Multiple osteoporotic vertebral body compression fractures  Mild treatment induced leukopenia  BLE Myalgias - ? Prolia induced    Plan:   Continue Mitotane at current dose and schedule.  Await mitotane  level with dose titration per [facility] guidelines.    Continue hydrocortisone.    Repeat laboratory 02/06/2023.  Repeat PET scan 02/20/2023.    Telemedicine follow-up with [facility] scheduled 02/28/2023.    Return to clinic on 03/06/2023 to discuss the results of her restaging and recommendations from .  Patient will have labs same day.  All questions answered.    CRISTINA WILSON, FNP-C    Other Physicians  Dr. Dion Lynch ([facility])

## 2023-01-12 ENCOUNTER — TELEPHONE (OUTPATIENT)
Dept: HEMATOLOGY/ONCOLOGY | Facility: CLINIC | Age: 63
End: 2023-01-12
Payer: COMMERCIAL

## 2023-01-12 ENCOUNTER — OFFICE VISIT (OUTPATIENT)
Dept: HEMATOLOGY/ONCOLOGY | Facility: CLINIC | Age: 63
End: 2023-01-12
Payer: COMMERCIAL

## 2023-01-12 VITALS
BODY MASS INDEX: 21.16 KG/M2 | RESPIRATION RATE: 18 BRPM | TEMPERATURE: 99 F | HEART RATE: 73 BPM | DIASTOLIC BLOOD PRESSURE: 81 MMHG | OXYGEN SATURATION: 97 % | HEIGHT: 65 IN | WEIGHT: 127 LBS | SYSTOLIC BLOOD PRESSURE: 166 MMHG

## 2023-01-12 DIAGNOSIS — C74.02 MALIGNANT NEOPLASM OF CORTEX OF LEFT ADRENAL GLAND: Primary | ICD-10-CM

## 2023-01-12 DIAGNOSIS — C79.51 SECONDARY MALIGNANT NEOPLASM OF BONE: ICD-10-CM

## 2023-01-12 PROCEDURE — 3008F BODY MASS INDEX DOCD: CPT | Mod: CPTII,S$GLB,, | Performed by: NURSE PRACTITIONER

## 2023-01-12 PROCEDURE — 99214 PR OFFICE/OUTPT VISIT, EST, LEVL IV, 30-39 MIN: ICD-10-PCS | Mod: S$GLB,,, | Performed by: NURSE PRACTITIONER

## 2023-01-12 PROCEDURE — 3079F PR MOST RECENT DIASTOLIC BLOOD PRESSURE 80-89 MM HG: ICD-10-PCS | Mod: CPTII,S$GLB,, | Performed by: NURSE PRACTITIONER

## 2023-01-12 PROCEDURE — 3077F SYST BP >= 140 MM HG: CPT | Mod: CPTII,S$GLB,, | Performed by: NURSE PRACTITIONER

## 2023-01-12 PROCEDURE — 1160F PR REVIEW ALL MEDS BY PRESCRIBER/CLIN PHARMACIST DOCUMENTED: ICD-10-PCS | Mod: CPTII,S$GLB,, | Performed by: NURSE PRACTITIONER

## 2023-01-12 PROCEDURE — 99214 OFFICE O/P EST MOD 30 MIN: CPT | Mod: S$GLB,,, | Performed by: NURSE PRACTITIONER

## 2023-01-12 PROCEDURE — 3008F PR BODY MASS INDEX (BMI) DOCUMENTED: ICD-10-PCS | Mod: CPTII,S$GLB,, | Performed by: NURSE PRACTITIONER

## 2023-01-12 PROCEDURE — 3079F DIAST BP 80-89 MM HG: CPT | Mod: CPTII,S$GLB,, | Performed by: NURSE PRACTITIONER

## 2023-01-12 PROCEDURE — 3077F PR MOST RECENT SYSTOLIC BLOOD PRESSURE >= 140 MM HG: ICD-10-PCS | Mod: CPTII,S$GLB,, | Performed by: NURSE PRACTITIONER

## 2023-01-12 PROCEDURE — 1160F RVW MEDS BY RX/DR IN RCRD: CPT | Mod: CPTII,S$GLB,, | Performed by: NURSE PRACTITIONER

## 2023-01-12 PROCEDURE — 99999 PR PBB SHADOW E&M-EST. PATIENT-LVL IV: ICD-10-PCS | Mod: PBBFAC,,, | Performed by: NURSE PRACTITIONER

## 2023-01-12 PROCEDURE — 1159F PR MEDICATION LIST DOCUMENTED IN MEDICAL RECORD: ICD-10-PCS | Mod: CPTII,S$GLB,, | Performed by: NURSE PRACTITIONER

## 2023-01-12 PROCEDURE — 99999 PR PBB SHADOW E&M-EST. PATIENT-LVL IV: CPT | Mod: PBBFAC,,, | Performed by: NURSE PRACTITIONER

## 2023-01-12 PROCEDURE — 1159F MED LIST DOCD IN RCRD: CPT | Mod: CPTII,S$GLB,, | Performed by: NURSE PRACTITIONER

## 2023-01-13 ENCOUNTER — PATIENT MESSAGE (OUTPATIENT)
Dept: HEMATOLOGY/ONCOLOGY | Facility: CLINIC | Age: 63
End: 2023-01-13
Payer: COMMERCIAL

## 2023-01-13 LAB — MITOTANE SERPL-MCNC: 20.4 UG/ML

## 2023-01-18 ENCOUNTER — HOSPITAL ENCOUNTER (OUTPATIENT)
Dept: RADIOLOGY | Facility: HOSPITAL | Age: 63
Discharge: HOME | End: 2023-01-18
Attending: INTERNAL MEDICINE
Payer: COMMERCIAL

## 2023-01-18 DIAGNOSIS — C74.02 MALIGNANT NEOPLASM OF CORTEX OF LEFT ADRENAL GLAND: ICD-10-CM

## 2023-01-18 DIAGNOSIS — M25.559 HIP PAIN: Primary | ICD-10-CM

## 2023-01-18 DIAGNOSIS — M25.551 RIGHT HIP PAIN: ICD-10-CM

## 2023-01-18 DIAGNOSIS — C79.51 SECONDARY MALIGNANT NEOPLASM OF BONE: ICD-10-CM

## 2023-01-18 DIAGNOSIS — M25.559 HIP PAIN: ICD-10-CM

## 2023-01-18 DIAGNOSIS — C74.02 MALIGNANT NEOPLASM OF CORTEX OF LEFT ADRENAL GLAND: Primary | ICD-10-CM

## 2023-01-18 PROCEDURE — 73552 X-RAY EXAM OF FEMUR 2/>: CPT | Mod: TC,RT

## 2023-01-18 PROCEDURE — 73502 X-RAY EXAM HIP UNI 2-3 VIEWS: CPT | Mod: TC,RT

## 2023-01-19 ENCOUNTER — TELEPHONE (OUTPATIENT)
Dept: HEMATOLOGY/ONCOLOGY | Facility: CLINIC | Age: 63
End: 2023-01-19
Payer: COMMERCIAL

## 2023-01-20 ENCOUNTER — HOSPITAL ENCOUNTER (OUTPATIENT)
Dept: RADIOLOGY | Facility: HOSPITAL | Age: 63
Discharge: HOME | End: 2023-01-20
Attending: INTERNAL MEDICINE
Payer: COMMERCIAL

## 2023-01-20 ENCOUNTER — HOSPITAL ENCOUNTER (OUTPATIENT)
Dept: RADIOLOGY | Facility: HOSPITAL | Age: 63
Discharge: HOME | End: 2023-01-20
Attending: NURSE PRACTITIONER
Payer: COMMERCIAL

## 2023-01-20 DIAGNOSIS — M89.8X5 PAIN IN RIGHT FEMUR: ICD-10-CM

## 2023-01-20 DIAGNOSIS — M89.8X5 PAIN IN FEMUR: Primary | ICD-10-CM

## 2023-01-20 DIAGNOSIS — M25.551 RIGHT HIP PAIN: ICD-10-CM

## 2023-01-20 DIAGNOSIS — M89.8X5 PAIN IN FEMUR: ICD-10-CM

## 2023-01-20 DIAGNOSIS — M25.552 LEFT HIP PAIN: ICD-10-CM

## 2023-01-20 DIAGNOSIS — M25.552 BILATERAL HIP PAIN: ICD-10-CM

## 2023-01-20 DIAGNOSIS — C79.51 SECONDARY MALIGNANT NEOPLASM OF BONE: ICD-10-CM

## 2023-01-20 DIAGNOSIS — M25.551 BILATERAL HIP PAIN: ICD-10-CM

## 2023-01-20 DIAGNOSIS — C74.02 MALIGNANT NEOPLASM OF CORTEX OF LEFT ADRENAL GLAND: ICD-10-CM

## 2023-01-20 PROCEDURE — 25500020 PHARM REV CODE 255: Performed by: INTERNAL MEDICINE

## 2023-01-20 PROCEDURE — 72194 CT PELVIS W/O & W/DYE: CPT | Mod: TC

## 2023-01-20 PROCEDURE — 73702 CT LWR EXTREMITY W/O&W/DYE: CPT | Mod: TC,50

## 2023-01-20 RX ADMIN — IOPAMIDOL 100 ML: 755 INJECTION, SOLUTION INTRAVENOUS at 10:01

## 2023-01-26 ENCOUNTER — TELEPHONE (OUTPATIENT)
Dept: HEMATOLOGY/ONCOLOGY | Facility: CLINIC | Age: 63
End: 2023-01-26
Payer: COMMERCIAL

## 2023-01-26 DIAGNOSIS — M25.551 RIGHT HIP PAIN: Primary | ICD-10-CM

## 2023-01-26 RX ORDER — DULOXETIN HYDROCHLORIDE 30 MG/1
30 CAPSULE, DELAYED RELEASE ORAL DAILY
Qty: 15 CAPSULE | Refills: 0 | Status: ON HOLD | OUTPATIENT
Start: 2023-01-26 | End: 2023-02-01 | Stop reason: HOSPADM

## 2023-01-26 NOTE — TELEPHONE ENCOUNTER
Patient called back. States she is still having the right hip / leg pain. Taking old rx of percocet prescribed by another physician, but not helping at all. Please advise.

## 2023-01-26 NOTE — TELEPHONE ENCOUNTER
Patient left message through portal requesting office visit to discuss pain medication for leg pain. I attempted to contact her by phone to triage. Unable to reach, left message with my contact information.

## 2023-01-26 NOTE — TELEPHONE ENCOUNTER
Spoke with patient and advised Dr. Arzate she is taking Ibuprofen during the day and Percocet 7.5mg (her Rx is from 4/2022)at night which is not relieving her pain. Dr. Arzate said he spoke with her  right before lunch and he sent in an Rx for Cymbalta for her to add in to her regimen to address her pain. Patient verbalized understanding.

## 2023-01-27 DIAGNOSIS — M25.551 RIGHT HIP PAIN: ICD-10-CM

## 2023-01-27 DIAGNOSIS — C74.02 MALIGNANT NEOPLASM OF CORTEX OF LEFT ADRENAL GLAND: Primary | ICD-10-CM

## 2023-01-27 RX ORDER — HYDROCODONE BITARTRATE AND ACETAMINOPHEN 7.5; 325 MG/1; MG/1
1 TABLET ORAL EVERY 6 HOURS PRN
Qty: 60 TABLET | Refills: 0 | Status: ON HOLD | OUTPATIENT
Start: 2023-01-27 | End: 2023-02-09 | Stop reason: HOSPADM

## 2023-01-29 ENCOUNTER — TELEPHONE (OUTPATIENT)
Dept: HEMATOLOGY/ONCOLOGY | Facility: CLINIC | Age: 63
End: 2023-01-29
Payer: COMMERCIAL

## 2023-01-29 ENCOUNTER — HOSPITAL ENCOUNTER (INPATIENT)
Facility: HOSPITAL | Age: 63
LOS: 3 days | Discharge: HOME | DRG: 543 | End: 2023-02-01
Attending: INTERNAL MEDICINE | Admitting: INTERNAL MEDICINE
Payer: COMMERCIAL

## 2023-01-29 DIAGNOSIS — M47.22 CERVICAL SPONDYLOSIS WITH RADICULOPATHY: ICD-10-CM

## 2023-01-29 DIAGNOSIS — R52 INTRACTABLE PAIN: ICD-10-CM

## 2023-01-29 DIAGNOSIS — C74.00 MALIGNANT NEOPLASM OF ADRENAL CORTEX, UNSPECIFIED LATERALITY: Primary | ICD-10-CM

## 2023-01-29 DIAGNOSIS — C79.51 BONE METASTASES: ICD-10-CM

## 2023-01-29 LAB
ALBUMIN SERPL-MCNC: 2.7 G/DL (ref 3.4–4.8)
ALBUMIN/GLOB SERPL: 1 RATIO (ref 1.1–2)
ALP SERPL-CCNC: 78 UNIT/L (ref 40–150)
ALT SERPL-CCNC: 10 UNIT/L (ref 0–55)
APTT PPP: 23.8 SECONDS (ref 23.2–33.7)
AST SERPL-CCNC: 17 UNIT/L (ref 5–34)
BASOPHILS # BLD AUTO: 0.06 X10(3)/MCL (ref 0–0.2)
BASOPHILS NFR BLD AUTO: 1.3 %
BILIRUBIN DIRECT+TOT PNL SERPL-MCNC: 0.2 MG/DL
BUN SERPL-MCNC: 18 MG/DL (ref 9.8–20.1)
CALCIUM SERPL-MCNC: 8.8 MG/DL (ref 8.4–10.2)
CHLORIDE SERPL-SCNC: 102 MMOL/L (ref 98–107)
CO2 SERPL-SCNC: 25 MMOL/L (ref 23–31)
CREAT SERPL-MCNC: 0.59 MG/DL (ref 0.55–1.02)
EOSINOPHIL # BLD AUTO: 0.23 X10(3)/MCL (ref 0–0.9)
EOSINOPHIL NFR BLD AUTO: 4.9 %
ERYTHROCYTE [DISTWIDTH] IN BLOOD BY AUTOMATED COUNT: 13.2 % (ref 11.5–17)
GFR SERPLBLD CREATININE-BSD FMLA CKD-EPI: >60 MLS/MIN/1.73/M2
GLOBULIN SER-MCNC: 2.7 GM/DL (ref 2.4–3.5)
GLUCOSE SERPL-MCNC: 79 MG/DL (ref 82–115)
HCT VFR BLD AUTO: 33.4 % (ref 37–47)
HGB BLD-MCNC: 11.2 GM/DL (ref 12–16)
IMM GRANULOCYTES # BLD AUTO: 0.01 X10(3)/MCL (ref 0–0.04)
IMM GRANULOCYTES NFR BLD AUTO: 0.2 %
INR BLD: 0.96 (ref 0–1.3)
LYMPHOCYTES # BLD AUTO: 1.08 X10(3)/MCL (ref 0.6–4.6)
LYMPHOCYTES NFR BLD AUTO: 22.9 %
MAGNESIUM SERPL-MCNC: 1.9 MG/DL (ref 1.6–2.6)
MCH RBC QN AUTO: 32.3 PG
MCHC RBC AUTO-ENTMCNC: 33.5 MG/DL (ref 33–36)
MCV RBC AUTO: 96.3 FL (ref 80–94)
MONOCYTES # BLD AUTO: 0.66 X10(3)/MCL (ref 0.1–1.3)
MONOCYTES NFR BLD AUTO: 14 %
NEUTROPHILS # BLD AUTO: 2.68 X10(3)/MCL (ref 2.1–9.2)
NEUTROPHILS NFR BLD AUTO: 56.7 %
NRBC BLD AUTO-RTO: 0 %
PHOSPHATE SERPL-MCNC: 3.3 MG/DL (ref 2.3–4.7)
PLATELET # BLD AUTO: 240 X10(3)/MCL (ref 130–400)
PMV BLD AUTO: 11.4 FL (ref 7.4–10.4)
POTASSIUM SERPL-SCNC: 3.9 MMOL/L (ref 3.5–5.1)
PROT SERPL-MCNC: 5.4 GM/DL (ref 5.8–7.6)
PROTHROMBIN TIME: 12.7 SECONDS (ref 12.5–14.5)
RBC # BLD AUTO: 3.47 X10(6)/MCL (ref 4.2–5.4)
SODIUM SERPL-SCNC: 137 MMOL/L (ref 136–145)
WBC # SPEC AUTO: 4.7 X10(3)/MCL (ref 4.5–11.5)

## 2023-01-29 PROCEDURE — 84100 ASSAY OF PHOSPHORUS: CPT | Performed by: STUDENT IN AN ORGANIZED HEALTH CARE EDUCATION/TRAINING PROGRAM

## 2023-01-29 PROCEDURE — 85610 PROTHROMBIN TIME: CPT | Performed by: STUDENT IN AN ORGANIZED HEALTH CARE EDUCATION/TRAINING PROGRAM

## 2023-01-29 PROCEDURE — 85025 COMPLETE CBC W/AUTO DIFF WBC: CPT | Performed by: STUDENT IN AN ORGANIZED HEALTH CARE EDUCATION/TRAINING PROGRAM

## 2023-01-29 PROCEDURE — 83735 ASSAY OF MAGNESIUM: CPT | Performed by: STUDENT IN AN ORGANIZED HEALTH CARE EDUCATION/TRAINING PROGRAM

## 2023-01-29 PROCEDURE — 63600175 PHARM REV CODE 636 W HCPCS: Performed by: INTERNAL MEDICINE

## 2023-01-29 PROCEDURE — 99222 PR INITIAL HOSPITAL CARE,LEVL II: ICD-10-PCS | Mod: ,,, | Performed by: INTERNAL MEDICINE

## 2023-01-29 PROCEDURE — 85730 THROMBOPLASTIN TIME PARTIAL: CPT | Performed by: STUDENT IN AN ORGANIZED HEALTH CARE EDUCATION/TRAINING PROGRAM

## 2023-01-29 PROCEDURE — 80053 COMPREHEN METABOLIC PANEL: CPT | Performed by: STUDENT IN AN ORGANIZED HEALTH CARE EDUCATION/TRAINING PROGRAM

## 2023-01-29 PROCEDURE — C9113 INJ PANTOPRAZOLE SODIUM, VIA: HCPCS | Performed by: NURSE PRACTITIONER

## 2023-01-29 PROCEDURE — 25000003 PHARM REV CODE 250: Performed by: NURSE PRACTITIONER

## 2023-01-29 PROCEDURE — 63600175 PHARM REV CODE 636 W HCPCS: Performed by: NURSE PRACTITIONER

## 2023-01-29 PROCEDURE — 25500020 PHARM REV CODE 255: Performed by: INTERNAL MEDICINE

## 2023-01-29 PROCEDURE — 99222 1ST HOSP IP/OBS MODERATE 55: CPT | Mod: ,,, | Performed by: INTERNAL MEDICINE

## 2023-01-29 PROCEDURE — 20000000 HC ICU ROOM

## 2023-01-29 PROCEDURE — A9577 INJ MULTIHANCE: HCPCS | Performed by: INTERNAL MEDICINE

## 2023-01-29 PROCEDURE — 25000003 PHARM REV CODE 250: Performed by: STUDENT IN AN ORGANIZED HEALTH CARE EDUCATION/TRAINING PROGRAM

## 2023-01-29 RX ORDER — DEXAMETHASONE SODIUM PHOSPHATE 4 MG/ML
4 INJECTION, SOLUTION INTRA-ARTICULAR; INTRALESIONAL; INTRAMUSCULAR; INTRAVENOUS; SOFT TISSUE EVERY 8 HOURS
Status: DISCONTINUED | OUTPATIENT
Start: 2023-01-29 | End: 2023-02-01 | Stop reason: HOSPADM

## 2023-01-29 RX ORDER — HYDROMORPHONE HYDROCHLORIDE 2 MG/ML
0.5 INJECTION, SOLUTION INTRAMUSCULAR; INTRAVENOUS; SUBCUTANEOUS EVERY 4 HOURS PRN
Status: DISCONTINUED | OUTPATIENT
Start: 2023-01-29 | End: 2023-02-01 | Stop reason: HOSPADM

## 2023-01-29 RX ORDER — HYDROCODONE BITARTRATE AND ACETAMINOPHEN 7.5; 325 MG/1; MG/1
1 TABLET ORAL EVERY 6 HOURS PRN
Status: DISCONTINUED | OUTPATIENT
Start: 2023-01-29 | End: 2023-01-31

## 2023-01-29 RX ORDER — LABETALOL HYDROCHLORIDE 5 MG/ML
10 INJECTION, SOLUTION INTRAVENOUS EVERY 4 HOURS PRN
Status: DISCONTINUED | OUTPATIENT
Start: 2023-01-29 | End: 2023-02-01 | Stop reason: HOSPADM

## 2023-01-29 RX ORDER — LOSARTAN POTASSIUM 50 MG/1
50 TABLET ORAL DAILY
Status: DISCONTINUED | OUTPATIENT
Start: 2023-01-30 | End: 2023-02-01 | Stop reason: HOSPADM

## 2023-01-29 RX ORDER — CALCIUM CARBONATE 200(500)MG
500 TABLET,CHEWABLE ORAL 2 TIMES DAILY
Status: DISCONTINUED | OUTPATIENT
Start: 2023-01-29 | End: 2023-02-01 | Stop reason: HOSPADM

## 2023-01-29 RX ORDER — SODIUM CHLORIDE 0.9 % (FLUSH) 0.9 %
10 SYRINGE (ML) INJECTION
Status: DISCONTINUED | OUTPATIENT
Start: 2023-01-29 | End: 2023-02-01 | Stop reason: HOSPADM

## 2023-01-29 RX ORDER — ENOXAPARIN SODIUM 100 MG/ML
40 INJECTION SUBCUTANEOUS EVERY 24 HOURS
Status: DISCONTINUED | OUTPATIENT
Start: 2023-01-29 | End: 2023-02-01 | Stop reason: HOSPADM

## 2023-01-29 RX ORDER — PANTOPRAZOLE SODIUM 40 MG/10ML
40 INJECTION, POWDER, LYOPHILIZED, FOR SOLUTION INTRAVENOUS DAILY
Status: DISCONTINUED | OUTPATIENT
Start: 2023-01-29 | End: 2023-02-01 | Stop reason: HOSPADM

## 2023-01-29 RX ORDER — DULOXETIN HYDROCHLORIDE 30 MG/1
30 CAPSULE, DELAYED RELEASE ORAL DAILY
Status: DISCONTINUED | OUTPATIENT
Start: 2023-01-30 | End: 2023-01-30

## 2023-01-29 RX ORDER — HYDROMORPHONE HYDROCHLORIDE 2 MG/ML
1 INJECTION, SOLUTION INTRAMUSCULAR; INTRAVENOUS; SUBCUTANEOUS ONCE
Status: COMPLETED | OUTPATIENT
Start: 2023-01-29 | End: 2023-01-29

## 2023-01-29 RX ORDER — HYDRALAZINE HYDROCHLORIDE 20 MG/ML
10 INJECTION INTRAMUSCULAR; INTRAVENOUS EVERY 4 HOURS PRN
Status: DISCONTINUED | OUTPATIENT
Start: 2023-01-29 | End: 2023-02-01 | Stop reason: HOSPADM

## 2023-01-29 RX ORDER — CHOLECALCIFEROL (VITAMIN D3) 25 MCG
5000 TABLET ORAL DAILY
Status: DISCONTINUED | OUTPATIENT
Start: 2023-01-30 | End: 2023-02-01 | Stop reason: HOSPADM

## 2023-01-29 RX ORDER — HYDROMORPHONE HYDROCHLORIDE 4 MG/1
4 TABLET ORAL EVERY 4 HOURS PRN
Qty: 130 TABLET | Refills: 0 | Status: ON HOLD | OUTPATIENT
Start: 2023-01-29 | End: 2023-02-01 | Stop reason: HOSPADM

## 2023-01-29 RX ORDER — IBUPROFEN 600 MG/1
600 TABLET ORAL EVERY 6 HOURS PRN
Status: DISCONTINUED | OUTPATIENT
Start: 2023-01-29 | End: 2023-02-01 | Stop reason: HOSPADM

## 2023-01-29 RX ORDER — ONDANSETRON 4 MG/1
8 TABLET, ORALLY DISINTEGRATING ORAL EVERY 8 HOURS PRN
Status: DISCONTINUED | OUTPATIENT
Start: 2023-01-29 | End: 2023-01-30

## 2023-01-29 RX ORDER — HYDROCORTISONE 10 MG/1
10 TABLET ORAL 2 TIMES DAILY
Status: DISCONTINUED | OUTPATIENT
Start: 2023-01-29 | End: 2023-01-29

## 2023-01-29 RX ORDER — DEXAMETHASONE SODIUM PHOSPHATE 4 MG/ML
4 INJECTION, SOLUTION INTRA-ARTICULAR; INTRALESIONAL; INTRAMUSCULAR; INTRAVENOUS; SOFT TISSUE EVERY 12 HOURS
Status: DISCONTINUED | OUTPATIENT
Start: 2023-01-29 | End: 2023-01-29

## 2023-01-29 RX ORDER — HYDROMORPHONE HYDROCHLORIDE 4 MG/1
4 TABLET ORAL EVERY 4 HOURS PRN
Qty: 180 TABLET | Refills: 0 | Status: SHIPPED | OUTPATIENT
Start: 2023-01-29 | End: 2023-01-29

## 2023-01-29 RX ADMIN — HYDROMORPHONE HYDROCHLORIDE 0.5 MG: 2 INJECTION INTRAMUSCULAR; INTRAVENOUS; SUBCUTANEOUS at 03:01

## 2023-01-29 RX ADMIN — DEXAMETHASONE SODIUM PHOSPHATE 4 MG: 4 INJECTION, SOLUTION INTRA-ARTICULAR; INTRALESIONAL; INTRAMUSCULAR; INTRAVENOUS; SOFT TISSUE at 09:01

## 2023-01-29 RX ADMIN — CALCIUM CARBONATE (ANTACID) CHEW TAB 500 MG 500 MG: 500 CHEW TAB at 08:01

## 2023-01-29 RX ADMIN — DEXAMETHASONE SODIUM PHOSPHATE 4 MG: 4 INJECTION, SOLUTION INTRA-ARTICULAR; INTRALESIONAL; INTRAMUSCULAR; INTRAVENOUS; SOFT TISSUE at 03:01

## 2023-01-29 RX ADMIN — HYDROCODONE BITARTRATE AND ACETAMINOPHEN 1 TABLET: 7.5; 325 TABLET ORAL at 07:01

## 2023-01-29 RX ADMIN — HYDROMORPHONE HYDROCHLORIDE 0.5 MG: 2 INJECTION INTRAMUSCULAR; INTRAVENOUS; SUBCUTANEOUS at 08:01

## 2023-01-29 RX ADMIN — LABETALOL HYDROCHLORIDE 10 MG: 5 INJECTION, SOLUTION INTRAVENOUS at 07:01

## 2023-01-29 RX ADMIN — ENOXAPARIN SODIUM 40 MG: 40 INJECTION SUBCUTANEOUS at 05:01

## 2023-01-29 RX ADMIN — HYDRALAZINE HYDROCHLORIDE 10 MG: 20 INJECTION INTRAMUSCULAR; INTRAVENOUS at 03:01

## 2023-01-29 RX ADMIN — PANTOPRAZOLE SODIUM 40 MG: 40 INJECTION, POWDER, FOR SOLUTION INTRAVENOUS at 03:01

## 2023-01-29 RX ADMIN — ONDANSETRON 8 MG: 4 TABLET, ORALLY DISINTEGRATING ORAL at 07:01

## 2023-01-29 RX ADMIN — IBUPROFEN 600 MG: 600 TABLET ORAL at 03:01

## 2023-01-29 RX ADMIN — GADOBENATE DIMEGLUMINE 11 ML: 529 INJECTION, SOLUTION INTRAVENOUS at 02:01

## 2023-01-29 RX ADMIN — HYDROMORPHONE HYDROCHLORIDE 1 MG: 2 INJECTION INTRAMUSCULAR; INTRAVENOUS; SUBCUTANEOUS at 12:01

## 2023-01-29 NOTE — H&P
[facility] - [facility]  Pulmonary Critical Care Note    Patient Name: Shreya Reyes  MRN: [id number]  Admission Date: 1/29/2023  Hospital Length of Stay: 0 days  Code Status: Full Code  Attending Provider: Abraham Wu MD  Primary Care Provider: Robert Holt MD     Subjective:     HPI:   This is a 63-year-old  female who presented to the emergency room today with complaints of a retractable pain to the right hip and knee. She underwent a recent CT of RLE and pelvis w/ and w/o contrast on 1/20/23 that showed stable osseous metastatic disease in L5 measuring 2.8cm, left femoral neck measuring 2.7cm, with known right ischium lesion ill-defined on CT. She was admitted to ICU and was sent for an MRI lumber spine and hip.     Patient has complicated medical history dating back to March of 2021 after she presented to the emergency room with complaints of acute left flank plain imaging revealed mass in the left adrenal gland with mild displacement of left renal vein hormonal workup was negative for pheochromocytoma and she was felt to have an acute adrenal hemorrhage with close observation recommended.  Repeat imaging June of 2021 showed increased size of the adrenal mass.  She then underwent laparoscopic/robotic left adrenalectomy on June 11, 2021.  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      She had a Telemedicine consultation with Dr. Dion Lynch at [facility] 8/3/21 who specializes in treatment of adrenocortical carcinoma. Her case was reviewed and discussed in their tumor board. Recommendations were for treatment with adjuvant radiation therapy and systemic treatment with Mitotane. Baseline postoperative CT scans of the chest, abdomen and pelvis 8/4/21 showed postoperative changes with no evidence of measurable metastatic  disease.     She was seen as a new patient with Dr. Arzate at [facility] on 8/9/21.  Treatment recommendations from the [facility] were reviewed and discussed.  Treatment was started with Mitotane 1000 mg BID on 8/16/2021.  After she was snf through with her adjunctive radiation treatments the patient did have a bump in her LFTs. Mitotane was held and adjunctive radiation therapy was continued; she completed adjunctive radiation therapy on 09/30/2021.  She continued to have issues with elevated LFTs, however this eventually normalized and was able to be resumed on 1/25/22.     She then had an injury at home and was found to have a compression deformity at L2 of questionable age.  MRI of the lumbar spine 3/23/22 showed a subacute severe compression fracture deformity of the L1 vertebral body and mild superior endplate compression fracture of L3 vertebral body with osteoporotic appearance and no findings suspicious for pathologic fracture.  However, there were scattered small osseous lesions in the right L3 vertebral body and L5 vertebral body concerning for metastatic disease.  CT PET scan 3/28/22 showed mildly hypermetabolic osseous lesions in the lumbar spine, pelvis and proximal left femur consistent with metastatic disease. She was started on Prolia 3/31/22 and underwent L1 and L3 kyphoplasty 4//8/22.  Biopsies of the L3 vertebral body showed no evidence of metastatic carcinoma. She continued to have issues,  films showed a possible compression fracture at T11.  MRI of the thoracic spine 4/15/22 showed a compression fracture of T11 with 50% loss of vertebral body height.  She underwent kyphoplasty 4/21/22 with symptomatic improvement. MRI of the cervical and lumbar spine 7/5/22 showed moderate disc disease at C5-6 and C6-7 without significant spinal canal stenosis or foraminal stenosis.  She developed progressive symptoms of right arm pain, numbness and tingling and updated MRI  7/13/22 showed foraminal stenosis secondary to disc osteophyte on the right side at C5-6 and C6-7.  She underwent a right foraminotomy with relief of her symptoms.     She had a teleconference with [facility] 8/2/22 and a follow-up PET scan was recommended.  Mitotane was discontinued 7/26/22. CT-PET 08/05/22 hypermetabolic osseous metastatic disease involving L5, right ischium, left femoral neck and a new lesion in the inferior right scapula.  There was a 12 mm area of hypermetabolic activity adjacent to the right adrenal gland without a definite CT correlate.     She completed palliative radiation therapy to the right scapula, L5, R ischium and left femoral neck on 8/26/22.  She resumed Mitotane at 1000 mg BID on 8/29/22.      CT-PET scan 11/21/22 showed improved FDG uptake in all of the treated sites.  Right scapula SUV decreased from 4.2 to 2.1, left femoral neck 9.1 to 5, right ischium 13 to 2.4 and L5 8.2 to 4.6.  Hypermetabolic activity at the site of the previous compression fractures had also improved.  There were no findings suspicious for new sites of disease or visceral metastatic disease.    Per oncology's latest note recs from [facility] were for her to continue mitotane with repeat PET scan in February of 2023.     Hospital Course/Significant events:  As above     24 Hour Interval History:  As above     Past Medical History:   Diagnosis Date    Adrenal cancer     Closed compression fracture of L3 lumbar vertebra, sequela 5/4/2022    Closed wedge compression fracture of T11 vertebra 5/4/2022    Compression fracture of L1 vertebra     Compression fracture of L3 vertebra     Elevated liver enzymes 11/02/2021    Hyperlipidemia     Hypertension 05/04/2022    Osteoporosis     Thoracic compression fracture        Past Surgical History:   Procedure Laterality Date    ABDOMINOPLASTY      ADRENALECTOMY Left     L1 kyphoplasty w/ spine barbara, L3 kyphoplasty      Laproscopy for excision of  adrenal mass      left cataract Left     MAGNETIC RESONANCE IMAGING N/A 2022    Procedure: MRI (MAGNETIC RESONANCE IMAGING);  Surgeon: Nam Sandoval MD;  Location: [facility] OR;  Service: Neurosurgery;  Laterality: N/A;  MRI CERVICAL SPINE WITHOUT CONTRAST WITH ANESTHESIA    MINIMALLY INVASIVE FORAMINOTOMY CERVICAL SPINE Right 2022    Procedure: FORAMINOTOMY, SPINE, CERVICAL, MINIMALLY INVASIVE;  Surgeon: Nam Sandoval MD;  Location: [facility] OR;  Service: Neurosurgery;  Laterality: Right;  right C5-6, C6-7 posterior foraminotomy    ORIF TIBIA & FIBULA FRACTURES Left     TONSILLECTOMY         Social History     Socioeconomic History    Marital status:    Occupational History    Occupation: RN on leave   Tobacco Use    Smoking status: Former     Packs/day: 1.00     Types: Cigarettes     Quit date: 2009     Years since quittin.0    Smokeless tobacco: Never   Substance and Sexual Activity    Alcohol use: Yes     Comment: social    Drug use: Never         Current Outpatient Medications   Medication Instructions    ALPRAZolam (XANAX) 0.25 mg, Oral, Every 8 hours PRN    calcium carbonate (OS-ORAL) 600 mg, Oral, 2 times daily    cholecalciferol (vitamin D3) 250 mcg, Oral    DULoxetine (CYMBALTA) 30 mg, Oral, Daily    HYDROcodone-acetaminophen (NORCO) 7.5-325 mg per tablet 1 tablet, Oral, Every 6 hours PRN    hydrocortisone (CORTEF) 10 mg, Oral, 2 times daily    HYDROmorphone (DILAUDID) 4 mg, Oral, Every 4 hours PRN    ibuprofen (ADVIL,MOTRIN) 800 mg, Oral, 3 times daily PRN    levothyroxine (SYNTHROID) 75 MCG tablet 1 tablet, Oral, Daily    losartan (COZAAR) 50 MG tablet 1 tablet, Oral, Daily    mitotane (LYSODREN) 1,000 mg, Oral, Daily       Review of Systems   Constitutional:  Positive for weight loss.   HENT: Negative.     Gastrointestinal: Negative.    Genitourinary: Negative.    Musculoskeletal:  Positive for joint pain.   Skin: Negative.    Neurological: Negative.         Objective:     No intake or  output data in the 24 hours ending 01/29/23 1327      Vital Signs (Most Recent):  Pulse: 70 (01/29/23 1245)  Resp: 16 (01/29/23 1245)  BP: (!) 142/98 (01/29/23 1245)  SpO2: 96 % (01/29/23 1245)    Body mass index is 20.47 kg/m².  Weight: 55.8 kg (123 lb) Vital Signs (24h Range):  Pulse:  [70-72] 70  Resp:  [16] 16  SpO2:  [95 %-96 %] 96 %  BP: (142-182)/(97-98) 142/98     Physical Exam  Constitutional:       Comments: Arousable but sleepy from pain med administration    HENT:      Head: Normocephalic and atraumatic.      Mouth/Throat:      Mouth: Mucous membranes are moist.      Pharynx: Oropharynx is clear.   Cardiovascular:      Rate and Rhythm: Normal rate and regular rhythm.      Pulses: Normal pulses.      Heart sounds: Normal heart sounds.   Pulmonary:      Breath sounds: Normal breath sounds.   Abdominal:      General: Bowel sounds are normal.      Palpations: Abdomen is soft.   Musculoskeletal:      Right lower leg: No edema.      Left lower leg: No edema.   Neurological:      Comments: Arousable and able to follow commands          Lines/Drains/Airways       Peripheral Intravenous Line  Duration                  Peripheral IV - Single Lumen 22 G Right Antecubital -- days         Peripheral IV - Single Lumen 01/29/23 1213 20 G Left;Posterior Hand <1 day                    Significant Labs:    Lab Results   Component Value Date    WBC 3.8 (L) 01/06/2023    HGB 11.1 (L) 01/06/2023    HCT 34.8 (L) 01/06/2023    .8 (H) 01/06/2023     01/06/2023         BMP  Lab Results   Component Value Date     01/06/2023    K 4.2 01/06/2023    CHLORIDE 106 01/06/2023    CO2 26 01/06/2023    BUN 20.1 01/06/2023    CREATININE 0.59 01/06/2023    CALCIUM 8.7 01/06/2023    EGFRNONAA >60 08/01/2022       ABG  No results for input(s): PH, PO2, PCO2, HCO3, BE in the last 168 hours.    Significant Imaging:  MRI of Hip and spine pending.       Assessment/Plan:     Assessment  Intractable pain   Metastatic  adrenocortical carcinoma   On Mitotane  S/p adjunctive radiation therapy completed on 09/30/2021  Completed palliative radiation therapy to the right scapula, L5, R ischium and left femoral neck on 8/26/22  Multiple osteoporotic vertebral compression fractures  S/p L1 and L3 kyphoplasty 4//8/22  compression fracture of T11 with 50% loss of vertebral body height underwent kyphoplasty 4/21/22  foraminal stenosis secondary to disc osteophyte on the right side at C5-6 and C6-7.  She underwent a right foraminotomy     Plan  Pain control   Await results of MRIs with further recs to follow.          Sofía Ellis, ANP  Pulmonary Critical Care Medicine  [facility] - [facility]

## 2023-01-29 NOTE — TELEPHONE ENCOUNTER
Called by Dr. Reyes and patient's pain is not being controlled.   She is having severe pain in her hip.   Norco is not working.   Will try dilaudid PRN for pain. Will send prescription to pharmacy.   Patient ultimately will need MRI and/or have PET moved up.  Told them to call clinic or go to ED with worsening pain.     Madelin Arevalo MD

## 2023-01-29 NOTE — CONSULTS
[facility] - [facility]  Orthopedics  Consult Note    Patient Name: Shreya Reyes  MRN: [id number]  Admission Date: 1/29/2023  Hospital Length of Stay: 0 days  Attending Provider: Abraham Wu MD  Primary Care Provider: Robert Holt MD        Consults  Subjective:     Principal Problem:<principal problem not specified>    Chief Complaint: No chief complaint on file.       HPI:  62 yo white female with h/o metastatic adrenocortical carcinoma directly admitted for intractable pain. Patient has known metastatic disease in her bones. She is currently being treated by Dr. Arzate and is on Mitotane. She is also under treatment with [facility]. Dose was recently decreased to 500mg PO BID for a high level. Last PET done in 11/2022 showed disease improvement. She has been having increasing pain to right hip, radiating to knee, and feeling like at times that her leg was going to give out. Today the pain became unbearable. She took 2 7.5mg Norco and a valium which eased the pain some, but pain is still present. She underwent a recent CT of RLE and pelvis w/ and w/o contrast on 1/20/23 that showed stable osseous metastatic disease in L5 measuring 2.8cm, left femoral neck measuring 2.7cm, with known right ischium lesion ill-defined on CT. There was more sclerosis of these lesions, likely treatment related. No fracture was noted. Of note, she completed treatment with radiation to right scapula, L5, right ischium and left femoral neck 8/26/22. Patient is admitted to ICU for monitoring and to obtain MRI, in need of IV sedation/pain medication for the procedure.    She underwent MRI of her lumbar spine as well as her right hip.  I was consulted for management of metastatic disease and her right hip pain.  She is currently in her hospital bed.  She complains of pain that goes into the right hip and goes down in to the knee.  It does not go past the knee.  She denies any new numbness or tingling.   She does know it worse with motion although it hurts her at rest.    Past Medical History:   Diagnosis Date    Adrenal cancer     Closed compression fracture of L3 lumbar vertebra, sequela 5/4/2022    Closed wedge compression fracture of T11 vertebra 5/4/2022    Compression fracture of L1 vertebra     Compression fracture of L3 vertebra     Elevated liver enzymes 11/02/2021    Hyperlipidemia     Hypertension 05/04/2022    Osteoporosis     Thoracic compression fracture        Past Surgical History:   Procedure Laterality Date    ABDOMINOPLASTY      ADRENALECTOMY Left     L1 kyphoplasty w/ spine barbara, L3 kyphoplasty      Laproscopy for excision of adrenal mass      left cataract Left     MAGNETIC RESONANCE IMAGING N/A 7/13/2022    Procedure: MRI (MAGNETIC RESONANCE IMAGING);  Surgeon: Nam Sandoval MD;  Location: [facility] OR;  Service: Neurosurgery;  Laterality: N/A;  MRI CERVICAL SPINE WITHOUT CONTRAST WITH ANESTHESIA    MINIMALLY INVASIVE FORAMINOTOMY CERVICAL SPINE Right 7/13/2022    Procedure: FORAMINOTOMY, SPINE, CERVICAL, MINIMALLY INVASIVE;  Surgeon: Nam Sandoval MD;  Location: [facility] OR;  Service: Neurosurgery;  Laterality: Right;  right C5-6, C6-7 posterior foraminotomy    ORIF TIBIA & FIBULA FRACTURES Left     TONSILLECTOMY         Review of patient's allergies indicates:  No Known Allergies    Current Facility-Administered Medications   Medication    calcium carbonate 200 mg calcium (500 mg) chewable tablet 500 mg    dexAMETHasone injection 4 mg    [START ON 1/30/2023] DULoxetine DR capsule 30 mg    enoxaparin injection 40 mg    hydrALAZINE injection 10 mg    HYDROcodone-acetaminophen 7.5-325 mg per tablet 1 tablet    HYDROmorphone (PF) injection 0.5 mg    ibuprofen tablet 600 mg    labetaloL injection 10 mg    [START ON 1/30/2023] levothyroxine tablet 75 mcg    [START ON 1/30/2023] losartan tablet 50 mg    ondansetron disintegrating tablet 8 mg    pantoprazole injection 40 mg    sodium chloride 0.9% flush 10  mL    [START ON 2023] vitamin D 1000 units tablet 5,000 Units     Family History       Problem Relation (Age of Onset)    Heart disease Father, Sister, Brother    Hyperlipidemia Father    Lymphoma Mother          Tobacco Use    Smoking status: Former     Packs/day: 1.00     Types: Cigarettes     Quit date: 2009     Years since quittin.0    Smokeless tobacco: Never   Substance and Sexual Activity    Alcohol use: Yes     Comment: social    Drug use: Never    Sexual activity: Not on file     ROS  Objective:     Vital Signs (Most Recent):  Pulse: 70 (23 1500)  Resp: 14 (23 1500)  BP: (!) 170/102 (23 1500)  SpO2: 96 % (23 1500)   Vital Signs (24h Range):  Pulse:  [70-72] 70  Resp:  [14-16] 14  SpO2:  [95 %-96 %] 96 %  BP: (142-182)/() 170/102     Weight: 55.8 kg (123 lb)     Body mass index is 20.47 kg/m².    No intake or output data in the 24 hours ending 23 1531    Ortho/SPM Exam  Musculoskeletal:    focused exam over bilateral hips show non tenderness to the right hip.  She is full range of motion of the hip with flexion internal and external rotation.  She is a negative straight leg raise.  She is full range of motion of her knee.  Exam over the left hip again shows a full range of motion with limited external rotation to about 30°.  She is full range of motion of the knee.  Sensation intact to light touch.  + DP pulse     Significant Labs: All pertinent labs within the past 24 hours have been reviewed.  None  Recent Lab Results         23  1439        Baso # 0.06       Basophil % 1.3       Eos # 0.23       Eosinophil % 4.9       Hematocrit 33.4       Hemoglobin 11.2       Immature Grans (Abs) 0.01       Immature Granulocytes 0.2       Lymph # 1.08       LYMPH % 22.9       MCH 32.3       MCHC 33.5       MCV 96.3       Mono # 0.66       Mono % 14.0       MPV 11.4       Neut # 2.68       Neut % 56.7       nRBC 0.0       Platelets 240       RBC 3.47       RDW  13.2       WBC 4.7                Significant Imaging:   I have reviewed the MRI of her lumbar spine and the MRI of her hip.  She has a metastatic nodule in the posterior column of her right acetabulum in the nonweightbearing portion.  She has a another metastatic nodule in the left femoral neck.    Assessment/Plan:     I suspect her right-sided leg pain is from the progression of her metastatic disease in her lumbar spine.  Her right acetabular lesion appears to be asymptomatic.  Her left femoral neck lesion is concerning given its location and size.  I think it would be best treated with prophylactic stabilization in the outpatient setting.  Once she gets over her right-sided pain will be in contact and can pursue this.  Neurosurgery and radiation oncology on board.            Ezequiel Velasquez Jr, MD  Orthopedic Surgery and Sports Medicine  [facility] - [facility]

## 2023-01-29 NOTE — NURSING
Nurses Note -- 4 Eyes      1/29/2023   12:52 PM      Skin assessed during: Admit      [x] No Pressure Injuries Present    [x]Prevention Measures Documented      [] Yes- Altered Skin Integrity Present or Discovered   [] LDA Added if Not in Epic (Describe Wound)   [] New Altered Skin Integrity was Present on Admit and Documented in LDA   [] Wound Image Taken    Wound Care Consulted? No    Attending Nurse:  Anita Marie RN     Second RN/Staff Member:  Lulu Dorado RN

## 2023-01-29 NOTE — CONSULTS
[facility] - Oncology Acute  Hematology/Oncology  Consult Note    Patient Name: Shreya Reyes  MRN: [id number]  Admission Date: 1/29/2023  Hospital Length of Stay: 0 days  Attending Provider: Abraham Wu MD  Consulting Provider: Madelin Arevalo MD  Principal Problem:<principal problem not specified>    Consults  Subjective:     CC: Right hip/leg pain    HPI: 64 yo white female with h/o metastatic adrenocortical carcinoma directly admitted for intractable pain. Patient has known metastatic disease in her bones. She is currently being treated by Dr. Arzate and is on Mitotane. She is also under treatment with [facility]. Dose was recently decreased to 500mg PO BID for a high level. Last PET done in 11/2022 showed disease improvement. She has been having increasing pain to right hip, radiating to knee, and feeling like at times that her leg was going to give out. Today the pain became unbearable. She took 2 7.5mg Norco and a valium which eased the pain some, but pain is still present. She underwent a recent CT of RLE and pelvis w/ and w/o contrast on 1/20/23 that showed stable osseous metastatic disease in L5 measuring 2.8cm, left femoral neck measuring 2.7cm, with known right ischium lesion ill-defined on CT. There was more sclerosis of these lesions, likely treatment related. No fracture was noted. Of note, she completed treatment with radiation to right scapula, L5, right ischium and left femoral neck 8/26/22. Patient is admitted to ICU for monitoring and to obtain MRI, in need of IV sedation/pain medication for the procedure.    Patient seen and examined. She is a little groggy but continues c/o pain to right hip. Her  Dr. Dion Reyes at bedside. He reports that she has not been eating much and her weight is down to 123lbs. She reports that her bowels are moving with the help of Miralax. She denies any other pain. No HA. She was recently started on Cymbalta as well. She also takes  Advil at home in addition to her pain medication.     Oncology Treatment Plan:   [No matching plan found]    Medications:  Continuous Infusions:    Scheduled Meds:    PRN Meds:ondansetron, sodium chloride 0.9%     Review of patient's allergies indicates:  No Known Allergies     Past Medical History:   Diagnosis Date    Adrenal cancer     Closed compression fracture of L3 lumbar vertebra, sequela 2022    Closed wedge compression fracture of T11 vertebra 2022    Compression fracture of L1 vertebra     Compression fracture of L3 vertebra     Elevated liver enzymes 2021    Hyperlipidemia     Hypertension 2022    Osteoporosis     Thoracic compression fracture      Past Surgical History:   Procedure Laterality Date    ABDOMINOPLASTY      ADRENALECTOMY Left     L1 kyphoplasty w/ spine barbara, L3 kyphoplasty      Laproscopy for excision of adrenal mass      left cataract Left     MAGNETIC RESONANCE IMAGING N/A 2022    Procedure: MRI (MAGNETIC RESONANCE IMAGING);  Surgeon: Nam Sandoval MD;  Location: [facility];  Service: Neurosurgery;  Laterality: N/A;  MRI CERVICAL SPINE WITHOUT CONTRAST WITH ANESTHESIA    MINIMALLY INVASIVE FORAMINOTOMY CERVICAL SPINE Right 2022    Procedure: FORAMINOTOMY, SPINE, CERVICAL, MINIMALLY INVASIVE;  Surgeon: Nam Sandoval MD;  Location: [facility];  Service: Neurosurgery;  Laterality: Right;  right C5-6, C6-7 posterior foraminotomy    ORIF TIBIA & FIBULA FRACTURES Left     TONSILLECTOMY       Family History       Problem Relation (Age of Onset)    Heart disease Father, Sister, Brother    Hyperlipidemia Father    Lymphoma Mother          Tobacco Use    Smoking status: Former     Packs/day: 1.00     Types: Cigarettes     Quit date: 2009     Years since quittin.0    Smokeless tobacco: Never   Substance and Sexual Activity    Alcohol use: Yes     Comment: social    Drug use: Never    Sexual activity: Not on file       Review of Systems   All other systems reviewed  and are negative.  Objective:     Vital Signs (Most Recent):  Pulse: 70 (01/29/23 1245)  Resp: 16 (01/29/23 1245)  BP: (!) 142/98 (01/29/23 1245)  SpO2: 96 % (01/29/23 1245)   Vital Signs (24h Range):  Pulse:  [70-72] 70  Resp:  [16] 16  SpO2:  [95 %-96 %] 96 %  BP: (142-182)/(97-98) 142/98     Weight: 55.8 kg (123 lb)  Body mass index is 20.47 kg/m².  Body surface area is 1.6 meters squared.    No intake or output data in the 24 hours ending 01/29/23 1305    Physical Exam  Constitutional:       Appearance: Normal appearance.      Comments: Slightly groggy, but answers questions appropriately    HENT:      Head: Normocephalic.      Nose: Nose normal.      Mouth/Throat:      Mouth: Mucous membranes are moist.   Eyes:      Extraocular Movements: Extraocular movements intact.      Conjunctiva/sclera: Conjunctivae normal.   Cardiovascular:      Rate and Rhythm: Normal rate and regular rhythm.   Pulmonary:      Effort: Pulmonary effort is normal.      Breath sounds: Normal breath sounds.   Abdominal:      General: Bowel sounds are normal. There is no distension.      Palpations: Abdomen is soft.      Tenderness: There is no abdominal tenderness.   Musculoskeletal:      Right lower leg: No edema.      Left lower leg: No edema.   Skin:     General: Skin is warm.   Neurological:      General: No focal deficit present.      Mental Status: She is alert and oriented to person, place, and time.   Psychiatric:         Mood and Affect: Mood normal.         Judgment: Judgment normal.       Significant Labs:   None    Diagnostic Results:  None    Assessment/Plan:     Active Hospital Problems    Diagnosis    Intractable pain    Malignant neoplasm of adrenal cortex       Plan:  MRI right hip and lumbar spine ordered, will f/u results.  Recommend pain control with IV dilaudid PRN.  Await findings of MRI for further treatment plan.  Will need to restart her home medications. Would hold Mitotane for now.    Thank you for your consult. I  will follow-up with patient. Please contact us if you have any additional questions.  Dr. Arzate also to return tomorrow.      Madelin Arevalo MD  Hematology/Oncology  [facility] - Oncology Acute     Addendum:  MRI lumbar spine with L3 lesion progression with ventral and paraspinal extension and moderate to severe narrowing of the spinal canal.  Results of MRI hip pending.   Will start Decadron 4mg IV BID. She is already on Protonix.  Case discussed with Dr. Ramone Abdalla who will see her this evening.   Consult placed to radiation oncology.     Madelin Arevalo MD  Case discussed with

## 2023-01-30 LAB — POCT GLUCOSE: 120 MG/DL (ref 70–110)

## 2023-01-30 PROCEDURE — 25000003 PHARM REV CODE 250: Performed by: NURSE PRACTITIONER

## 2023-01-30 PROCEDURE — 25000003 PHARM REV CODE 250

## 2023-01-30 PROCEDURE — 20000000 HC ICU ROOM

## 2023-01-30 PROCEDURE — C9113 INJ PANTOPRAZOLE SODIUM, VIA: HCPCS | Performed by: NURSE PRACTITIONER

## 2023-01-30 PROCEDURE — 63600175 PHARM REV CODE 636 W HCPCS: Performed by: NURSE PRACTITIONER

## 2023-01-30 PROCEDURE — 25000003 PHARM REV CODE 250: Performed by: STUDENT IN AN ORGANIZED HEALTH CARE EDUCATION/TRAINING PROGRAM

## 2023-01-30 PROCEDURE — 77334 RADIATION TREATMENT AID(S): CPT | Performed by: RADIOLOGY

## 2023-01-30 PROCEDURE — 63600175 PHARM REV CODE 636 W HCPCS: Performed by: INTERNAL MEDICINE

## 2023-01-30 PROCEDURE — 25000003 PHARM REV CODE 250: Performed by: INTERNAL MEDICINE

## 2023-01-30 RX ORDER — ALPRAZOLAM 0.25 MG/1
0.25 TABLET ORAL 3 TIMES DAILY PRN
Status: DISCONTINUED | OUTPATIENT
Start: 2023-01-30 | End: 2023-02-01 | Stop reason: HOSPADM

## 2023-01-30 RX ORDER — PROMETHAZINE HYDROCHLORIDE 12.5 MG/1
12.5 TABLET ORAL EVERY 6 HOURS PRN
Status: DISCONTINUED | OUTPATIENT
Start: 2023-01-30 | End: 2023-02-01 | Stop reason: HOSPADM

## 2023-01-30 RX ORDER — HYDROMORPHONE HYDROCHLORIDE 2 MG/1
2 TABLET ORAL
Status: DISCONTINUED | OUTPATIENT
Start: 2023-01-30 | End: 2023-01-31

## 2023-01-30 RX ORDER — DULOXETIN HYDROCHLORIDE 30 MG/1
30 CAPSULE, DELAYED RELEASE ORAL NIGHTLY
Status: DISCONTINUED | OUTPATIENT
Start: 2023-01-30 | End: 2023-02-01 | Stop reason: HOSPADM

## 2023-01-30 RX ORDER — ONDANSETRON 4 MG/1
4 TABLET, ORALLY DISINTEGRATING ORAL EVERY 4 HOURS PRN
Status: DISCONTINUED | OUTPATIENT
Start: 2023-01-30 | End: 2023-02-01 | Stop reason: HOSPADM

## 2023-01-30 RX ADMIN — ONDANSETRON 4 MG: 4 TABLET, ORALLY DISINTEGRATING ORAL at 05:01

## 2023-01-30 RX ADMIN — HYDROMORPHONE HYDROCHLORIDE 0.5 MG: 2 INJECTION INTRAMUSCULAR; INTRAVENOUS; SUBCUTANEOUS at 02:01

## 2023-01-30 RX ADMIN — PROMETHAZINE HYDROCHLORIDE 12.5 MG: 12.5 TABLET ORAL at 12:01

## 2023-01-30 RX ADMIN — HYDROMORPHONE HYDROCHLORIDE 2 MG: 2 TABLET ORAL at 11:01

## 2023-01-30 RX ADMIN — HYDROMORPHONE HYDROCHLORIDE 0.5 MG: 2 INJECTION INTRAMUSCULAR; INTRAVENOUS; SUBCUTANEOUS at 06:01

## 2023-01-30 RX ADMIN — HYDROMORPHONE HYDROCHLORIDE 0.5 MG: 2 INJECTION INTRAMUSCULAR; INTRAVENOUS; SUBCUTANEOUS at 08:01

## 2023-01-30 RX ADMIN — ONDANSETRON 4 MG: 4 TABLET, ORALLY DISINTEGRATING ORAL at 02:01

## 2023-01-30 RX ADMIN — ENOXAPARIN SODIUM 40 MG: 40 INJECTION SUBCUTANEOUS at 08:01

## 2023-01-30 RX ADMIN — ONDANSETRON 4 MG: 4 TABLET, ORALLY DISINTEGRATING ORAL at 09:01

## 2023-01-30 RX ADMIN — LEVOTHYROXINE SODIUM 75 MCG: 25 TABLET ORAL at 08:01

## 2023-01-30 RX ADMIN — PANTOPRAZOLE SODIUM 40 MG: 40 INJECTION, POWDER, FOR SOLUTION INTRAVENOUS at 08:01

## 2023-01-30 RX ADMIN — LOSARTAN POTASSIUM 50 MG: 50 TABLET, FILM COATED ORAL at 08:01

## 2023-01-30 RX ADMIN — DEXAMETHASONE SODIUM PHOSPHATE 4 MG: 4 INJECTION, SOLUTION INTRA-ARTICULAR; INTRALESIONAL; INTRAMUSCULAR; INTRAVENOUS; SOFT TISSUE at 01:01

## 2023-01-30 RX ADMIN — HYDRALAZINE HYDROCHLORIDE 10 MG: 20 INJECTION INTRAMUSCULAR; INTRAVENOUS at 03:01

## 2023-01-30 RX ADMIN — CHOLECALCIFEROL TAB 25 MCG (1000 UNIT) 5000 UNITS: 25 TAB at 08:01

## 2023-01-30 RX ADMIN — DEXAMETHASONE SODIUM PHOSPHATE 4 MG: 4 INJECTION, SOLUTION INTRA-ARTICULAR; INTRALESIONAL; INTRAMUSCULAR; INTRAVENOUS; SOFT TISSUE at 09:01

## 2023-01-30 RX ADMIN — DEXAMETHASONE SODIUM PHOSPHATE 4 MG: 4 INJECTION, SOLUTION INTRA-ARTICULAR; INTRALESIONAL; INTRAMUSCULAR; INTRAVENOUS; SOFT TISSUE at 06:01

## 2023-01-30 RX ADMIN — HYDROCODONE BITARTRATE AND ACETAMINOPHEN 1 TABLET: 7.5; 325 TABLET ORAL at 06:01

## 2023-01-30 RX ADMIN — LABETALOL HYDROCHLORIDE 10 MG: 5 INJECTION, SOLUTION INTRAVENOUS at 07:01

## 2023-01-30 RX ADMIN — HYDRALAZINE HYDROCHLORIDE 10 MG: 20 INJECTION INTRAMUSCULAR; INTRAVENOUS at 09:01

## 2023-01-30 RX ADMIN — HYDROMORPHONE HYDROCHLORIDE 0.5 MG: 2 INJECTION INTRAMUSCULAR; INTRAVENOUS; SUBCUTANEOUS at 10:01

## 2023-01-30 RX ADMIN — ALPRAZOLAM 0.25 MG: 0.25 TABLET ORAL at 01:01

## 2023-01-30 RX ADMIN — ONDANSETRON 8 MG: 4 TABLET, ORALLY DISINTEGRATING ORAL at 10:01

## 2023-01-30 RX ADMIN — CALCIUM CARBONATE (ANTACID) CHEW TAB 500 MG 500 MG: 500 CHEW TAB at 08:01

## 2023-01-30 NOTE — PLAN OF CARE
DCP assessment completed as below. PCP Robert Holt. Patient is IADLs at baseline and uses no DME or home car services. Good support available at discharge. CM will follow for discharge needs as indicated.        01/30/23 1424   Discharge Assessment   Assessment Type Discharge Planning Assessment   Confirmed/corrected address, phone number and insurance Yes   Confirmed Demographics Correct on Facesheet   Source of Information patient   Communicated VIDHI with patient/caregiver Date not available/Unable to determine   People in Home spouse   Do you expect to return to your current living situation? Yes   Do you have help at home or someone to help you manage your care at home? Yes   Who are your caregiver(s) and their phone number(s)? spouse Dion; [phone]   Prior to hospitilization cognitive status: Alert/Oriented   Current cognitive status: Alert/Oriented   Home Layout Able to live on 1st floor   Equipment Currently Used at Home none   Readmission within 30 days? No   Patient currently being followed by outpatient case management? No   Do you currently have service(s) that help you manage your care at home? No   Do you take prescription medications? Yes   Do you have prescription coverage? Yes   Coverage [employer] of [place]   Who is going to help you get home at discharge? spouse   How do you get to doctors appointments? family or friend will provide   Discharge Plan A Home with family   Discharge Plan B Home Health   Discharge Plan discussed with: Patient   Discharge Barriers Identified None     Amaury Kaye, ERICA Case Manager

## 2023-01-30 NOTE — NURSING
Nurses Note -- 4 Eyes      1/30/2023   3:50 PM      Skin assessed during: Daily Assessment      [x] No Pressure Injuries Present    [x]Prevention Measures Documented      [] Yes- Altered Skin Integrity Present or Discovered   [] LDA Added if Not in Epic (Describe Wound)   [] New Altered Skin Integrity was Present on Admit and Documented in LDA   [] Wound Image Taken    Wound Care Consulted? No    Attending Nurse:  Lori Lyle RN     Second RN/Staff Member:  Rodrigo Cabello RN

## 2023-01-30 NOTE — PROGRESS NOTES
Hematology/Oncology  Progress Note    Patient Name: Shreya Reyes  MRN: [id number]  Admission Date: 1/29/2023  Hospital Length of Stay: 1 days  Attending Provider: Abraham Wu MD  Consulting Provider: Robert Arzate MD  Principal Problem:Intractable pain      Subjective:     HPI: 64 yo white female with h/o metastatic adrenocortical carcinoma directly admitted for intractable pain. Patient has known metastatic disease in her bones. She is currently being treated with Mitotane with dose monitoring and adjustment by [facility]. Dose was recently decreased to 500 mg BID. Last PET done 11/22 showed disease improvement.     Treatment History  Adjuvant XRT (completed 9/30/21)  Mitotane 1/25/22-7/26/22, Resumed 8/22  XRT right scapula, L5,  R ischium, L femoral neck completed 8/26/22    She complained of increasing pain to right hip, radiating to knee, and feeling like at times that her leg was going to give out.  CTs of the pelvis and right lower extremity 1/20/23 showed stable osseous metastatic disease involving L5 measuring 2.8 cm and left femoral neck measuring 2.7 cm and a right ischium ill-defined lesion.  Lesions all showed increased sclerosis consistent with prior radiation therapy completed 8/26/22.  Her symptoms were unrelieved by hydrocodone and she was admitted to the hospital 1/29/23 secondary to intractable pain.    MRI of the lumbar spine and right hip on admission showed an enhancing metastatic lesion involving L3 with ventral and paraspinal extension causing moderate-to-severe narrowing of the spinal canal.  Radiation Oncology was consulted and planning for stereotactic treatment to the symptomatic L3 lesion.  She was also evaluated by Orthopedics who recommended consideration of prophylactic nail the left femoral neck lesion.  She is not having any significant left hip or leg pain at this time.    Today (1/30/23):  Patient is somnolent but arousable, pain controlled on IV Dilaudid.  Her   is at the bedside.  She is having intermittent nausea secondary to the pain medication.  Appetite remains poor, she has lost weight.    Medications:  Continuous Infusions:    Scheduled Meds:   calcium carbonate  500 mg Oral BID    dexAMETHasone  4 mg Intravenous Q8H    DULoxetine  30 mg Oral Daily    enoxaparin  40 mg Subcutaneous Daily    levothyroxine  75 mcg Oral Daily    losartan  50 mg Oral Daily    pantoprazole  40 mg Intravenous Daily    vitamin D  5,000 Units Oral Daily     PRN Meds:hydrALAZINE, HYDROcodone-acetaminophen, HYDROmorphone, HYDROmorphone, ibuprofen, labetalol, ondansetron, sodium chloride 0.9%     Review of patient's allergies indicates:  No Known Allergies       Review of Systems   Constitutional:  Positive for activity change, appetite change and fatigue. Negative for fever.   HENT:  Negative for hearing loss, mouth sores and sore throat.    Eyes: Negative.    Respiratory:  Negative for cough and shortness of breath.    Cardiovascular:  Negative for chest pain and leg swelling.   Gastrointestinal:  Positive for nausea. Negative for abdominal pain, constipation and diarrhea.   Genitourinary:  Negative for dysuria, frequency and urgency.   Musculoskeletal:  Positive for back pain.        Severe right hip pain   Skin:  Negative for rash.   Neurological:  Negative for dizziness, weakness, numbness and headaches.   Hematological:  Negative for adenopathy. Does not bruise/bleed easily.   Psychiatric/Behavioral: Negative.         Objective:     Vital Signs (Most Recent):  Temp: 98.1 °F (36.7 °C) (01/30/23 0400)  Pulse: 68 (01/30/23 0600)  Resp: (!) 23 (01/30/23 0621)  BP: 133/71 (01/30/23 0600)  SpO2: 97 % (01/30/23 0600)   Vital Signs (24h Range):  Temp:  [97.8 °F (36.6 °C)-98.1 °F (36.7 °C)] 98.1 °F (36.7 °C)  Pulse:  [67-84] 68  Resp:  [12-23] 23  SpO2:  [94 %-98 %] 97 %  BP: (121-182)/() 133/71     Weight: 55.8 kg (123 lb)      Physical Exam  Constitutional:       Comments: Pale,  ill-appearing white female in NAD   HENT:      Head: Normocephalic.      Mouth/Throat:      Mouth: Mucous membranes are moist.      Pharynx: No posterior oropharyngeal erythema.   Eyes:      Extraocular Movements: Extraocular movements intact.      Conjunctiva/sclera: Conjunctivae normal.      Pupils: Pupils are equal, round, and reactive to light.   Cardiovascular:      Rate and Rhythm: Normal rate and regular rhythm.      Heart sounds: No murmur heard.  Pulmonary:      Comments: Lungs clear anteriorly  Abdominal:      General: There is no distension.      Palpations: Abdomen is soft.      Tenderness: There is no abdominal tenderness.      Comments: Bowel sounds decreased   Musculoskeletal:      Right lower leg: No edema.      Left lower leg: No edema.   Skin:     General: Skin is warm and dry.      Findings: No rash.   Neurological:      General: No focal deficit present.      Mental Status: She is alert and oriented to person, place, and time.      Motor: No weakness.       Significant Labs:    Latest Reference Range & Units 01/29/23 14:39   WBC 4.5 - 11.5 x10(3)/mcL 4.7   RBC 4.20 - 5.40 x10(6)/mcL 3.47 (L)   Hemoglobin 12.0 - 16.0 gm/dL 11.2 (L)   Hematocrit 37.0 - 47.0 % 33.4 (L)   MCV 80.0 - 94.0 fL 96.3 (H)   MCH pg 32.3   MCHC 33.0 - 36.0 mg/dL 33.5   RDW 11.5 - 17.0 % 13.2   Platelets 130 - 400 x10(3)/mcL 240   MPV 7.4 - 10.4 fL 11.4 (H)   Neut % % 56.7   LYMPH % % 22.9   Mono % % 14.0   Eosinophil % % 4.9       Diagnostic Results:  MRI films reviewed.    Impression/Plan:   IMPRESSION  Metastatic adrenocortical carcinoma  L3 metastasis with impending cord compression and intractable pain  Malnutrition and hypoalbuminemia  Mild anemia    PLAN:  Patient to go to Radiation Oncology today for simulation for stereotactic treatment of the L3 metastasis.  Trial of oral Dilaudid to determine if it will provide adequate pain control.  Continue Decadron 4 mg b.i.d.  Hold mitotane for now.      MARY NORMAN,  MD  Hematology/Oncology

## 2023-01-30 NOTE — PROGRESS NOTES
[facility] - [facility] ICU  Pulmonary Critical Care Note    Patient Name: Shreya Reyes  MRN: [id number]  Admission Date: 1/29/2023  Hospital Length of Stay: 1 days  Code Status: Full Code  Attending Provider: Abraham Wu MD  Primary Care Provider: Robert Holt MD     Subjective:     HPI:   This is a 63-year-old  female who presented to the emergency room today with complaints of a retractable pain to the right hip and knee. She underwent a recent CT of RLE and pelvis w/ and w/o contrast on 1/20/23 that showed stable osseous metastatic disease in L5 measuring 2.8cm, left femoral neck measuring 2.7cm, with known right ischium lesion ill-defined on CT. She was admitted to ICU and was sent for an MRI lumber spine and hip.     Patient has complicated medical history dating back to March of 2021 after she presented to the emergency room with complaints of acute left flank plain imaging revealed mass in the left adrenal gland with mild displacement of left renal vein hormonal workup was negative for pheochromocytoma and she was felt to have an acute adrenal hemorrhage with close observation recommended.  Repeat imaging June of 2021 showed increased size of the adrenal mass.  She then underwent laparoscopic/robotic left adrenalectomy on June 11, 2021.  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      She had a Telemedicine consultation with Dr. Dion Lynch at [facility] 8/3/21 who specializes in treatment of adrenocortical carcinoma. Her case was reviewed and discussed in their tumor board. Recommendations were for treatment with adjuvant radiation therapy and systemic treatment with Mitotane. Baseline postoperative CT scans of the chest, abdomen and pelvis 8/4/21 showed postoperative changes with no evidence of measurable metastatic  disease.     She was seen as a new patient with Dr. Arzate at [facility] on 8/9/21.  Treatment recommendations from the [facility] were reviewed and discussed.  Treatment was started with Mitotane 1000 mg BID on 8/16/2021.  After she was nursing home through with her adjunctive radiation treatments the patient did have a bump in her LFTs. Mitotane was held and adjunctive radiation therapy was continued; she completed adjunctive radiation therapy on 09/30/2021.  She continued to have issues with elevated LFTs, however this eventually normalized and was able to be resumed on 1/25/22.     She then had an injury at home and was found to have a compression deformity at L2 of questionable age.  MRI of the lumbar spine 3/23/22 showed a subacute severe compression fracture deformity of the L1 vertebral body and mild superior endplate compression fracture of L3 vertebral body with osteoporotic appearance and no findings suspicious for pathologic fracture.  However, there were scattered small osseous lesions in the right L3 vertebral body and L5 vertebral body concerning for metastatic disease.  CT PET scan 3/28/22 showed mildly hypermetabolic osseous lesions in the lumbar spine, pelvis and proximal left femur consistent with metastatic disease. She was started on Prolia 3/31/22 and underwent L1 and L3 kyphoplasty 4//8/22.  Biopsies of the L3 vertebral body showed no evidence of metastatic carcinoma. She continued to have issues,  films showed a possible compression fracture at T11.  MRI of the thoracic spine 4/15/22 showed a compression fracture of T11 with 50% loss of vertebral body height.  She underwent kyphoplasty 4/21/22 with symptomatic improvement. MRI of the cervical and lumbar spine 7/5/22 showed moderate disc disease at C5-6 and C6-7 without significant spinal canal stenosis or foraminal stenosis.  She developed progressive symptoms of right arm pain, numbness and tingling and updated MRI  7/13/22 showed foraminal stenosis secondary to disc osteophyte on the right side at C5-6 and C6-7.  She underwent a right foraminotomy with relief of her symptoms.     She had a teleconference with [facility] 8/2/22 and a follow-up PET scan was recommended.  Mitotane was discontinued 7/26/22. CT-PET 08/05/22 hypermetabolic osseous metastatic disease involving L5, right ischium, left femoral neck and a new lesion in the inferior right scapula.  There was a 12 mm area of hypermetabolic activity adjacent to the right adrenal gland without a definite CT correlate.     She completed palliative radiation therapy to the right scapula, L5, R ischium and left femoral neck on 8/26/22.  She resumed Mitotane at 1000 mg BID on 8/29/22.      CT-PET scan 11/21/22 showed improved FDG uptake in all of the treated sites.  Right scapula SUV decreased from 4.2 to 2.1, left femoral neck 9.1 to 5, right ischium 13 to 2.4 and L5 8.2 to 4.6.  Hypermetabolic activity at the site of the previous compression fractures had also improved.  There were no findings suspicious for new sites of disease or visceral metastatic disease.    Per oncology's latest note recs from [facility] were for her to continue mitotane with repeat PET scan in February of 2023.     Hospital Course/Significant events:  As above     24 Hour Interval History:  Slated for stereotactic treatment of L3 today. Ongoing pain, attempting to transition to oral Dilaudid.    Past Medical History:   Diagnosis Date    Adrenal cancer     Closed compression fracture of L3 lumbar vertebra, sequela 5/4/2022    Closed wedge compression fracture of T11 vertebra 5/4/2022    Compression fracture of L1 vertebra     Compression fracture of L3 vertebra     Elevated liver enzymes 11/02/2021    Hyperlipidemia     Hypertension 05/04/2022    Osteoporosis     Thoracic compression fracture        Past Surgical History:   Procedure Laterality Date    ABDOMINOPLASTY       ADRENALECTOMY Left     L1 kyphoplasty w/ spine barbara, L3 kyphoplasty      Laproscopy for excision of adrenal mass      left cataract Left     MAGNETIC RESONANCE IMAGING N/A 2022    Procedure: MRI (MAGNETIC RESONANCE IMAGING);  Surgeon: Nam Sandoval MD;  [facility]: [facility];  Service: Neurosurgery;  Laterality: N/A;  MRI CERVICAL SPINE WITHOUT CONTRAST WITH ANESTHESIA    MINIMALLY INVASIVE FORAMINOTOMY CERVICAL SPINE Right 2022    Procedure: FORAMINOTOMY, SPINE, CERVICAL, MINIMALLY INVASIVE;  Surgeon: Nam Sandoval MD;  Location: [facility] OR;  Service: Neurosurgery;  Laterality: Right;  right C5-6, C6-7 posterior foraminotomy    ORIF TIBIA & FIBULA FRACTURES Left     TONSILLECTOMY         Social History     Socioeconomic History    Marital status:    Occupational History    Occupation: RN on leave   Tobacco Use    Smoking status: Former     Packs/day: 1.00     Types: Cigarettes     Quit date: 2009     Years since quittin.0    Smokeless tobacco: Never   Substance and Sexual Activity    Alcohol use: Yes     Comment: social    Drug use: Never         Current Outpatient Medications   Medication Instructions    ALPRAZolam (XANAX) 0.25 mg, Oral, Every 8 hours PRN    calcium carbonate (OS-ORAL) 600 mg, Oral, 2 times daily    cholecalciferol (vitamin D3) 250 mcg, Oral    DULoxetine (CYMBALTA) 30 mg, Oral, Daily    HYDROcodone-acetaminophen (NORCO) 7.5-325 mg per tablet 1 tablet, Oral, Every 6 hours PRN    hydrocortisone (CORTEF) 10 mg, Oral, 2 times daily    HYDROmorphone (DILAUDID) 4 mg, Oral, Every 4 hours PRN    ibuprofen (ADVIL,MOTRIN) 800 mg, Oral, 3 times daily PRN    levothyroxine (SYNTHROID) 75 MCG tablet 1 tablet, Oral, Daily    losartan (COZAAR) 50 MG tablet 1 tablet, Oral, Daily    mitotane (LYSODREN) 1,000 mg, Oral, Daily       Review of Systems   Constitutional:  Positive for weight loss.   HENT: Negative.     Gastrointestinal: Negative.    Genitourinary: Negative.    Musculoskeletal:   Positive for joint pain.   Skin: Negative.    Neurological: Negative.         Objective:     No intake or output data in the 24 hours ending 01/30/23 0822      Vital Signs (Most Recent):  Temp: 98.1 °F (36.7 °C) (01/30/23 0400)  Pulse: 68 (01/30/23 0600)  Resp: 18 (01/30/23 0815)  BP: (!) 184/110 (01/30/23 0815)  SpO2: 97 % (01/30/23 0600)    Body mass index is 20.47 kg/m².  Weight: 55.8 kg (123 lb) Vital Signs (24h Range):  Temp:  [97.8 °F (36.6 °C)-98.1 °F (36.7 °C)] 98.1 °F (36.7 °C)  Pulse:  [67-84] 68  Resp:  [12-23] 18  SpO2:  [94 %-98 %] 97 %  BP: (121-184)/() 184/110     Physical Exam  Constitutional:       Appearance: She is ill-appearing.   HENT:      Head: Normocephalic and atraumatic.   Cardiovascular:      Rate and Rhythm: Normal rate and regular rhythm.      Pulses: Normal pulses.      Heart sounds: Normal heart sounds.   Pulmonary:      Breath sounds: Normal breath sounds.   Abdominal:      General: Bowel sounds are normal.      Palpations: Abdomen is soft.   Musculoskeletal:      Right lower leg: No edema.      Left lower leg: No edema.   Neurological:      General: No focal deficit present.      Mental Status: She is alert and oriented to person, place, and time.         Lines/Drains/Airways       Peripheral Intravenous Line  Duration                  Peripheral IV - Single Lumen 22 G Right Antecubital -- days         Peripheral IV - Single Lumen 01/29/23 1213 20 G Left;Posterior Hand <1 day                    Significant Labs:    Lab Results   Component Value Date    WBC 4.7 01/29/2023    HGB 11.2 (L) 01/29/2023    HCT 33.4 (L) 01/29/2023    MCV 96.3 (H) 01/29/2023     01/29/2023         BMP  Lab Results   Component Value Date     01/29/2023    K 3.9 01/29/2023    CHLORIDE 102 01/29/2023    CO2 25 01/29/2023    BUN 18.0 01/29/2023    CREATININE 0.59 01/29/2023    CALCIUM 8.8 01/29/2023    EGFRNONAA >60 08/01/2022       ABG  No results for input(s): PH, PO2, PCO2, HCO3, BE in the  last 168 hours.    Significant Imaging:  MRI of Hip and spine pending.       Assessment/Plan:     Assessment  Intractable pain   Metastatic adrenocortical carcinoma   On Mitotane  S/p adjunctive radiation therapy completed on 09/30/2021  Completed palliative radiation therapy to the right scapula, L5, R ischium and left femoral neck on 8/26/22  Multiple osteoporotic vertebral compression fractures  S/p L1 and L3 kyphoplasty 4//8/22  compression fracture of T11 with 50% loss of vertebral body height underwent kyphoplasty 4/21/22  foraminal stenosis secondary to disc osteophyte on the right side at C5-6 and C6-7.  She underwent a right foraminotomy     Plan  Slated for focal radiotherapy with stereotactic body radiotherapy to the L3 vertebral body  Ortho recommends prophylactic stabilization of femur outpatient  Continue pain control with Dilaudid         SHILPI Garcia  Pulmonary Critical Care Medicine  [facility] - [facility] ICU

## 2023-01-30 NOTE — CONSULTS
HPI:   Ms. Reyes is a 63-year-old female with metastatic adrenal cortical carcinoma.  She is a patient of mine, having completed two separate courses of radiotherapy.  She initially was treated with a left adrenalectomy on 6/11/2021, which revealed a low-grade adrenal cortical carcinoma measuring 9.3 cm.  She then underwent adjuvant radiotherapy which was completed on 10/1/2021 directed at the left adrenal tumor bed and left para-aortic region.   She then developed multiple compression fractures in underwent kyphoplasty in April of 2022.  A biopsy was performed L3 which revealed no evidence of metastatic disease at that time   She eventually demonstrated progression at four separate sites on PET/CT and was treated palliatively to the lower lumbar spine from L4-S1, the left femoral neck, the right ischium, and the right scapula, with treatment completed in August of 2022, receiving 30 Gy in 10 fractions   I saw her most recently in follow-up on 11/29/2022, and PET/CT imaging performed beforehand demonstrated a decrease in hypermetabolism at all sites with no progression elsewhere.  She did continue to have persistent hypermetabolism at the kyphoplasty sites.  At that time, she was having bilateral thigh pain.  Apparently, the left lower extremity pain eventually resolved but she has been having progressively worsening right hip and thigh pain.  This became intractable and she was admitted today for pain control and to expedite the workup.   MRI of the lumbar spine performed 1/29/2023 demonstrated new ventral epidural enhancement at L3 measuring up to 9 mm in thickness with moderate to severe narrowing of the spinal canal.  There was diffuse infiltration of the L3 vertebral body which was new as well compared to the previous MRI scan from July.  There was about 5 mm of paraspinal enhancement noted as well at the level of L3.    Impression/Plan:   63-year-old female with a history of radiotherapy to L4-S1 completed  about five months ago, now with evidence of progression at L3 with severe right hip and lower extremity pain.  I have discussed the case with Dr. Sandoval, who feels that surgical intervention would not be likely to provide a significant benefit at this time.  I do believe that focal radiotherapy with stereotactic body radiotherapy to the L3 vertebral body can help with her symptoms and provide durable control via an ablative dose.  I will likely utilize three fractions given the proximity to the previously treated lower lumbar spine.  I will have her come down tomorrow morning for a CT simulation and we will then begin the planning process.  Hopefully we will be able to begin her three fraction regimen, administered every other day by mid week.  I would like for her to continue on steroids for now and I will taper her off of these after she completes stereotactic body radiotherapy.  Orthopedic surgery has also suggested that she undergo placement of an intramedullary nail within the left femur.  After she completes stereotactic body radiotherapy she should be able to undergo that procedure.

## 2023-01-31 DIAGNOSIS — C79.51 SECONDARY MALIGNANT NEOPLASM OF BONE: ICD-10-CM

## 2023-01-31 DIAGNOSIS — C74.00 MALIGNANT NEOPLASM OF ADRENAL CORTEX, UNSPECIFIED LATERALITY: Primary | ICD-10-CM

## 2023-01-31 PROCEDURE — 63600175 PHARM REV CODE 636 W HCPCS: Performed by: INTERNAL MEDICINE

## 2023-01-31 PROCEDURE — 63600175 PHARM REV CODE 636 W HCPCS: Performed by: NURSE PRACTITIONER

## 2023-01-31 PROCEDURE — 20000000 HC ICU ROOM

## 2023-01-31 PROCEDURE — C9113 INJ PANTOPRAZOLE SODIUM, VIA: HCPCS | Performed by: NURSE PRACTITIONER

## 2023-01-31 PROCEDURE — 25000003 PHARM REV CODE 250: Performed by: INTERNAL MEDICINE

## 2023-01-31 PROCEDURE — 25000003 PHARM REV CODE 250

## 2023-01-31 PROCEDURE — 25000003 PHARM REV CODE 250: Performed by: NURSE PRACTITIONER

## 2023-01-31 RX ORDER — SODIUM CHLORIDE 9 MG/ML
INJECTION, SOLUTION INTRAVENOUS ONCE
Status: COMPLETED | OUTPATIENT
Start: 2023-01-31 | End: 2023-01-31

## 2023-01-31 RX ORDER — POLYETHYLENE GLYCOL 3350 17 G/17G
17 POWDER, FOR SOLUTION ORAL DAILY
Status: DISCONTINUED | OUTPATIENT
Start: 2023-01-31 | End: 2023-02-01 | Stop reason: HOSPADM

## 2023-01-31 RX ORDER — OXYCODONE HYDROCHLORIDE 5 MG/1
10 TABLET ORAL EVERY 4 HOURS PRN
Status: DISCONTINUED | OUTPATIENT
Start: 2023-01-31 | End: 2023-02-01 | Stop reason: HOSPADM

## 2023-01-31 RX ORDER — DEXAMETHASONE 4 MG/1
4 TABLET ORAL EVERY 6 HOURS
Qty: 100 TABLET | Refills: 0 | Status: SHIPPED | OUTPATIENT
Start: 2023-01-31 | End: 2023-03-06 | Stop reason: SDUPTHER

## 2023-01-31 RX ORDER — OXYCODONE HYDROCHLORIDE 10 MG/1
10 TABLET ORAL EVERY 4 HOURS PRN
Qty: 100 TABLET | Refills: 0 | Status: ON HOLD | OUTPATIENT
Start: 2023-01-31 | End: 2023-02-09 | Stop reason: SDUPTHER

## 2023-01-31 RX ADMIN — OXYCODONE 10 MG: 5 TABLET ORAL at 10:01

## 2023-01-31 RX ADMIN — LEVOTHYROXINE SODIUM 75 MCG: 25 TABLET ORAL at 08:01

## 2023-01-31 RX ADMIN — ONDANSETRON 4 MG: 4 TABLET, ORALLY DISINTEGRATING ORAL at 04:01

## 2023-01-31 RX ADMIN — SODIUM CHLORIDE: 9 INJECTION, SOLUTION INTRAVENOUS at 06:01

## 2023-01-31 RX ADMIN — OXYCODONE 10 MG: 5 TABLET ORAL at 05:01

## 2023-01-31 RX ADMIN — CALCIUM CARBONATE (ANTACID) CHEW TAB 500 MG 500 MG: 500 CHEW TAB at 08:01

## 2023-01-31 RX ADMIN — LOSARTAN POTASSIUM 50 MG: 50 TABLET, FILM COATED ORAL at 08:01

## 2023-01-31 RX ADMIN — HYDROMORPHONE HYDROCHLORIDE 0.5 MG: 2 INJECTION INTRAMUSCULAR; INTRAVENOUS; SUBCUTANEOUS at 05:01

## 2023-01-31 RX ADMIN — ONDANSETRON 4 MG: 4 TABLET, ORALLY DISINTEGRATING ORAL at 05:01

## 2023-01-31 RX ADMIN — ONDANSETRON 4 MG: 4 TABLET, ORALLY DISINTEGRATING ORAL at 09:01

## 2023-01-31 RX ADMIN — ONDANSETRON 4 MG: 4 TABLET, ORALLY DISINTEGRATING ORAL at 01:01

## 2023-01-31 RX ADMIN — CHOLECALCIFEROL TAB 25 MCG (1000 UNIT) 5000 UNITS: 25 TAB at 08:01

## 2023-01-31 RX ADMIN — POLYETHYLENE GLYCOL 3350 17 G: 17 POWDER, FOR SOLUTION ORAL at 11:01

## 2023-01-31 RX ADMIN — OXYCODONE 10 MG: 5 TABLET ORAL at 01:01

## 2023-01-31 RX ADMIN — DEXAMETHASONE SODIUM PHOSPHATE 4 MG: 4 INJECTION, SOLUTION INTRA-ARTICULAR; INTRALESIONAL; INTRAMUSCULAR; INTRAVENOUS; SOFT TISSUE at 05:01

## 2023-01-31 RX ADMIN — ENOXAPARIN SODIUM 40 MG: 40 INJECTION SUBCUTANEOUS at 08:01

## 2023-01-31 RX ADMIN — OXYCODONE 10 MG: 5 TABLET ORAL at 09:01

## 2023-01-31 RX ADMIN — PANTOPRAZOLE SODIUM 40 MG: 40 INJECTION, POWDER, FOR SOLUTION INTRAVENOUS at 08:01

## 2023-01-31 RX ADMIN — DEXAMETHASONE SODIUM PHOSPHATE 4 MG: 4 INJECTION, SOLUTION INTRA-ARTICULAR; INTRALESIONAL; INTRAMUSCULAR; INTRAVENOUS; SOFT TISSUE at 02:01

## 2023-01-31 RX ADMIN — DEXAMETHASONE SODIUM PHOSPHATE 4 MG: 4 INJECTION, SOLUTION INTRA-ARTICULAR; INTRALESIONAL; INTRAMUSCULAR; INTRAVENOUS; SOFT TISSUE at 09:01

## 2023-01-31 RX ADMIN — METHYLNALTREXONE BROMIDE 12 MG: 12 INJECTION, SOLUTION SUBCUTANEOUS at 09:01

## 2023-01-31 NOTE — PROGRESS NOTES
[facility] - [facility] ICU  Pulmonary Critical Care Note    Patient Name: Shreya Reyes  MRN: [id number]  Admission Date: 1/29/2023  Hospital Length of Stay: 2 days  Code Status: Full Code  Attending Provider: Abraham Wu MD  Primary Care Provider: Robert Holt MD     Subjective:     HPI:   This is a 63-year-old  female who presented to the emergency room today with complaints of a retractable pain to the right hip and knee. She underwent a recent CT of RLE and pelvis w/ and w/o contrast on 1/20/23 that showed stable osseous metastatic disease in L5 measuring 2.8cm, left femoral neck measuring 2.7cm, with known right ischium lesion ill-defined on CT. She was admitted to ICU and was sent for an MRI lumber spine and hip.     Patient has complicated medical history dating back to March of 2021 after she presented to the emergency room with complaints of acute left flank plain imaging revealed mass in the left adrenal gland with mild displacement of left renal vein hormonal workup was negative for pheochromocytoma and she was felt to have an acute adrenal hemorrhage with close observation recommended.  Repeat imaging June of 2021 showed increased size of the adrenal mass.  She then underwent laparoscopic/robotic left adrenalectomy on June 11, 2021.  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      She had a Telemedicine consultation with Dr. Dion Lynch at [facility] 8/3/21 who specializes in treatment of adrenocortical carcinoma. Her case was reviewed and discussed in their tumor board. Recommendations were for treatment with adjuvant radiation therapy and systemic treatment with Mitotane. Baseline postoperative CT scans of the chest, abdomen and pelvis 8/4/21 showed postoperative changes with no evidence of measurable metastatic  disease.     She was seen as a new patient with Dr. Arzate at [facility] on 8/9/21.  Treatment recommendations from the [facility] were reviewed and discussed.  Treatment was started with Mitotane 1000 mg BID on 8/16/2021.  After she was intermediate through with her adjunctive radiation treatments the patient did have a bump in her LFTs. Mitotane was held and adjunctive radiation therapy was continued; she completed adjunctive radiation therapy on 09/30/2021.  She continued to have issues with elevated LFTs, however this eventually normalized and was able to be resumed on 1/25/22.     She then had an injury at home and was found to have a compression deformity at L2 of questionable age.  MRI of the lumbar spine 3/23/22 showed a subacute severe compression fracture deformity of the L1 vertebral body and mild superior endplate compression fracture of L3 vertebral body with osteoporotic appearance and no findings suspicious for pathologic fracture.  However, there were scattered small osseous lesions in the right L3 vertebral body and L5 vertebral body concerning for metastatic disease.  CT PET scan 3/28/22 showed mildly hypermetabolic osseous lesions in the lumbar spine, pelvis and proximal left femur consistent with metastatic disease. She was started on Prolia 3/31/22 and underwent L1 and L3 kyphoplasty 4//8/22.  Biopsies of the L3 vertebral body showed no evidence of metastatic carcinoma. She continued to have issues,  films showed a possible compression fracture at T11.  MRI of the thoracic spine 4/15/22 showed a compression fracture of T11 with 50% loss of vertebral body height.  She underwent kyphoplasty 4/21/22 with symptomatic improvement. MRI of the cervical and lumbar spine 7/5/22 showed moderate disc disease at C5-6 and C6-7 without significant spinal canal stenosis or foraminal stenosis.  She developed progressive symptoms of right arm pain, numbness and tingling and updated MRI  7/13/22 showed foraminal stenosis secondary to disc osteophyte on the right side at C5-6 and C6-7.  She underwent a right foraminotomy with relief of her symptoms.     She had a teleconference with [facility] 8/2/22 and a follow-up PET scan was recommended.  Mitotane was discontinued 7/26/22. CT-PET 08/05/22 hypermetabolic osseous metastatic disease involving L5, right ischium, left femoral neck and a new lesion in the inferior right scapula.  There was a 12 mm area of hypermetabolic activity adjacent to the right adrenal gland without a definite CT correlate.     She completed palliative radiation therapy to the right scapula, L5, R ischium and left femoral neck on 8/26/22.  She resumed Mitotane at 1000 mg BID on 8/29/22.      CT-PET scan 11/21/22 showed improved FDG uptake in all of the treated sites.  Right scapula SUV decreased from 4.2 to 2.1, left femoral neck 9.1 to 5, right ischium 13 to 2.4 and L5 8.2 to 4.6.  Hypermetabolic activity at the site of the previous compression fractures had also improved.  There were no findings suspicious for new sites of disease or visceral metastatic disease.    Per oncology's latest note recs from [facility] were for her to continue mitotane with repeat PET scan in February of 2023.     Hospital Course/Significant events:  Ortho and radiation oncology also following- found to have metastatic nodule in the posterior column of her right acetabulum and the nonweightbearing portion also with another medic solid nodule in the left femoral neck.  Per ortho left femoral neck lesion is concerning given its size and location he is recommended prophylactic stabilization as an outpatient in the near future.  Radiation Oncology was consulted and recommend stereotactic radiation of L3 lesion underwent mapping on Monday.     24 Hour Interval History:  Continues with Ongoing pain, patient had significant nausea with oral Dilaudid on Monday; Oncology has added PO  oxycodone with IV Dilaudid as breakthrough. Nausea and pain seem better this AM    Past Medical History:   Diagnosis Date    Adrenal cancer     Closed compression fracture of L3 lumbar vertebra, sequela 2022    Closed wedge compression fracture of T11 vertebra 2022    Compression fracture of L1 vertebra     Compression fracture of L3 vertebra     Elevated liver enzymes 2021    Hyperlipidemia     Hypertension 2022    Osteoporosis     Thoracic compression fracture        Past Surgical History:   Procedure Laterality Date    ABDOMINOPLASTY      ADRENALECTOMY Left     L1 kyphoplasty w/ spine barbara, L3 kyphoplasty      Laproscopy for excision of adrenal mass      left cataract Left     MAGNETIC RESONANCE IMAGING N/A 2022    Procedure: MRI (MAGNETIC RESONANCE IMAGING);  Surgeon: Nam Sandoval MD;  Location: [facility] OR;  Service: Neurosurgery;  Laterality: N/A;  MRI CERVICAL SPINE WITHOUT CONTRAST WITH ANESTHESIA    MINIMALLY INVASIVE FORAMINOTOMY CERVICAL SPINE Right 2022    Procedure: FORAMINOTOMY, SPINE, CERVICAL, MINIMALLY INVASIVE;  Surgeon: Nam Sandoval MD;  Location: [facility] OR;  Service: Neurosurgery;  Laterality: Right;  right C5-6, C6-7 posterior foraminotomy    ORIF TIBIA & FIBULA FRACTURES Left     TONSILLECTOMY         Social History     Socioeconomic History    Marital status:    Occupational History    Occupation: RN on leave   Tobacco Use    Smoking status: Former     Packs/day: 1.00     Types: Cigarettes     Quit date: 2009     Years since quittin.0    Smokeless tobacco: Never   Substance and Sexual Activity    Alcohol use: Yes     Comment: social    Drug use: Never         Current Outpatient Medications   Medication Instructions    ALPRAZolam (XANAX) 0.25 mg, Oral, Every 8 hours PRN    calcium carbonate (OS-ORAL) 600 mg, Oral, 2 times daily    cholecalciferol (vitamin D3) 250 mcg, Oral    DULoxetine (CYMBALTA) 30 mg, Oral, Daily    HYDROcodone-acetaminophen (NORCO)  7.5-325 mg per tablet 1 tablet, Oral, Every 6 hours PRN    hydrocortisone (CORTEF) 10 mg, Oral, 2 times daily    HYDROmorphone (DILAUDID) 4 mg, Oral, Every 4 hours PRN    ibuprofen (ADVIL,MOTRIN) 800 mg, Oral, 3 times daily PRN    levothyroxine (SYNTHROID) 75 MCG tablet 1 tablet, Oral, Daily    losartan (COZAAR) 50 MG tablet 1 tablet, Oral, Daily    mitotane (LYSODREN) 1,000 mg, Oral, Daily       Review of Systems   Constitutional:  Positive for weight loss.   HENT: Negative.     Gastrointestinal: Negative.    Genitourinary: Negative.    Musculoskeletal:  Positive for joint pain.   Skin: Negative.    Neurological: Negative.         Objective:       Intake/Output Summary (Last 24 hours) at 1/31/2023 0937  Last data filed at 1/30/2023 1400  Gross per 24 hour   Intake 120 ml   Output --   Net 120 ml         Vital Signs (Most Recent):  Temp: 98.2 °F (36.8 °C) (01/31/23 0400)  Pulse: 84 (01/31/23 0400)  Resp: 20 (01/31/23 0508)  BP: 118/64 (01/31/23 0000)  SpO2: 95 % (01/31/23 0400)    Body mass index is 20.47 kg/m².  Weight: 55.8 kg (123 lb) Vital Signs (24h Range):  Temp:  [98.1 °F (36.7 °C)-98.3 °F (36.8 °C)] 98.2 °F (36.8 °C)  Pulse:  [76-84] 84  Resp:  [13-22] 20  SpO2:  [95 %-97 %] 95 %  BP: (118-186)/(64-96) 118/64     Physical Exam  Constitutional:       Appearance: She is ill-appearing.   HENT:      Head: Normocephalic and atraumatic.   Cardiovascular:      Rate and Rhythm: Normal rate and regular rhythm.      Pulses: Normal pulses.      Heart sounds: Normal heart sounds.   Pulmonary:      Breath sounds: Normal breath sounds.   Abdominal:      General: Bowel sounds are normal.      Palpations: Abdomen is soft.   Musculoskeletal:      Right lower leg: No edema.      Left lower leg: No edema.   Neurological:      General: No focal deficit present.      Mental Status: She is alert and oriented to person, place, and time.       Lines/Drains/Airways       Peripheral Intravenous Line  Duration                   Peripheral IV - Single Lumen 22 G Right Antecubital -- days         Peripheral IV - Single Lumen 01/29/23 1213 20 G Left;Posterior Hand 1 day                    Significant Labs:    Lab Results   Component Value Date    WBC 4.7 01/29/2023    HGB 11.2 (L) 01/29/2023    HCT 33.4 (L) 01/29/2023    MCV 96.3 (H) 01/29/2023     01/29/2023         BMP  Lab Results   Component Value Date     01/29/2023    K 3.9 01/29/2023    CHLORIDE 102 01/29/2023    CO2 25 01/29/2023    BUN 18.0 01/29/2023    CREATININE 0.59 01/29/2023    CALCIUM 8.8 01/29/2023    EGFRNONAA >60 08/01/2022       ABG  No results for input(s): PH, PO2, PCO2, HCO3, BE in the last 168 hours.    Significant Imaging:  No imaging this AM     Assessment/Plan:     Assessment  Intractable pain   Metastatic adrenocortical carcinoma   On Mitotane at home currently on hold  S/p adjunctive radiation therapy completed on 09/30/2021  Completed palliative radiation therapy to the right scapula, L5, R ischium and left femoral neck on 8/26/22  Now undergoing stereotatic radiation of L3  Multiple osteoporotic vertebral compression fractures  S/p L1 and L3 kyphoplasty 4//8/22  compression fracture of T11 with 50% loss of vertebral body height underwent kyphoplasty 4/21/22  foraminal stenosis secondary to disc osteophyte on the right side at C5-6 and C6-7.  She underwent a right foraminotomy     Plan  Slated for focal radiotherapy with stereotactic body radiotherapy to the L3 vertebral body; underwent mapping on Monday   Ortho recommends prophylactic stabilization of femur outpatient  Continue pain control with Po oxycodone and IV Dilaudid fro breakthrough  Add Miralax        Sofía Ellis, ANP  Pulmonary Critical Care Medicine  [facility] - [facility] ICU

## 2023-01-31 NOTE — NURSING
Nurses Note -- 4 Eyes      1/31/2023   2:59 PM      Skin assessed during: Q Shift Change      [x] No Pressure Injuries Present    []Prevention Measures Documented      [] Yes- Altered Skin Integrity Present or Discovered   [] LDA Added if Not in Epic (Describe Wound)   [] New Altered Skin Integrity was Present on Admit and Documented in LDA   [] Wound Image Taken    Wound Care Consulted? No    Attending Nurse:  Brittany Vargas RN     Second RN/Staff Member:  Iman Carrillo

## 2023-01-31 NOTE — PROGRESS NOTES
Hematology/Oncology  Progress Note    Patient Name: Shreya Reyes  MRN: [id number]  Admission Date: 1/29/2023  Hospital Length of Stay: 2 days  Attending Provider: Abraham Wu MD  Consulting Provider: Robert Arzate MD  Principal Problem:Intractable pain      Subjective:     HPI: 62 yo white female with h/o metastatic adrenocortical carcinoma directly admitted for intractable pain. Patient has known metastatic disease in her bones. She is currently being treated with Mitotane with dose monitoring and adjustment by [facility]. Dose was recently decreased to 500 mg BID. Last PET done 11/22 showed disease improvement.     Treatment History  Adjuvant XRT (completed 9/30/21)  Mitotane 1/25/22-7/26/22, Resumed 8/22  XRT right scapula, L5,  R ischium, L femoral neck completed 8/26/22    She complained of increasing pain to right hip, radiating to knee, and feeling like at times that her leg was going to give out.  CTs of the pelvis and right lower extremity 1/20/23 showed stable osseous metastatic disease involving L5 measuring 2.8 cm and left femoral neck measuring 2.7 cm and a right ischium ill-defined lesion.  Lesions all showed increased sclerosis consistent with prior radiation therapy completed 8/26/22.  Her symptoms were unrelieved by hydrocodone and she was admitted to the hospital 1/29/23 secondary to intractable pain.    MRI of the lumbar spine and right hip on admission showed an enhancing metastatic lesion involving L3 with ventral and paraspinal extension causing moderate-to-severe narrowing of the spinal canal.  Radiation Oncology was consulted and planning for stereotactic treatment to the symptomatic L3 lesion.  She was also evaluated by Orthopedics who recommended consideration of prophylactic nail for the left femoral neck lesion.  She is not having any significant left hip or leg pain at this time.    Today (1/31/23):  She rested well last night.  She feels better this morning, pain better  controlled.  The oral Dilaudid made her very nauseated.  Radiation therapy planning is in process.      Medications:  Continuous Infusions:    Scheduled Meds:   calcium carbonate  500 mg Oral BID    dexAMETHasone  4 mg Intravenous Q8H    DULoxetine  30 mg Oral QHS    enoxaparin  40 mg Subcutaneous Daily    levothyroxine  75 mcg Oral Daily    losartan  50 mg Oral Daily    pantoprazole  40 mg Intravenous Daily    vitamin D  5,000 Units Oral Daily     PRN Meds:ALPRAZolam, hydrALAZINE, HYDROcodone-acetaminophen, HYDROmorphone, HYDROmorphone, ibuprofen, labetalol, ondansetron, promethazine, sodium chloride 0.9%     Review of patient's allergies indicates:  No Known Allergies       Review of Systems   Constitutional:  Positive for activity change, appetite change and fatigue. Negative for fever.   HENT:  Negative for hearing loss, mouth sores and sore throat.    Eyes: Negative.    Respiratory:  Negative for cough and shortness of breath.    Cardiovascular:  Negative for chest pain and leg swelling.   Gastrointestinal:  Positive for nausea. Negative for abdominal pain, constipation and diarrhea.   Genitourinary:  Negative for dysuria, frequency and urgency.   Musculoskeletal:  Positive for back pain.        Severe right hip pain   Skin:  Negative for rash.   Neurological:  Negative for dizziness, weakness, numbness and headaches.   Hematological:  Negative for adenopathy. Does not bruise/bleed easily.   Psychiatric/Behavioral: Negative.         Objective:     Vital Signs (Most Recent):  Temp: 98.2 °F (36.8 °C) (01/31/23 0400)  Pulse: 84 (01/31/23 0400)  Resp: 20 (01/31/23 0508)  BP: 118/64 (01/31/23 0000)  SpO2: 95 % (01/31/23 0400)   Vital Signs (24h Range):  Temp:  [98.1 °F (36.7 °C)-98.3 °F (36.8 °C)] 98.2 °F (36.8 °C)  Pulse:  [70-84] 84  Resp:  [11-22] 20  SpO2:  [95 %-98 %] 95 %  BP: (118-186)/() 118/64       Physical Exam  Constitutional:       Comments: Pale, ill-appearing white female in NAD   HENT:       Head: Normocephalic.      Mouth/Throat:      Mouth: Mucous membranes are moist.      Pharynx: No posterior oropharyngeal erythema.   Eyes:      Extraocular Movements: Extraocular movements intact.      Conjunctiva/sclera: Conjunctivae normal.      Pupils: Pupils are equal, round, and reactive to light.   Cardiovascular:      Rate and Rhythm: Normal rate and regular rhythm.      Heart sounds: No murmur heard.  Pulmonary:      Comments: Lungs clear anteriorly  Abdominal:      General: There is no distension.      Palpations: Abdomen is soft.      Tenderness: There is no abdominal tenderness.      Comments: Bowel sounds decreased   Musculoskeletal:      Right lower leg: No edema.      Left lower leg: No edema.   Skin:     General: Skin is warm and dry.      Findings: No rash.   Neurological:      General: No focal deficit present.      Mental Status: She is alert and oriented to person, place, and time.      Motor: No weakness.       Significant Labs:   No new laboratory for review.    Diagnostic Results:  MRI films reviewed.    Impression/Plan:   IMPRESSION  Metastatic adrenocortical carcinoma  L3 metastasis with impending cord compression and intractable pain  Malnutrition and hypoalbuminemia  Mild anemia    PLAN:  D/C oral Dilaudid,   Poorly tolerated secondary to nausea.  Trial of oxycodone 10 mg q.4 hours PRN.  Continue IV Dilaudid for breakthrough.  Mitotane on hold for now.        MARY NORMAN MD  Hematology/Oncology

## 2023-02-01 ENCOUNTER — OFFICE VISIT (OUTPATIENT)
Dept: RADIATION THERAPY | Facility: HOSPITAL | Age: 63
End: 2023-02-01
Attending: INTERNAL MEDICINE
Payer: COMMERCIAL

## 2023-02-01 VITALS
SYSTOLIC BLOOD PRESSURE: 116 MMHG | OXYGEN SATURATION: 97 % | BODY MASS INDEX: 20.47 KG/M2 | HEART RATE: 69 BPM | RESPIRATION RATE: 16 BRPM | TEMPERATURE: 98 F | DIASTOLIC BLOOD PRESSURE: 74 MMHG | WEIGHT: 123 LBS

## 2023-02-01 LAB
ALBUMIN SERPL-MCNC: 2.7 G/DL (ref 3.4–4.8)
ALBUMIN/GLOB SERPL: 1 RATIO (ref 1.1–2)
ALP SERPL-CCNC: 74 UNIT/L (ref 40–150)
ALT SERPL-CCNC: 18 UNIT/L (ref 0–55)
AST SERPL-CCNC: 28 UNIT/L (ref 5–34)
BASOPHILS # BLD AUTO: 0.02 X10(3)/MCL (ref 0–0.2)
BASOPHILS NFR BLD AUTO: 0.6 %
BILIRUBIN DIRECT+TOT PNL SERPL-MCNC: 0.4 MG/DL
BUN SERPL-MCNC: 16.6 MG/DL (ref 9.8–20.1)
CALCIUM SERPL-MCNC: 8.8 MG/DL (ref 8.4–10.2)
CHLORIDE SERPL-SCNC: 107 MMOL/L (ref 98–107)
CO2 SERPL-SCNC: 23 MMOL/L (ref 23–31)
CREAT SERPL-MCNC: 0.55 MG/DL (ref 0.55–1.02)
EOSINOPHIL # BLD AUTO: 0.01 X10(3)/MCL (ref 0–0.9)
EOSINOPHIL NFR BLD AUTO: 0.3 %
ERYTHROCYTE [DISTWIDTH] IN BLOOD BY AUTOMATED COUNT: 13.4 % (ref 11.5–17)
GFR SERPLBLD CREATININE-BSD FMLA CKD-EPI: >60 MLS/MIN/1.73/M2
GLOBULIN SER-MCNC: 2.7 GM/DL (ref 2.4–3.5)
GLUCOSE SERPL-MCNC: 95 MG/DL (ref 82–115)
HCT VFR BLD AUTO: 33.6 % (ref 37–47)
HGB BLD-MCNC: 11 GM/DL (ref 12–16)
IMM GRANULOCYTES # BLD AUTO: 0 X10(3)/MCL (ref 0–0.04)
IMM GRANULOCYTES NFR BLD AUTO: 0 %
LYMPHOCYTES # BLD AUTO: 0.94 X10(3)/MCL (ref 0.6–4.6)
LYMPHOCYTES NFR BLD AUTO: 28 %
MCH RBC QN AUTO: 31.9 PG
MCHC RBC AUTO-ENTMCNC: 32.7 MG/DL (ref 33–36)
MCV RBC AUTO: 97.4 FL (ref 80–94)
MONOCYTES # BLD AUTO: 0.31 X10(3)/MCL (ref 0.1–1.3)
MONOCYTES NFR BLD AUTO: 9.2 %
NEUTROPHILS # BLD AUTO: 2.08 X10(3)/MCL (ref 2.1–9.2)
NEUTROPHILS NFR BLD AUTO: 61.9 %
NRBC BLD AUTO-RTO: 0 %
PLATELET # BLD AUTO: 239 X10(3)/MCL (ref 130–400)
PMV BLD AUTO: 11.2 FL (ref 7.4–10.4)
POTASSIUM SERPL-SCNC: 4.3 MMOL/L (ref 3.5–5.1)
PROT SERPL-MCNC: 5.4 GM/DL (ref 5.8–7.6)
RBC # BLD AUTO: 3.45 X10(6)/MCL (ref 4.2–5.4)
SODIUM SERPL-SCNC: 139 MMOL/L (ref 136–145)
WBC # SPEC AUTO: 3.4 X10(3)/MCL (ref 4.5–11.5)

## 2023-02-01 PROCEDURE — 77338 DESIGN MLC DEVICE FOR IMRT: CPT | Performed by: RADIOLOGY

## 2023-02-01 PROCEDURE — 80053 COMPREHEN METABOLIC PANEL: CPT | Performed by: NURSE PRACTITIONER

## 2023-02-01 PROCEDURE — 77300 RADIATION THERAPY DOSE PLAN: CPT | Performed by: RADIOLOGY

## 2023-02-01 PROCEDURE — 77373 STRTCTC BDY RAD THER TX DLVR: CPT | Performed by: RADIOLOGY

## 2023-02-01 PROCEDURE — 85025 COMPLETE CBC W/AUTO DIFF WBC: CPT | Performed by: NURSE PRACTITIONER

## 2023-02-01 PROCEDURE — 25000003 PHARM REV CODE 250

## 2023-02-01 PROCEDURE — 77301 RADIOTHERAPY DOSE PLAN IMRT: CPT | Mod: 59 | Performed by: RADIOLOGY

## 2023-02-01 PROCEDURE — 63600175 PHARM REV CODE 636 W HCPCS: Performed by: INTERNAL MEDICINE

## 2023-02-01 PROCEDURE — 25000003 PHARM REV CODE 250: Performed by: INTERNAL MEDICINE

## 2023-02-01 RX ADMIN — ONDANSETRON 4 MG: 4 TABLET, ORALLY DISINTEGRATING ORAL at 05:02

## 2023-02-01 RX ADMIN — ONDANSETRON 4 MG: 4 TABLET, ORALLY DISINTEGRATING ORAL at 01:02

## 2023-02-01 RX ADMIN — OXYCODONE 10 MG: 5 TABLET ORAL at 02:02

## 2023-02-01 RX ADMIN — DEXAMETHASONE SODIUM PHOSPHATE 4 MG: 4 INJECTION, SOLUTION INTRA-ARTICULAR; INTRALESIONAL; INTRAMUSCULAR; INTRAVENOUS; SOFT TISSUE at 05:02

## 2023-02-01 RX ADMIN — OXYCODONE 10 MG: 5 TABLET ORAL at 06:02

## 2023-02-01 NOTE — PROGRESS NOTES
Hematology/Oncology  Progress Note    Patient Name: Shreya Reyes  MRN: [id number]  Admission Date: 1/29/2023  Hospital Length of Stay: 3 days  Attending Provider: Abraham Wu MD  Consulting Provider: Robert Arzate MD  Principal Problem:Intractable pain      Subjective:     HPI: 62 yo white female with h/o metastatic adrenocortical carcinoma directly admitted for intractable pain. Patient has known metastatic disease in her bones. She is currently being treated with Mitotane with dose monitoring and adjustment by [facility]. Dose was recently decreased to 500 mg BID. Last PET done 11/22 showed disease improvement.     Treatment History  Adjuvant XRT (completed 9/30/21)  Mitotane 1/25/22-7/26/22, Resumed 8/22  XRT right scapula, L5,  R ischium, L femoral neck completed 8/26/22    She complained of increasing pain to right hip, radiating to knee, and feeling like at times that her leg was going to give out.  CTs of the pelvis and right lower extremity 1/20/23 showed stable osseous metastatic disease involving L5 measuring 2.8 cm and left femoral neck measuring 2.7 cm and a right ischium ill-defined lesion.  Lesions all showed increased sclerosis consistent with prior radiation therapy completed 8/26/22.  Her symptoms were unrelieved by hydrocodone and she was admitted to the hospital 1/29/23 secondary to intractable pain.    MRI of the lumbar spine and right hip on admission showed an enhancing metastatic lesion involving L3 with ventral and paraspinal extension causing moderate-to-severe narrowing of the spinal canal.  Radiation Oncology was consulted and planning for stereotactic treatment to the symptomatic L3 lesion.  She was also evaluated by Orthopedics who recommended consideration of prophylactic nail for the left femoral neck lesion.  She is not having any significant left hip or leg pain at this time.    Today (2/01/23):  Patient awake and alert this morning, sitting up in bed eating  breakfast.  Her  is at the bedside.  She looks and feels much better.  Pain medicine changed to oxycodone IR 10 mg q.4 hours p.r.n. yesterday.  She is taking Zofran prior to the oxycodone and is not having any further nausea or vomiting.  She will be going home this morning.      Medications:  Continuous Infusions:    Scheduled Meds:   calcium carbonate  500 mg Oral BID    dexAMETHasone  4 mg Intravenous Q8H    DULoxetine  30 mg Oral QHS    enoxaparin  40 mg Subcutaneous Daily    levothyroxine  75 mcg Oral Daily    losartan  50 mg Oral Daily    methylnaltrexone  12 mg Subcutaneous Daily    pantoprazole  40 mg Intravenous Daily    polyethylene glycol  17 g Oral Daily    vitamin D  5,000 Units Oral Daily     PRN Meds:ALPRAZolam, hydrALAZINE, HYDROmorphone, ibuprofen, labetalol, ondansetron, oxyCODONE, promethazine, sodium chloride 0.9%        Review of Systems   Constitutional:  Positive for activity change, appetite change and fatigue. Negative for fever.   HENT:  Negative for hearing loss, mouth sores and sore throat.    Eyes: Negative.    Respiratory:  Negative for cough and shortness of breath.    Cardiovascular:  Negative for chest pain and leg swelling.   Gastrointestinal:  Positive for nausea. Negative for abdominal pain, constipation and diarrhea.   Genitourinary:  Negative for dysuria, frequency and urgency.   Musculoskeletal:  Positive for back pain.        Right hip pain - improved   Skin:  Negative for rash.   Neurological:  Negative for dizziness, weakness, numbness and headaches.   Hematological:  Negative for adenopathy. Does not bruise/bleed easily.   Psychiatric/Behavioral: Negative.         Objective:     Vital Signs (Most Recent):  Temp: 97.7 °F (36.5 °C) (02/01/23 0400)  Pulse: 69 (02/01/23 0400)  Resp: 16 (02/01/23 0632)  BP: 116/74 (02/01/23 0600)  SpO2: 97 % (02/01/23 0400)   Vital Signs (24h Range):  Temp:  [97.7 °F (36.5 °C)-99.2 °F (37.3 °C)] 97.7 °F (36.5 °C)  Pulse:  [69-86]  69  Resp:  [16-22] 16  SpO2:  [94 %-97 %] 97 %  BP: (105-165)/(53-80) 116/74       Physical Exam  Constitutional:       Comments: Well-developed white female in NAD   HENT:      Head: Normocephalic.      Mouth/Throat:      Mouth: Mucous membranes are moist.      Pharynx: No posterior oropharyngeal erythema.   Eyes:      Extraocular Movements: Extraocular movements intact.      Conjunctiva/sclera: Conjunctivae normal.      Pupils: Pupils are equal, round, and reactive to light.   Cardiovascular:      Rate and Rhythm: Normal rate and regular rhythm.      Heart sounds: No murmur heard.  Pulmonary:      Comments: Lungs clear anteriorly  Abdominal:      General: Bowel sounds are normal. There is no distension.      Palpations: Abdomen is soft.      Tenderness: There is no abdominal tenderness.   Musculoskeletal:      Right lower leg: No edema.      Left lower leg: No edema.   Skin:     General: Skin is warm and dry.      Findings: No rash.   Neurological:      General: No focal deficit present.      Mental Status: She is alert and oriented to person, place, and time.      Motor: No weakness.       Significant Labs:    Latest Reference Range & Units 02/01/23 01:56   WBC 4.5 - 11.5 x10(3)/mcL 3.4 (L)   RBC 4.20 - 5.40 x10(6)/mcL 3.45 (L)   Hemoglobin 12.0 - 16.0 gm/dL 11.0 (L)   Hematocrit 37.0 - 47.0 % 33.6 (L)   MCV 80.0 - 94.0 fL 97.4 (H)   MCH pg 31.9   MCHC 33.0 - 36.0 mg/dL 32.7 (L)   RDW 11.5 - 17.0 % 13.4   Platelets 130 - 400 x10(3)/mcL 239   MPV 7.4 - 10.4 fL 11.2 (H)   Neut % % 61.9   LYMPH % % 28.0   Mono % % 9.2       Diagnostic Results:  MRI films reviewed.    Impression/Plan:   IMPRESSION  Metastatic adrenocortical carcinoma  L3 metastasis with impending cord compression and intractable pain  Malnutrition and hypoalbuminemia  Mild anemia    PLAN:  Pain controlled on oxycodone IR 10 mg q.4 hours p.r.n.  Continue Decadron 4 mg q.6 hours until radiation therapy completed.  Awaiting finalization of radiation  treatment plan.  Outpatient CT-PET scan after radiation therapy completed.  I will arrange outpatient follow-up in my office.  She is stable for discharge.      MARY NORMAN MD  Hematology/Oncology

## 2023-02-01 NOTE — DISCHARGE SUMMARY
[facility] - [facility] North ICU  Pulmonary Critical Care Note    Patient Name: Shreya Reyes  MRN: [id number]  Admission Date: 1/29/2023  DISCHARGE DATE: 2/1/23  Hospital Length of Stay: 3 days  Code Status: Full Code  Attending Provider: No att. providers found  Primary Care Provider: Robert Holt MD     Subjective:     HPI:   This is a 63-year-old  female who presented to the emergency room today with complaints of a retractable pain to the right hip and knee. She underwent a recent CT of RLE and pelvis w/ and w/o contrast on 1/20/23 that showed stable osseous metastatic disease in L5 measuring 2.8cm, left femoral neck measuring 2.7cm, with known right ischium lesion ill-defined on CT. She was admitted to ICU and was sent for an MRI lumber spine and hip.     Patient has complicated medical history dating back to March of 2021 after she presented to the emergency room with complaints of acute left flank plain imaging revealed mass in the left adrenal gland with mild displacement of left renal vein hormonal workup was negative for pheochromocytoma and she was felt to have an acute adrenal hemorrhage with close observation recommended.  Repeat imaging June of 2021 showed increased size of the adrenal mass.  She then underwent laparoscopic/robotic left adrenalectomy on June 11, 2021.  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      She had a Telemedicine consultation with Dr. Dion Lynch at [facility] 8/3/21 who specializes in treatment of adrenocortical carcinoma. Her case was reviewed and discussed in their tumor board. Recommendations were for treatment with adjuvant radiation therapy and systemic treatment with Mitotane. Baseline postoperative CT scans of the chest, abdomen and pelvis 8/4/21 showed postoperative changes with no evidence of  measurable metastatic disease.     She was seen as a new patient with Dr. Arzate at [facility] on 8/9/21.  Treatment recommendations from the [facility] were reviewed and discussed.  Treatment was started with Mitotane 1000 mg BID on 8/16/2021.  After she was group home through with her adjunctive radiation treatments the patient did have a bump in her LFTs. Mitotane was held and adjunctive radiation therapy was continued; she completed adjunctive radiation therapy on 09/30/2021.  She continued to have issues with elevated LFTs, however this eventually normalized and was able to be resumed on 1/25/22.     She then had an injury at home and was found to have a compression deformity at L2 of questionable age.  MRI of the lumbar spine 3/23/22 showed a subacute severe compression fracture deformity of the L1 vertebral body and mild superior endplate compression fracture of L3 vertebral body with osteoporotic appearance and no findings suspicious for pathologic fracture.  However, there were scattered small osseous lesions in the right L3 vertebral body and L5 vertebral body concerning for metastatic disease.  CT PET scan 3/28/22 showed mildly hypermetabolic osseous lesions in the lumbar spine, pelvis and proximal left femur consistent with metastatic disease. She was started on Prolia 3/31/22 and underwent L1 and L3 kyphoplasty 4//8/22.  Biopsies of the L3 vertebral body showed no evidence of metastatic carcinoma. She continued to have issues,  films showed a possible compression fracture at T11.  MRI of the thoracic spine 4/15/22 showed a compression fracture of T11 with 50% loss of vertebral body height.  She underwent kyphoplasty 4/21/22 with symptomatic improvement. MRI of the cervical and lumbar spine 7/5/22 showed moderate disc disease at C5-6 and C6-7 without significant spinal canal stenosis or foraminal stenosis.  She developed progressive symptoms of right arm pain, numbness and tingling  and updated MRI 7/13/22 showed foraminal stenosis secondary to disc osteophyte on the right side at C5-6 and C6-7.  She underwent a right foraminotomy with relief of her symptoms.     She had a teleconference with [facility] 8/2/22 and a follow-up PET scan was recommended.  Mitotane was discontinued 7/26/22. CT-PET 08/05/22 hypermetabolic osseous metastatic disease involving L5, right ischium, left femoral neck and a new lesion in the inferior right scapula.  There was a 12 mm area of hypermetabolic activity adjacent to the right adrenal gland without a definite CT correlate.     She completed palliative radiation therapy to the right scapula, L5, R ischium and left femoral neck on 8/26/22.  She resumed Mitotane at 1000 mg BID on 8/29/22.      CT-PET scan 11/21/22 showed improved FDG uptake in all of the treated sites.  Right scapula SUV decreased from 4.2 to 2.1, left femoral neck 9.1 to 5, right ischium 13 to 2.4 and L5 8.2 to 4.6.  Hypermetabolic activity at the site of the previous compression fractures had also improved.  There were no findings suspicious for new sites of disease or visceral metastatic disease.    Per oncology's latest note recs from [facility] were for her to continue mitotane with repeat PET scan in February of 2023.     Hospital Course/Significant events:  Ortho and radiation oncology also following- found to have metastatic nodule in the posterior column of her right acetabulum and the nonweightbearing portion also with another medic solid nodule in the left femoral neck.  Per ortho left femoral neck lesion is concerning given its size and location he is recommended prophylactic stabilization as an outpatient in the near future.  Radiation Oncology was consulted and recommend stereotactic radiation of L3 lesion underwent mapping on Monday.     Pain is much better controlled at this point they feel they can be handled at home.  Radiation has been set up as described  above to be completed as an outpatient       Medication List        START taking these medications      dexAMETHasone 4 MG Tab  Commonly known as: DECADRON  Take 1 tablet (4 mg total) by mouth every 6 (six) hours.     oxyCODONE 10 mg Tab immediate release tablet  Commonly known as: ROXICODONE  Take 1 tablet (10 mg total) by mouth every 4 (four) hours as needed for Pain.            CONTINUE taking these medications      ALPRAZolam 0.25 MG tablet  Commonly known as: XANAX  Take 1 tablet (0.25 mg total) by mouth every 8 (eight) hours as needed for Anxiety.     calcium carbonate 600 mg calcium (1,500 mg) Tab  Commonly known as: OS-ORAL     cholecalciferol (vitamin D3) 125 mcg (5,000 unit) capsule     HYDROcodone-acetaminophen 7.5-325 mg per tablet  Commonly known as: NORCO  Take 1 tablet by mouth every 6 (six) hours as needed for Pain.     ibuprofen 800 MG tablet  Commonly known as: ADVIL,MOTRIN     levothyroxine 75 MCG tablet  Commonly known as: SYNTHROID     losartan 50 MG tablet  Commonly known as: COZAAR     mitotane 500 mg tablet  Commonly known as: LYSODREN            STOP taking these medications      DULoxetine 30 MG capsule  Commonly known as: CYMBALTA     hydrocortisone 20 MG Tab  Commonly known as: CORTEF     HYDROmorphone 4 MG tablet  Commonly known as: DILAUDID               Where to Get Your Medications        These medications were sent to [facility] [address]  [address] [facility] [address]      Phone: [phone]   dexAMETHasone 4 MG Tab  oxyCODONE 10 mg Tab immediate release tablet            Objective:       Intake/Output Summary (Last 24 hours) at 2/1/2023 0925  Last data filed at 1/31/2023 1626  Gross per 24 hour   Intake 800 ml   Output --   Net 800 ml           Vital Signs (Most Recent):  Temp: 97.7 °F (36.5 °C) (02/01/23 0400)  Pulse: 69 (02/01/23 0400)  Resp: 16 (02/01/23 0632)  BP: 116/74 (02/01/23 0600)  SpO2: 97 % (02/01/23 0400)    Body mass index is  20.47 kg/m².  Weight: 55.8 kg (123 lb) Vital Signs (24h Range):  Temp:  [97.7 °F (36.5 °C)-99.2 °F (37.3 °C)] 97.7 °F (36.5 °C)  Pulse:  [69-78] 69  Resp:  [16-22] 16  SpO2:  [94 %-97 %] 97 %  BP: (105-165)/(53-80) 116/74         Assessment/Plan:     Assessment  Intractable pain   Metastatic adrenocortical carcinoma   On Mitotane at home currently on hold  S/p adjunctive radiation therapy completed on 09/30/2021  Completed palliative radiation therapy to the right scapula, L5, R ischium and left femoral neck on 8/26/22  Now undergoing stereotatic radiation of L3  Multiple osteoporotic vertebral compression fractures  S/p L1 and L3 kyphoplasty 4//8/22  compression fracture of T11 with 50% loss of vertebral body height underwent kyphoplasty 4/21/22  foraminal stenosis secondary to disc osteophyte on the right side at C5-6 and C6-7.  She underwent a right foraminotomy     Plan  Slated for focal radiotherapy with stereotactic body radiotherapy to the L3 vertebral body; underwent mapping on Monday; can be completed as an outpatient   Ortho recommends prophylactic stabilization of femur as outpatient  Discharge home meds as above   Continue follow ups with oncology, rad. Oncology, and ortho        Sofía Ellis, ANP  Pulmonary Critical Care Medicine  [facility] - [facility] ICU

## 2023-02-01 NOTE — PROGRESS NOTES
Discharge orders noted from Dr. Hathaway. Prescriptions for dexamethasone and oxycodone were called in to [facility] and have already been picked up by pt's spouse. IV discontinued and discharge instructions given to pt. Pt verbalizes understanding. Offered to give am meds prior to discharge, however patient states she will take them when she gets home. Brought downstairs by transport personnel via wheelchair.

## 2023-02-02 ENCOUNTER — PATIENT OUTREACH (OUTPATIENT)
Dept: ADMINISTRATIVE | Facility: CLINIC | Age: 63
End: 2023-02-02
Payer: COMMERCIAL

## 2023-02-02 ENCOUNTER — TELEPHONE (OUTPATIENT)
Dept: HEMATOLOGY/ONCOLOGY | Facility: CLINIC | Age: 63
End: 2023-02-02
Payer: COMMERCIAL

## 2023-02-02 NOTE — PROGRESS NOTES
Called [employer] to ask for assistance with PA- I answered their questions and med approved. Faxed message to [facility].

## 2023-02-02 NOTE — TELEPHONE ENCOUNTER
I spoke to Bryce at [employer] ([phone]) re: error in pt authentication. Bryce states that he will fax over additional information and states I can fax that to [employer] or I can call [employer] at [phone].

## 2023-02-03 ENCOUNTER — TELEPHONE (OUTPATIENT)
Dept: HEMATOLOGY/ONCOLOGY | Facility: CLINIC | Age: 63
End: 2023-02-03
Payer: COMMERCIAL

## 2023-02-03 ENCOUNTER — LAB VISIT (OUTPATIENT)
Dept: LAB | Facility: HOSPITAL | Age: 63
End: 2023-02-03
Attending: INTERNAL MEDICINE
Payer: COMMERCIAL

## 2023-02-03 DIAGNOSIS — M25.551 RIGHT HIP PAIN: ICD-10-CM

## 2023-02-03 DIAGNOSIS — C79.51 SECONDARY MALIGNANT NEOPLASM OF BONE: ICD-10-CM

## 2023-02-03 DIAGNOSIS — C74.02 MALIGNANT NEOPLASM OF CORTEX OF LEFT ADRENAL GLAND: ICD-10-CM

## 2023-02-03 LAB
ALBUMIN SERPL-MCNC: 3.2 G/DL (ref 3.4–4.8)
ALBUMIN/GLOB SERPL: 1 RATIO (ref 1.1–2)
ALP SERPL-CCNC: 85 UNIT/L (ref 40–150)
ALT SERPL-CCNC: 19 UNIT/L (ref 0–55)
AST SERPL-CCNC: 22 UNIT/L (ref 5–34)
BASOPHILS # BLD AUTO: 0.04 X10(3)/MCL (ref 0–0.2)
BASOPHILS NFR BLD AUTO: 1 %
BILIRUBIN DIRECT+TOT PNL SERPL-MCNC: 0.4 MG/DL
BUN SERPL-MCNC: 15.4 MG/DL (ref 9.8–20.1)
CALCIUM SERPL-MCNC: 9.2 MG/DL (ref 8.4–10.2)
CHLORIDE SERPL-SCNC: 103 MMOL/L (ref 98–107)
CHOLEST SERPL-MCNC: 240 MG/DL
CHOLEST/HDLC SERPL: 4 {RATIO} (ref 0–5)
CO2 SERPL-SCNC: 26 MMOL/L (ref 23–31)
CORTIS SERPL-SCNC: <1 UG/DL
CREAT SERPL-MCNC: 0.7 MG/DL (ref 0.55–1.02)
EOSINOPHIL # BLD AUTO: 0.04 X10(3)/MCL (ref 0–0.9)
EOSINOPHIL NFR BLD AUTO: 1 %
ERYTHROCYTE [DISTWIDTH] IN BLOOD BY AUTOMATED COUNT: 13 % (ref 11.5–17)
GFR SERPLBLD CREATININE-BSD FMLA CKD-EPI: >60 MLS/MIN/1.73/M2
GLOBULIN SER-MCNC: 3.1 GM/DL (ref 2.4–3.5)
GLUCOSE SERPL-MCNC: 90 MG/DL (ref 82–115)
HCT VFR BLD AUTO: 38 % (ref 37–47)
HDLC SERPL-MCNC: 66 MG/DL (ref 35–60)
HGB BLD-MCNC: 12.3 GM/DL (ref 12–16)
IMM GRANULOCYTES # BLD AUTO: 0.01 X10(3)/MCL (ref 0–0.04)
IMM GRANULOCYTES NFR BLD AUTO: 0.3 %
LDLC SERPL CALC-MCNC: 149 MG/DL (ref 50–140)
LYMPHOCYTES # BLD AUTO: 0.72 X10(3)/MCL (ref 0.6–4.6)
LYMPHOCYTES NFR BLD AUTO: 18.6 %
MCH RBC QN AUTO: 32.3 PG
MCHC RBC AUTO-ENTMCNC: 32.4 MG/DL (ref 33–36)
MCV RBC AUTO: 99.7 FL (ref 80–94)
MONOCYTES # BLD AUTO: 0.49 X10(3)/MCL (ref 0.1–1.3)
MONOCYTES NFR BLD AUTO: 12.6 %
NEUTROPHILS # BLD AUTO: 2.58 X10(3)/MCL (ref 2.1–9.2)
NEUTROPHILS NFR BLD AUTO: 66.5 %
PLATELET # BLD AUTO: 271 X10(3)/MCL (ref 130–400)
PMV BLD AUTO: 10.8 FL (ref 7.4–10.4)
POTASSIUM SERPL-SCNC: 4.4 MMOL/L (ref 3.5–5.1)
PROT SERPL-MCNC: 6.3 GM/DL (ref 5.8–7.6)
RBC # BLD AUTO: 3.81 X10(6)/MCL (ref 4.2–5.4)
SODIUM SERPL-SCNC: 139 MMOL/L (ref 136–145)
T4 FREE SERPL-MCNC: 1.12 NG/DL (ref 0.7–1.48)
TRIGL SERPL-MCNC: 124 MG/DL (ref 37–140)
TSH SERPL-ACNC: 0.41 UIU/ML (ref 0.35–4.94)
VLDLC SERPL CALC-MCNC: 25 MG/DL
WBC # SPEC AUTO: 3.9 X10(3)/MCL (ref 4.5–11.5)

## 2023-02-03 PROCEDURE — 82024 ASSAY OF ACTH: CPT

## 2023-02-03 PROCEDURE — 80061 LIPID PANEL: CPT

## 2023-02-03 PROCEDURE — 82533 TOTAL CORTISOL: CPT

## 2023-02-03 PROCEDURE — 84244 ASSAY OF RENIN: CPT

## 2023-02-03 PROCEDURE — 84439 ASSAY OF FREE THYROXINE: CPT

## 2023-02-03 PROCEDURE — 85025 COMPLETE CBC W/AUTO DIFF WBC: CPT

## 2023-02-03 PROCEDURE — 82088 ASSAY OF ALDOSTERONE: CPT

## 2023-02-03 PROCEDURE — 84443 ASSAY THYROID STIM HORMONE: CPT

## 2023-02-03 PROCEDURE — 77373 STRTCTC BDY RAD THER TX DLVR: CPT | Performed by: RADIOLOGY

## 2023-02-03 PROCEDURE — 36415 COLL VENOUS BLD VENIPUNCTURE: CPT

## 2023-02-03 PROCEDURE — 80053 COMPREHEN METABOLIC PANEL: CPT

## 2023-02-03 PROCEDURE — 82627 DEHYDROEPIANDROSTERONE: CPT

## 2023-02-03 NOTE — TELEPHONE ENCOUNTER
Received additional PA forms from [employer]. Completed and faxed to [employer]. See scanned in chart.

## 2023-02-04 LAB
ACTH PLAS-MCNC: <5 PG/ML
DHEA-S SERPL-MCNC: <5 MCG/DL (ref 9.7–159)

## 2023-02-06 ENCOUNTER — LAB VISIT (OUTPATIENT)
Dept: LAB | Facility: HOSPITAL | Age: 63
End: 2023-02-06
Attending: INTERNAL MEDICINE
Payer: COMMERCIAL

## 2023-02-06 DIAGNOSIS — C74.02 MALIGNANT NEOPLASM OF CORTEX OF LEFT ADRENAL GLAND: Primary | ICD-10-CM

## 2023-02-06 DIAGNOSIS — C79.51 BONE METASTASES: ICD-10-CM

## 2023-02-06 DIAGNOSIS — C79.51 SECONDARY MALIGNANT NEOPLASM OF BONE: ICD-10-CM

## 2023-02-06 DIAGNOSIS — C74.00 MALIGNANT NEOPLASM OF ADRENAL CORTEX, UNSPECIFIED LATERALITY: Primary | ICD-10-CM

## 2023-02-06 PROCEDURE — 82530 CORTISOL FREE: CPT

## 2023-02-06 PROCEDURE — 77373 STRTCTC BDY RAD THER TX DLVR: CPT | Performed by: RADIOLOGY

## 2023-02-06 PROCEDURE — 77336 RADIATION PHYSICS CONSULT: CPT | Performed by: RADIOLOGY

## 2023-02-07 ENCOUNTER — OFFICE VISIT (OUTPATIENT)
Dept: ORTHOPEDICS | Facility: CLINIC | Age: 63
End: 2023-02-07
Payer: COMMERCIAL

## 2023-02-07 ENCOUNTER — ANESTHESIA EVENT (OUTPATIENT)
Dept: SURGERY | Facility: HOSPITAL | Age: 63
End: 2023-02-07
Payer: COMMERCIAL

## 2023-02-07 VITALS
SYSTOLIC BLOOD PRESSURE: 130 MMHG | DIASTOLIC BLOOD PRESSURE: 87 MMHG | HEIGHT: 65 IN | BODY MASS INDEX: 20.49 KG/M2 | WEIGHT: 123 LBS | HEART RATE: 83 BPM | TEMPERATURE: 97 F

## 2023-02-07 DIAGNOSIS — S72.092A OTHER FRACTURE OF HEAD AND NECK OF LEFT FEMUR, INITIAL ENCOUNTER FOR CLOSED FRACTURE: Primary | ICD-10-CM

## 2023-02-07 DIAGNOSIS — C79.9 MULTIPLE LESIONS OF METASTATIC MALIGNANCY: ICD-10-CM

## 2023-02-07 LAB — ALDOST SERPL-MCNC: <4 NG/DL

## 2023-02-07 PROCEDURE — 1159F PR MEDICATION LIST DOCUMENTED IN MEDICAL RECORD: ICD-10-PCS | Mod: CPTII,,, | Performed by: ORTHOPAEDIC SURGERY

## 2023-02-07 PROCEDURE — 3079F DIAST BP 80-89 MM HG: CPT | Mod: CPTII,,, | Performed by: ORTHOPAEDIC SURGERY

## 2023-02-07 PROCEDURE — 3008F BODY MASS INDEX DOCD: CPT | Mod: CPTII,,, | Performed by: ORTHOPAEDIC SURGERY

## 2023-02-07 PROCEDURE — 3075F SYST BP GE 130 - 139MM HG: CPT | Mod: CPTII,,, | Performed by: ORTHOPAEDIC SURGERY

## 2023-02-07 PROCEDURE — 1111F DSCHRG MED/CURRENT MED MERGE: CPT | Mod: CPTII,,, | Performed by: ORTHOPAEDIC SURGERY

## 2023-02-07 PROCEDURE — 1111F PR DISCHARGE MEDS RECONCILED W/ CURRENT OUTPATIENT MED LIST: ICD-10-PCS | Mod: CPTII,,, | Performed by: ORTHOPAEDIC SURGERY

## 2023-02-07 PROCEDURE — 1159F MED LIST DOCD IN RCRD: CPT | Mod: CPTII,,, | Performed by: ORTHOPAEDIC SURGERY

## 2023-02-07 PROCEDURE — 1160F PR REVIEW ALL MEDS BY PRESCRIBER/CLIN PHARMACIST DOCUMENTED: ICD-10-PCS | Mod: CPTII,,, | Performed by: ORTHOPAEDIC SURGERY

## 2023-02-07 PROCEDURE — 3075F PR MOST RECENT SYSTOLIC BLOOD PRESS GE 130-139MM HG: ICD-10-PCS | Mod: CPTII,,, | Performed by: ORTHOPAEDIC SURGERY

## 2023-02-07 PROCEDURE — 1160F RVW MEDS BY RX/DR IN RCRD: CPT | Mod: CPTII,,, | Performed by: ORTHOPAEDIC SURGERY

## 2023-02-07 PROCEDURE — 99204 OFFICE O/P NEW MOD 45 MIN: CPT | Mod: 57,,, | Performed by: ORTHOPAEDIC SURGERY

## 2023-02-07 PROCEDURE — 3079F PR MOST RECENT DIASTOLIC BLOOD PRESSURE 80-89 MM HG: ICD-10-PCS | Mod: CPTII,,, | Performed by: ORTHOPAEDIC SURGERY

## 2023-02-07 PROCEDURE — 99204 PR OFFICE/OUTPT VISIT, NEW, LEVL IV, 45-59 MIN: ICD-10-PCS | Mod: 57,,, | Performed by: ORTHOPAEDIC SURGERY

## 2023-02-07 PROCEDURE — 3008F PR BODY MASS INDEX (BMI) DOCUMENTED: ICD-10-PCS | Mod: CPTII,,, | Performed by: ORTHOPAEDIC SURGERY

## 2023-02-07 RX ORDER — MUPIROCIN 20 MG/G
OINTMENT TOPICAL
Status: CANCELLED | OUTPATIENT
Start: 2023-02-07

## 2023-02-07 NOTE — H&P (VIEW-ONLY)
Subjective:       Patient ID: Shreya Reyes is a 63 y.o. female.    Chief Complaint   Patient presents with    Left Femur - Follow-up     Met lesion left femoral neck. Complains of pain worsening at night. Ambulates without assistance.        Patient is here today for initial evaluation of lesion noted on MRI of the pelvis within the left basicervical superior aspect of the femoral neck.  She is a history of metastatic adrenal carcinoma that has been treated with radiation which she has completed recently.  She has had pain in both of her limbs not specific to the left hip or groin however she does have lesions in her spine which have been symptomatic in the past.  She states that the left leg is no worse than it has been over the last several weeks.  She is here today to discuss prophylactic intramedullary nailing due to the size and location of the lesion her left femoral neck.    Follow-up  Pertinent negatives include no abdominal pain, chest pain, chills, congestion, coughing, fever, nausea, neck pain, numbness or vomiting.     Review of Systems   Constitutional: Negative for chills, fever and malaise/fatigue.   HENT:  Negative for congestion and hearing loss.    Eyes:  Negative for visual disturbance.   Cardiovascular:  Negative for chest pain and syncope.   Respiratory:  Negative for cough and shortness of breath.    Hematologic/Lymphatic: Does not bruise/bleed easily.   Skin:  Negative for color change and suspicious lesions.   Musculoskeletal:  Negative for falls and neck pain.   Gastrointestinal:  Negative for abdominal pain, nausea and vomiting.   Genitourinary:  Negative for dysuria and hematuria.   Neurological:  Negative for numbness and sensory change.   Psychiatric/Behavioral:  Negative for altered mental status. The patient is not nervous/anxious.       Current Outpatient Medications on File Prior to Visit   Medication Sig Dispense Refill    ALPRAZolam (XANAX) 0.25 MG tablet Take 1 tablet (0.25 mg  "total) by mouth every 8 (eight) hours as needed for Anxiety. 60 tablet 1    cholecalciferol, vitamin D3, 125 mcg (5,000 unit) capsule Take 250 mcg by mouth.      dexAMETHasone (DECADRON) 4 MG Tab Take 1 tablet (4 mg total) by mouth every 6 (six) hours. 100 tablet 0    HYDROcodone-acetaminophen (NORCO) 7.5-325 mg per tablet Take 1 tablet by mouth every 6 (six) hours as needed for Pain. 60 tablet 0    ibuprofen (ADVIL,MOTRIN) 800 MG tablet Take 800 mg by mouth 3 (three) times daily as needed.      levothyroxine (SYNTHROID) 75 MCG tablet Take 1 tablet by mouth once daily.      losartan (COZAAR) 50 MG tablet Take 1 tablet by mouth once daily.      mitotane (LYSODREN) 500 mg tablet Take 1,000 mg by mouth once daily.      calcium carbonate (OS-ORAL) 600 mg calcium (1,500 mg) Tab Take 600 mg by mouth 2 (two) times daily.      oxyCODONE (ROXICODONE) 10 mg Tab immediate release tablet Take 1 tablet (10 mg total) by mouth every 4 (four) hours as needed for Pain. (Patient not taking: Reported on 2/7/2023) 100 tablet 0     No current facility-administered medications on file prior to visit.          Objective:      /87   Pulse 83   Temp 97 °F (36.1 °C)   Ht 5' 5" (1.651 m)   Wt 55.8 kg (123 lb)   BMI 20.47 kg/m²   Physical Exam  Constitutional:       General: She is not in acute distress.     Appearance: Normal appearance. She is not ill-appearing.   HENT:      Head: Normocephalic and atraumatic.      Nose: No congestion.   Eyes:      Extraocular Movements: Extraocular movements intact.   Cardiovascular:      Rate and Rhythm: Normal rate and regular rhythm.      Pulses: Normal pulses.   Pulmonary:      Effort: Pulmonary effort is normal.      Breath sounds: Normal breath sounds.   Abdominal:      General: There is no distension.      Palpations: Abdomen is soft.      Tenderness: There is no abdominal tenderness.   Musculoskeletal:      Comments: She is able to ambulate without any assistive devices.  Has no pain when " rising from a seated position.  She has full equal range of motion left hip compared to the right.  She is neurovascularly intact distally bilateral lower extremities.   Skin:     General: Skin is warm and dry.   Neurological:      Mental Status: She is alert and oriented to person, place, and time. Mental status is at baseline.   Psychiatric:         Mood and Affect: Mood normal.         Behavior: Behavior normal.         Thought Content: Thought content normal.         Judgment: Judgment normal.      Body mass index is 20.47 kg/m².    Radiology:   Review of the MRI obtained of her pelvis was done today in the office with the patient and her .  She has lesion in the superior aspect of the left femoral neck takes up about 50% of the width of the neck, at the basicervical intertroch junction      Assessment:         1. Other fracture of head and neck of left femur, initial encounter for closed fracture  Case Request Operating Room: INSERTION, INTRAMEDULLARY LEELEE, LEFT FEMUR      2. Multiple lesions of metastatic malignancy  Case Request Operating Room: INSERTION, INTRAMEDULLARY LEELEE, LEFT FEMUR              Plan:       An extensive discussion was held today with the patient and her  regarding prophylactic intramedullary nailing.  Thankfully the lesion is asymptomatic however it is in an area that she is lots of stress on the tension side of the femoral neck and based on the size of the lesion I feel as though be at risk for completion which would lead to a much more complicated treatment option.  She had a completion of the fracture she would require total hip arthroplasty however we are able to prophylactically fix this with leelee and screws we can prevent her a big invasive procedure and allow her to ambulate immediately with a much less invasive procedure. The risks, benefits and alternatives treatment were discussed at length with the patient today including but not limited to pain, bleeding, scarring,  infection, damage to neurovascular structures, malunion/nonunion, hardware failure/irritation, need for future procedures and complications leading to amputation and even death.  Plan to take her to the operating room for prophylactic intramedullary nailing of her left femur tomorrow.  They understand and agree with all that we have discussed and all questions and concerns were addressed.      Richard Bolanos MD  Orthopedic Trauma  [facility]      No follow-ups on file.    Other fracture of head and neck of left femur, initial encounter for closed fracture  -     Case Request Operating Room: INSERTION, INTRAMEDULLARY LEELEE, LEFT FEMUR    Multiple lesions of metastatic malignancy  -     Case Request Operating Room: INSERTION, INTRAMEDULLARY LEELEE, LEFT FEMUR    Other orders  -     Full code; Standing  -     Place in Outpatient; Standing  -     Vital signs; Standing  -     Insert peripheral IV; Standing  -     Verify informed consent; Standing  -     Diet NPO; Standing  -     Place ELISSA hose; Standing  -     Place sequential compression device; Standing  -     ceFAZolin (ANCEF) 2 g in dextrose 5 % (D5W) 50 mL IVPB  -     mupirocin 2 % ointment              Orders Placed This Encounter   Procedures    Diet NPO     Specify start time     Standing Status:   Standing     Number of Occurrences:   1    Verify informed consent     Standing Status:   Standing     Number of Occurrences:   1    Place ELISSA hose     Standing Status:   Standing     Number of Occurrences:   1    Place sequential compression device     Standing Status:   Standing     Number of Occurrences:   1    Full code     Standing Status:   Standing     Number of Occurrences:   1    Insert peripheral IV     Standing Status:   Standing     Number of Occurrences:   1    Case Request Operating Room: INSERTION, INTRAMEDULLARY LEELEE, LEFT FEMUR     Order Specific Question:   Medical Necessity:     Answer:   Medically Urgent [101]     Order Specific  Question:   CPT Code:     Answer:   [place] OPEN FIX INTER/SUBTROCH FX,IMPLNT [78348]     Order Specific Question:   Implant Required:     Answer:   Yes [1000]     Order Specific Question:   Has Vendor been notified:     Answer:   Yes [100]     Comments:   [employer]     Order Specific Question:   Is an on-site pathologist required for this procedure?     Answer:   N/A       Future Appointments   Date Time Provider Department Center   2/20/2023  7:45 AM [facility]   3/6/2023  8:30 [facility]   3/6/2023  9:00 AM [facility]   3/28/2023 10:00 AM Robert Holt MD [facility] [id number] [facility]

## 2023-02-07 NOTE — PROGRESS NOTES
Subjective:       Patient ID: Shreya Reyes is a 63 y.o. female.    Chief Complaint   Patient presents with    Left Femur - Follow-up     Met lesion left femoral neck. Complains of pain worsening at night. Ambulates without assistance.        Patient is here today for initial evaluation of lesion noted on MRI of the pelvis within the left basicervical superior aspect of the femoral neck.  She is a history of metastatic adrenal carcinoma that has been treated with radiation which she has completed recently.  She has had pain in both of her limbs not specific to the left hip or groin however she does have lesions in her spine which have been symptomatic in the past.  She states that the left leg is no worse than it has been over the last several weeks.  She is here today to discuss prophylactic intramedullary nailing due to the size and location of the lesion her left femoral neck.    Follow-up  Pertinent negatives include no abdominal pain, chest pain, chills, congestion, coughing, fever, nausea, neck pain, numbness or vomiting.     Review of Systems   Constitutional: Negative for chills, fever and malaise/fatigue.   HENT:  Negative for congestion and hearing loss.    Eyes:  Negative for visual disturbance.   Cardiovascular:  Negative for chest pain and syncope.   Respiratory:  Negative for cough and shortness of breath.    Hematologic/Lymphatic: Does not bruise/bleed easily.   Skin:  Negative for color change and suspicious lesions.   Musculoskeletal:  Negative for falls and neck pain.   Gastrointestinal:  Negative for abdominal pain, nausea and vomiting.   Genitourinary:  Negative for dysuria and hematuria.   Neurological:  Negative for numbness and sensory change.   Psychiatric/Behavioral:  Negative for altered mental status. The patient is not nervous/anxious.       Current Outpatient Medications on File Prior to Visit   Medication Sig Dispense Refill    ALPRAZolam (XANAX) 0.25 MG tablet Take 1 tablet (0.25 mg  "total) by mouth every 8 (eight) hours as needed for Anxiety. 60 tablet 1    cholecalciferol, vitamin D3, 125 mcg (5,000 unit) capsule Take 250 mcg by mouth.      dexAMETHasone (DECADRON) 4 MG Tab Take 1 tablet (4 mg total) by mouth every 6 (six) hours. 100 tablet 0    HYDROcodone-acetaminophen (NORCO) 7.5-325 mg per tablet Take 1 tablet by mouth every 6 (six) hours as needed for Pain. 60 tablet 0    ibuprofen (ADVIL,MOTRIN) 800 MG tablet Take 800 mg by mouth 3 (three) times daily as needed.      levothyroxine (SYNTHROID) 75 MCG tablet Take 1 tablet by mouth once daily.      losartan (COZAAR) 50 MG tablet Take 1 tablet by mouth once daily.      mitotane (LYSODREN) 500 mg tablet Take 1,000 mg by mouth once daily.      calcium carbonate (OS-ORAL) 600 mg calcium (1,500 mg) Tab Take 600 mg by mouth 2 (two) times daily.      oxyCODONE (ROXICODONE) 10 mg Tab immediate release tablet Take 1 tablet (10 mg total) by mouth every 4 (four) hours as needed for Pain. (Patient not taking: Reported on 2/7/2023) 100 tablet 0     No current facility-administered medications on file prior to visit.          Objective:      /87   Pulse 83   Temp 97 °F (36.1 °C)   Ht 5' 5" (1.651 m)   Wt 55.8 kg (123 lb)   BMI 20.47 kg/m²   Physical Exam  Constitutional:       General: She is not in acute distress.     Appearance: Normal appearance. She is not ill-appearing.   HENT:      Head: Normocephalic and atraumatic.      Nose: No congestion.   Eyes:      Extraocular Movements: Extraocular movements intact.   Cardiovascular:      Rate and Rhythm: Normal rate and regular rhythm.      Pulses: Normal pulses.   Pulmonary:      Effort: Pulmonary effort is normal.      Breath sounds: Normal breath sounds.   Abdominal:      General: There is no distension.      Palpations: Abdomen is soft.      Tenderness: There is no abdominal tenderness.   Musculoskeletal:      Comments: She is able to ambulate without any assistive devices.  Has no pain when " rising from a seated position.  She has full equal range of motion left hip compared to the right.  She is neurovascularly intact distally bilateral lower extremities.   Skin:     General: Skin is warm and dry.   Neurological:      Mental Status: She is alert and oriented to person, place, and time. Mental status is at baseline.   Psychiatric:         Mood and Affect: Mood normal.         Behavior: Behavior normal.         Thought Content: Thought content normal.         Judgment: Judgment normal.      Body mass index is 20.47 kg/m².    Radiology:   Review of the MRI obtained of her pelvis was done today in the office with the patient and her .  She has lesion in the superior aspect of the left femoral neck takes up about 50% of the width of the neck, at the basicervical intertroch junction      Assessment:         1. Other fracture of head and neck of left femur, initial encounter for closed fracture  Case Request Operating Room: INSERTION, INTRAMEDULLARY LEELEE, LEFT FEMUR      2. Multiple lesions of metastatic malignancy  Case Request Operating Room: INSERTION, INTRAMEDULLARY LEELEE, LEFT FEMUR              Plan:       An extensive discussion was held today with the patient and her  regarding prophylactic intramedullary nailing.  Thankfully the lesion is asymptomatic however it is in an area that she is lots of stress on the tension side of the femoral neck and based on the size of the lesion I feel as though be at risk for completion which would lead to a much more complicated treatment option.  She had a completion of the fracture she would require total hip arthroplasty however we are able to prophylactically fix this with leelee and screws we can prevent her a big invasive procedure and allow her to ambulate immediately with a much less invasive procedure. The risks, benefits and alternatives treatment were discussed at length with the patient today including but not limited to pain, bleeding, scarring,  infection, damage to neurovascular structures, malunion/nonunion, hardware failure/irritation, need for future procedures and complications leading to amputation and even death.  Plan to take her to the operating room for prophylactic intramedullary nailing of her left femur tomorrow.  They understand and agree with all that we have discussed and all questions and concerns were addressed.      Richard Bolanos MD  Orthopedic Trauma  [facility]      No follow-ups on file.    Other fracture of head and neck of left femur, initial encounter for closed fracture  -     Case Request Operating Room: INSERTION, INTRAMEDULLARY LEELEE, LEFT FEMUR    Multiple lesions of metastatic malignancy  -     Case Request Operating Room: INSERTION, INTRAMEDULLARY LEELEE, LEFT FEMUR    Other orders  -     Full code; Standing  -     Place in Outpatient; Standing  -     Vital signs; Standing  -     Insert peripheral IV; Standing  -     Verify informed consent; Standing  -     Diet NPO; Standing  -     Place ELISSA hose; Standing  -     Place sequential compression device; Standing  -     ceFAZolin (ANCEF) 2 g in dextrose 5 % (D5W) 50 mL IVPB  -     mupirocin 2 % ointment              Orders Placed This Encounter   Procedures    Diet NPO     Specify start time     Standing Status:   Standing     Number of Occurrences:   1    Verify informed consent     Standing Status:   Standing     Number of Occurrences:   1    Place ELISSA hose     Standing Status:   Standing     Number of Occurrences:   1    Place sequential compression device     Standing Status:   Standing     Number of Occurrences:   1    Full code     Standing Status:   Standing     Number of Occurrences:   1    Insert peripheral IV     Standing Status:   Standing     Number of Occurrences:   1    Case Request Operating Room: INSERTION, INTRAMEDULLARY LEELEE, LEFT FEMUR     Order Specific Question:   Medical Necessity:     Answer:   Medically Urgent [101]     Order Specific  Question:   CPT Code:     Answer:   DC OPEN FIX INTER/SUBTROCH FX,IMPLNT [83657]     Order Specific Question:   Implant Required:     Answer:   Yes [1000]     Order Specific Question:   Has Vendor been notified:     Answer:   Yes [100]     Comments:   Synthes TFNA     Order Specific Question:   Is an on-site pathologist required for this procedure?     Answer:   N/A       Future Appointments   Date Time Provider Department Center   2/20/2023  7:45 AM [facility] PET 1 [facility] LB [facility]   3/6/2023  8:30 AM [facility]   3/6/2023  9:00 AM [facility]   3/28/2023 10:00 AM Robert Holt MD [facility] 461MDAC [facility]

## 2023-02-08 ENCOUNTER — ANESTHESIA (OUTPATIENT)
Dept: SURGERY | Facility: HOSPITAL | Age: 63
End: 2023-02-08
Payer: COMMERCIAL

## 2023-02-08 ENCOUNTER — HOSPITAL ENCOUNTER (OUTPATIENT)
Facility: HOSPITAL | Age: 63
LOS: 1 days | Discharge: HOME | End: 2023-02-09
Attending: ORTHOPAEDIC SURGERY | Admitting: ORTHOPAEDIC SURGERY
Payer: COMMERCIAL

## 2023-02-08 DIAGNOSIS — C74.00 MALIGNANT NEOPLASM OF ADRENAL CORTEX, UNSPECIFIED LATERALITY: ICD-10-CM

## 2023-02-08 DIAGNOSIS — S72.092A OTHER FRACTURE OF HEAD AND NECK OF LEFT FEMUR, INITIAL ENCOUNTER FOR CLOSED FRACTURE: ICD-10-CM

## 2023-02-08 DIAGNOSIS — M84.452A: ICD-10-CM

## 2023-02-08 DIAGNOSIS — M81.0 OSTEOPOROSIS, UNSPECIFIED OSTEOPOROSIS TYPE, UNSPECIFIED PATHOLOGICAL FRACTURE PRESENCE: Primary | ICD-10-CM

## 2023-02-08 DIAGNOSIS — C79.9 MULTIPLE LESIONS OF METASTATIC MALIGNANCY: ICD-10-CM

## 2023-02-08 DIAGNOSIS — C79.51 SECONDARY MALIGNANT NEOPLASM OF BONE: ICD-10-CM

## 2023-02-08 DIAGNOSIS — M47.22 CERVICAL SPONDYLOSIS WITH RADICULOPATHY: ICD-10-CM

## 2023-02-08 LAB — RENIN PLAS-CCNC: 3.7 NG/ML/H

## 2023-02-08 PROCEDURE — 27201423 OPTIME MED/SURG SUP & DEVICES STERILE SUPPLY: Performed by: ORTHOPAEDIC SURGERY

## 2023-02-08 PROCEDURE — 71000016 HC POSTOP RECOV ADDL HR: Performed by: ORTHOPAEDIC SURGERY

## 2023-02-08 PROCEDURE — 27187 PR REINFORCE HIP BONES: ICD-10-PCS | Mod: AS,LT,, | Performed by: NURSE PRACTITIONER

## 2023-02-08 PROCEDURE — 36000710: Performed by: ORTHOPAEDIC SURGERY

## 2023-02-08 PROCEDURE — 71000015 HC POSTOP RECOV 1ST HR: Performed by: ORTHOPAEDIC SURGERY

## 2023-02-08 PROCEDURE — 63600175 PHARM REV CODE 636 W HCPCS

## 2023-02-08 PROCEDURE — 37000008 HC ANESTHESIA 1ST 15 MINUTES: Performed by: ORTHOPAEDIC SURGERY

## 2023-02-08 PROCEDURE — 25000003 PHARM REV CODE 250: Performed by: ORTHOPAEDIC SURGERY

## 2023-02-08 PROCEDURE — 63600175 PHARM REV CODE 636 W HCPCS: Performed by: ORTHOPAEDIC SURGERY

## 2023-02-08 PROCEDURE — 25000003 PHARM REV CODE 250

## 2023-02-08 PROCEDURE — 27187 PR REINFORCE HIP BONES: ICD-10-PCS | Mod: LT,,, | Performed by: ORTHOPAEDIC SURGERY

## 2023-02-08 PROCEDURE — 63600175 PHARM REV CODE 636 W HCPCS: Performed by: ANESTHESIOLOGY

## 2023-02-08 PROCEDURE — C1713 ANCHOR/SCREW BN/BN,TIS/BN: HCPCS | Performed by: ORTHOPAEDIC SURGERY

## 2023-02-08 PROCEDURE — G0378 HOSPITAL OBSERVATION PER HR: HCPCS

## 2023-02-08 PROCEDURE — 27187 REINFORCE HIP BONES: CPT | Mod: AS,LT,, | Performed by: NURSE PRACTITIONER

## 2023-02-08 PROCEDURE — 27187 REINFORCE HIP BONES: CPT | Mod: LT,,, | Performed by: ORTHOPAEDIC SURGERY

## 2023-02-08 PROCEDURE — C1769 GUIDE WIRE: HCPCS | Performed by: ORTHOPAEDIC SURGERY

## 2023-02-08 PROCEDURE — 25000003 PHARM REV CODE 250: Performed by: NURSE PRACTITIONER

## 2023-02-08 PROCEDURE — 36000711: Performed by: ORTHOPAEDIC SURGERY

## 2023-02-08 PROCEDURE — 71000033 HC RECOVERY, INTIAL HOUR: Performed by: ORTHOPAEDIC SURGERY

## 2023-02-08 PROCEDURE — 71000039 HC RECOVERY, EACH ADD'L HOUR: Performed by: ORTHOPAEDIC SURGERY

## 2023-02-08 PROCEDURE — 97166 OT EVAL MOD COMPLEX 45 MIN: CPT

## 2023-02-08 PROCEDURE — 37000009 HC ANESTHESIA EA ADD 15 MINS: Performed by: ORTHOPAEDIC SURGERY

## 2023-02-08 PROCEDURE — 97162 PT EVAL MOD COMPLEX 30 MIN: CPT

## 2023-02-08 PROCEDURE — 63600175 PHARM REV CODE 636 W HCPCS: Performed by: NURSE PRACTITIONER

## 2023-02-08 DEVICE — SCREW LOK XL25 5X44MM: Type: IMPLANTABLE DEVICE | Site: FEMUR | Status: FUNCTIONAL

## 2023-02-08 DEVICE — WIRE GUIDE 3.2MM 400MM: Type: IMPLANTABLE DEVICE | Site: FEMUR | Status: FUNCTIONAL

## 2023-02-08 DEVICE — SCREW FEM NECK PERF GOLD 85MM: Type: IMPLANTABLE DEVICE | Site: FEMUR | Status: FUNCTIONAL

## 2023-02-08 DEVICE — IMPLANTABLE DEVICE: Type: IMPLANTABLE DEVICE | Site: FEMUR | Status: FUNCTIONAL

## 2023-02-08 DEVICE — SCREW LOCK XL25 5X46MM: Type: IMPLANTABLE DEVICE | Site: FEMUR | Status: FUNCTIONAL

## 2023-02-08 RX ORDER — MIDAZOLAM HYDROCHLORIDE 1 MG/ML
2 INJECTION INTRAMUSCULAR; INTRAVENOUS ONCE AS NEEDED
Status: DISCONTINUED | OUTPATIENT
Start: 2023-02-08 | End: 2023-02-08

## 2023-02-08 RX ORDER — HYDROMORPHONE HYDROCHLORIDE 2 MG/ML
INJECTION, SOLUTION INTRAMUSCULAR; INTRAVENOUS; SUBCUTANEOUS
Status: DISCONTINUED | OUTPATIENT
Start: 2023-02-08 | End: 2023-02-08

## 2023-02-08 RX ORDER — ACETAMINOPHEN 10 MG/ML
1000 INJECTION, SOLUTION INTRAVENOUS ONCE
Status: ACTIVE | OUTPATIENT
Start: 2023-02-08 | End: 2023-02-09

## 2023-02-08 RX ORDER — MAG HYDROX/ALUMINUM HYD/SIMETH 200-200-20
30 SUSPENSION, ORAL (FINAL DOSE FORM) ORAL EVERY 6 HOURS PRN
Status: DISCONTINUED | OUTPATIENT
Start: 2023-02-08 | End: 2023-02-09 | Stop reason: HOSPADM

## 2023-02-08 RX ORDER — KETOROLAC TROMETHAMINE 30 MG/ML
30 INJECTION, SOLUTION INTRAMUSCULAR; INTRAVENOUS EVERY 6 HOURS PRN
Status: DISCONTINUED | OUTPATIENT
Start: 2023-02-08 | End: 2023-02-09 | Stop reason: HOSPADM

## 2023-02-08 RX ORDER — GLYCOPYRROLATE 0.2 MG/ML
INJECTION INTRAMUSCULAR; INTRAVENOUS
Status: DISCONTINUED | OUTPATIENT
Start: 2023-02-08 | End: 2023-02-08

## 2023-02-08 RX ORDER — ONDANSETRON HYDROCHLORIDE 4 MG/5ML
4 SOLUTION ORAL EVERY 6 HOURS PRN
Status: DISCONTINUED | OUTPATIENT
Start: 2023-02-08 | End: 2023-02-09 | Stop reason: HOSPADM

## 2023-02-08 RX ORDER — CALCIUM CARBONATE 200(500)MG
500 TABLET,CHEWABLE ORAL DAILY
Status: DISCONTINUED | OUTPATIENT
Start: 2023-02-08 | End: 2023-02-09 | Stop reason: HOSPADM

## 2023-02-08 RX ORDER — DOCUSATE SODIUM 100 MG/1
100 CAPSULE, LIQUID FILLED ORAL 2 TIMES DAILY
Status: DISCONTINUED | OUTPATIENT
Start: 2023-02-08 | End: 2023-02-09 | Stop reason: HOSPADM

## 2023-02-08 RX ORDER — ALPRAZOLAM 0.25 MG/1
0.25 TABLET ORAL EVERY 8 HOURS PRN
Status: DISCONTINUED | OUTPATIENT
Start: 2023-02-08 | End: 2023-02-09 | Stop reason: HOSPADM

## 2023-02-08 RX ORDER — KETOROLAC TROMETHAMINE 30 MG/ML
30 INJECTION, SOLUTION INTRAMUSCULAR; INTRAVENOUS ONCE
Status: COMPLETED | OUTPATIENT
Start: 2023-02-08 | End: 2023-02-08

## 2023-02-08 RX ORDER — HYDROMORPHONE HYDROCHLORIDE 2 MG/ML
0.2 INJECTION, SOLUTION INTRAMUSCULAR; INTRAVENOUS; SUBCUTANEOUS EVERY 5 MIN PRN
Status: DISCONTINUED | OUTPATIENT
Start: 2023-02-08 | End: 2023-02-08

## 2023-02-08 RX ORDER — CEFAZOLIN SODIUM 2 G/50ML
SOLUTION INTRAVENOUS
Status: COMPLETED
Start: 2023-02-08 | End: 2023-02-08

## 2023-02-08 RX ORDER — DIPHENHYDRAMINE HYDROCHLORIDE 50 MG/ML
25 INJECTION INTRAMUSCULAR; INTRAVENOUS EVERY 6 HOURS PRN
Status: DISCONTINUED | OUTPATIENT
Start: 2023-02-08 | End: 2023-02-09 | Stop reason: HOSPADM

## 2023-02-08 RX ORDER — ROCURONIUM BROMIDE 10 MG/ML
INJECTION, SOLUTION INTRAVENOUS
Status: DISCONTINUED | OUTPATIENT
Start: 2023-02-08 | End: 2023-02-08

## 2023-02-08 RX ORDER — CEFAZOLIN SODIUM 2 G/50ML
2 SOLUTION INTRAVENOUS
Status: COMPLETED | OUTPATIENT
Start: 2023-02-08 | End: 2023-02-09

## 2023-02-08 RX ORDER — ACETAMINOPHEN 325 MG/1
650 TABLET ORAL EVERY 6 HOURS PRN
Status: DISCONTINUED | OUTPATIENT
Start: 2023-02-08 | End: 2023-02-09 | Stop reason: HOSPADM

## 2023-02-08 RX ORDER — ACETAMINOPHEN 10 MG/ML
INJECTION, SOLUTION INTRAVENOUS
Status: DISCONTINUED | OUTPATIENT
Start: 2023-02-08 | End: 2023-02-08

## 2023-02-08 RX ORDER — METHOCARBAMOL 750 MG/1
750 TABLET, FILM COATED ORAL 3 TIMES DAILY PRN
Status: DISCONTINUED | OUTPATIENT
Start: 2023-02-08 | End: 2023-02-09 | Stop reason: HOSPADM

## 2023-02-08 RX ORDER — DEXAMETHASONE 4 MG/1
4 TABLET ORAL 2 TIMES DAILY
Status: DISCONTINUED | OUTPATIENT
Start: 2023-02-08 | End: 2023-02-09 | Stop reason: HOSPADM

## 2023-02-08 RX ORDER — ENOXAPARIN SODIUM 100 MG/ML
40 INJECTION SUBCUTANEOUS EVERY 24 HOURS
Status: DISCONTINUED | OUTPATIENT
Start: 2023-02-08 | End: 2023-02-09 | Stop reason: HOSPADM

## 2023-02-08 RX ORDER — ONDANSETRON HYDROCHLORIDE 2 MG/ML
INJECTION, SOLUTION INTRAMUSCULAR; INTRAVENOUS
Status: DISCONTINUED | OUTPATIENT
Start: 2023-02-08 | End: 2023-02-08

## 2023-02-08 RX ORDER — OXYCODONE HYDROCHLORIDE 5 MG/1
10 TABLET ORAL EVERY 4 HOURS PRN
Status: DISCONTINUED | OUTPATIENT
Start: 2023-02-08 | End: 2023-02-09 | Stop reason: HOSPADM

## 2023-02-08 RX ORDER — SODIUM CHLORIDE, SODIUM GLUCONATE, SODIUM ACETATE, POTASSIUM CHLORIDE AND MAGNESIUM CHLORIDE 30; 37; 368; 526; 502 MG/100ML; MG/100ML; MG/100ML; MG/100ML; MG/100ML
INJECTION, SOLUTION INTRAVENOUS CONTINUOUS
Status: DISCONTINUED | OUTPATIENT
Start: 2023-02-08 | End: 2023-02-09 | Stop reason: HOSPADM

## 2023-02-08 RX ORDER — VANCOMYCIN HYDROCHLORIDE 1 G/20ML
INJECTION, POWDER, LYOPHILIZED, FOR SOLUTION INTRAVENOUS
Status: DISCONTINUED | OUTPATIENT
Start: 2023-02-08 | End: 2023-02-08 | Stop reason: HOSPADM

## 2023-02-08 RX ORDER — POLYETHYLENE GLYCOL 3350 17 G/17G
17 POWDER, FOR SOLUTION ORAL DAILY PRN
Status: DISCONTINUED | OUTPATIENT
Start: 2023-02-08 | End: 2023-02-09 | Stop reason: HOSPADM

## 2023-02-08 RX ORDER — LIDOCAINE HYDROCHLORIDE 20 MG/ML
INJECTION, SOLUTION EPIDURAL; INFILTRATION; INTRACAUDAL; PERINEURAL
Status: DISCONTINUED | OUTPATIENT
Start: 2023-02-08 | End: 2023-02-08

## 2023-02-08 RX ORDER — MIDAZOLAM HYDROCHLORIDE 1 MG/ML
INJECTION INTRAMUSCULAR; INTRAVENOUS
Status: DISCONTINUED | OUTPATIENT
Start: 2023-02-08 | End: 2023-02-08

## 2023-02-08 RX ORDER — DEXMEDETOMIDINE HYDROCHLORIDE 100 UG/ML
INJECTION, SOLUTION INTRAVENOUS
Status: DISCONTINUED | OUTPATIENT
Start: 2023-02-08 | End: 2023-02-08

## 2023-02-08 RX ORDER — MUPIROCIN 20 MG/G
OINTMENT TOPICAL
Status: DISCONTINUED | OUTPATIENT
Start: 2023-02-08 | End: 2023-02-09 | Stop reason: HOSPADM

## 2023-02-08 RX ORDER — SODIUM CHLORIDE 9 MG/ML
INJECTION, SOLUTION INTRAVENOUS CONTINUOUS
Status: DISCONTINUED | OUTPATIENT
Start: 2023-02-08 | End: 2023-02-09 | Stop reason: HOSPADM

## 2023-02-08 RX ORDER — LIDOCAINE HYDROCHLORIDE 10 MG/ML
1 INJECTION, SOLUTION EPIDURAL; INFILTRATION; INTRACAUDAL; PERINEURAL ONCE
Status: DISCONTINUED | OUTPATIENT
Start: 2023-02-08 | End: 2023-02-09 | Stop reason: HOSPADM

## 2023-02-08 RX ORDER — LOSARTAN POTASSIUM 50 MG/1
50 TABLET ORAL DAILY
Status: DISCONTINUED | OUTPATIENT
Start: 2023-02-08 | End: 2023-02-09 | Stop reason: HOSPADM

## 2023-02-08 RX ORDER — CHOLECALCIFEROL (VITAMIN D3) 10 MCG
5000 TABLET ORAL DAILY
Status: DISCONTINUED | OUTPATIENT
Start: 2023-02-08 | End: 2023-02-09 | Stop reason: HOSPADM

## 2023-02-08 RX ORDER — BISACODYL 10 MG
10 SUPPOSITORY, RECTAL RECTAL DAILY PRN
Status: DISCONTINUED | OUTPATIENT
Start: 2023-02-08 | End: 2023-02-09 | Stop reason: HOSPADM

## 2023-02-08 RX ORDER — CEFAZOLIN SODIUM 2 G/50ML
2 SOLUTION INTRAVENOUS
Status: COMPLETED | OUTPATIENT
Start: 2023-02-08 | End: 2023-02-08

## 2023-02-08 RX ORDER — PROPOFOL 10 MG/ML
VIAL (ML) INTRAVENOUS
Status: DISCONTINUED | OUTPATIENT
Start: 2023-02-08 | End: 2023-02-08

## 2023-02-08 RX ORDER — DIPHENHYDRAMINE HCL 25 MG
25 CAPSULE ORAL EVERY 6 HOURS PRN
Status: DISCONTINUED | OUTPATIENT
Start: 2023-02-08 | End: 2023-02-09 | Stop reason: HOSPADM

## 2023-02-08 RX ORDER — PHENYLEPHRINE HCL IN 0.9% NACL 1 MG/10 ML
SYRINGE (ML) INTRAVENOUS
Status: DISCONTINUED | OUTPATIENT
Start: 2023-02-08 | End: 2023-02-08

## 2023-02-08 RX ORDER — ESMOLOL HYDROCHLORIDE 10 MG/ML
INJECTION INTRAVENOUS
Status: DISCONTINUED | OUTPATIENT
Start: 2023-02-08 | End: 2023-02-08

## 2023-02-08 RX ORDER — ONDANSETRON 2 MG/ML
4 INJECTION INTRAMUSCULAR; INTRAVENOUS DAILY PRN
Status: DISCONTINUED | OUTPATIENT
Start: 2023-02-08 | End: 2023-02-09 | Stop reason: HOSPADM

## 2023-02-08 RX ADMIN — PROPOFOL 50 MG: 10 INJECTION, EMULSION INTRAVENOUS at 07:02

## 2023-02-08 RX ADMIN — DEXAMETHASONE 4 MG: 4 TABLET ORAL at 08:02

## 2023-02-08 RX ADMIN — Medication 50 MCG: at 08:02

## 2023-02-08 RX ADMIN — Medication 100 MCG: at 07:02

## 2023-02-08 RX ADMIN — HYDROMORPHONE HYDROCHLORIDE 0.2 MG: 2 INJECTION, SOLUTION INTRAMUSCULAR; INTRAVENOUS; SUBCUTANEOUS at 09:02

## 2023-02-08 RX ADMIN — OXYCODONE 10 MG: 5 TABLET ORAL at 09:02

## 2023-02-08 RX ADMIN — LIDOCAINE HYDROCHLORIDE 80 MG: 20 INJECTION, SOLUTION EPIDURAL; INFILTRATION; INTRACAUDAL; PERINEURAL at 07:02

## 2023-02-08 RX ADMIN — MUPIROCIN: 20 OINTMENT TOPICAL at 05:02

## 2023-02-08 RX ADMIN — GLYCOPYRROLATE 0.2 MG: 0.2 INJECTION INTRAMUSCULAR; INTRAVENOUS at 07:02

## 2023-02-08 RX ADMIN — OXYCODONE 10 MG: 5 TABLET ORAL at 01:02

## 2023-02-08 RX ADMIN — HYDROMORPHONE HYDROCHLORIDE 0.4 MG: 2 INJECTION, SOLUTION INTRAMUSCULAR; INTRAVENOUS; SUBCUTANEOUS at 08:02

## 2023-02-08 RX ADMIN — DOCUSATE SODIUM 100 MG: 100 CAPSULE, LIQUID FILLED ORAL at 08:02

## 2023-02-08 RX ADMIN — SODIUM CHLORIDE, SODIUM GLUCONATE, SODIUM ACETATE, POTASSIUM CHLORIDE AND MAGNESIUM CHLORIDE: 526; 502; 368; 37; 30 INJECTION, SOLUTION INTRAVENOUS at 07:02

## 2023-02-08 RX ADMIN — MIDAZOLAM HYDROCHLORIDE 2 MG: 1 INJECTION, SOLUTION INTRAMUSCULAR; INTRAVENOUS at 07:02

## 2023-02-08 RX ADMIN — ONDANSETRON HYDROCHLORIDE 4 MG: 2 INJECTION, SOLUTION INTRAMUSCULAR; INTRAVENOUS at 07:02

## 2023-02-08 RX ADMIN — CEFAZOLIN SODIUM 2 G: 2 SOLUTION INTRAVENOUS at 11:02

## 2023-02-08 RX ADMIN — SUGAMMADEX 200 MG: 100 INJECTION, SOLUTION INTRAVENOUS at 08:02

## 2023-02-08 RX ADMIN — ROCURONIUM BROMIDE 40 MG: 50 INJECTION INTRAVENOUS at 07:02

## 2023-02-08 RX ADMIN — OXYCODONE 10 MG: 5 TABLET ORAL at 05:02

## 2023-02-08 RX ADMIN — HYDROCORTISONE SODIUM SUCCINATE 100 MG: 100 INJECTION, POWDER, FOR SOLUTION INTRAMUSCULAR; INTRAVENOUS at 07:02

## 2023-02-08 RX ADMIN — CEFAZOLIN SODIUM 2 G: 2 SOLUTION INTRAVENOUS at 03:02

## 2023-02-08 RX ADMIN — Medication 50 MCG: at 07:02

## 2023-02-08 RX ADMIN — ESMOLOL HYDROCHLORIDE 20 MG: 100 INJECTION, SOLUTION INTRAVENOUS at 07:02

## 2023-02-08 RX ADMIN — ACETAMINOPHEN 1000 MG: 10 INJECTION, SOLUTION INTRAVENOUS at 08:02

## 2023-02-08 RX ADMIN — SODIUM CHLORIDE: 9 INJECTION, SOLUTION INTRAVENOUS at 10:02

## 2023-02-08 RX ADMIN — ALPRAZOLAM 0.25 MG: 0.25 TABLET ORAL at 08:02

## 2023-02-08 RX ADMIN — ROCURONIUM BROMIDE 10 MG: 50 INJECTION INTRAVENOUS at 07:02

## 2023-02-08 RX ADMIN — DEXMEDETOMIDINE 10 MCG: 200 INJECTION, SOLUTION INTRAVENOUS at 07:02

## 2023-02-08 RX ADMIN — KETOROLAC TROMETHAMINE 30 MG: 30 INJECTION, SOLUTION INTRAMUSCULAR; INTRAVENOUS at 09:02

## 2023-02-08 RX ADMIN — PROPOFOL 150 MG: 10 INJECTION, EMULSION INTRAVENOUS at 07:02

## 2023-02-08 RX ADMIN — CEFAZOLIN SODIUM 2 G: 2 SOLUTION INTRAVENOUS at 07:02

## 2023-02-08 NOTE — NURSING
Nurses Note -- 4 Eyes      2/8/2023   2:06 PM      Skin assessed during: Admit      [x] No Pressure Injuries Present    []Prevention Measures Documented      [] Yes- Altered Skin Integrity Present or Discovered   [] LDA Added if Not in Epic (Describe Wound)   [] New Altered Skin Integrity was Present on Admit and Documented in LDA   [] Wound Image Taken    Wound Care Consulted? No    Attending Nurse:  Elizabeth Puri RN     Second RN/Staff Member:  Melissa Wang RN

## 2023-02-08 NOTE — OP NOTE
[facility]                       [address] [place]                      [place] [address]    PATIENT NAME:      SREEDHAR REYES  YOB: 1960  CSN:               [id number]  MRN:               [id number]  ADMIT DATE:        02/08/2023 05:10:00  PHYSICIAN:         Richard Bolanos MD                          OPERATIVE REPORT      DATE OF SURGERY:    02/08/2023 00:00:00    SURGEON:  Richard Bolanos MD    PREOPERATIVE DIAGNOSIS:  Left proximal femur metastatic bone lesion.    POSTOPERATIVE DIAGNOSIS:  Left proximal femur metastatic bone lesion.    PROCEDURE:    1. Prophylactic intramedullary nailing, left femur.  2. Biopsy bone, proximal femur.    ANESTHESIA:  General.    ESTIMATED BLOOD LOSS:  100 cc.    ASSISTANT:  Apple Mejia nurse practitioner, necessary for a skilled set of   hands to assist with reduction of the fracture as well as application of   hardware and deep closure.    IMPLANTS:  Synthes TFNA 10 x 380 mm, 125 degree with a single distal   interlocking screw.    COMPLICATIONS:  None.    COUNTS:  All counts correct x2 at the end of the case.    INDICATIONS FOR PROCEDURE:  Ms Reyes is a 63-year-old female with a history of   metastatic adrenal carcinoma.  She had multiple lesions.  She has been followed   by Hem/Onc and Radiation Oncology.  She has had radiation therapy for lesions   noted on PET scans.  She has had the pain in bilateral lower extremities.  No   specific pain in the left hip; however, recent MRI did show a lesion greater   than 50% of the width of her left femoral neck in the superior basicervical   region, putting her at high risk for developing a periprosthetic femur fracture.    The risks and benefits of treatment were discussed at length with the patient   and her , and she is brought to the operating room today for prophylactic   intramedullary nailing and biopsy of this lesion.    PROCEDURE IN  DETAIL:  After informed consent was obtained, the patient was met   in the preoperative holding area and her site was marked.  She was taken to the   operating room.  She was placed supine on the operating table.  General   anesthesia was induced.  All bony prominences were well padded.  Preoperative   antibiotics were given.  Her left lower extremity was prepped and draped in a   standard sterile fashion.  A time-out was done indicating correct operative limb   and procedure.  Incision was made over the lateral aspect of the hip.  It was   carried down to the tip of the greater trochanter and starting point for   trochanteric entry nail was obtained.  The opening reamer was passed.  The   opening reamer went right through the lesion in her proximal femur.  We were   able to take a good core reaming from this area.  This was then collected and   sent to Pathology for permanent pathology.  The ball-tipped guidewire was placed   centered within the canal.  It was confirmed to be in appropriate position on AP   and lateral imaging.  The length was measured.  It was reamed up to 11 mm and a   10 mm nail was malleted into position.  The lag screw for a cephalomedullary   device was placed centered within the femoral neck and head.  It was confirmed   to be appropriate on AP and lateral imaging.  Set screw was tightened.  A single   distal interlocking screw was placed in a perfect [place] technique.  All the   hardware was confirmed to be in appropriate position in AP and lateral imaging.    Wounds were thoroughly irrigated and closed using #1 Vicryl, 2-0 Monocryl, and   staples.  Xeroform and island dressings were applied.  The patient was awakened,   extubated, and taken to recovery in stable condition.    POSTOPERATIVE PLAN:  She will be admitted to the floor.  She can weightbear as   tolerated on left lower extremity.  Full range of motion left lower extremity.    Plan for discharge home  mikael.        ______________________________  MD ANIKA Ellington/NICOLÁS  DD:  02/08/2023  Time:  08:32AM  DT:  02/08/2023  Time:  08:47AM  Job #:  [id number]      OPERATIVE REPORT

## 2023-02-08 NOTE — INTERVAL H&P NOTE
I have seen the patient and reviewed this note, and there is no change in history and physical since the last exam.    Richard Bolanos MD

## 2023-02-08 NOTE — ANESTHESIA PROCEDURE NOTES
Intubation    Date/Time: 2/8/2023 7:28 AM  Performed by: Isaiah Munoz CRNA  Authorized by: Wiley Cervantes MD     Intubation:     Induction:  Intravenous    Intubated:  Postinduction    Mask Ventilation:  Easy mask    Attempts:  1    Attempted By:  CRNA    Method of Intubation:  Direct and video laryngoscopy    Blade:  Arambula 3    Laryngeal View Grade: Grade I - full view of cords      Difficult Airway Encountered?: No      Complications:  None    Airway Device:  Oral endotracheal tube    Airway Device Size:  7.0    Style/Cuff Inflation:  Cuffed (inflated to minimal occlusive pressure)    Inflation Amount (mL):  6    Tube secured:  22    Secured at:  The lips    Placement Verified By:  Capnometry    Complicating Factors:  None    Findings Post-Intubation:  BS equal bilateral and atraumatic/condition of teeth unchanged

## 2023-02-08 NOTE — TRANSFER OF CARE
Anesthesia Transfer of Care Note    Patient: Shreya Reyes    Procedure(s) Performed: Procedure(s) (LRB):  INSERTION, INTRAMEDULLARY LEELEE, LEFT FEMUR (Left)    Patient location: PACU    Anesthesia Type: general    Transport from OR: Transported from OR on room air with adequate spontaneous ventilation    Post pain: adequate analgesia    Post assessment: no apparent anesthetic complications and tolerated procedure well    Post vital signs: stable    Level of consciousness: responds to stimulation    Nausea/Vomiting: no nausea/vomiting    Complications: none    Transfer of care protocol was followed      Last vitals:   Visit Vitals  /71   Pulse 72   Temp 36.6 °C (97.8 °F) (Oral)   Resp 16   SpO2 99%

## 2023-02-08 NOTE — PT/OT/SLP EVAL
"Occupational Therapy   Evaluation and Discharge Note    Name: Shreya Reyes  MRN: [id number]  Admitting Diagnosis: metastatic adrenal carcinoma with proximal femur metastatic bone lesion s/p L femur IMN and bone biopsy   Recent Surgery: Procedure(s) (LRB):  INSERTION, INTRAMEDULLARY LEELEE, LEFT FEMUR (Left) Day of Surgery    Recommendations:     Discharge Recommendations: home  Discharge Equipment Recommendations:   Barriers to discharge:      Assessment:     Shreya Reyes is a 63 y.o. female with a medical diagnosis of metastatic adrenal carcinoma with proximal femur metastatic bone lesion s/p L femur IMN and bone biopsy. At this time, patient is functioning at their prior level of function and does not require further acute OT services.   Please reconsult for any acute changes.    Plan:     During this hospitalization, patient does not require further acute OT services.  Please re-consult if situation changes.    Plan of Care Reviewed with: patient, spouse    Subjective     Chief Complaint: slight pain, states bearable to participate  Patient/Family Comments/goals: to go home    Occupational Profile:  Living Environment: Pt lives with  in [facility], 2 steps to enter. Walk in shower with shower chair.  Previous level of function: Independent in all ADLs  Roles and Routines: wife, sister  Equipment Used at home: crutches (scooter), shower chair  Assistance upon Discharge: family    Pain/Comfort:  Pain Rating 1:  (does not objectively state pain; "bearable for movement")  Pain Addressed 1: Pre-medicate for activity, Reposition    Patients cultural, spiritual, Rastafarian conflicts given the current situation: no    Objective:     Communicated with: NSG prior to session.  Patient found up in chair with PIV upon OT entry to room.    General Precautions: Standard,    Orthopedic Precautions:  (L LE weightbearing as tolerated; ROMAT)  Braces:    Respiratory Status: Room air     Occupational Performance:    Bed Mobility:  "   Patient completed Supine to Sit with stand by assistance    Functional Mobility/Transfers:  Patient completed Sit <> Stand Transfer with stand by assistance  with  rolling walker   Patient completed Toilet Transfer Step Transfer technique with stand by assistance with  rolling walker  Functional Mobility: Pt ambulated bed > toilet and in hallway with PT CGA, with RW    Activities of Daily Living:  Grooming: independence to wash face  Lower Body Dressing: independence doffing/donning socks  Toileting: stand by assistance to wipe anteriorly    Cognitive/Visual Perceptual:  Cognitive/Psychosocial Skills:     -       Oriented to: Person, Place, Time, and Situation   -       Follows Commands/attention:Follows two-step commands  -       Safety awareness/insight to disability: intact     Physical Exam:  Sensation:    -       Intact  light/touch bilateral upper extremities  Upper Extremity Strength:    -       Right Upper Extremity: WFL  -       Left Upper Extremity: WFL   Strength:    -       Right Upper Extremity: WFL  -       Left Upper Extremity: WFL  Fine Motor Coordination:    -       Intact  Left hand, finger to nose and Right hand, finger to nose    Treatment & Education:  Pt currently functioning independently in self care tasks; deferring mobility needs to PT at this time. Updated on d/c recs and continued OOB activities.    Patient left up in chair with all lines intact, call button in reach, NSG notified, and spouse present    GOALS:   Multidisciplinary Problems       Occupational Therapy Goals       Not on file                    History:     Past Medical History:   Diagnosis Date    Adrenal cancer     Closed compression fracture of L3 lumbar vertebra, sequela 5/4/2022    Closed wedge compression fracture of T11 vertebra 5/4/2022    Compression fracture of L1 vertebra     Compression fracture of L3 vertebra     Elevated liver enzymes 11/02/2021    Hyperlipidemia     Hypertension 05/04/2022    Osteoporosis      Thoracic compression fracture          Past Surgical History:   Procedure Laterality Date    ABDOMINOPLASTY      ADENOIDECTOMY  Karely     ADRENALECTOMY Left     EYE SURGERY  2021    FRACTURE SURGERY      L1 kyphoplasty w/ spine barbara, L3 kyphoplasty      Laproscopy for excision of adrenal mass      left cataract Left     MAGNETIC RESONANCE IMAGING N/A 07/13/2022    Procedure: MRI (MAGNETIC RESONANCE IMAGING);  Surgeon: Nam Sandoval MD;  Location: [facility];  Service: Neurosurgery;  Laterality: N/A;  MRI CERVICAL SPINE WITHOUT CONTRAST WITH ANESTHESIA    MINIMALLY INVASIVE FORAMINOTOMY CERVICAL SPINE Right 07/13/2022    Procedure: FORAMINOTOMY, SPINE, CERVICAL, MINIMALLY INVASIVE;  Surgeon: Nam Sandoval MD;  Location: [facility];  Service: Neurosurgery;  Laterality: Right;  right C5-6, C6-7 posterior foraminotomy    ORIF TIBIA & FIBULA FRACTURES Left     SPINE SURGERY      TONSILLECTOMY         Time Tracking:     OT Date of Treatment:    OT Start Time: 1406  OT Stop Time: 1420  OT Total Time (min): 14 min    Billable Minutes:Evaluation mod    2/8/2023

## 2023-02-08 NOTE — PLAN OF CARE
Problem: Physical Therapy  Goal: Physical Therapy Goal  Description: Goals to be met by: 3/8/2023     Patient will increase functional independence with mobility by performin. Sit to stand transfer with Modified [place]  2. Bed to chair transfer with Modified [place] using Rolling Walker  3. Gait  x 200 feet with Modified [place] using Rolling Walker.   4. Ascend/descend 2 stair with bilateral Handrails Supervision.     Outcome: Ongoing, Progressing

## 2023-02-08 NOTE — ANESTHESIA PREPROCEDURE EVALUATION
"                                                                                                             02/08/2023  Shreya Reyes is a 63 y.o., female with metastatic adrenal carcinoma with some metastasis to bone and resultant vertebral compression fractures.  She also has a lesion at femoral neck on left, and is here today for prophylactic placement of IM oneil at the site.  She has had a number of recent anesthetics in the process of dealing with these fractures, and she has done well.  She denies cardiopulmonary complaints.    "   Left Femur - Follow-up       Met lesion left femoral neck. Complains of pain worsening at night. Ambulates without assistance.         Patient is here today for initial evaluation of lesion noted on MRI of the pelvis within the left basicervical superior aspect of the femoral neck.  She is a history of metastatic adrenal carcinoma that has been treated with radiation which she has completed recently.  She has had pain in both of her limbs not specific to the left hip or groin however she does have lesions in her spine which have been symptomatic in the past.  She states that the left leg is no worse than it has been over the last several weeks.  She is here today to discuss prophylactic intramedullary nailing due to the size and location of the lesion her left femoral neck."        Pre-op Assessment    I have reviewed the Patient Summary Reports.     I have reviewed the Nursing Notes. I have reviewed the NPO Status.   I have reviewed the Medications.     Review of Systems  Anesthesia Hx:  Denies Family Hx of Anesthesia complications.   Denies Personal Hx of Anesthesia complications.   Hematology/Oncology:        Current/Recent Cancer.   Cardiovascular:   Hypertension    Pulmonary:  Pulmonary Normal    Musculoskeletal:   Arthritis   Spine Disorders:    Neurological:   Neuromuscular Disease,        Physical Exam  General: Well nourished, Cooperative, Alert and " Oriented    Airway:  Mallampati: II   Mouth Opening: Normal  TM Distance: Normal  Tongue: Normal  Neck ROM: Normal ROM    Dental:  Intact    Chest/Lungs:  Clear to auscultation, Normal Respiratory Rate    Heart:  Rate: Normal  Rhythm: Regular Rhythm        Anesthesia Plan  Type of Anesthesia, risks & benefits discussed:    Anesthesia Type: Gen ETT  Intra-op Monitoring Plan: Standard ASA Monitors  Post Op Pain Control Plan: multimodal analgesia  Induction:  IV  Airway Plan: Direct, Post-Induction  Informed Consent: Informed consent signed with the Patient and all parties understand the risks and agree with anesthesia plan.  All questions answered.   ASA Score: 3  Day of Surgery Review of History & Physical: H&P Update referred to the surgeon/provider.  Anesthesia Plan Notes: Stress dose steroids    Ready For Surgery From Anesthesia Perspective.     .

## 2023-02-08 NOTE — PT/OT/SLP EVAL
Physical Therapy Evaluation    Patient Name:  Shreya Reyes   MRN:  [id number]    Recommendations:     Discharge Recommendations: home, outpatient PT as per ortho recs  Discharge Equipment Recommendations: walker, rolling   Barriers to discharge:  impaired mobility post-op    Assessment:     Shreya Reyes is a 63 y.o. female admitted with a medical diagnosis of metastatic adrenal carcinoma with proximal femur metastatic bone lesion s/p L femur IMN and bone biopsy .  She presents with the following impairments/functional limitations: weakness, decreased lower extremity function, pain. Patient tolerated PT evaluation well. Patient able to perform bed mobility independently. Patient transitioned sit<>stand with RW and CGA. Patient ambulated in room x20ft with transfer to and from toilet, RW with CGA. Patient ambulated in hallway x150ft with RW and step-through gait pattern. Patient ambulated with good balance, step length, sapphire, and no LOB noted. Patient educated on B ankle pumps while in bed and seated in chair to decrease risk of post-op DVT. Patient and spouse also educated on importance of LLE ROMAT to decrease stiffness and maintain range. Patient is appropriate for skilled acute PT to increase endurance, LLE strength, and independence with ambulation. PT recommending home upon discharge.     Rehab Prognosis: Good; patient would benefit from acute skilled PT services to address these deficits and reach maximum level of function.    Recent Surgery: Procedure(s) (LRB):  INSERTION, INTRAMEDULLARY LEELEE, LEFT FEMUR (Left) Day of Surgery    Plan:     During this hospitalization, patient to be seen 6 x/week to address the identified rehab impairments via gait training, therapeutic activities, therapeutic exercises, neuromuscular re-education and progress toward the following goals:    Plan of Care Expires:  03/08/23    Subjective     Chief Complaint: LLE pain  Patient/Family Comments/goals: to go  home  Pain/Comfort:  Pain Rating 1: other (see comments) (pre-medicated for LLE pain, didn't objectively state)    Patients cultural, spiritual, Rastafari conflicts given the current situation:      Living Environment:  Patient lives in a single story home with  with 2 steps inside home.   Prior to admission, patients level of function was independent.  Equipment used at home: none.  DME owned (not currently used): none.  Upon discharge, patient will have assistance from self and .    Objective:     Communicated with RN prior to session.  Patient found HOB elevated with peripheral IV  upon PT entry to room.    General Precautions: Standard, fall  Orthopedic Precautions:LLE weight bearing as tolerated, full ROM  Braces: N/A  Respiratory Status: Room air    Exams:  Sensation: -       Intact  RLE ROM: WNL  RLE Strength: WNL  LLE ROM: WNL  LLE Strength: against gravity motion noted, no resistance added due to surgical site    Functional Mobility:  Bed Mobility:  Supine to Sit: contact guard assistance  Transfers:  Sit to Stand:  contact guard assistance with rolling walker  Toilet Transfer: contact guard assistance with  rolling walker  using  Stand Pivot  Gait: x150ft with RW and CGA      AM-PAC 6 CLICK MOBILITY  Total Score:21     Patient left up in chair with all lines intact, call button in reach, RN notified, and  present.    GOALS:   Multidisciplinary Problems       Physical Therapy Goals          Problem: Physical Therapy    Goal Priority Disciplines Outcome Goal Variances Interventions   Physical Therapy Goal    High PT, PT/OT Ongoing, Progressing     Description: Goals to be met by: 3/8/2023     Patient will increase functional independence with mobility by performin. Sit to stand transfer with Modified [place]  2. Bed to chair transfer with Modified [place] using Rolling Walker  3. Gait  x 200 feet with Modified [place] using Rolling Walker.   4. Ascend/descend 2  stair with bilateral Handrails Supervision.                          History:     Past Medical History:   Diagnosis Date    Adrenal cancer     Closed compression fracture of L3 lumbar vertebra, sequela 5/4/2022    Closed wedge compression fracture of T11 vertebra 5/4/2022    Compression fracture of L1 vertebra     Compression fracture of L3 vertebra     Elevated liver enzymes 11/02/2021    Hyperlipidemia     Hypertension 05/04/2022    Osteoporosis     Thoracic compression fracture        Past Surgical History:   Procedure Laterality Date    ABDOMINOPLASTY      ADENOIDECTOMY  K[place]ly     ADRENALECTOMY Left     EYE SURGERY  2021    FRACTURE SURGERY      L1 kyphoplasty w/ spine barbara, L3 kyphoplasty      Laproscopy for excision of adrenal mass      left cataract Left     MAGNETIC RESONANCE IMAGING N/A 07/13/2022    Procedure: MRI (MAGNETIC RESONANCE IMAGING);  Surgeon: Nam Sandoval MD;  Location: [facility];  Service: Neurosurgery;  Laterality: N/A;  MRI CERVICAL SPINE WITHOUT CONTRAST WITH ANESTHESIA    MINIMALLY INVASIVE FORAMINOTOMY CERVICAL SPINE Right 07/13/2022    Procedure: FORAMINOTOMY, SPINE, CERVICAL, MINIMALLY INVASIVE;  Surgeon: Nam Sandoval MD;  Location: [facility];  Service: Neurosurgery;  Laterality: Right;  right C5-6, C6-7 posterior foraminotomy    ORIF TIBIA & FIBULA FRACTURES Left     SPINE SURGERY      TONSILLECTOMY         Time Tracking:     PT Received On: 02/08/23  PT Start Time: 1406     PT Stop Time: 1420  PT Total Time (min): 14 min     Billable Minutes: Evaluation moderate complexity     Note signed by supervising PT: Diana Rincon, PT.       02/08/2023

## 2023-02-08 NOTE — BRIEF OP NOTE
[facility] - Periop Services  Brief Operative Note    SUMMARY     Surgery Date: 2/8/2023     Surgeon(s) and Role:     * Richard Bolanos MD - Primary    Assisting Surgeon: None    Pre-op Diagnosis:  Other fracture of head and neck of left femur, initial encounter for closed fracture [S72.092A]  Multiple lesions of metastatic malignancy [C79.9]    Post-op Diagnosis:  Post-Op Diagnosis Codes:     * Other fracture of head and neck of left femur, initial encounter for closed fracture [S72.092A]     * Multiple lesions of metastatic malignancy [C79.9]    Procedure(s) (LRB):  INSERTION, INTRAMEDULLARY LEELEE, LEFT FEMUR (Left)  Biopsy bone, deep- Left proximal femur    Anesthesia: General    Operative Findings: See op report    Estimated Blood Loss: 100mL    Estimated Blood Loss has been documented.         Specimens:   Specimen (24h ago, onward)       Start     Ordered    02/08/23 0805  Specimen to Pathology  RELEASE UPON ORDERING        References:    Click here for ordering Quick Tip   Question:  Release to patient  Answer:  Immediate    02/08/23 0805                    [id number]  A/P: Tolerated procedure well. Admit to floor. WBAT to LLE. Full ROM LLE. Core reamings from proximal femur sent for permanent path. WBAT to LLE. Lovenox for DVT ppx while in house.       Richard Bolanos MD  Orthopedic Trauma  [facility]

## 2023-02-09 VITALS
RESPIRATION RATE: 19 BRPM | HEART RATE: 78 BPM | HEIGHT: 65 IN | DIASTOLIC BLOOD PRESSURE: 72 MMHG | BODY MASS INDEX: 19.66 KG/M2 | TEMPERATURE: 98 F | OXYGEN SATURATION: 98 % | WEIGHT: 118 LBS | SYSTOLIC BLOOD PRESSURE: 115 MMHG

## 2023-02-09 PROBLEM — M84.452A: Status: ACTIVE | Noted: 2023-02-09

## 2023-02-09 LAB
ANION GAP SERPL CALC-SCNC: 6 MEQ/L
BASOPHILS # BLD AUTO: 0.03 X10(3)/MCL (ref 0–0.2)
BASOPHILS NFR BLD AUTO: 0.9 %
BUN SERPL-MCNC: 11.7 MG/DL (ref 9.8–20.1)
CALCIUM SERPL-MCNC: 7.2 MG/DL (ref 8.4–10.2)
CHLORIDE SERPL-SCNC: 110 MMOL/L (ref 98–107)
CO2 SERPL-SCNC: 25 MMOL/L (ref 23–31)
CREAT SERPL-MCNC: 0.54 MG/DL (ref 0.55–1.02)
CREAT/UREA NIT SERPL: 22
EOSINOPHIL # BLD AUTO: 0.07 X10(3)/MCL (ref 0–0.9)
EOSINOPHIL NFR BLD AUTO: 2 %
ERYTHROCYTE [DISTWIDTH] IN BLOOD BY AUTOMATED COUNT: 13.4 % (ref 11.5–17)
GFR SERPLBLD CREATININE-BSD FMLA CKD-EPI: >60 MLS/MIN/1.73/M2
GLUCOSE SERPL-MCNC: 83 MG/DL (ref 82–115)
HCT VFR BLD AUTO: 26.8 % (ref 37–47)
HGB BLD-MCNC: 8.6 GM/DL (ref 12–16)
IMM GRANULOCYTES # BLD AUTO: 0.02 X10(3)/MCL (ref 0–0.04)
IMM GRANULOCYTES NFR BLD AUTO: 0.6 %
LYMPHOCYTES # BLD AUTO: 0.45 X10(3)/MCL (ref 0.6–4.6)
LYMPHOCYTES NFR BLD AUTO: 13 %
MCH RBC QN AUTO: 32 PG
MCHC RBC AUTO-ENTMCNC: 32.1 MG/DL (ref 33–36)
MCV RBC AUTO: 99.6 FL (ref 80–94)
MITOTANE SERPL-MCNC: 12.7 UG/ML
MONOCYTES # BLD AUTO: 0.53 X10(3)/MCL (ref 0.1–1.3)
MONOCYTES NFR BLD AUTO: 15.4 %
NEUTROPHILS # BLD AUTO: 2.35 X10(3)/MCL (ref 2.1–9.2)
NEUTROPHILS NFR BLD AUTO: 68.1 %
NRBC BLD AUTO-RTO: 0 %
PLATELET # BLD AUTO: 185 X10(3)/MCL (ref 130–400)
PMV BLD AUTO: 10.9 FL (ref 7.4–10.4)
POTASSIUM SERPL-SCNC: 4 MMOL/L (ref 3.5–5.1)
PSYCHE PATHOLOGY RESULT: NORMAL
RBC # BLD AUTO: 2.69 X10(6)/MCL (ref 4.2–5.4)
SODIUM SERPL-SCNC: 141 MMOL/L (ref 136–145)
WBC # SPEC AUTO: 3.5 X10(3)/MCL (ref 4.5–11.5)

## 2023-02-09 PROCEDURE — 25000003 PHARM REV CODE 250: Performed by: NURSE PRACTITIONER

## 2023-02-09 PROCEDURE — 96372 THER/PROPH/DIAG INJ SC/IM: CPT | Performed by: NURSE PRACTITIONER

## 2023-02-09 PROCEDURE — G0378 HOSPITAL OBSERVATION PER HR: HCPCS

## 2023-02-09 PROCEDURE — 85025 COMPLETE CBC W/AUTO DIFF WBC: CPT | Performed by: NURSE PRACTITIONER

## 2023-02-09 PROCEDURE — 63600175 PHARM REV CODE 636 W HCPCS: Performed by: NURSE PRACTITIONER

## 2023-02-09 PROCEDURE — 80048 BASIC METABOLIC PNL TOTAL CA: CPT | Performed by: NURSE PRACTITIONER

## 2023-02-09 PROCEDURE — 97530 THERAPEUTIC ACTIVITIES: CPT

## 2023-02-09 RX ORDER — ASPIRIN 81 MG/1
81 TABLET ORAL 2 TIMES DAILY
Qty: 60 TABLET | Refills: 0 | Status: SHIPPED | OUTPATIENT
Start: 2023-02-09 | End: 2023-03-03

## 2023-02-09 RX ORDER — ENOXAPARIN SODIUM 100 MG/ML
40 INJECTION SUBCUTANEOUS DAILY
Qty: 5.6 ML | Refills: 0 | Status: SHIPPED | OUTPATIENT
Start: 2023-02-09 | End: 2023-02-23

## 2023-02-09 RX ORDER — KETOROLAC TROMETHAMINE 10 MG/1
10 TABLET, FILM COATED ORAL EVERY 6 HOURS PRN
Qty: 20 TABLET | Refills: 0 | Status: SHIPPED | OUTPATIENT
Start: 2023-02-09 | End: 2023-02-16

## 2023-02-09 RX ORDER — OXYCODONE HYDROCHLORIDE 10 MG/1
10 TABLET ORAL EVERY 4 HOURS PRN
Qty: 42 TABLET | Refills: 0 | Status: SHIPPED | OUTPATIENT
Start: 2023-02-09 | End: 2023-02-16

## 2023-02-09 RX ADMIN — OXYCODONE 10 MG: 5 TABLET ORAL at 02:02

## 2023-02-09 RX ADMIN — CALCIUM CARBONATE (ANTACID) CHEW TAB 500 MG 500 MG: 500 CHEW TAB at 08:02

## 2023-02-09 RX ADMIN — DEXAMETHASONE 4 MG: 4 TABLET ORAL at 08:02

## 2023-02-09 RX ADMIN — LOSARTAN POTASSIUM 50 MG: 50 TABLET, FILM COATED ORAL at 08:02

## 2023-02-09 RX ADMIN — CEFAZOLIN SODIUM 2 G: 2 SOLUTION INTRAVENOUS at 06:02

## 2023-02-09 RX ADMIN — CHOLECALCIFEROL (VITAMIN D3) 10 MCG (400 UNIT) TABLET 5200 UNITS: at 08:02

## 2023-02-09 RX ADMIN — LEVOTHYROXINE SODIUM 75 MCG: 25 TABLET ORAL at 08:02

## 2023-02-09 RX ADMIN — DOCUSATE SODIUM 100 MG: 100 CAPSULE, LIQUID FILLED ORAL at 08:02

## 2023-02-09 RX ADMIN — OXYCODONE 10 MG: 5 TABLET ORAL at 06:02

## 2023-02-09 RX ADMIN — ENOXAPARIN SODIUM 40 MG: 40 INJECTION SUBCUTANEOUS at 08:02

## 2023-02-09 NOTE — DISCHARGE SUMMARY
[facility]  Discharge Summary      Patient Name: Shreya Reyes  MRN: [id number]  Admission Date: 2/8/2023  Hospital Length of Stay: 1 days  Discharge Date and Time: 2/9/2023  8:48 AM  Attending Physician: No att. providers found   Discharging Provider: SHILPI Davis  Primary Care Provider: Robert Arzate MD    HPI/hospital course:  Ms Reyes is a 63-year-old female with a history of metastatic adrenal carcinoma.  She had multiple lesions. She has been followed by Hem/Onc and Radiation Oncology.  She has had radiation therapy for lesions noted on PET scans.  She has had the pain in bilateral lower extremities.  No specific pain in the left hip; however, recent MRI did show a lesion greater   than 50% of the width of her left femoral neck in the superior basicervical region, putting her at high risk for developing a periprosthetic femur fracture. She was evaluated in the clinic by Dr. Bolanos and The risks and benefits of treatment were discussed at length with the patient and her , and she was brought to the operating room for prophylactic intramedullary nailing and biopsy of the lesion. Surgery proceeded as planned and patient tolerated well. She was admitted overnight for pain control and observation. She progressed as planned and was discharged home on POD 1.     Procedure(s) (LRB):  INSERTION, INTRAMEDULLARY LEELEE, LEFT FEMUR (Left)              Significant Diagnostic Studies: post op xray L femur with good alignment and hardware intact    Pending Diagnostic Studies:       None          Final Active Diagnoses:    Diagnosis Date Noted POA    PRINCIPAL PROBLEM:  Pathologic fracture of femoral neck, left, initial encounter [M84.452A] 02/09/2023 Yes      Problems Resolved During this Admission:      Discharged Condition: stable    Disposition: Home or Self Care    Follow Up:   Follow-up Information       Richard Bolanos MD Follow up on 2/27/2023.    Specialty:  "Orthopedic Surgery  Why: For suture removal, For wound re-check  time of appointment: @ 10:00 am  Contact information:  [address] [facility] [address] [phone]                           Patient Instructions:      WALKER FOR HOME USE     Order Specific Question Answer Comments   Type of Walker: Adult (5'4"-6'6")    With wheels? Yes    Height: 5' 5" (1.651 m)    Weight: 53.5 kg (118 lb)    Length of need (1-99 months): 99    Does patient have medical equipment at home? crutches scooter   Please check all that apply: Patient's condition impairs ambulation.    Please check all that apply: Patient is unable to safely ambulate without equipment.      Diet Adult Regular     Notify your health care provider if you experience any of the following:  temperature >100.4     Notify your health care provider if you experience any of the following:  severe uncontrolled pain     Notify your health care provider if you experience any of the following:  redness, tenderness, or signs of infection (pain, swelling, redness, odor or green/yellow discharge around incision site)     Remove dressing in 24 hours     Change dressing (specify)   Order Comments: Dressing change: 1 time per day using dry dressing.     Activity as tolerated     Medications:  Reconciled Home Medications:      Medication List        START taking these medications      aspirin 81 MG EC tablet  Commonly known as: ECOTRIN  Take 1 tablet (81 mg total) by mouth 2 (two) times a day.     enoxaparin 40 mg/0.4 mL Syrg  Commonly known as: LOVENOX  Inject 0.4 mLs (40 mg total) into the skin once daily. for 14 days     ketorolac 10 mg tablet  Commonly known as: TORADOL  Take 1 tablet (10 mg total) by mouth every 6 (six) hours as needed for Pain.            CONTINUE taking these medications      ALPRAZolam 0.25 MG tablet  Commonly known as: XANAX  Take 1 tablet (0.25 mg total) by mouth every 8 (eight) hours as needed for Anxiety.     calcium carbonate 600 mg " calcium (1,500 mg) Tab  Commonly known as: OS-ORAL  Take 600 mg by mouth 2 (two) times daily.     cholecalciferol (vitamin D3) 125 mcg (5,000 unit) capsule  Take 250 mcg by mouth.     levothyroxine 75 MCG tablet  Commonly known as: SYNTHROID  Take 1 tablet by mouth once daily.     losartan 50 MG tablet  Commonly known as: COZAAR  Take 1 tablet by mouth once daily.     mitotane 500 mg tablet  Commonly known as: LYSODREN  Take 1,000 mg by mouth once daily.     oxyCODONE 10 mg Tab immediate release tablet  Commonly known as: ROXICODONE  Take 1 tablet (10 mg total) by mouth every 4 (four) hours as needed for Pain.            STOP taking these medications      HYDROcodone-acetaminophen 7.5-325 mg per tablet  Commonly known as: NORCO     ibuprofen 800 MG tablet  Commonly known as: MELIDA HUTCHINS            ASK your doctor about these medications      dexAMETHasone 4 MG Tab  Commonly known as: DECADRON  Take 1 tablet (4 mg total) by mouth every 6 (six) hours.              SHILPI Davis  Orthopedics  [facility] - Ortho Neuro

## 2023-02-09 NOTE — NURSING
Patient and Dr. Reyes bedside for discharge teaching. Patient prescriptions were printed and reviewed with patient. Daily wound care wound discussed with patient, extra dressings were given to patient. Follow up appointments were made and patient understands the importance of attending and when to call MD with any questions or concerns. Patient and spouse had not further questions were asked.Iv was removed without complications. Patient stable upon discharge.

## 2023-02-09 NOTE — PROGRESS NOTES
[facility]  Orthopedics  Progress Note    Patient Name: Shreya Reyes  MRN: [id number]  Admission Date: 2/8/2023  Hospital Length of Stay: 1 days  Attending Provider: Richard Bolanos MD  Primary Care Provider: Robert Arzate MD  Follow-up For: Procedure(s) (LRB):  INSERTION, INTRAMEDULLARY LEELEE, LEFT FEMUR (Left)    Post-Operative Day: 1 Day Post-Op  Subjective:     Principal Problem:Pathologic fracture of femoral neck, left, initial encounter    Principal Orthopedic Problem: same    Interval History: POD 1 IMN L femur. No acute events overnight. Pain is well controlled with PO meds. States she is mobilizing well with walker. Tolerating diet. Voiding. No new complaints/issues this morning.     Review of patient's allergies indicates:  No Known Allergies    Current Facility-Administered Medications   Medication    0.9%  NaCl infusion    acetaminophen 1,000 mg/100 mL (10 mg/mL) injection 1,000 mg    acetaminophen tablet 650 mg    ALPRAZolam tablet 0.25 mg    aluminum-magnesium hydroxide-simethicone 200-200-20 mg/5 mL suspension 30 mL    bisacodyL suppository 10 mg    calcium carbonate 200 mg calcium (500 mg) chewable tablet 500 mg    cholecalciferol (vitamin D3) capsule/tablet 5,200 Units    dexAMETHasone tablet 4 mg    diphenhydrAMINE capsule 25 mg    diphenhydrAMINE injection 25 mg    docusate sodium capsule 100 mg    electrolyte-A infusion    enoxaparin injection 40 mg    ketorolac injection 30 mg    levothyroxine tablet 75 mcg    LIDOcaine (PF) 10 mg/ml (1%) injection 10 mg    losartan tablet 50 mg    methocarbamoL tablet 750 mg    mitotane (LYSODREN) tablet 1,000 mg    mupirocin 2 % ointment    ondansetron 4 mg/5 mL solution 4 mg    ondansetron injection 4 mg    oxyCODONE immediate release tablet 10 mg    polyethylene glycol packet 17 g     Objective:     Vital Signs (Most Recent):  Temp: 98 °F (36.7 °C) (02/09/23 0743)  Pulse: 78 (02/09/23 0743)  Resp: 19 (02/09/23 0743)  BP: (P) 115/72  "(02/09/23 0828)  SpO2: 98 % (02/09/23 0743) Vital Signs (24h Range):  Temp:  [97 °F (36.1 °C)-98 °F (36.7 °C)] 98 °F (36.7 °C)  Pulse:  [61-81] 78  Resp:  [12-20] 19  SpO2:  [96 %-100 %] 98 %  BP: (110-158)/(61-75) (P) 115/72     Weight: 53.5 kg (118 lb)  Height: 5' 5" (165.1 cm)  Body mass index is 19.64 kg/m².      Intake/Output Summary (Last 24 hours) at 2/9/2023 0833  Last data filed at 2/9/2023 0752  Gross per 24 hour   Intake 1380 ml   Output --   Net 1380 ml       Ortho/SPM Exam  General: AAO. Sitting up in chair eating breakfast. Well nourished, well perfused, no distress.     L LE:  Dressings with spots of sanguinous drainage  Tolerates passive ROM to hip and knee  Thigh is soft and compressible  No calf tenderness  5/5 strength with dorsi/plantar flexion  BCR distally  Palpable dp pulse  SLT intact distally    Significant Labs: BMP:   Recent Labs   Lab 02/09/23  0530      K 4.0   CO2 25   BUN 11.7   CREATININE 0.54*   CALCIUM 7.2*     CBC:   Recent Labs   Lab 02/09/23  0530   WBC 3.5*   HGB 8.6*   HCT 26.8*        All pertinent labs within the past 24 hours have been reviewed.    Significant Imaging:  post op xray L femur with good alignment and all hardware intact    Assessment/Plan:     Active Diagnoses:    Diagnosis Date Noted POA    PRINCIPAL PROBLEM:  Pathologic fracture of femoral neck, left, initial encounter [M84.452A] 02/09/2023 Unknown      Problems Resolved During this Admission:     Pt is doing well this morning s/p IMN L femur. H&H down slightly as expected post op. Pt asymptomatic with stable VS. Continue PT this morning. WBAT L LE. Full ROM. D/c home after PT. Will send with toradol and oxycodone for pain. Plan for 2 weeks of lovenox for dvt ppx followed by aspirin 81 mg po bid x1 month. Begin daily dry dressing changes starting tomorrow. F/U path results. F/u with Dr. Bolanos in 2 weeks for wound check and staple removal.     The above findings, diagnosis, and treatment plan " were discussed with Dr. Richard Bolanos who is in agreement.      Apple Mejia, FNP  Orthopedics  [facility] - [facility]o Neuro

## 2023-02-09 NOTE — PT/OT/SLP PROGRESS
Physical Therapy Treatment    Patient Name:  Shreya Reyes   MRN:  [id number]    Recommendations:     Discharge Recommendations: home, outpatient PT  Discharge Equipment Recommendations: walker, rolling  Barriers to discharge:  none    Assessment:     Shreya Reyes is a 63 y.o. female admitted with a medical diagnosis of metastatic carcinoma with Pathologic fracture of femoral neck, left, initial encounter, s/p IMN.  She presents with the following impairments/functional limitations: impaired endurance, pain. Patient tolerated PT treatment session well. Patient ambulated Shaheed with RW x350ft with step-through gait pattern. Patient ascended and descended 4 stairs with Shaheed. PT recommending home with RW and outpatient PT per ortho upon discharge.     Rehab Prognosis: Good; patient would benefit from acute skilled PT services to address these deficits and reach maximum level of function.    Recent Surgery: Procedure(s) (LRB):  INSERTION, INTRAMEDULLARY LEELEE, LEFT FEMUR (Left) 1 Day Post-Op    Plan:     During this hospitalization, patient to be seen 6 x/week to address the identified rehab impairments via gait training, therapeutic activities, therapeutic exercises, neuromuscular re-education and progress toward the following goals:    Plan of Care Expires:  03/08/23    Subjective     Chief Complaint: LLE pain  Patient/Family Comments/goals: to go home  Pain/Comfort:  Pain Rating 1: 5/10 (LLE)  Pain Addressed 1: Pre-medicate for activity      Objective:     Communicated with RN prior to session.  Patient found up in chair with peripheral IV upon PT entry to room.     General Precautions: Standard, fall  Orthopedic Precautions: LLE weight bearing as tolerated  Braces: N/A  Respiratory Status: Room air    AM-PAC 6 CLICK MOBILITY  Turning over in bed (including adjusting bedclothes, sheets and blankets)?: 4  Sitting down on and standing up from a chair with arms (e.g., wheelchair, bedside commode, etc.): 4  Moving from lying  on back to sitting on the side of the bed?: 4  Moving to and from a bed to a chair (including a wheelchair)?: 4  Need to walk in hospital room?: 4  Climbing 3-5 steps with a railing?: 4  Basic Mobility Total Score: 24       Treatment & Education:  Patient tolerated PT treatment session well. Patient able transition sit<>stand with RW and SBA. Patient ambulated x350ft in hallway with RW and Shaheed. Patient educated on stair negotiation prior to activity, ascending with strong LE leading (RLE) and descending with weak (LLE) leading, to increase safety and independence prior to discharge home. Patient able to negotiate 4 stairs with Shaheed this date. Patient fatigued after session due to increase in activity compared to previous session. Patient continues to be appropriate for skilled acute PT to increase endurance with ambulation.     Patient left up in chair with all lines intact, call button in reach,  notified, and  present..    GOALS:   Multidisciplinary Problems       Physical Therapy Goals          Problem: Physical Therapy    Goal Priority Disciplines Outcome Goal Variances Interventions   Physical Therapy Goal    High PT, PT/OT Ongoing, Progressing     Description: Goals to be met by: 3/8/2023     Patient will increase functional independence with mobility by performin. Sit to stand transfer with Modified [place]  2. Bed to chair transfer with Modified [place] using Rolling Walker  3. Gait  x 200 feet with Modified [place] using Rolling Walker.   4. Ascend/descend 2 stair with bilateral Handrails Supervision.                          Time Tracking:     PT Received On: 23  PT Start Time: 0753     PT Stop Time: 0816  PT Total Time (min): 23 min     Billable Minutes: Therapeutic Activity 2 units (23 minutes)    Treatment Type: Treatment  PT/PTA: PT     PTA Visit Number: 1     2023

## 2023-02-09 NOTE — PLAN OF CARE
02/09/23 0830   Discharge Assessment   Assessment Type Discharge Planning Assessment   Confirmed/corrected address, phone number and insurance Yes   Confirmed Demographics Correct on Facesheet   Source of Information patient   Reason For Admission L femur fx; s/p nailing   People in Home spouse  (Pt lives with her , Dion in a single story home)   Do you expect to return to your current living situation? Yes   Do you have help at home or someone to help you manage your care at home? Yes   Who are your caregiver(s) and their phone number(s)? Dion--[phone]   Prior to hospitilization cognitive status: Alert/Oriented   Current cognitive status: Alert/Oriented   Walking or Climbing Stairs   (independent with mobility)   Home Layout Able to live on 1st floor   Equipment Currently Used at Home none   Patient currently being followed by outpatient case management? No   Do you currently have service(s) that help you manage your care at home? No   Who is going to help you get home at discharge? Dion   How do you get to doctors appointments? family or friend will provide   Are you on dialysis? No   Discharge Plan A Home with family   Discharge Plan B Home with family   DME Needed Upon Discharge  walker, rolling   Discharge Plan discussed with: Spouse/sig other;Patient   Name(s) and Number(s) Dion--[phone]   Discharge Barriers Identified None   Housing Stability   In the last 12 months, was there a time when you were not able to pay the mortgage or rent on time? N   Transportation Needs   In the past 12 months, has lack of transportation kept you from medical appointments or from getting medications? no   Food Insecurity   Within the past 12 months, you worried that your food would run out before you got the money to buy more. Never true   OTHER   Name(s) of People in Home Dion,      Pt's PCP is Dr Holt. Pt has crutches, scooter, and shower chair. She has never had HH services. She uses  [facility] pharmacy in [place].  Rec consult for RW. Referral faxed to Chantelle at [phone]. Notified Chantelle to deliver the RW to pt's house. No other dc needs.

## 2023-02-11 LAB
COLLECT DURATION TIME UR: 24 H
CORTIS 24H UR-MRATE: 11 MCG/24 H (ref 3.5–45)
SPECIMEN VOL 24H UR: 1500 ML

## 2023-02-20 ENCOUNTER — HOSPITAL ENCOUNTER (OUTPATIENT)
Dept: RADIOLOGY | Facility: HOSPITAL | Age: 63
Discharge: HOME | End: 2023-02-20
Attending: NURSE PRACTITIONER
Payer: COMMERCIAL

## 2023-02-20 DIAGNOSIS — C74.02 MALIGNANT NEOPLASM OF CORTEX OF LEFT ADRENAL GLAND: ICD-10-CM

## 2023-02-20 DIAGNOSIS — C79.51 SECONDARY MALIGNANT NEOPLASM OF BONE: ICD-10-CM

## 2023-02-20 PROCEDURE — A9552 F18 FDG: HCPCS

## 2023-02-27 ENCOUNTER — OFFICE VISIT (OUTPATIENT)
Dept: ORTHOPEDICS | Facility: CLINIC | Age: 63
End: 2023-02-27
Payer: COMMERCIAL

## 2023-02-27 VITALS — TEMPERATURE: 98 F | HEART RATE: 90 BPM | DIASTOLIC BLOOD PRESSURE: 85 MMHG | SYSTOLIC BLOOD PRESSURE: 125 MMHG

## 2023-02-27 DIAGNOSIS — M84.452A: Primary | ICD-10-CM

## 2023-02-27 PROCEDURE — 1160F PR REVIEW ALL MEDS BY PRESCRIBER/CLIN PHARMACIST DOCUMENTED: ICD-10-PCS | Mod: CPTII,,, | Performed by: NURSE PRACTITIONER

## 2023-02-27 PROCEDURE — 99024 PR POST-OP FOLLOW-UP VISIT: ICD-10-PCS | Mod: ,,, | Performed by: NURSE PRACTITIONER

## 2023-02-27 PROCEDURE — 3079F PR MOST RECENT DIASTOLIC BLOOD PRESSURE 80-89 MM HG: ICD-10-PCS | Mod: CPTII,,, | Performed by: NURSE PRACTITIONER

## 2023-02-27 PROCEDURE — 1159F PR MEDICATION LIST DOCUMENTED IN MEDICAL RECORD: ICD-10-PCS | Mod: CPTII,,, | Performed by: NURSE PRACTITIONER

## 2023-02-27 PROCEDURE — 99024 POSTOP FOLLOW-UP VISIT: CPT | Mod: ,,, | Performed by: NURSE PRACTITIONER

## 2023-02-27 PROCEDURE — 3079F DIAST BP 80-89 MM HG: CPT | Mod: CPTII,,, | Performed by: NURSE PRACTITIONER

## 2023-02-27 PROCEDURE — 1160F RVW MEDS BY RX/DR IN RCRD: CPT | Mod: CPTII,,, | Performed by: NURSE PRACTITIONER

## 2023-02-27 PROCEDURE — 1159F MED LIST DOCD IN RCRD: CPT | Mod: CPTII,,, | Performed by: NURSE PRACTITIONER

## 2023-02-27 PROCEDURE — 3074F SYST BP LT 130 MM HG: CPT | Mod: CPTII,,, | Performed by: NURSE PRACTITIONER

## 2023-02-27 PROCEDURE — 3074F PR MOST RECENT SYSTOLIC BLOOD PRESSURE < 130 MM HG: ICD-10-PCS | Mod: CPTII,,, | Performed by: NURSE PRACTITIONER

## 2023-02-27 RX ORDER — HYDROCODONE BITARTRATE AND ACETAMINOPHEN 7.5; 325 MG/1; MG/1
1 TABLET ORAL EVERY 6 HOURS PRN
Qty: 28 TABLET | Refills: 0 | Status: SHIPPED | OUTPATIENT
Start: 2023-02-27 | End: 2023-03-06

## 2023-02-27 NOTE — PROGRESS NOTES
Subjective:       Patient ID: Shreya Reyes is a 63 y.o. female.    Chief Complaint   Patient presents with    Left Hip - Post-op Evaluation     2.5 WEEK F/U PROPH IMN LEFT FEMUR.  AMBULATING WITHOUT ASSISTANCE.          Patient is here today for follow up evaluation 2.5 weeks out from intramedullary nailing of left femur for pathologic lesion to the femur. She is doing well today. Pain is controlled with medication. She requests a refill of norco today. She is ambulatory with no assistive device. She discontinued her walker yesterday. She has not had any redness or drainage to her incisions. She has completed her lovenox. She complains of soreness and weakness to the muscles in her leg. She has no other issues or complaints to report today. She is here with her .       Review of Systems   Constitutional: Negative for chills and fever.   HENT:  Negative for congestion and hearing loss.    Eyes:  Negative for visual disturbance.   Cardiovascular:  Negative for chest pain and syncope.   Respiratory:  Negative for cough and shortness of breath.    Hematologic/Lymphatic: Does not bruise/bleed easily.   Skin:  Negative for color change and rash.   Gastrointestinal:  Negative for abdominal pain, nausea and vomiting.   Genitourinary:  Negative for dysuria and hematuria.   Neurological:  Negative for numbness, sensory change and weakness.   Psychiatric/Behavioral:  Negative for altered mental status.       Current Outpatient Medications on File Prior to Visit   Medication Sig Dispense Refill    ALPRAZolam (XANAX) 0.25 MG tablet Take 1 tablet (0.25 mg total) by mouth every 8 (eight) hours as needed for Anxiety. 60 tablet 1    aspirin (ECOTRIN) 81 MG EC tablet Take 1 tablet (81 mg total) by mouth 2 (two) times a day. 60 tablet 0    calcium carbonate (OS-ORAL) 600 mg calcium (1,500 mg) Tab Take 600 mg by mouth 2 (two) times daily.      cholecalciferol, vitamin D3, 125 mcg (5,000 unit) capsule Take 250 mcg by mouth.       dexAMETHasone (DECADRON) 4 MG Tab Take 1 tablet (4 mg total) by mouth every 6 (six) hours. (Patient taking differently: Take 4 mg by mouth 2 (two) times a day.) 100 tablet 0    levothyroxine (SYNTHROID) 75 MCG tablet Take 1 tablet by mouth once daily.      losartan (COZAAR) 50 MG tablet Take 1 tablet by mouth once daily.      mitotane (LYSODREN) 500 mg tablet Take 1,000 mg by mouth once daily.       No current facility-administered medications on file prior to visit.          Objective:      /85   Pulse 90   Temp 97.5 °F (36.4 °C) (Oral)   Physical Exam  Constitutional:       General: She is not in acute distress.     Appearance: Normal appearance.   HENT:      Head: Normocephalic and atraumatic.      Mouth/Throat:      Mouth: Mucous membranes are moist.   Eyes:      Extraocular Movements: Extraocular movements intact.   Cardiovascular:      Rate and Rhythm: Normal rate.      Pulses: Normal pulses.   Pulmonary:      Effort: Pulmonary effort is normal. No respiratory distress.   Abdominal:      General: There is no distension.      Palpations: Abdomen is soft.      Tenderness: There is no abdominal tenderness.   Musculoskeletal:      Cervical back: Normal range of motion and neck supple.      Comments: Left lower extremity: surgical incisions are well healed with no erythema, drainage, dehiscence. Mild ecchymosis to proximal thigh. Mild swelling to thigh; compartments soft/compressible. No painful/prominent hardware. Good passive ROM of hip and knee. No calf tenderness. 5/5 motor strength distally with dorsiflexion/plantar flexion. BCR distally.    Neurological:      Mental Status: She is alert and oriented to person, place, and time. Mental status is at baseline.   Psychiatric:         Mood and Affect: Mood normal.         Behavior: Behavior normal.         Thought Content: Thought content normal.         Judgment: Judgment normal.      There is no height or weight on file to calculate BMI.    Radiology:  post op films with good alignment and hardware intact        Assessment:         1. Pathologic fracture of femoral neck, left, initial encounter  Ambulatory referral/consult to Physical/Occupational Therapy              Plan:     Pt is progressing as expected 2.5 weeks out from IMN L femur. Incisions are well healed with no signs of infection. Staples were removed. Steri strips were applied. She may shower with antibacterial soap and water, pat dry, and leave open to air. Continue weight bearing as tolerated and full ROM. Orders for outpatient PT were provided for gait training, ROM, and strengthening exercises. Norco was refilled for pain. She will follow up with oncology as scheduled. We will see her back in 1 month for repeat xrays and evaluation. All questions and concerns were addressed with patient and . They understand and agree with the plan of care.     The above findings, diagnosis, and treatment plan were discussed with Dr. Richard Bolanos who is in agreement.     Follow up in about 4 weeks (around 3/27/2023).    Pathologic fracture of femoral neck, left, initial encounter  -     Ambulatory referral/consult to Physical/Occupational Therapy; Future; Expected date: 03/06/2023    Other orders  -     HYDROcodone-acetaminophen (NORCO) 7.5-325 mg per tablet; Take 1 tablet by mouth every 6 (six) hours as needed for Pain.  Dispense: 28 tablet; Refill: 0         Medications Ordered This Encounter   Medications    HYDROcodone-acetaminophen (NORCO) 7.5-325 mg per tablet     Sig: Take 1 tablet by mouth every 6 (six) hours as needed for Pain.     Dispense:  28 tablet     Refill:  0     Quantity prescribed more than 7 day supply? No     Order Specific Question:   I have reviewed the Prescription Drug Monitoring Program (PDMP) database for this patient prior to prescribing the above opioid medication     Answer:   Yes       Orders Placed This Encounter   Procedures    Ambulatory referral/consult to  Physical/Occupational Therapy     Standing Status:   Future     Standing Expiration Date:   3/27/2024     Referral Priority:   Routine     Referral Type:   Physical Medicine     Referral Reason:   Specialty Services Required     Requested Specialty:   Physical Therapy     Number of Visits Requested:   1       Future Appointments   Date Time Provider Department Center   3/6/2023  8:30 AM LAB, [facility]   3/6/2023  9:00 AM [facility]   3/28/2023 10:00 AM Robert Holt MD [id number]   3/29/2023 10:00 AM Richard Bolanos MD [facility]

## 2023-02-28 DIAGNOSIS — F41.9 ANXIETY: Primary | ICD-10-CM

## 2023-02-28 RX ORDER — ALPRAZOLAM 0.25 MG/1
0.25 TABLET ORAL EVERY 8 HOURS PRN
Qty: 60 TABLET | Refills: 3 | Status: SHIPPED | OUTPATIENT
Start: 2023-02-28 | End: 2023-05-01

## 2023-02-28 NOTE — PROGRESS NOTES
Subjective:       Patient ID: Shreya Reyes is a 63 y.o. female.    Chief Complaint:  Left hip pain improved    Diagnosis: Metastatic adrenocortical carcinoma                    Multiple osteoporotic vertebral compression fractures    Treatment History  Adjuvant XRT (completed 9/30/21)  Mitotane 1/25/22-7/26/22, Resumed 8/22  XRT right scapula, L5,  R ischium, L femoral neck completed 8/26/22  SBRT L2-3 2/06/23  Left IM nail 2/08/23    Current Treatment: Mitotane resumed 8/22 - current dose 1000 mg/d                                  Hydrocortisone 10 mg Q AM, 10 mg Q PM                                  Levothyroxine 75 mcg/d              Clinical History:  Patient initially presented to the [facility] ER 3/16/21 with acute left flank pain. CT A/P showed a heterogeneous enhancing mass of the left adrenal gland measuring 5.5 x 4.8 x 5 cm (24 Hu), with no definite microscopic fat. There was mild displacement of the left renal vein. Right adrenal gland was unremarkable.  Hormonal workup was negative for pheochromocytoma. She was felt to have had an acute adrenal hemorrhage and close observation was recommended. Follow-up CT A/P 6/9/21 showed increased size of the adrenal mass to 8.0 x 4.5 cm appearing hypervascular with some central necrosis. She underwent a laparoscopic/robotic left adrenalectomy 6/11/21.  Final pathology showed an adrenal cortical carcinoma predominant oncocytic/trabecular morphology with focally marked cytologic atypia and increased mitotic rate (up to 10/50 HPF's). There were large areas of necrosis and multiple foci of capsular and lymphovascular invasion. Ki-67 expression was 10-15%.    She had a Telemedicine consultation with Dr. Dion Lynch at [facility] 8/3/21 who specializes in treatment of adrenocortical carcinoma. Her case was reviewed and discussed in their tumor board. Recommendations were for treatment with adjuvant radiation therapy and systemic treatment with Mitotane. Baseline  postoperative CT scans of the chest, abdomen and pelvis 8/4/21 showed postoperative changes with no evidence of measurable metastatic disease.    She was seen as a new patient at [facility] 8/9/21.  She had recovered well from her surgery and was asymptomatic.  Treatment recommendations from the [facility] were reviewed and discussed.  Treatment was started with Mitotane 1000 mg BID on 8/16/2021.  No dose escalation was recommended until she completed radiation therapy.  She was started on replacement hydrocortisone 20 mg Q AM and 10 mg Q PM.  Surveillance CT scans were recommended in 3 months.      She was seen for an office visit 9/16/21 after completing 4 weeks of treatment with Mitotane.  She was not having any significant side effects associated with her therapy.  She was care home through her adjuvant radiation therapy.  Laboratory testing showed marked transaminase elevations with an AST of 493,  and alkaline phosphatase 175.  Bilirubin was normal.  The remainder of her CMP was unremarkable.  Baseline mitotane level drawn at that visit was 2.5 mcg/mL.  Mitotane was held and adjuvant radiation therapy was continued.  Weekly laboratory monitoring showed gradual improvement in her LFTs.  Although mitotane had been associated with transaminase elevations, >5x elevations are rare occurring in <1% of patients.  She was also instructed to hold her lovastatin.  She completed adjuvant radiation therapy 9/30/2021.     Follow-up laboratory continued to show transaminase elevations.  GI was consulted and ordered additional laboratory testing which did not reveal evidence of viral or autoimmune etiologies for her hepatitis.  Mitotane was not resumed due to her persistent transaminase elevations. Follow-up CT scans of the chest, abdomen and pelvis 10/28/21 showed a few stable subcentimeter pulmonary nodules and changes related to her previous left adrenalectomy with no evidence of disease  recurrence.  Following normalization of her LFTs, treatment was resumed with Mitotane 1/25/22.  Her LFTs remained normal even during dose escalation.    She had an injury at home in mid March after carrying in several arms of firewood with acute mid back pain.  Plain x-ray 3/17/22 showed a compression deformity at L2 of questionable age.  MRI of the lumbar spine 3/23/22 showed a subacute severe compression fracture deformity of the L1 vertebral body and mild superior endplate compression fracture of L3 vertebral body with osteoporotic appearance and no findings suspicious for pathologic fracture.  However, there were scattered small osseous lesions in the right L3 vertebral body and L5 vertebral body concerning for metastatic disease.  CT PET scan 3/28/22 showed mildly hypermetabolic osseous lesions in the lumbar spine, pelvis and proximal left femur consistent with metastatic disease.  There was no significant hypermetabolic uptake at the sites of her compression fractures.  She did not have pain at any of the sites prior to the acute compression fracture.  Bone density exam showed osteoporosis of the lumbar spine with a T score of -3.4, left femoral neck -3.4 and left total hip -2.7.  She was started on Prolia 3/31/22 and underwent L1 and L3 kyphoplasty 4//8/22.  Biopsies of the L3 vertebral body showed no evidence of metastatic carcinoma.  She continued to have significant pain following the kyphoplasty.  Further review of her films showed a possible compression fracture at T11.  MRI of the thoracic spine 4/15/22 showed a compression fracture of T11 with 50% loss of vertebral body height.  She underwent kyphoplasty 4/21/22 with symptomatic improvement.    CT scans of the chest, abdomen and pelvis 4/27/22 showed sclerotic osseous lesions at L4, right ischium and left femur correlating with the hypermetabolic lesions on CT-PET scan.  There were stable sub-6 mm pulmonary nodules in the RML and LLL unchanged from  previous imaging.  MRI of the cervical and lumbar spine 7/5/22 showed moderate disc disease at C5-6 and C6-7 without significant spinal canal stenosis or foraminal stenosis.  She developed progressive symptoms of right arm pain, numbness and tingling and updated MRI 7/13/22 showed foraminal stenosis secondary to disc osteophyte on the right side at C5-6 and C6-7.  She underwent a right foraminotomy with relief of her symptoms.    CT C/A/P 08/01/22 showed multiple compression fractures and postsurgical changes.  Area of sclerosis in the left sacrum was stable compared to the prior exam.  There was no evidence of visceral metastatic disease.  She had a teleconference with [facility] 8/2/22 and a follow-up PET scan was recommended.  Mitotane was discontinued 7/26/22. CT-PET 08/05/22 hypermetabolic osseous metastatic disease involving L5, right ischium, left femoral neck and a new lesion in the inferior right scapula.  There was a 12 mm area of hypermetabolic activity adjacent to the right adrenal gland without a definite CT correlate.    She completed palliative radiation therapy to the right scapula, L5, R ischium and left femoral neck on 8/26/22.  She resumed Mitotane at 1000 mg BID on 8/29/22.      CT-PET scan 11/21/22 showed improved FDG uptake in all of the treated sites.  Right scapula SUV decreased from 4.2 to 2.1, left femoral neck 9.1 to 5, right ischium 13 to 2.4 and L5 8.2 to 4.6.  Hypermetabolic activity at the site of the previous compression fractures had also improved.  There were no findings suspicious for new sites of disease or visceral metastatic disease.    MRI lumbar spine and left hip 1/29/23:  Metastatic disease involving right ischial tuberosity, left femoral neck and L3 vertebral body, similar in size to CT-PET scan 11/21/22.  L3 lesion showed interval progression with ventral and paraspinal extension causing moderate-to-severe narrowing of the spinal canal.  Left femoral neck  lesion involved greater than 50% of the total diameter of the femoral neck.  She was treated with stereotactic XRT to L3 and underwent prophylactic IM nail of the left femoral neck 2/8/23    Guardant 360 2/20/23:  Positive TP53 mutation, microsatellite stable     Interval History  She returns to clinic today for a follow-up visit accompanied by her .  She is ambulatory without assistance.  She is taking Norco 7.5/3251 to 2 times a day for pain.  CT-PET scan 2/20/23 showed hypermetabolic uptake at L3 with an SUV of 7, previously 5.1.  There were no additional sites of hypermetabolic activity or evidence of new bone lesions.  She had a follow-up video visit with the treatment team at [facility] 2/28/23.  Recommendations were to continue mitotane with follow-up CT CAP and MRI of the spine and pelvis at the end of April.      Review of Systems   Constitutional:  Positive for fatigue. Negative for appetite change, fever and unexpected weight change.   HENT:  Negative for mouth sores, sore throat and trouble swallowing.    Eyes: Negative.    Respiratory:  Negative for cough and shortness of breath.    Cardiovascular:  Negative for chest pain, palpitations and leg swelling.   Gastrointestinal:  Negative for abdominal distention, abdominal pain, constipation, diarrhea, nausea and vomiting.   Genitourinary:  Negative for dysuria, frequency and urgency.   Musculoskeletal:  Positive for back pain. Negative for arthralgias and myalgias.        Left hip pain   Integumentary:  Negative for rash and mole/lesion.   Neurological:  Negative for dizziness, weakness, numbness and headaches.   Hematological:  Negative for adenopathy. Does not bruise/bleed easily.   Psychiatric/Behavioral: Negative.         PMHx:  Hyperlipidemia, osteoporosis  PSHx:  Tonsils, left cataract, ORIF left tibia/fib, left adrenalectomy, multiple vertebral kyphoplasties  SH:  Former smoker 1 PPD, quit 2009. + Social alcohol use. Lives in  [place] with her . 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  FH:  Her mother had lymphoma.     Objective:        BP (!) 142/89   Pulse 76   Temp 98.2 °F (36.8 °C)   Resp 18   Ht 5' 4" (1.626 m)   Wt 55.1 kg (121 lb 6.4 oz)   SpO2 100%   BMI 20.84 kg/m²    Physical Exam  Constitutional:       Comments: Well-developed white female in NAD.   HENT:      Head: Normocephalic.      Mouth/Throat:      Mouth: Mucous membranes are moist.      Pharynx: Oropharynx is clear. No posterior oropharyngeal erythema.   Eyes:      Extraocular Movements: Extraocular movements intact.      Conjunctiva/sclera: Conjunctivae normal.      Pupils: Pupils are equal, round, and reactive to light.   Cardiovascular:      Rate and Rhythm: Normal rate and regular rhythm.      Heart sounds: No murmur heard.  Pulmonary:      Comments: Lungs clear to auscultation  Abdominal:      General: Bowel sounds are normal. There is no distension.      Palpations: Abdomen is soft. There is no mass.      Tenderness: There is no abdominal tenderness.   Musculoskeletal:         General: No swelling.      Cervical back: Neck supple. No tenderness.      Comments: No vertebral body or rib cage tenderness.   Skin:     General: Skin is warm and dry.      Findings: No rash.   Neurological:      General: No focal deficit present.      Mental Status: She is alert and oriented to person, place, and time.      Motor: No weakness.     ECOG SCORE    1 - Restricted in strenuous activity-ambulatory and able to carry out work of a light nature        LABORATORY  Recent Results (from the past 336 hour(s))   Comprehensive Metabolic Panel    Collection Time: 03/06/23  8:10 AM   Result Value Ref Range    Sodium Level 145 136 - 145 mmol/L    Potassium Level 3.6 3.5 - 5.1 mmol/L    Chloride 109 (H) 98 - 107 mmol/L    Carbon Dioxide 25 23 - 31 mmol/L    Glucose Level 89 82 - 115 mg/dL    Blood Urea Nitrogen 17.9 9.8 - 20.1 mg/dL    Creatinine 0.59 0.55 - " 1.02 mg/dL    Calcium Level Total 8.5 8.4 - 10.2 mg/dL    Protein Total 5.4 (L) 5.8 - 7.6 gm/dL    Albumin Level 2.7 (L) 3.4 - 4.8 g/dL    Globulin 2.7 2.4 - 3.5 gm/dL    Albumin/Globulin Ratio 1.0 (L) 1.1 - 2.0 ratio    Bilirubin Total 0.2 <=1.5 mg/dL    Alkaline Phosphatase 90 40 - 150 unit/L    Alanine Aminotransferase 10 0 - 55 unit/L    Aspartate Aminotransferase 15 5 - 34 unit/L    eGFR >60 mls/min/1.73/m2   CBC with Differential    Collection Time: 03/06/23  8:10 AM   Result Value Ref Range    WBC 2.6 (L) 4.5 - 11.5 x10(3)/mcL    RBC 3.34 (L) 4.20 - 5.40 x10(6)/mcL    Hgb 10.9 (L) 12.0 - 16.0 g/dL    Hct 34.2 (L) 37.0 - 47.0 %    .4 (H) 80.0 - 94.0 fL    MCH 32.6 pg    MCHC 31.9 (L) 33.0 - 36.0 g/dL    RDW 14.2 11.5 - 17.0 %    Platelet 202 130 - 400 x10(3)/mcL    MPV 9.5 7.4 - 10.4 fL    Neut % 49.6 %    Lymph % 32.3 %    Mono % 12.7 %    Eos % 4.6 %    Basophil % 0.8 %    Lymph # 0.84 0.6 - 4.6 x10(3)/mcL    Neut # 1.29 (L) 2.1 - 9.2 x10(3)/mcL    Mono # 0.33 0.1 - 1.3 x10(3)/mcL    Eos # 0.12 0 - 0.9 x10(3)/mcL    Baso # 0.02 0 - 0.2 x10(3)/mcL    IG# 0.00 0 - 0.04 x10(3)/mcL    IG% 0.0 %          Assessment:   Metastatic adrenocortical carcinoma  Multiple osteoporotic vertebral body compression fractures  S/p prophylactic left femur IM nail 2/8/23  Treatment induced leukopenia - stable      Plan:   Continue Mitotane 1000 mg daily.  Dose adjustment per [facility]  Continue laboratory monitoring every 4 weeks.    RTC in 4 weeks for a follow-up visit and clinical exam.  Repeat imaging as recommended in late April or early May. (CT CAP and MRI L spine and pelvis)      MARY NORMAN MD    Other Physicians  Dr. Dion Lynch ([facility])

## 2023-03-06 ENCOUNTER — OFFICE VISIT (OUTPATIENT)
Dept: HEMATOLOGY/ONCOLOGY | Facility: CLINIC | Age: 63
End: 2023-03-06
Payer: COMMERCIAL

## 2023-03-06 ENCOUNTER — TELEPHONE (OUTPATIENT)
Dept: HEMATOLOGY/ONCOLOGY | Facility: CLINIC | Age: 63
End: 2023-03-06

## 2023-03-06 VITALS
HEART RATE: 76 BPM | BODY MASS INDEX: 20.72 KG/M2 | HEIGHT: 64 IN | TEMPERATURE: 98 F | SYSTOLIC BLOOD PRESSURE: 142 MMHG | DIASTOLIC BLOOD PRESSURE: 89 MMHG | OXYGEN SATURATION: 100 % | RESPIRATION RATE: 18 BRPM | WEIGHT: 121.38 LBS

## 2023-03-06 DIAGNOSIS — C74.02 MALIGNANT NEOPLASM OF CORTEX OF LEFT ADRENAL GLAND: ICD-10-CM

## 2023-03-06 DIAGNOSIS — C74.02 MALIGNANT NEOPLASM OF CORTEX OF LEFT ADRENAL GLAND: Primary | ICD-10-CM

## 2023-03-06 DIAGNOSIS — C74.00 MALIGNANT NEOPLASM OF ADRENAL CORTEX, UNSPECIFIED LATERALITY: Primary | ICD-10-CM

## 2023-03-06 DIAGNOSIS — C79.51 SECONDARY MALIGNANT NEOPLASM OF BONE: ICD-10-CM

## 2023-03-06 PROCEDURE — 3008F PR BODY MASS INDEX (BMI) DOCUMENTED: ICD-10-PCS | Mod: CPTII,S$GLB,, | Performed by: INTERNAL MEDICINE

## 2023-03-06 PROCEDURE — 99214 OFFICE O/P EST MOD 30 MIN: CPT | Mod: S$GLB,,, | Performed by: INTERNAL MEDICINE

## 2023-03-06 PROCEDURE — 1159F MED LIST DOCD IN RCRD: CPT | Mod: CPTII,S$GLB,, | Performed by: INTERNAL MEDICINE

## 2023-03-06 PROCEDURE — 4010F PR ACE/ARB THEARPY RXD/TAKEN: ICD-10-PCS | Mod: CPTII,S$GLB,, | Performed by: INTERNAL MEDICINE

## 2023-03-06 PROCEDURE — 1111F DSCHRG MED/CURRENT MED MERGE: CPT | Mod: CPTII,S$GLB,, | Performed by: INTERNAL MEDICINE

## 2023-03-06 PROCEDURE — 1111F PR DISCHARGE MEDS RECONCILED W/ CURRENT OUTPATIENT MED LIST: ICD-10-PCS | Mod: CPTII,S$GLB,, | Performed by: INTERNAL MEDICINE

## 2023-03-06 PROCEDURE — 4010F ACE/ARB THERAPY RXD/TAKEN: CPT | Mod: CPTII,S$GLB,, | Performed by: INTERNAL MEDICINE

## 2023-03-06 PROCEDURE — 3077F SYST BP >= 140 MM HG: CPT | Mod: CPTII,S$GLB,, | Performed by: INTERNAL MEDICINE

## 2023-03-06 PROCEDURE — 3008F BODY MASS INDEX DOCD: CPT | Mod: CPTII,S$GLB,, | Performed by: INTERNAL MEDICINE

## 2023-03-06 PROCEDURE — 3079F DIAST BP 80-89 MM HG: CPT | Mod: CPTII,S$GLB,, | Performed by: INTERNAL MEDICINE

## 2023-03-06 PROCEDURE — 3079F PR MOST RECENT DIASTOLIC BLOOD PRESSURE 80-89 MM HG: ICD-10-PCS | Mod: CPTII,S$GLB,, | Performed by: INTERNAL MEDICINE

## 2023-03-06 PROCEDURE — 99214 PR OFFICE/OUTPT VISIT, EST, LEVL IV, 30-39 MIN: ICD-10-PCS | Mod: S$GLB,,, | Performed by: INTERNAL MEDICINE

## 2023-03-06 PROCEDURE — 99999 PR PBB SHADOW E&M-EST. PATIENT-LVL IV: CPT | Mod: PBBFAC,,, | Performed by: INTERNAL MEDICINE

## 2023-03-06 PROCEDURE — 99999 PR PBB SHADOW E&M-EST. PATIENT-LVL IV: ICD-10-PCS | Mod: PBBFAC,,, | Performed by: INTERNAL MEDICINE

## 2023-03-06 PROCEDURE — 3077F PR MOST RECENT SYSTOLIC BLOOD PRESSURE >= 140 MM HG: ICD-10-PCS | Mod: CPTII,S$GLB,, | Performed by: INTERNAL MEDICINE

## 2023-03-06 PROCEDURE — 1159F PR MEDICATION LIST DOCUMENTED IN MEDICAL RECORD: ICD-10-PCS | Mod: CPTII,S$GLB,, | Performed by: INTERNAL MEDICINE

## 2023-03-06 RX ORDER — IBUPROFEN 800 MG/1
800 TABLET ORAL 3 TIMES DAILY
COMMUNITY
Start: 2023-03-05 | End: 2024-01-22 | Stop reason: SDUPTHER

## 2023-03-06 RX ORDER — OXYCODONE HYDROCHLORIDE 10 MG/1
10 TABLET ORAL EVERY 6 HOURS PRN
COMMUNITY
End: 2023-04-11

## 2023-03-06 RX ORDER — HYDROCORTISONE 20 MG/1
0.5 TABLET ORAL 2 TIMES DAILY
COMMUNITY
Start: 2023-03-01 | End: 2023-11-07 | Stop reason: SDUPTHER

## 2023-03-23 ENCOUNTER — TELEPHONE (OUTPATIENT)
Dept: INTERNAL MEDICINE | Facility: CLINIC | Age: 63
End: 2023-03-23
Payer: COMMERCIAL

## 2023-03-23 NOTE — TELEPHONE ENCOUNTER
----- Message from Rosalba Longoria LPN sent at 3/21/2023  8:46 AM CDT -----  Regarding: misty Kirkland 3/28 @10:00  Fasting labs needed.

## 2023-03-27 ENCOUNTER — OFFICE VISIT (OUTPATIENT)
Dept: ORTHOPEDICS | Facility: CLINIC | Age: 63
End: 2023-03-27
Payer: COMMERCIAL

## 2023-03-27 ENCOUNTER — HOSPITAL ENCOUNTER (OUTPATIENT)
Dept: RADIOLOGY | Facility: CLINIC | Age: 63
Discharge: HOME | End: 2023-03-27
Attending: ORTHOPAEDIC SURGERY
Payer: COMMERCIAL

## 2023-03-27 VITALS
SYSTOLIC BLOOD PRESSURE: 137 MMHG | HEIGHT: 64 IN | RESPIRATION RATE: 18 BRPM | HEART RATE: 90 BPM | WEIGHT: 121.5 LBS | BODY MASS INDEX: 20.74 KG/M2 | DIASTOLIC BLOOD PRESSURE: 90 MMHG

## 2023-03-27 DIAGNOSIS — M84.552D PATHOLOGICAL FRACTURE OF LEFT FEMUR DUE TO NEOPLASTIC DISEASE WITH ROUTINE HEALING, SUBSEQUENT ENCOUNTER: Primary | ICD-10-CM

## 2023-03-27 DIAGNOSIS — M84.552D PATHOLOGICAL FRACTURE OF LEFT FEMUR DUE TO NEOPLASTIC DISEASE WITH ROUTINE HEALING, SUBSEQUENT ENCOUNTER: ICD-10-CM

## 2023-03-27 PROCEDURE — 3075F SYST BP GE 130 - 139MM HG: CPT | Mod: CPTII,,, | Performed by: ORTHOPAEDIC SURGERY

## 2023-03-27 PROCEDURE — 99024 PR POST-OP FOLLOW-UP VISIT: ICD-10-PCS | Mod: ,,, | Performed by: ORTHOPAEDIC SURGERY

## 2023-03-27 PROCEDURE — 3008F PR BODY MASS INDEX (BMI) DOCUMENTED: ICD-10-PCS | Mod: CPTII,,, | Performed by: ORTHOPAEDIC SURGERY

## 2023-03-27 PROCEDURE — 73552 XR FEMUR 2 VIEW LEFT: ICD-10-PCS | Mod: LT,,, | Performed by: ORTHOPAEDIC SURGERY

## 2023-03-27 PROCEDURE — 99024 POSTOP FOLLOW-UP VISIT: CPT | Mod: ,,, | Performed by: ORTHOPAEDIC SURGERY

## 2023-03-27 PROCEDURE — 1159F MED LIST DOCD IN RCRD: CPT | Mod: CPTII,,, | Performed by: ORTHOPAEDIC SURGERY

## 2023-03-27 PROCEDURE — 3008F BODY MASS INDEX DOCD: CPT | Mod: CPTII,,, | Performed by: ORTHOPAEDIC SURGERY

## 2023-03-27 PROCEDURE — 1160F PR REVIEW ALL MEDS BY PRESCRIBER/CLIN PHARMACIST DOCUMENTED: ICD-10-PCS | Mod: CPTII,,, | Performed by: ORTHOPAEDIC SURGERY

## 2023-03-27 PROCEDURE — 1160F RVW MEDS BY RX/DR IN RCRD: CPT | Mod: CPTII,,, | Performed by: ORTHOPAEDIC SURGERY

## 2023-03-27 PROCEDURE — 1159F PR MEDICATION LIST DOCUMENTED IN MEDICAL RECORD: ICD-10-PCS | Mod: CPTII,,, | Performed by: ORTHOPAEDIC SURGERY

## 2023-03-27 PROCEDURE — 3080F DIAST BP >= 90 MM HG: CPT | Mod: CPTII,,, | Performed by: ORTHOPAEDIC SURGERY

## 2023-03-27 PROCEDURE — 3075F PR MOST RECENT SYSTOLIC BLOOD PRESS GE 130-139MM HG: ICD-10-PCS | Mod: CPTII,,, | Performed by: ORTHOPAEDIC SURGERY

## 2023-03-27 PROCEDURE — 73552 X-RAY EXAM OF FEMUR 2/>: CPT | Mod: LT,,, | Performed by: ORTHOPAEDIC SURGERY

## 2023-03-27 PROCEDURE — 4010F PR ACE/ARB THEARPY RXD/TAKEN: ICD-10-PCS | Mod: CPTII,,, | Performed by: ORTHOPAEDIC SURGERY

## 2023-03-27 PROCEDURE — 3080F PR MOST RECENT DIASTOLIC BLOOD PRESSURE >= 90 MM HG: ICD-10-PCS | Mod: CPTII,,, | Performed by: ORTHOPAEDIC SURGERY

## 2023-03-27 PROCEDURE — 4010F ACE/ARB THERAPY RXD/TAKEN: CPT | Mod: CPTII,,, | Performed by: ORTHOPAEDIC SURGERY

## 2023-03-27 NOTE — PROGRESS NOTES
Subjective:       Patient ID: Shreya Reyes is a 63 y.o. female.    Chief Complaint   Patient presents with    Left Femur - Follow-up     6.5 week f/u imn left femoral neck fx, ambulating without assistance. No complaints.         Patient is here today for follow-up evaluation now 6 weeks status post intramedullary nailing of left femoral neck pathologic fracture.  She had an incomplete fracture to the superior aspect of the femoral neck that has been stabilized with intramedullary device.  She reports some mild discomfort over the lateral aspect of her hip at the insertion site of her lag screw but otherwise things are progressing very well.  She has not done any formal physical therapy.  She has been working on stretching and range of motion exercises at home.    Follow-up  Pertinent negatives include no abdominal pain, chest pain, chills, congestion, coughing, fever, nausea, neck pain, numbness or vomiting.     Review of Systems   Constitutional: Negative for chills, fever and malaise/fatigue.   HENT:  Negative for congestion and hearing loss.    Eyes:  Negative for visual disturbance.   Cardiovascular:  Negative for chest pain and syncope.   Respiratory:  Negative for cough and shortness of breath.    Hematologic/Lymphatic: Does not bruise/bleed easily.   Skin:  Negative for color change and suspicious lesions.   Musculoskeletal:  Negative for falls and neck pain.   Gastrointestinal:  Negative for abdominal pain, nausea and vomiting.   Genitourinary:  Negative for dysuria and hematuria.   Neurological:  Negative for numbness and sensory change.   Psychiatric/Behavioral:  Negative for altered mental status. The patient is not nervous/anxious.       Current Outpatient Medications on File Prior to Visit   Medication Sig Dispense Refill    ALPRAZolam (XANAX) 0.25 MG tablet Take 1 tablet (0.25 mg total) by mouth every 8 (eight) hours as needed for Anxiety. 60 tablet 3    aspirin (ECOTRIN) 81 MG EC tablet TAKE 1  "TABLET BY MOUTH TWICE A DAY 60 tablet 0    calcium carbonate (OS-ORAL) 600 mg calcium (1,500 mg) Tab Take 600 mg by mouth 2 (two) times daily.      cholecalciferol, vitamin D3, 125 mcg (5,000 unit) capsule Take 250 mcg by mouth.      hydrocortisone (CORTEF) 20 MG Tab Take 0.5 tablets by mouth 2 (two) times a day.      ibuprofen (ADVIL,MOTRIN) 800 MG tablet Take 800 mg by mouth 3 (three) times daily.      levothyroxine (SYNTHROID) 75 MCG tablet Take 1 tablet by mouth once daily.      losartan (COZAAR) 50 MG tablet Take 1 tablet by mouth once daily.      mitotane (LYSODREN) 500 mg tablet Take 1,000 mg by mouth once daily.      oxyCODONE (ROXICODONE) 10 mg Tab immediate release tablet Take 10 mg by mouth every 6 (six) hours as needed for Pain.       No current facility-administered medications on file prior to visit.          Objective:      BP (!) 137/90   Pulse 90   Resp 18   Ht 5' 4" (1.626 m)   Wt 55.1 kg (121 lb 7.6 oz)   BMI 20.85 kg/m²   Physical Exam  Musculoskeletal:      Comments: Left lower extremity:  Surgical incisions are all well healed.  No painful or prominent hardware.  She tolerates full passive range of motion of the left hip without any discomfort.  She has full range of motion of the knee ankle and digits.  No calf swelling or tenderness, no signs of DVT.  Mild tenderness over the surgical incision laterally on the hip due to scar tissue      Body mass index is 20.85 kg/m².    Radiology:   Left femur two views:  Hardware intact.  Alignment maintained.  No evidence of any progressive lesions around the proximal femur no bony erosion.      Assessment:         1. 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  No evidence of any new lesions in the femur.  Minimal discomfort.  Continue full activities without restrictions.  She has a script " for therapy that she has not used yet however she feels as though she would like to go to work on strengthening and range of motion exercises that would be fine.  She will follow up with me on an as-needed basis should any new issues arise in the future.  She and her  understand and agree with all that we discussed today and all questions and concerns were addressed.    Richard Bolanos MD  Orthopedic Trauma  [facility]      Follow up if symptoms worsen or fail to improve.    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     Answer:   Yes     Order Specific Question:   Release to patient     Answer:   Immediate       Future Appointments   Date Time Provider Department Center   4/11/2023 10:00 AM LAB, [facility]   4[facility]11[facility]2023 10:30 [facility]   4/11/2023 11:00 AM INJECTION CHAIR , [facility] CHEMOTHERAPY INFUSION [facility] CHEMO [facility]

## 2023-04-11 ENCOUNTER — INFUSION (OUTPATIENT)
Dept: INFUSION THERAPY | Facility: HOSPITAL | Age: 63
End: 2023-04-11
Attending: INTERNAL MEDICINE
Payer: COMMERCIAL

## 2023-04-11 ENCOUNTER — LAB VISIT (OUTPATIENT)
Dept: LAB | Facility: HOSPITAL | Age: 63
End: 2023-04-11
Attending: INTERNAL MEDICINE
Payer: COMMERCIAL

## 2023-04-11 ENCOUNTER — OFFICE VISIT (OUTPATIENT)
Dept: HEMATOLOGY/ONCOLOGY | Facility: CLINIC | Age: 63
End: 2023-04-11
Payer: COMMERCIAL

## 2023-04-11 VITALS
OXYGEN SATURATION: 99 % | HEIGHT: 64 IN | TEMPERATURE: 98 F | BODY MASS INDEX: 20.66 KG/M2 | HEART RATE: 86 BPM | SYSTOLIC BLOOD PRESSURE: 142 MMHG | WEIGHT: 121 LBS | DIASTOLIC BLOOD PRESSURE: 90 MMHG

## 2023-04-11 DIAGNOSIS — M84.452A: ICD-10-CM

## 2023-04-11 DIAGNOSIS — C74.02 MALIGNANT NEOPLASM OF CORTEX OF LEFT ADRENAL GLAND: ICD-10-CM

## 2023-04-11 DIAGNOSIS — C79.51 SECONDARY MALIGNANT NEOPLASM OF BONE: ICD-10-CM

## 2023-04-11 DIAGNOSIS — G89.3 CANCER RELATED PAIN: ICD-10-CM

## 2023-04-11 DIAGNOSIS — S34.125D: ICD-10-CM

## 2023-04-11 DIAGNOSIS — E89.6 POSTPROCEDURAL ADRENOCORTICAL (-MEDULLARY) HYPOFUNCTION: ICD-10-CM

## 2023-04-11 DIAGNOSIS — M81.0 OSTEOPOROSIS, UNSPECIFIED OSTEOPOROSIS TYPE, UNSPECIFIED PATHOLOGICAL FRACTURE PRESENCE: Primary | ICD-10-CM

## 2023-04-11 DIAGNOSIS — C74.00 MALIGNANT NEOPLASM OF ADRENAL CORTEX, UNSPECIFIED LATERALITY: Primary | ICD-10-CM

## 2023-04-11 PROBLEM — M54.50 CHRONIC MIDLINE LOW BACK PAIN WITHOUT SCIATICA: Status: ACTIVE | Noted: 2022-08-02

## 2023-04-11 PROBLEM — S34.125A: Status: ACTIVE | Noted: 2022-08-02

## 2023-04-11 PROBLEM — G89.29 CHRONIC MIDLINE LOW BACK PAIN WITHOUT SCIATICA: Status: ACTIVE | Noted: 2022-08-02

## 2023-04-11 LAB
ALBUMIN SERPL-MCNC: 3 G/DL (ref 3.4–4.8)
ALBUMIN/GLOB SERPL: 1.1 RATIO (ref 1.1–2)
ALP SERPL-CCNC: 94 UNIT/L (ref 40–150)
ALT SERPL-CCNC: 11 UNIT/L (ref 0–55)
AST SERPL-CCNC: 18 UNIT/L (ref 5–34)
BASOPHILS # BLD AUTO: 0.04 X10(3)/MCL (ref 0–0.2)
BASOPHILS NFR BLD AUTO: 0.9 %
BILIRUBIN DIRECT+TOT PNL SERPL-MCNC: 0.2 MG/DL
BUN SERPL-MCNC: 18.5 MG/DL (ref 9.8–20.1)
CALCIUM SERPL-MCNC: 9.3 MG/DL (ref 8.4–10.2)
CHLORIDE SERPL-SCNC: 105 MMOL/L (ref 98–107)
CHOLEST SERPL-MCNC: 193 MG/DL
CHOLEST/HDLC SERPL: 3 {RATIO} (ref 0–5)
CO2 SERPL-SCNC: 25 MMOL/L (ref 23–31)
CORTIS SERPL-SCNC: 30.2 UG/DL
CREAT SERPL-MCNC: 0.67 MG/DL (ref 0.55–1.02)
EOSINOPHIL # BLD AUTO: 0.16 X10(3)/MCL (ref 0–0.9)
EOSINOPHIL NFR BLD AUTO: 3.7 %
ERYTHROCYTE [DISTWIDTH] IN BLOOD BY AUTOMATED COUNT: 13.8 % (ref 11.5–17)
GFR SERPLBLD CREATININE-BSD FMLA CKD-EPI: >60 MLS/MIN/1.73/M2
GLOBULIN SER-MCNC: 2.8 GM/DL (ref 2.4–3.5)
GLUCOSE SERPL-MCNC: 110 MG/DL (ref 82–115)
HCT VFR BLD AUTO: 35.7 % (ref 37–47)
HDLC SERPL-MCNC: 56 MG/DL (ref 35–60)
HGB BLD-MCNC: 11.5 G/DL (ref 12–16)
IMM GRANULOCYTES # BLD AUTO: 0.01 X10(3)/MCL (ref 0–0.04)
IMM GRANULOCYTES NFR BLD AUTO: 0.2 %
LDLC SERPL CALC-MCNC: 89 MG/DL (ref 50–140)
LYMPHOCYTES # BLD AUTO: 0.9 X10(3)/MCL (ref 0.6–4.6)
LYMPHOCYTES NFR BLD AUTO: 20.8 %
MCH RBC QN AUTO: 32.2 PG (ref 27–31)
MCHC RBC AUTO-ENTMCNC: 32.2 G/DL (ref 33–36)
MCV RBC AUTO: 100 FL (ref 80–94)
MONOCYTES # BLD AUTO: 0.49 X10(3)/MCL (ref 0.1–1.3)
MONOCYTES NFR BLD AUTO: 11.3 %
NEUTROPHILS # BLD AUTO: 2.73 X10(3)/MCL (ref 2.1–9.2)
NEUTROPHILS NFR BLD AUTO: 63.1 %
PLATELET # BLD AUTO: 307 X10(3)/MCL (ref 130–400)
PMV BLD AUTO: 10.2 FL (ref 7.4–10.4)
POTASSIUM SERPL-SCNC: 4 MMOL/L (ref 3.5–5.1)
PROT SERPL-MCNC: 5.8 GM/DL (ref 5.8–7.6)
RBC # BLD AUTO: 3.57 X10(6)/MCL (ref 4.2–5.4)
SODIUM SERPL-SCNC: 139 MMOL/L (ref 136–145)
T4 FREE SERPL-MCNC: 0.88 NG/DL (ref 0.7–1.48)
TRIGL SERPL-MCNC: 238 MG/DL (ref 37–140)
TSH SERPL-ACNC: 0.9 UIU/ML (ref 0.35–4.94)
VLDLC SERPL CALC-MCNC: 48 MG/DL
WBC # SPEC AUTO: 4.3 X10(3)/MCL (ref 4.5–11.5)

## 2023-04-11 PROCEDURE — 84244 ASSAY OF RENIN: CPT | Mod: 90

## 2023-04-11 PROCEDURE — 82627 DEHYDROEPIANDROSTERONE: CPT | Mod: 90

## 2023-04-11 PROCEDURE — 1159F PR MEDICATION LIST DOCUMENTED IN MEDICAL RECORD: ICD-10-PCS | Mod: CPTII,S$GLB,, | Performed by: NURSE PRACTITIONER

## 2023-04-11 PROCEDURE — 96372 THER/PROPH/DIAG INJ SC/IM: CPT

## 2023-04-11 PROCEDURE — 82088 ASSAY OF ALDOSTERONE: CPT | Mod: 90

## 2023-04-11 PROCEDURE — 85025 COMPLETE CBC W/AUTO DIFF WBC: CPT

## 2023-04-11 PROCEDURE — 63600175 PHARM REV CODE 636 W HCPCS: Mod: JZ,JG | Performed by: NURSE PRACTITIONER

## 2023-04-11 PROCEDURE — 3080F DIAST BP >= 90 MM HG: CPT | Mod: CPTII,S$GLB,, | Performed by: NURSE PRACTITIONER

## 2023-04-11 PROCEDURE — 4010F PR ACE/ARB THEARPY RXD/TAKEN: ICD-10-PCS | Mod: CPTII,S$GLB,, | Performed by: NURSE PRACTITIONER

## 2023-04-11 PROCEDURE — 4010F ACE/ARB THERAPY RXD/TAKEN: CPT | Mod: CPTII,S$GLB,, | Performed by: NURSE PRACTITIONER

## 2023-04-11 PROCEDURE — 99999 PR PBB SHADOW E&M-EST. PATIENT-LVL IV: ICD-10-PCS | Mod: PBBFAC,,, | Performed by: NURSE PRACTITIONER

## 2023-04-11 PROCEDURE — 82533 TOTAL CORTISOL: CPT

## 2023-04-11 PROCEDURE — 1160F RVW MEDS BY RX/DR IN RCRD: CPT | Mod: CPTII,S$GLB,, | Performed by: NURSE PRACTITIONER

## 2023-04-11 PROCEDURE — 80375 DRUG/SUBSTANCE NOS 1-3: CPT | Mod: 90

## 2023-04-11 PROCEDURE — 1160F PR REVIEW ALL MEDS BY PRESCRIBER/CLIN PHARMACIST DOCUMENTED: ICD-10-PCS | Mod: CPTII,S$GLB,, | Performed by: NURSE PRACTITIONER

## 2023-04-11 PROCEDURE — 99999 PR PBB SHADOW E&M-EST. PATIENT-LVL IV: CPT | Mod: PBBFAC,,, | Performed by: NURSE PRACTITIONER

## 2023-04-11 PROCEDURE — 99214 PR OFFICE/OUTPT VISIT, EST, LEVL IV, 30-39 MIN: ICD-10-PCS | Mod: S$GLB,,, | Performed by: NURSE PRACTITIONER

## 2023-04-11 PROCEDURE — 84439 ASSAY OF FREE THYROXINE: CPT

## 2023-04-11 PROCEDURE — 3080F PR MOST RECENT DIASTOLIC BLOOD PRESSURE >= 90 MM HG: ICD-10-PCS | Mod: CPTII,S$GLB,, | Performed by: NURSE PRACTITIONER

## 2023-04-11 PROCEDURE — 82024 ASSAY OF ACTH: CPT | Mod: 90

## 2023-04-11 PROCEDURE — 99214 OFFICE O/P EST MOD 30 MIN: CPT | Mod: S$GLB,,, | Performed by: NURSE PRACTITIONER

## 2023-04-11 PROCEDURE — 3077F SYST BP >= 140 MM HG: CPT | Mod: CPTII,S$GLB,, | Performed by: NURSE PRACTITIONER

## 2023-04-11 PROCEDURE — 80061 LIPID PANEL: CPT

## 2023-04-11 PROCEDURE — 3077F PR MOST RECENT SYSTOLIC BLOOD PRESSURE >= 140 MM HG: ICD-10-PCS | Mod: CPTII,S$GLB,, | Performed by: NURSE PRACTITIONER

## 2023-04-11 PROCEDURE — 3008F PR BODY MASS INDEX (BMI) DOCUMENTED: ICD-10-PCS | Mod: CPTII,S$GLB,, | Performed by: NURSE PRACTITIONER

## 2023-04-11 PROCEDURE — 36415 COLL VENOUS BLD VENIPUNCTURE: CPT

## 2023-04-11 PROCEDURE — 84443 ASSAY THYROID STIM HORMONE: CPT

## 2023-04-11 PROCEDURE — 80053 COMPREHEN METABOLIC PANEL: CPT

## 2023-04-11 PROCEDURE — 1159F MED LIST DOCD IN RCRD: CPT | Mod: CPTII,S$GLB,, | Performed by: NURSE PRACTITIONER

## 2023-04-11 PROCEDURE — 3008F BODY MASS INDEX DOCD: CPT | Mod: CPTII,S$GLB,, | Performed by: NURSE PRACTITIONER

## 2023-04-11 RX ORDER — OXYCODONE AND ACETAMINOPHEN 7.5; 325 MG/1; MG/1
1 TABLET ORAL EVERY 6 HOURS PRN
Qty: 60 TABLET | Refills: 0 | Status: SHIPPED | OUTPATIENT
Start: 2023-04-11 | End: 2023-04-11

## 2023-04-11 RX ORDER — LOSARTAN POTASSIUM 50 MG/1
50 TABLET ORAL DAILY
Qty: 90 TABLET | Refills: 3 | Status: SHIPPED | OUTPATIENT
Start: 2023-04-11 | End: 2023-11-07 | Stop reason: SDUPTHER

## 2023-04-11 RX ORDER — HYDROCODONE BITARTRATE AND ACETAMINOPHEN 7.5; 325 MG/1; MG/1
1 TABLET ORAL EVERY 6 HOURS PRN
Qty: 60 TABLET | Refills: 0 | Status: SHIPPED | OUTPATIENT
Start: 2023-04-11 | End: 2023-09-28

## 2023-04-11 RX ADMIN — DENOSUMAB 60 MG: 60 INJECTION SUBCUTANEOUS at 10:04

## 2023-04-11 NOTE — PROGRESS NOTES
Subjective:       Patient ID: Shreya Reyes is a 63 y.o. female.    Chief Complaint:  Left hip pain improved    Diagnosis: Metastatic adrenocortical carcinoma                    Multiple osteoporotic vertebral compression fractures    Treatment History  Adjuvant XRT (completed 9/30/21)  Mitotane 1/25/22-7/26/22, Resumed 8/22  XRT right scapula, L5,  R ischium, L femoral neck completed 8/26/22  SBRT L2-3 2/06/23  Left IM nail 2/08/23    Current Treatment:   Mitotane resumed 8/22 at 1000 mg/d. Changed to alternating 500mg/1000mg in March 2023  Hydrocortisone 10 mg Q AM, 10 mg Q PM  Levothyroxine 75 mcg/d              Clinical History:  Patient initially presented to the [facility] ER 3/16/21 with acute left flank pain. CT A/P showed a heterogeneous enhancing mass of the left adrenal gland measuring 5.5 x 4.8 x 5 cm (24 Hu), with no definite microscopic fat. There was mild displacement of the left renal vein. Right adrenal gland was unremarkable.  Hormonal workup was negative for pheochromocytoma. She was felt to have had an acute adrenal hemorrhage and close observation was recommended. Follow-up CT A/P 6/9/21 showed increased size of the adrenal mass to 8.0 x 4.5 cm appearing hypervascular with some central necrosis. She underwent a laparoscopic/robotic left adrenalectomy 6/11/21.  Final pathology showed an adrenal cortical carcinoma predominant oncocytic/trabecular morphology with focally marked cytologic atypia and increased mitotic rate (up to 10/50 HPF's). There were large areas of necrosis and multiple foci of capsular and lymphovascular invasion. Ki-67 expression was 10-15%.    She had a Telemedicine consultation with Dr. Dion Lynch at [facility] 8/3/21 who specializes in treatment of adrenocortical carcinoma. Her case was reviewed and discussed in their tumor board. Recommendations were for treatment with adjuvant radiation therapy and systemic treatment with Mitotane. Baseline postoperative CT scans of  the chest, abdomen and pelvis 8/4/21 showed postoperative changes with no evidence of measurable metastatic disease.    She was seen as a new patient at [facility] 8/9/21.  She had recovered well from her surgery and was asymptomatic.  Treatment recommendations from the [facility] were reviewed and discussed.  Treatment was started with Mitotane 1000 mg BID on 8/16/2021.  No dose escalation was recommended until she completed radiation therapy.  She was started on replacement hydrocortisone 20 mg Q AM and 10 mg Q PM.  Surveillance CT scans were recommended in 3 months.      She was seen for an office visit 9/16/21 after completing 4 weeks of treatment with Mitotane.  She was not having any significant side effects associated with her therapy.  She was penitentiary through her adjuvant radiation therapy.  Laboratory testing showed marked transaminase elevations with an AST of 493,  and alkaline phosphatase 175.  Bilirubin was normal.  The remainder of her CMP was unremarkable.  Baseline mitotane level drawn at that visit was 2.5 mcg/mL.  Mitotane was held and adjuvant radiation therapy was continued.  Weekly laboratory monitoring showed gradual improvement in her LFTs.  Although mitotane had been associated with transaminase elevations, >5x elevations are rare occurring in <1% of patients.  She was also instructed to hold her lovastatin.  She completed adjuvant radiation therapy 9/30/2021.     Follow-up laboratory continued to show transaminase elevations.  GI was consulted and ordered additional laboratory testing which did not reveal evidence of viral or autoimmune etiologies for her hepatitis.  Mitotane was not resumed due to her persistent transaminase elevations. Follow-up CT scans of the chest, abdomen and pelvis 10/28/21 showed a few stable subcentimeter pulmonary nodules and changes related to her previous left adrenalectomy with no evidence of disease recurrence.  Following  normalization of her LFTs, treatment was resumed with Mitotane 1/25/22.  Her LFTs remained normal even during dose escalation.    She had an injury at home in mid March after carrying in several arms of firewood with acute mid back pain.  Plain x-ray 3/17/22 showed a compression deformity at L2 of questionable age.  MRI of the lumbar spine 3/23/22 showed a subacute severe compression fracture deformity of the L1 vertebral body and mild superior endplate compression fracture of L3 vertebral body with osteoporotic appearance and no findings suspicious for pathologic fracture.  However, there were scattered small osseous lesions in the right L3 vertebral body and L5 vertebral body concerning for metastatic disease.  CT PET scan 3/28/22 showed mildly hypermetabolic osseous lesions in the lumbar spine, pelvis and proximal left femur consistent with metastatic disease.  There was no significant hypermetabolic uptake at the sites of her compression fractures.  She did not have pain at any of the sites prior to the acute compression fracture.  Bone density exam showed osteoporosis of the lumbar spine with a T score of -3.4, left femoral neck -3.4 and left total hip -2.7.  She was started on Prolia 3/31/22 and underwent L1 and L3 kyphoplasty 4//8/22.  Biopsies of the L3 vertebral body showed no evidence of metastatic carcinoma.  She continued to have significant pain following the kyphoplasty.  Further review of her films showed a possible compression fracture at T11.  MRI of the thoracic spine 4/15/22 showed a compression fracture of T11 with 50% loss of vertebral body height.  She underwent kyphoplasty 4/21/22 with symptomatic improvement.    CT scans of the chest, abdomen and pelvis 4/27/22 showed sclerotic osseous lesions at L4, right ischium and left femur correlating with the hypermetabolic lesions on CT-PET scan.  There were stable sub-6 mm pulmonary nodules in the RML and LLL unchanged from previous imaging.  MRI of  the cervical and lumbar spine 7/5/22 showed moderate disc disease at C5-6 and C6-7 without significant spinal canal stenosis or foraminal stenosis.  She developed progressive symptoms of right arm pain, numbness and tingling and updated MRI 7/13/22 showed foraminal stenosis secondary to disc osteophyte on the right side at C5-6 and C6-7.  She underwent a right foraminotomy with relief of her symptoms.    CT C/A/P 08/01/22 showed multiple compression fractures and postsurgical changes.  Area of sclerosis in the left sacrum was stable compared to the prior exam.  There was no evidence of visceral metastatic disease.  She had a teleconference with [facility] 8/2/22 and a follow-up PET scan was recommended.  Mitotane was discontinued 7/26/22. CT-PET 08/05/22 hypermetabolic osseous metastatic disease involving L5, right ischium, left femoral neck and a new lesion in the inferior right scapula.  There was a 12 mm area of hypermetabolic activity adjacent to the right adrenal gland without a definite CT correlate.    She completed palliative radiation therapy to the right scapula, L5, R ischium and left femoral neck on 8/26/22.  She resumed Mitotane at 1000 mg BID on 8/29/22.      CT-PET scan 11/21/22 showed improved FDG uptake in all of the treated sites.  Right scapula SUV decreased from 4.2 to 2.1, left femoral neck 9.1 to 5, right ischium 13 to 2.4 and L5 8.2 to 4.6.  Hypermetabolic activity at the site of the previous compression fractures had also improved.  There were no findings suspicious for new sites of disease or visceral metastatic disease.    MRI lumbar spine and left hip 1/29/23:  Metastatic disease involving right ischial tuberosity, left femoral neck and L3 vertebral body, similar in size to CT-PET scan 11/21/22.  L3 lesion showed interval progression with ventral and paraspinal extension causing moderate-to-severe narrowing of the spinal canal.  Left femoral neck lesion involved greater than  50% of the total diameter of the femoral neck.  She was treated with stereotactic XRT to L3 and underwent prophylactic IM nail of the left femoral neck 2/8/23    Guardant 360 2/20/23:  Positive TP53 mutation, microsatellite stable     Interval History  She returns to clinic today for a follow-up visit accompanied by her . She is doing fairly well. She is ambulatory without assistance. Pain is controlled with Norco 7.5/325 1 to 2 times a day.  Mentions she has changed the dose of the Mitotane to alternating between 500mg/1000mg every other day about 1 month ago. Denies any new problems or side effects today. Plans are to repeat CT scans and MRI of L spine and pelvis in a few weeks followed by a telemedicine with her team at [facility]. Scheduled for Prolia injection after her visit today. No new problems reported.     Review of Systems   Constitutional:  Positive for fatigue. Negative for appetite change, fever and unexpected weight change.   HENT:  Negative for mouth sores, sore throat and trouble swallowing.    Eyes: Negative.  Negative for visual disturbance.   Respiratory:  Negative for cough and shortness of breath.    Cardiovascular:  Negative for chest pain, palpitations and leg swelling.   Gastrointestinal:  Negative for abdominal distention, abdominal pain, constipation, diarrhea, nausea and vomiting.   Genitourinary:  Negative for dysuria, frequency and urgency.   Musculoskeletal:  Positive for back pain. Negative for arthralgias and myalgias.        Left hip pain   Integumentary:  Negative for rash and mole/lesion.   Neurological:  Negative for dizziness, weakness, numbness and headaches.   Hematological:  Negative for adenopathy. Does not bruise/bleed easily.   Psychiatric/Behavioral: Negative.       PMHx:  Hyperlipidemia, osteoporosis  PSHx:  Tonsils, left cataract, ORIF left tibia/fib, left adrenalectomy, multiple vertebral kyphoplasties  SH:  Former smoker 1 PPD, quit 2009. + Social  "alcohol use. Lives in [place] with her . 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  FH:  Her mother had lymphoma.     Objective:        BP (!) 142/90   Pulse 86   Temp 98.4 °F (36.9 °C)   Ht 5' 4" (1.626 m)   Wt 54.9 kg (121 lb)   SpO2 99%   BMI 20.77 kg/m²    Physical Exam  Constitutional:       Appearance: Normal appearance. She is normal weight.      Comments: Well-developed white female in NAD.   HENT:      Head: Normocephalic.      Mouth/Throat:      Mouth: Mucous membranes are moist.      Pharynx: Oropharynx is clear. No posterior oropharyngeal erythema.   Eyes:      Extraocular Movements: Extraocular movements intact.      Conjunctiva/sclera: Conjunctivae normal.      Pupils: Pupils are equal, round, and reactive to light.   Cardiovascular:      Rate and Rhythm: Normal rate and regular rhythm.      Heart sounds: No murmur heard.  Pulmonary:      Effort: Pulmonary effort is normal.      Breath sounds: Normal breath sounds.      Comments: Lungs clear to auscultation  Abdominal:      General: Abdomen is flat. Bowel sounds are normal. There is no distension.      Palpations: Abdomen is soft. There is no mass.      Tenderness: There is no abdominal tenderness.   Musculoskeletal:         General: No swelling.      Cervical back: Neck supple. No tenderness.      Comments: No vertebral body or rib cage tenderness.   Skin:     General: Skin is warm and dry.      Findings: No rash.   Neurological:      General: No focal deficit present.      Mental Status: She is alert and oriented to person, place, and time.      Motor: No weakness.   Psychiatric:         Behavior: Behavior is cooperative.     ECOG SCORE    2 - Capable of all selfcare but unable to carry out any work activities, active > 50% of hours        LABORATORY  Recent Results (from the past 336 hour(s))   CBC with Differential    Collection Time: 04/11/23 10:00 AM   Result Value Ref Range    WBC 4.3 (L) 4.5 - 11.5 x10(3)/mcL    " RBC 3.57 (L) 4.20 - 5.40 x10(6)/mcL    Hgb 11.5 (L) 12.0 - 16.0 g/dL    Hct 35.7 (L) 37.0 - 47.0 %    .0 (H) 80.0 - 94.0 fL    MCH 32.2 (H) 27.0 - 31.0 pg    MCHC 32.2 (L) 33.0 - 36.0 g/dL    RDW 13.8 11.5 - 17.0 %    Platelet 307 130 - 400 x10(3)/mcL    MPV 10.2 7.4 - 10.4 fL    Neut % 63.1 %    Lymph % 20.8 %    Mono % 11.3 %    Eos % 3.7 %    Basophil % 0.9 %    Lymph # 0.90 0.6 - 4.6 x10(3)/mcL    Neut # 2.73 2.1 - 9.2 x10(3)/mcL    Mono # 0.49 0.1 - 1.3 x10(3)/mcL    Eos # 0.16 0 - 0.9 x10(3)/mcL    Baso # 0.04 0 - 0.2 x10(3)/mcL    IG# 0.01 0 - 0.04 x10(3)/mcL    IG% 0.2 %          Assessment:   Metastatic adrenocortical carcinoma  Multiple osteoporotic vertebral body compression fractures  S/p prophylactic left femur IM nail 2/8/23  Treatment induced leukopenia - stable      Plan:   Continue Mitotane alternating between 500mg and 1000 mg daily.  Dose adjustment per [facility].   Continue laboratory monitoring every 4 weeks (standing orders).   CBC today with improved WBC at 4.3, normal ANC. Mild anemia with Hgb of 11.5 g/dL. Remainder of labs are still pending.   Repeat imaging as recommended in 2 weeks - CT CAP and MRI L spine and pelvis. Will send orders today.   Scheduled telemedicine appointment with [facility] on 5/9/23.   Will schedule follow-up with Dr. Arzate after her telemedicine appointment to discuss plan of care.   Refill Norco 7.5/325mg, #60 tabs sent to her pharmacy.   All questions answered at this time.       NICKY [facility]      Other Physicians  Dr. Dion Lynch ([facility])

## 2023-04-12 LAB
ACTH PLAS-MCNC: 243 PG/ML
DHEA-S SERPL-MCNC: <5 MCG/DL (ref 9.7–159)

## 2023-04-13 ENCOUNTER — APPOINTMENT (OUTPATIENT)
Dept: LAB | Facility: HOSPITAL | Age: 63
End: 2023-04-13
Attending: INTERNAL MEDICINE
Payer: COMMERCIAL

## 2023-04-13 LAB — RENIN PLAS-CCNC: 13 NG/ML/H

## 2023-04-14 LAB — ALDOST SERPL-MCNC: <4 NG/DL

## 2023-04-16 LAB — MITOTANE SERPL-MCNC: 16.2 UG/ML

## 2023-04-17 LAB
COLLECT DURATION TIME UR: 24 H
CORTIS 24H UR-MRATE: 13 MCG/24 H (ref 3.5–45)
SPECIMEN VOL 24H UR: 900 ML

## 2023-04-24 ENCOUNTER — HOSPITAL ENCOUNTER (OUTPATIENT)
Dept: RADIOLOGY | Facility: HOSPITAL | Age: 63
Discharge: HOME | End: 2023-04-24
Attending: NURSE PRACTITIONER
Payer: COMMERCIAL

## 2023-04-24 DIAGNOSIS — C74.00 MALIGNANT NEOPLASM OF ADRENAL CORTEX, UNSPECIFIED LATERALITY: ICD-10-CM

## 2023-04-24 DIAGNOSIS — S34.125D: ICD-10-CM

## 2023-04-24 DIAGNOSIS — M84.452A: ICD-10-CM

## 2023-04-24 PROCEDURE — 72158 MRI LUMBAR SPINE W/O & W/DYE: CPT | Mod: TC

## 2023-04-24 PROCEDURE — 25500020 PHARM REV CODE 255: Performed by: NURSE PRACTITIONER

## 2023-04-24 PROCEDURE — 72197 MRI PELVIS W/O & W/DYE: CPT | Mod: TC

## 2023-04-24 PROCEDURE — A9577 INJ MULTIHANCE: HCPCS | Performed by: NURSE PRACTITIONER

## 2023-04-24 RX ADMIN — GADOBENATE DIMEGLUMINE 16 ML: 529 INJECTION, SOLUTION INTRAVENOUS at 05:04

## 2023-04-30 DIAGNOSIS — F41.9 ANXIETY: ICD-10-CM

## 2023-05-01 RX ORDER — ALPRAZOLAM 0.25 MG/1
TABLET ORAL
Qty: 60 TABLET | Refills: 3 | Status: SHIPPED | OUTPATIENT
Start: 2023-05-01 | End: 2023-11-07 | Stop reason: SDUPTHER

## 2023-05-02 ENCOUNTER — APPOINTMENT (OUTPATIENT)
Dept: RADIATION THERAPY | Facility: HOSPITAL | Age: 63
End: 2023-05-02
Attending: RADIOLOGY
Payer: COMMERCIAL

## 2023-05-02 PROCEDURE — 77334 RADIATION TREATMENT AID(S): CPT | Performed by: RADIOLOGY

## 2023-05-08 PROCEDURE — 77338 DESIGN MLC DEVICE FOR IMRT: CPT | Performed by: RADIOLOGY

## 2023-05-08 PROCEDURE — 77301 RADIOTHERAPY DOSE PLAN IMRT: CPT | Performed by: RADIOLOGY

## 2023-05-08 PROCEDURE — 77300 RADIATION THERAPY DOSE PLAN: CPT | Performed by: RADIOLOGY

## 2023-05-09 PROCEDURE — 77373 STRTCTC BDY RAD THER TX DLVR: CPT | Performed by: RADIOLOGY

## 2023-05-11 PROCEDURE — 77373 STRTCTC BDY RAD THER TX DLVR: CPT | Performed by: RADIOLOGY

## 2023-05-13 NOTE — PROGRESS NOTES
Subjective:       Patient ID: Shreya Reyes is a 63 y.o. female.    Chief Complaint:  Just completed radiation therapy    Diagnosis: Metastatic adrenocortical carcinoma                    Multiple osteoporotic vertebral compression fractures    Treatment History  Adjuvant XRT (completed 9/30/21)  Mitotane 1/25/22-7/26/22, Resumed 8/22  XRT right scapula, L5,  R ischium, L femoral neck completed 8/26/22  SBRT L2-3 2/06/23  Left IM nail 2/08/23  SBRT R iliac wing 5/15/23    Current Treatment:   Mitotane resumed 8/22 - current dose 1000 mg/d  Hydrocortisone 10 mg Q AM, 10 mg Q PM  Levothyroxine 75 mcg/d              Clinical History:  Patient initially presented to the [facility] 3/16/21 with acute left flank pain. CT A/P showed a heterogeneous enhancing mass of the left adrenal gland measuring 5.5 x 4.8 x 5 cm (24 Hu), with no definite microscopic fat. There was mild displacement of the left renal vein. Right adrenal gland was unremarkable.  Hormonal workup was negative for pheochromocytoma. She was felt to have had an acute adrenal hemorrhage and close observation was recommended. Follow-up CT A/P 6/9/21 showed increased size of the adrenal mass to 8.0 x 4.5 cm appearing hypervascular with some central necrosis. She underwent a laparoscopic/robotic left adrenalectomy 6/11/21.  Final pathology showed an adrenal cortical carcinoma predominant oncocytic/trabecular morphology with focally marked cytologic atypia and increased mitotic rate (up to 10/50 HPF's). There were large areas of necrosis and multiple foci of capsular and lymphovascular invasion. Ki-67 expression was 10-15%.    She had a Telemedicine consultation with Dr. Dion Lynch at [facility] 8/3/21 who specializes in treatment of adrenocortical carcinoma. Her case was reviewed and discussed in their tumor board. Recommendations were for treatment with adjuvant radiation therapy and systemic treatment with Mitotane. Baseline postoperative CT scans of  the chest, abdomen and pelvis 8/4/21 showed postoperative changes with no evidence of measurable metastatic disease.    She was seen as a new patient at [facility] 8/9/21.  She had recovered well from her surgery and was asymptomatic.  Treatment recommendations from the [facility] were reviewed and discussed.  Treatment was started with Mitotane 1000 mg BID on 8/16/2021.  No dose escalation was recommended until she completed radiation therapy.  She was started on replacement hydrocortisone 20 mg Q AM and 10 mg Q PM.  Surveillance CT scans were recommended in 3 months.      She was seen for an office visit 9/16/21 after completing 4 weeks of treatment with Mitotane.  She was not having any significant side effects associated with her therapy.  She was [facility] through her adjuvant radiation therapy.  Laboratory testing showed marked transaminase elevations with an AST of 493,  and alkaline phosphatase 175.  Bilirubin was normal.  The remainder of her CMP was unremarkable.  Baseline mitotane level drawn at that visit was 2.5 mcg/mL.  Mitotane was held and adjuvant radiation therapy was continued.  Weekly laboratory monitoring showed gradual improvement in her LFTs.  Although mitotane had been associated with transaminase elevations, >5x elevations are rare occurring in <1% of patients.  She was also instructed to hold her lovastatin.  She completed adjuvant radiation therapy 9/30/2021.     Follow-up laboratory continued to show transaminase elevations.  GI was consulted and ordered additional laboratory testing which did not reveal evidence of viral or autoimmune etiologies for her hepatitis.  Mitotane was not resumed due to her persistent transaminase elevations. Follow-up CT scans of the chest, abdomen and pelvis 10/28/21 showed a few stable subcentimeter pulmonary nodules and changes related to her previous left adrenalectomy with no evidence of disease recurrence.  Following  normalization of her LFTs, treatment was resumed with Mitotane 1/25/22.  Her LFTs remained normal even during dose escalation.    She had an injury at home in mid March after carrying in several arms of firewood with acute mid back pain.  Plain x-ray 3/17/22 showed a compression deformity at L2 of questionable age.  MRI of the lumbar spine 3/23/22 showed a subacute severe compression fracture deformity of the L1 vertebral body and mild superior endplate compression fracture of L3 vertebral body with osteoporotic appearance and no findings suspicious for pathologic fracture.  However, there were scattered small osseous lesions in the right L3 vertebral body and L5 vertebral body concerning for metastatic disease.  CT PET scan 3/28/22 showed mildly hypermetabolic osseous lesions in the lumbar spine, pelvis and proximal left femur consistent with metastatic disease.  There was no significant hypermetabolic uptake at the sites of her compression fractures.  She did not have pain at any of the sites prior to the acute compression fracture.  Bone density exam showed osteoporosis of the lumbar spine with a T score of -3.4, left femoral neck -3.4 and left total hip -2.7.  She was started on Prolia 3/31/22 and underwent L1 and L3 kyphoplasty 4//8/22.  Biopsies of the L3 vertebral body showed no evidence of metastatic carcinoma.  She continued to have significant pain following the kyphoplasty.  Further review of her films showed a possible compression fracture at T11.  MRI of the thoracic spine 4/15/22 showed a compression fracture of T11 with 50% loss of vertebral body height.  She underwent kyphoplasty 4/21/22 with symptomatic improvement.    CT scans of the chest, abdomen and pelvis 4/27/22 showed sclerotic osseous lesions at L4, right ischium and left femur correlating with the hypermetabolic lesions on CT-PET scan.  There were stable sub-6 mm pulmonary nodules in the RML and LLL unchanged from previous imaging.  MRI of  the cervical and lumbar spine 7/5/22 showed moderate disc disease at C5-6 and C6-7 without significant spinal canal stenosis or foraminal stenosis.  She developed progressive symptoms of right arm pain, numbness and tingling and updated MRI 7/13/22 showed foraminal stenosis secondary to disc osteophyte on the right side at C5-6 and C6-7.  She underwent a right foraminotomy with relief of her symptoms.    CT C/A/P 08/01/22:  Multiple compression fractures and postsurgical changes.  Area of sclerosis in the left sacrum was stable compared to the prior exam.  There was no evidence of visceral metastatic disease.  She had a teleconference with [facility] 8/2/22 and a follow-up PET scan was recommended.  Mitotane was discontinued 7/26/22.   CT-PET 08/05/22:  Hypermetabolic osseous metastatic disease involving L5, right ischium, left femoral neck and a new lesion in the inferior right scapula.  There was a 12 mm area of hypermetabolic activity adjacent to the right adrenal gland without a definite CT correlate.    She completed palliative radiation therapy to the right scapula, L5, R ischium and left femoral neck on 8/26/22.  She resumed Mitotane at 1000 mg BID on 8/29/22.      CT-PET 11/21/22:  Improved FDG uptake in all of the treated sites.  Right scapula SUV decreased from 4.2 to 2.1, left femoral neck 9.1 to 5, right ischium 13 to 2.4 and L5 8.2 to 4.6.  Hypermetabolic activity at the site of the previous compression fractures had also improved.  There were no findings suspicious for new sites of disease or visceral metastatic disease.  MRI lumbar spine and left hip 1/29/23:  Metastatic disease involving right ischial tuberosity, left femoral neck and L3 vertebral body, similar in size to CT-PET scan 11/21/22:  L3 lesion showed interval progression with ventral and paraspinal extension causing moderate-to-severe narrowing of the spinal canal.  Left femoral neck lesion involved greater than 50% of the  total diameter of the femoral neck.  She was treated with stereotactic XRT to L3 and underwent prophylactic IM nail of the left femoral neck 2/8/23.  CT C/A/P 4/24/23:  13 mm sclerotic met inferior right scapula, bandlike densities RUL and RLL secondary to previous XRT.  Stable thoracic spine compression fractures.  Increased density L4 vertebral body metastasis.  No evidence of visceral metastatic disease.  MRI L-spine/pelvis 4/24/23:  Stable L3 lesion with mild epidural extension and enhancement measuring 8 mm.  Sclerotic 10 mm focus of abnormal marrow signal right iliac wing.    Guardant 360 2/20/23:  Positive TP53 mutation, microsatellite stable     Interval History  She returns to the office today for a 4 week follow-up visit accompanied by her .  She completed SBRT to the right iliac wing metastasis 5/15/23 receiving 2700 cGy in 3 fractions.  Treatment was well tolerated.  Her back pain has actually improved significantly.  She has been more active.  She denies worsening bone pain.  No recent illnesses or falls.  Her mitotane dose was adjusted to a 1000 mg daily 3-4 weeks ago.  She had a telemedicine visit 5/9/23 with [facility].  Follow-up imaging was recommended in 3 months.  Prolia was resumed 4/11/23.  She had no significant side effects associated with treatment.      Review of Systems   Constitutional:  Positive for fatigue. Negative for appetite change, fever and unexpected weight change.   HENT:  Negative for mouth sores, sore throat and trouble swallowing.    Eyes: Negative.    Respiratory:  Negative for cough and shortness of breath.    Cardiovascular:  Negative for chest pain, palpitations and leg swelling.   Gastrointestinal:  Negative for abdominal distention, abdominal pain, constipation, diarrhea, nausea and vomiting.   Genitourinary:  Negative for dysuria, frequency and urgency.   Musculoskeletal:  Positive for arthralgias and back pain (improved). Negative for myalgias.       "  Left hip pain   Integumentary:  Negative for rash and mole/lesion.   Neurological:  Negative for dizziness, weakness, numbness and headaches.   Hematological:  Negative for adenopathy. Does not bruise/bleed easily.   Psychiatric/Behavioral: Negative.         PMHx:  Hyperlipidemia, osteoporosis  PSHx:  Tonsils, left cataract, ORIF left tibia/fib, left adrenalectomy, multiple vertebral kyphoplasties  SH:  Former smoker 1 PPD, quit 2009. + Social alcohol use. Lives in [place] with her . 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  FH:  Her mother had lymphoma.     Objective:        BP (!) 172/98   Pulse 82   Temp 98.7 °F (37.1 °C)   Resp 16   Ht 5' 4" (1.626 m)   Wt 56.5 kg (124 lb 8 oz)   SpO2 100%   BMI 21.37 kg/m²    Physical Exam  Constitutional:       Appearance: Normal appearance. She is normal weight.      Comments: Well-developed white female in NAD.   HENT:      Head: Normocephalic.      Mouth/Throat:      Mouth: Mucous membranes are moist.      Pharynx: Oropharynx is clear. No posterior oropharyngeal erythema.   Eyes:      Extraocular Movements: Extraocular movements intact.      Conjunctiva/sclera: Conjunctivae normal.      Pupils: Pupils are equal, round, and reactive to light.   Cardiovascular:      Rate and Rhythm: Normal rate and regular rhythm.      Heart sounds: No murmur heard.  Pulmonary:      Effort: Pulmonary effort is normal.      Breath sounds: Normal breath sounds.      Comments: Lungs clear to auscultation  Abdominal:      General: Abdomen is flat. Bowel sounds are normal. There is no distension.      Palpations: Abdomen is soft. There is no mass.      Tenderness: There is no abdominal tenderness.   Musculoskeletal:         General: No swelling.      Cervical back: Neck supple. No tenderness.      Comments: No vertebral body or rib cage tenderness.   Skin:     General: Skin is warm and dry.      Findings: No rash.   Neurological:      General: No focal deficit " present.      Mental Status: She is alert and oriented to person, place, and time.      Motor: No weakness.   Psychiatric:         Behavior: Behavior is cooperative.     ECOG SCORE    1 - Restricted in strenuous activity-ambulatory and able to carry out work of a light nature        LABORATORY  Recent Results (from the past 336 hour(s))   Lipid Panel    Collection Time: 05/16/23 11:04 AM   Result Value Ref Range    Cholesterol Total 192 <=200 mg/dL    HDL Cholesterol 66 (H) 35 - 60 mg/dL    Triglyceride 169 (H) 37 - 140 mg/dL    Cholesterol/HDL Ratio 3 0 - 5    Very Low Density Lipoprotein 34     LDL Cholesterol 92.00 50.00 - 140.00 mg/dL   Comprehensive Metabolic Panel    Collection Time: 05/16/23 11:04 AM   Result Value Ref Range    Sodium Level 142 136 - 145 mmol/L    Potassium Level 3.9 3.5 - 5.1 mmol/L    Chloride 108 (H) 98 - 107 mmol/L    Carbon Dioxide 25 23 - 31 mmol/L    Glucose Level 93 82 - 115 mg/dL    Blood Urea Nitrogen 24.6 (H) 9.8 - 20.1 mg/dL    Creatinine 0.64 0.55 - 1.02 mg/dL    Calcium Level Total 8.8 8.4 - 10.2 mg/dL    Protein Total 5.9 5.8 - 7.6 gm/dL    Albumin Level 2.9 (L) 3.4 - 4.8 g/dL    Globulin 3.0 2.4 - 3.5 gm/dL    Albumin/Globulin Ratio 1.0 (L) 1.1 - 2.0 ratio    Bilirubin Total 0.2 <=1.5 mg/dL    Alkaline Phosphatase 83 40 - 150 unit/L    Alanine Aminotransferase 15 0 - 55 unit/L    Aspartate Aminotransferase 25 5 - 34 unit/L    eGFR >60 mls/min/1.73/m2   TSH    Collection Time: 05/16/23 11:04 AM   Result Value Ref Range    Thyroid Stimulating Hormone 0.865 0.350 - 4.940 uIU/mL   Cortisol    Collection Time: 05/16/23 11:04 AM   Result Value Ref Range    Cortisol Level 25.5 ug/dL   T4, Free    Collection Time: 05/16/23 11:04 AM   Result Value Ref Range    Thyroxine Free 0.87 0.70 - 1.48 ng/dL   CBC with Differential    Collection Time: 05/16/23 11:04 AM   Result Value Ref Range    WBC 4.47 (L) 4.50 - 11.50 x10(3)/mcL    RBC 3.53 (L) 4.20 - 5.40 x10(6)/mcL    Hgb 11.3 (L) 12.0 -  16.0 g/dL    Hct 35.8 (L) 37.0 - 47.0 %    .4 (H) 80.0 - 94.0 fL    MCH 32.0 (H) 27.0 - 31.0 pg    MCHC 31.6 (L) 33.0 - 36.0 g/dL    RDW 13.7 11.5 - 17.0 %    Platelet 251 130 - 400 x10(3)/mcL    MPV 10.5 (H) 7.4 - 10.4 fL    Neut % 68.5 %    Lymph % 17.2 %    Mono % 11.9 %    Eos % 1.6 %    Basophil % 0.4 %    Lymph # 0.77 0.6 - 4.6 x10(3)/mcL    Neut # 3.06 2.1 - 9.2 x10(3)/mcL    Mono # 0.53 0.1 - 1.3 x10(3)/mcL    Eos # 0.07 0 - 0.9 x10(3)/mcL    Baso # 0.02 <=0.2 x10(3)/mcL    IG# 0.02 0 - 0.04 x10(3)/mcL    IG% 0.4 %        Assessment:   Metastatic adrenocortical carcinoma  Multiple osteoporotic vertebral body compression fractures  S/p prophylactic left femur IM nail 2/8/23  Treatment induced leukopenia - stable      Plan:   Continue Mitotane at 1000 mg daily.  Follow-up level drawn today.  Dosing per [facility].  Continue follow-up and laboratory testing every 4 weeks.  Repeat CT chest, abdomen and pelvis and MRI of the lumbar spine and pelvis in 8-10 weeks.  Next telemedicine visit with [facility] scheduled for 8/15/23.      MARY NORMAN MD    Other Physicians  Dr. Dion Lynch ([facility])

## 2023-05-15 PROCEDURE — 77373 STRTCTC BDY RAD THER TX DLVR: CPT | Performed by: RADIOLOGY

## 2023-05-15 PROCEDURE — 77336 RADIATION PHYSICS CONSULT: CPT | Performed by: RADIOLOGY

## 2023-05-16 ENCOUNTER — OFFICE VISIT (OUTPATIENT)
Dept: HEMATOLOGY/ONCOLOGY | Facility: CLINIC | Age: 63
End: 2023-05-16
Payer: COMMERCIAL

## 2023-05-16 ENCOUNTER — TELEPHONE (OUTPATIENT)
Dept: HEMATOLOGY/ONCOLOGY | Facility: CLINIC | Age: 63
End: 2023-05-16
Payer: COMMERCIAL

## 2023-05-16 VITALS
SYSTOLIC BLOOD PRESSURE: 172 MMHG | HEART RATE: 82 BPM | TEMPERATURE: 99 F | HEIGHT: 64 IN | BODY MASS INDEX: 21.25 KG/M2 | WEIGHT: 124.5 LBS | RESPIRATION RATE: 16 BRPM | DIASTOLIC BLOOD PRESSURE: 98 MMHG | OXYGEN SATURATION: 100 %

## 2023-05-16 DIAGNOSIS — C74.00 MALIGNANT NEOPLASM OF ADRENAL CORTEX, UNSPECIFIED LATERALITY: Primary | ICD-10-CM

## 2023-05-16 DIAGNOSIS — C79.51 SECONDARY MALIGNANT NEOPLASM OF BONE: ICD-10-CM

## 2023-05-16 PROCEDURE — 99999 PR PBB SHADOW E&M-EST. PATIENT-LVL IV: ICD-10-PCS | Mod: PBBFAC,,, | Performed by: INTERNAL MEDICINE

## 2023-05-16 PROCEDURE — 3080F DIAST BP >= 90 MM HG: CPT | Mod: CPTII,S$GLB,, | Performed by: INTERNAL MEDICINE

## 2023-05-16 PROCEDURE — 1159F PR MEDICATION LIST DOCUMENTED IN MEDICAL RECORD: ICD-10-PCS | Mod: CPTII,S$GLB,, | Performed by: INTERNAL MEDICINE

## 2023-05-16 PROCEDURE — 3077F SYST BP >= 140 MM HG: CPT | Mod: CPTII,S$GLB,, | Performed by: INTERNAL MEDICINE

## 2023-05-16 PROCEDURE — 1159F MED LIST DOCD IN RCRD: CPT | Mod: CPTII,S$GLB,, | Performed by: INTERNAL MEDICINE

## 2023-05-16 PROCEDURE — 3008F PR BODY MASS INDEX (BMI) DOCUMENTED: ICD-10-PCS | Mod: CPTII,S$GLB,, | Performed by: INTERNAL MEDICINE

## 2023-05-16 PROCEDURE — 99999 PR PBB SHADOW E&M-EST. PATIENT-LVL IV: CPT | Mod: PBBFAC,,, | Performed by: INTERNAL MEDICINE

## 2023-05-16 PROCEDURE — 99214 OFFICE O/P EST MOD 30 MIN: CPT | Mod: S$GLB,,, | Performed by: INTERNAL MEDICINE

## 2023-05-16 PROCEDURE — 99214 PR OFFICE/OUTPT VISIT, EST, LEVL IV, 30-39 MIN: ICD-10-PCS | Mod: S$GLB,,, | Performed by: INTERNAL MEDICINE

## 2023-05-16 PROCEDURE — 4010F ACE/ARB THERAPY RXD/TAKEN: CPT | Mod: CPTII,S$GLB,, | Performed by: INTERNAL MEDICINE

## 2023-05-16 PROCEDURE — 3008F BODY MASS INDEX DOCD: CPT | Mod: CPTII,S$GLB,, | Performed by: INTERNAL MEDICINE

## 2023-05-16 PROCEDURE — 3080F PR MOST RECENT DIASTOLIC BLOOD PRESSURE >= 90 MM HG: ICD-10-PCS | Mod: CPTII,S$GLB,, | Performed by: INTERNAL MEDICINE

## 2023-05-16 PROCEDURE — 3077F PR MOST RECENT SYSTOLIC BLOOD PRESSURE >= 140 MM HG: ICD-10-PCS | Mod: CPTII,S$GLB,, | Performed by: INTERNAL MEDICINE

## 2023-05-16 PROCEDURE — 4010F PR ACE/ARB THEARPY RXD/TAKEN: ICD-10-PCS | Mod: CPTII,S$GLB,, | Performed by: INTERNAL MEDICINE

## 2023-06-20 ENCOUNTER — LAB VISIT (OUTPATIENT)
Dept: LAB | Facility: HOSPITAL | Age: 63
End: 2023-06-20
Attending: INTERNAL MEDICINE
Payer: COMMERCIAL

## 2023-06-20 ENCOUNTER — OFFICE VISIT (OUTPATIENT)
Dept: HEMATOLOGY/ONCOLOGY | Facility: CLINIC | Age: 63
End: 2023-06-20
Payer: COMMERCIAL

## 2023-06-20 VITALS
SYSTOLIC BLOOD PRESSURE: 123 MMHG | RESPIRATION RATE: 15 BRPM | HEART RATE: 74 BPM | TEMPERATURE: 98 F | BODY MASS INDEX: 21.31 KG/M2 | HEIGHT: 64 IN | DIASTOLIC BLOOD PRESSURE: 79 MMHG | WEIGHT: 124.81 LBS | OXYGEN SATURATION: 98 %

## 2023-06-20 DIAGNOSIS — C74.02 MALIGNANT NEOPLASM OF CORTEX OF LEFT ADRENAL GLAND: ICD-10-CM

## 2023-06-20 DIAGNOSIS — C79.51 SECONDARY MALIGNANT NEOPLASM OF BONE: ICD-10-CM

## 2023-06-20 DIAGNOSIS — C74.00 MALIGNANT NEOPLASM OF ADRENAL CORTEX, UNSPECIFIED LATERALITY: Primary | ICD-10-CM

## 2023-06-20 LAB
ALBUMIN SERPL-MCNC: 3.2 G/DL (ref 3.4–4.8)
ALBUMIN/GLOB SERPL: 1.1 RATIO (ref 1.1–2)
ALP SERPL-CCNC: 83 UNIT/L (ref 40–150)
ALT SERPL-CCNC: 12 UNIT/L (ref 0–55)
AST SERPL-CCNC: 18 UNIT/L (ref 5–34)
BASOPHILS # BLD AUTO: 0.02 X10(3)/MCL
BASOPHILS NFR BLD AUTO: 0.4 %
BILIRUBIN DIRECT+TOT PNL SERPL-MCNC: 0.3 MG/DL
BUN SERPL-MCNC: 23.4 MG/DL (ref 9.8–20.1)
CALCIUM SERPL-MCNC: 9.1 MG/DL (ref 8.4–10.2)
CHLORIDE SERPL-SCNC: 103 MMOL/L (ref 98–107)
CHOLEST SERPL-MCNC: 195 MG/DL
CHOLEST/HDLC SERPL: 2 {RATIO} (ref 0–5)
CO2 SERPL-SCNC: 29 MMOL/L (ref 23–31)
CORTIS SERPL-SCNC: 36.3 UG/DL
CREAT SERPL-MCNC: 0.72 MG/DL (ref 0.55–1.02)
EOSINOPHIL # BLD AUTO: 0.1 X10(3)/MCL (ref 0–0.9)
EOSINOPHIL NFR BLD AUTO: 2.2 %
ERYTHROCYTE [DISTWIDTH] IN BLOOD BY AUTOMATED COUNT: 13.5 % (ref 11.5–17)
GFR SERPLBLD CREATININE-BSD FMLA CKD-EPI: >60 MLS/MIN/1.73/M2
GLOBULIN SER-MCNC: 3 GM/DL (ref 2.4–3.5)
GLUCOSE SERPL-MCNC: 85 MG/DL (ref 82–115)
HCT VFR BLD AUTO: 37.8 % (ref 37–47)
HDLC SERPL-MCNC: 81 MG/DL (ref 35–60)
HGB BLD-MCNC: 12.2 G/DL (ref 12–16)
IMM GRANULOCYTES # BLD AUTO: 0.02 X10(3)/MCL (ref 0–0.04)
IMM GRANULOCYTES NFR BLD AUTO: 0.4 %
LDLC SERPL CALC-MCNC: 92 MG/DL (ref 50–140)
LYMPHOCYTES # BLD AUTO: 1.18 X10(3)/MCL (ref 0.6–4.6)
LYMPHOCYTES NFR BLD AUTO: 25.4 %
MCH RBC QN AUTO: 31.9 PG (ref 27–31)
MCHC RBC AUTO-ENTMCNC: 32.3 G/DL (ref 33–36)
MCV RBC AUTO: 99 FL (ref 80–94)
MONOCYTES # BLD AUTO: 0.64 X10(3)/MCL (ref 0.1–1.3)
MONOCYTES NFR BLD AUTO: 13.8 %
NEUTROPHILS # BLD AUTO: 2.68 X10(3)/MCL (ref 2.1–9.2)
NEUTROPHILS NFR BLD AUTO: 57.8 %
PLATELET # BLD AUTO: 278 X10(3)/MCL (ref 130–400)
PMV BLD AUTO: 9.9 FL (ref 7.4–10.4)
POTASSIUM SERPL-SCNC: 4.3 MMOL/L (ref 3.5–5.1)
PROT SERPL-MCNC: 6.2 GM/DL (ref 5.8–7.6)
RBC # BLD AUTO: 3.82 X10(6)/MCL (ref 4.2–5.4)
SODIUM SERPL-SCNC: 140 MMOL/L (ref 136–145)
T4 FREE SERPL-MCNC: 0.87 NG/DL (ref 0.7–1.48)
TRIGL SERPL-MCNC: 109 MG/DL (ref 37–140)
TSH SERPL-ACNC: 1.2 UIU/ML (ref 0.35–4.94)
VLDLC SERPL CALC-MCNC: 22 MG/DL
WBC # SPEC AUTO: 4.64 X10(3)/MCL (ref 4.5–11.5)

## 2023-06-20 PROCEDURE — 4010F ACE/ARB THERAPY RXD/TAKEN: CPT | Mod: CPTII,S$GLB,, | Performed by: NURSE PRACTITIONER

## 2023-06-20 PROCEDURE — 4010F PR ACE/ARB THEARPY RXD/TAKEN: ICD-10-PCS | Mod: CPTII,S$GLB,, | Performed by: NURSE PRACTITIONER

## 2023-06-20 PROCEDURE — 1160F PR REVIEW ALL MEDS BY PRESCRIBER/CLIN PHARMACIST DOCUMENTED: ICD-10-PCS | Mod: CPTII,S$GLB,, | Performed by: NURSE PRACTITIONER

## 2023-06-20 PROCEDURE — 3078F PR MOST RECENT DIASTOLIC BLOOD PRESSURE < 80 MM HG: ICD-10-PCS | Mod: CPTII,S$GLB,, | Performed by: NURSE PRACTITIONER

## 2023-06-20 PROCEDURE — 99999 PR PBB SHADOW E&M-EST. PATIENT-LVL IV: ICD-10-PCS | Mod: PBBFAC,,, | Performed by: NURSE PRACTITIONER

## 2023-06-20 PROCEDURE — 3074F SYST BP LT 130 MM HG: CPT | Mod: CPTII,S$GLB,, | Performed by: NURSE PRACTITIONER

## 2023-06-20 PROCEDURE — 82024 ASSAY OF ACTH: CPT

## 2023-06-20 PROCEDURE — 84439 ASSAY OF FREE THYROXINE: CPT

## 2023-06-20 PROCEDURE — 82627 DEHYDROEPIANDROSTERONE: CPT

## 2023-06-20 PROCEDURE — 82530 CORTISOL FREE: CPT

## 2023-06-20 PROCEDURE — 80061 LIPID PANEL: CPT

## 2023-06-20 PROCEDURE — 84244 ASSAY OF RENIN: CPT

## 2023-06-20 PROCEDURE — 1160F RVW MEDS BY RX/DR IN RCRD: CPT | Mod: CPTII,S$GLB,, | Performed by: NURSE PRACTITIONER

## 2023-06-20 PROCEDURE — 3008F PR BODY MASS INDEX (BMI) DOCUMENTED: ICD-10-PCS | Mod: CPTII,S$GLB,, | Performed by: NURSE PRACTITIONER

## 2023-06-20 PROCEDURE — 1159F PR MEDICATION LIST DOCUMENTED IN MEDICAL RECORD: ICD-10-PCS | Mod: CPTII,S$GLB,, | Performed by: NURSE PRACTITIONER

## 2023-06-20 PROCEDURE — 85025 COMPLETE CBC W/AUTO DIFF WBC: CPT

## 2023-06-20 PROCEDURE — 3008F BODY MASS INDEX DOCD: CPT | Mod: CPTII,S$GLB,, | Performed by: NURSE PRACTITIONER

## 2023-06-20 PROCEDURE — 82533 TOTAL CORTISOL: CPT

## 2023-06-20 PROCEDURE — 99214 OFFICE O/P EST MOD 30 MIN: CPT | Mod: S$GLB,,, | Performed by: NURSE PRACTITIONER

## 2023-06-20 PROCEDURE — 99214 PR OFFICE/OUTPT VISIT, EST, LEVL IV, 30-39 MIN: ICD-10-PCS | Mod: S$GLB,,, | Performed by: NURSE PRACTITIONER

## 2023-06-20 PROCEDURE — 82088 ASSAY OF ALDOSTERONE: CPT

## 2023-06-20 PROCEDURE — 80375 DRUG/SUBSTANCE NOS 1-3: CPT

## 2023-06-20 PROCEDURE — 3074F PR MOST RECENT SYSTOLIC BLOOD PRESSURE < 130 MM HG: ICD-10-PCS | Mod: CPTII,S$GLB,, | Performed by: NURSE PRACTITIONER

## 2023-06-20 PROCEDURE — 80053 COMPREHEN METABOLIC PANEL: CPT

## 2023-06-20 PROCEDURE — 99999 PR PBB SHADOW E&M-EST. PATIENT-LVL IV: CPT | Mod: PBBFAC,,, | Performed by: NURSE PRACTITIONER

## 2023-06-20 PROCEDURE — 36415 COLL VENOUS BLD VENIPUNCTURE: CPT

## 2023-06-20 PROCEDURE — 84443 ASSAY THYROID STIM HORMONE: CPT

## 2023-06-20 PROCEDURE — 3078F DIAST BP <80 MM HG: CPT | Mod: CPTII,S$GLB,, | Performed by: NURSE PRACTITIONER

## 2023-06-20 PROCEDURE — 1159F MED LIST DOCD IN RCRD: CPT | Mod: CPTII,S$GLB,, | Performed by: NURSE PRACTITIONER

## 2023-06-20 RX ORDER — AMLODIPINE BESYLATE 5 MG/1
5 TABLET ORAL 2 TIMES DAILY
COMMUNITY
Start: 2023-06-02 | End: 2023-11-07 | Stop reason: SDUPTHER

## 2023-06-20 NOTE — PROGRESS NOTES
Subjective:       Patient ID: Shreya Reyes is a 63 y.o. female.    Chief Complaint:  I am doing pretty good    Diagnosis: Metastatic adrenocortical carcinoma                    Multiple osteoporotic vertebral compression fractures    Treatment History  Adjuvant XRT (completed 9/30/21)  Mitotane 1/25/22-7/26/22, Resumed 8/22  XRT right scapula, L5,  R ischium, L femoral neck completed 8/26/22  SBRT L2-3 2/06/23  Left IM nail 2/08/23  SBRT R iliac wing 5/15/23    Current Treatment:   Mitotane resumed 8/22 - current dose 1000 mg/d x2 -->500 mg/d x1 --> repeat cycle  Hydrocortisone 15 mg Q AM, 10 mg Q PM  Levothyroxine 75 mcg/d              Clinical History:  Patient initially presented to the [facility] 3/16/21 with acute left flank pain. CT A/P showed a heterogeneous enhancing mass of the left adrenal gland measuring 5.5 x 4.8 x 5 cm (24 Hu), with no definite microscopic fat. There was mild displacement of the left renal vein. Right adrenal gland was unremarkable.  Hormonal workup was negative for pheochromocytoma. She was felt to have had an acute adrenal hemorrhage and close observation was recommended. Follow-up CT A/P 6/9/21 showed increased size of the adrenal mass to 8.0 x 4.5 cm appearing hypervascular with some central necrosis. She underwent a laparoscopic/robotic left adrenalectomy 6/11/21.  Final pathology showed an adrenal cortical carcinoma predominant oncocytic/trabecular morphology with focally marked cytologic atypia and increased mitotic rate (up to 10/50 HPF's). There were large areas of necrosis and multiple foci of capsular and lymphovascular invasion. Ki-67 expression was 10-15%.    She had a Telemedicine consultation with Dr. Dion Lynch at [facility] 8/3/21 who specializes in treatment of adrenocortical carcinoma. Her case was reviewed and discussed in their tumor board. Recommendations were for treatment with adjuvant radiation therapy and systemic treatment with Mitotane. Baseline  postoperative CT scans of the chest, abdomen and pelvis 8/4/21 showed postoperative changes with no evidence of measurable metastatic disease.    She was seen as a new patient at [facility] 8/9/21.  She had recovered well from her surgery and was asymptomatic.  Treatment recommendations from the [facility] were reviewed and discussed.  Treatment was started with Mitotane 1000 mg BID on 8/16/2021.  No dose escalation was recommended until she completed radiation therapy.  She was started on replacement hydrocortisone 20 mg Q AM and 10 mg Q PM.  Surveillance CT scans were recommended in 3 months.      She was seen for an office visit 9/16/21 after completing 4 weeks of treatment with Mitotane.  She was not having any significant side effects associated with her therapy.  She was custodial through her adjuvant radiation therapy.  Laboratory testing showed marked transaminase elevations with an AST of 493,  and alkaline phosphatase 175.  Bilirubin was normal.  The remainder of her CMP was unremarkable.  Baseline mitotane level drawn at that visit was 2.5 mcg/mL.  Mitotane was held and adjuvant radiation therapy was continued.  Weekly laboratory monitoring showed gradual improvement in her LFTs.  Although mitotane had been associated with transaminase elevations, >5x elevations are rare occurring in <1% of patients.  She was also instructed to hold her lovastatin.  She completed adjuvant radiation therapy 9/30/2021.     Follow-up laboratory continued to show transaminase elevations.  GI was consulted and ordered additional laboratory testing which did not reveal evidence of viral or autoimmune etiologies for her hepatitis.  Mitotane was not resumed due to her persistent transaminase elevations. Follow-up CT scans of the chest, abdomen and pelvis 10/28/21 showed a few stable subcentimeter pulmonary nodules and changes related to her previous left adrenalectomy with no evidence of disease  recurrence.  Following normalization of her LFTs, treatment was resumed with Mitotane 1/25/22.  Her LFTs remained normal even during dose escalation.    She had an injury at home in mid March after carrying in several arms of firewood with acute mid back pain.  Plain x-ray 3/17/22 showed a compression deformity at L2 of questionable age.  MRI of the lumbar spine 3/23/22 showed a subacute severe compression fracture deformity of the L1 vertebral body and mild superior endplate compression fracture of L3 vertebral body with osteoporotic appearance and no findings suspicious for pathologic fracture.  However, there were scattered small osseous lesions in the right L3 vertebral body and L5 vertebral body concerning for metastatic disease.  CT PET scan 3/28/22 showed mildly hypermetabolic osseous lesions in the lumbar spine, pelvis and proximal left femur consistent with metastatic disease.  There was no significant hypermetabolic uptake at the sites of her compression fractures.  She did not have pain at any of the sites prior to the acute compression fracture.  Bone density exam showed osteoporosis of the lumbar spine with a T score of -3.4, left femoral neck -3.4 and left total hip -2.7.  She was started on Prolia 3/31/22 and underwent L1 and L3 kyphoplasty 4//8/22.  Biopsies of the L3 vertebral body showed no evidence of metastatic carcinoma.  She continued to have significant pain following the kyphoplasty.  Further review of her films showed a possible compression fracture at T11.  MRI of the thoracic spine 4/15/22 showed a compression fracture of T11 with 50% loss of vertebral body height.  She underwent kyphoplasty 4/21/22 with symptomatic improvement.    CT scans of the chest, abdomen and pelvis 4/27/22 showed sclerotic osseous lesions at L4, right ischium and left femur correlating with the hypermetabolic lesions on CT-PET scan.  There were stable sub-6 mm pulmonary nodules in the RML and LLL unchanged from  previous imaging.  MRI of the cervical and lumbar spine 7/5/22 showed moderate disc disease at C5-6 and C6-7 without significant spinal canal stenosis or foraminal stenosis.  She developed progressive symptoms of right arm pain, numbness and tingling and updated MRI 7/13/22 showed foraminal stenosis secondary to disc osteophyte on the right side at C5-6 and C6-7.  She underwent a right foraminotomy with relief of her symptoms.    CT C/A/P 08/01/22:  Multiple compression fractures and postsurgical changes.  Area of sclerosis in the left sacrum was stable compared to the prior exam.  There was no evidence of visceral metastatic disease.  She had a teleconference with [facility] 8/2/22 and a follow-up PET scan was recommended.  Mitotane was discontinued 7/26/22.   CT-PET 08/05/22:  Hypermetabolic osseous metastatic disease involving L5, right ischium, left femoral neck and a new lesion in the inferior right scapula.  There was a 12 mm area of hypermetabolic activity adjacent to the right adrenal gland without a definite CT correlate.    She completed palliative radiation therapy to the right scapula, L5, R ischium and left femoral neck on 8/26/22.  She resumed Mitotane at 1000 mg BID on 8/29/22.      CT-PET 11/21/22:  Improved FDG uptake in all of the treated sites.  Right scapula SUV decreased from 4.2 to 2.1, left femoral neck 9.1 to 5, right ischium 13 to 2.4 and L5 8.2 to 4.6.  Hypermetabolic activity at the site of the previous compression fractures had also improved.  There were no findings suspicious for new sites of disease or visceral metastatic disease.  MRI lumbar spine and left hip 1/29/23:  Metastatic disease involving right ischial tuberosity, left femoral neck and L3 vertebral body, similar in size to CT-PET scan 11/21/22:  L3 lesion showed interval progression with ventral and paraspinal extension causing moderate-to-severe narrowing of the spinal canal.  Left femoral neck lesion involved  greater than 50% of the total diameter of the femoral neck.  She was treated with stereotactic XRT to L3 and underwent prophylactic IM nail of the left femoral neck 2/8/23.  CT C/A/P 4/24/23:  13 mm sclerotic met inferior right scapula, bandlike densities RUL and RLL secondary to previous XRT.  Stable thoracic spine compression fractures.  Increased density L4 vertebral body metastasis.  No evidence of visceral metastatic disease.  MRI L-spine/pelvis 4/24/23:  Stable L3 lesion with mild epidural extension and enhancement measuring 8 mm.  Sclerotic 10 mm focus of abnormal marrow signal right iliac wing.    Guardant 360 2/20/23:  Positive TP53 mutation, microsatellite stable     She completed SBRT to the right iliac wing metastasis 5/15/23 receiving 2700 cGy in 3 fractions.  Treatment was well tolerated.      Interval History  Mrs. Reyes is here today by herself for a four week follow-up appointment.  She is ambulatory without assistance.  She is feeling pretty good overall.  Her Mitotane dose was changed to 1000 mg for 2 days, followed by 500 mg x1 day, then repeat.  She has a repeat serum level today.  Her current dose of Hydrocortisone is 15 mg in the morning and 10 mg in the evening.  She is doing some exercises in her pool several times per week.  She does report some tenderness of the proximal RLE, slightly below the buttock laterally.  The pain was present prior to radiation and is unchanged.  The pain rating is 4-5/10.  She takes Advil, and occasionally a Norco.  She resumed Prolia 4/11/23.  She is scheduled for a screening colonoscopy 7/25/23.  Her next telemedicine follow-up with [facility] is 8/15/23.  She is to have follow-up imaging prior.    Review of Systems   Constitutional:  Positive for fatigue. Negative for appetite change, fever and unexpected weight change.   HENT:  Negative for mouth sores, sore throat and trouble swallowing.    Eyes: Negative.    Respiratory:  Negative for cough and  "shortness of breath.    Cardiovascular:  Negative for chest pain, palpitations and leg swelling.   Gastrointestinal:  Negative for abdominal distention, abdominal pain, constipation, diarrhea, nausea and vomiting.   Genitourinary:  Negative for dysuria, frequency and urgency.   Musculoskeletal:  Positive for arthralgias, back pain (improved) and leg pain (Right upper leg, just below buttock). Negative for myalgias.        Left hip pain   Integumentary:  Negative for rash and mole/lesion.   Neurological:  Negative for dizziness, weakness, numbness and headaches.   Hematological:  Negative for adenopathy. Does not bruise/bleed easily.   Psychiatric/Behavioral: Negative.         PMHx:  Hyperlipidemia, osteoporosis  PSHx:  Tonsils, left cataract, ORIF left tibia/fib, left adrenalectomy, multiple vertebral kyphoplasties  SH:  Former smoker 1 PPD, quit 2009. + Social alcohol use. Lives in [place] with her . 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  FH:  Her mother had lymphoma.     Objective:        /79   Pulse 74   Temp 98 °F (36.7 °C)   Resp 15   Ht 5' 4" (1.626 m)   Wt 56.6 kg (124 lb 12.8 oz)   SpO2 98%   BMI 21.42 kg/m²    Physical Exam  Constitutional:       Appearance: Normal appearance. She is normal weight.      Comments: Well-developed white female in NAD.   HENT:      Head: Normocephalic.      Mouth/Throat:      Mouth: Mucous membranes are moist.      Pharynx: Oropharynx is clear. No posterior oropharyngeal erythema.   Eyes:      Extraocular Movements: Extraocular movements intact.      Conjunctiva/sclera: Conjunctivae normal.      Pupils: Pupils are equal, round, and reactive to light.   Cardiovascular:      Rate and Rhythm: Normal rate and regular rhythm.      Heart sounds: No murmur heard.  Pulmonary:      Effort: Pulmonary effort is normal.      Breath sounds: Normal breath sounds.      Comments: Lungs clear to auscultation  Abdominal:      General: Abdomen is flat. " Bowel sounds are normal. There is no distension.      Palpations: Abdomen is soft. There is no mass.      Tenderness: There is no abdominal tenderness.   Musculoskeletal:         General: Tenderness (Proximal RLE, just below buttock laterally) present. No swelling.      Cervical back: Neck supple. No tenderness.      Comments: No vertebral body or rib cage tenderness.   Skin:     General: Skin is warm and dry.      Findings: No rash.   Neurological:      General: No focal deficit present.      Mental Status: She is alert and oriented to person, place, and time.      Motor: No weakness.   Psychiatric:         Behavior: Behavior is cooperative.     ECOG SCORE    2 - Capable of all selfcare but unable to carry out any work activities, active > 50% of hours        LABORATORY  Recent Results (from the past 336 hour(s))   Lipid Panel    Collection Time: 06/20/23  8:26 AM   Result Value Ref Range    Cholesterol Total 195 <=200 mg/dL    HDL Cholesterol 81 (H) 35 - 60 mg/dL    Triglyceride 109 37 - 140 mg/dL    Cholesterol/HDL Ratio 2 0 - 5    Very Low Density Lipoprotein 22     LDL Cholesterol 92.00 50.00 - 140.00 mg/dL   Comprehensive Metabolic Panel    Collection Time: 06/20/23  8:26 AM   Result Value Ref Range    Sodium Level 140 136 - 145 mmol/L    Potassium Level 4.3 3.5 - 5.1 mmol/L    Chloride 103 98 - 107 mmol/L    Carbon Dioxide 29 23 - 31 mmol/L    Glucose Level 85 82 - 115 mg/dL    Blood Urea Nitrogen 23.4 (H) 9.8 - 20.1 mg/dL    Creatinine 0.72 0.55 - 1.02 mg/dL    Calcium Level Total 9.1 8.4 - 10.2 mg/dL    Protein Total 6.2 5.8 - 7.6 gm/dL    Albumin Level 3.2 (L) 3.4 - 4.8 g/dL    Globulin 3.0 2.4 - 3.5 gm/dL    Albumin/Globulin Ratio 1.1 1.1 - 2.0 ratio    Bilirubin Total 0.3 <=1.5 mg/dL    Alkaline Phosphatase 83 40 - 150 unit/L    Alanine Aminotransferase 12 0 - 55 unit/L    Aspartate Aminotransferase 18 5 - 34 unit/L    eGFR >60 mls/min/1.73/m2   TSH    Collection Time: 06/20/23  8:26 AM   Result Value  Ref Range    Thyroid Stimulating Hormone 1.202 0.350 - 4.940 uIU/mL   Cortisol    Collection Time: 06/20/23  8:26 AM   Result Value Ref Range    Cortisol Level 36.3 ug/dL   T4, Free    Collection Time: 06/20/23  8:26 AM   Result Value Ref Range    Thyroxine Free 0.87 0.70 - 1.48 ng/dL   CBC with Differential    Collection Time: 06/20/23  8:26 AM   Result Value Ref Range    WBC 4.64 4.50 - 11.50 x10(3)/mcL    RBC 3.82 (L) 4.20 - 5.40 x10(6)/mcL    Hgb 12.2 12.0 - 16.0 g/dL    Hct 37.8 37.0 - 47.0 %    MCV 99.0 (H) 80.0 - 94.0 fL    MCH 31.9 (H) 27.0 - 31.0 pg    MCHC 32.3 (L) 33.0 - 36.0 g/dL    RDW 13.5 11.5 - 17.0 %    Platelet 278 130 - 400 x10(3)/mcL    MPV 9.9 7.4 - 10.4 fL    Neut % 57.8 %    Lymph % 25.4 %    Mono % 13.8 %    Eos % 2.2 %    Basophil % 0.4 %    Lymph # 1.18 0.6 - 4.6 x10(3)/mcL    Neut # 2.68 2.1 - 9.2 x10(3)/mcL    Mono # 0.64 0.1 - 1.3 x10(3)/mcL    Eos # 0.10 0 - 0.9 x10(3)/mcL    Baso # 0.02 <=0.2 x10(3)/mcL    IG# 0.02 0 - 0.04 x10(3)/mcL    IG% 0.4 %        Assessment:   Metastatic adrenocortical carcinoma  Multiple osteoporotic vertebral body compression fractures  S/p prophylactic left femur IM nail 2/8/23  Treatment induced leukopenia - stable      Plan:   Continue Mitotane at current dose/schedule.  Follow-up level drawn today.  Dosing per [facility].  Continue follow-up and laboratory testing every 4 weeks.  Repeat CT chest, abdomen and pelvis and MRI of the lumbar spine and pelvis in late July.  Case discussed with Dr. Arzate.  Will add CT BLE.  RTC after scans for results review with Dr. Arzate.  Next telemedicine visit with [facility] scheduled for 8/15/23.  Continue Prolia every 6 months.  All questions answered.  Patient to report any new or progressive symptoms.    CRISTINA WILSON, FNP-C    Other Physicians  Dr. Dion Lynch ([facility])

## 2023-06-21 LAB
ACTH PLAS-MCNC: 63 PG/ML
DHEA-S SERPL-MCNC: <5 MCG/DL (ref 9.7–159)

## 2023-06-22 LAB
COLLECT DURATION TIME UR: 24 H
CORTIS 24H UR-MRATE: 44 MCG/24 H (ref 3.5–45)
SPECIMEN VOL 24H UR: 2000 ML

## 2023-06-23 LAB — RENIN PLAS-CCNC: 21 NG/ML/H

## 2023-06-25 LAB — ALDOST SERPL-MCNC: <4 NG/DL

## 2023-06-29 LAB — MITOTANE SERPL-MCNC: 9.7 UG/ML

## 2023-07-18 ENCOUNTER — LAB VISIT (OUTPATIENT)
Dept: LAB | Facility: HOSPITAL | Age: 63
End: 2023-07-18
Attending: INTERNAL MEDICINE
Payer: COMMERCIAL

## 2023-07-18 DIAGNOSIS — C79.51 SECONDARY MALIGNANT NEOPLASM OF BONE: ICD-10-CM

## 2023-07-18 DIAGNOSIS — C74.00 MALIGNANT NEOPLASM OF ADRENAL CORTEX, UNSPECIFIED LATERALITY: ICD-10-CM

## 2023-07-18 DIAGNOSIS — C74.02 MALIGNANT NEOPLASM OF CORTEX OF LEFT ADRENAL GLAND: ICD-10-CM

## 2023-07-18 LAB
ALBUMIN SERPL-MCNC: 2.9 G/DL (ref 3.4–4.8)
ALBUMIN/GLOB SERPL: 1.1 RATIO (ref 1.1–2)
ALP SERPL-CCNC: 78 UNIT/L (ref 40–150)
ALT SERPL-CCNC: 11 UNIT/L (ref 0–55)
AST SERPL-CCNC: 18 UNIT/L (ref 5–34)
BASOPHILS # BLD AUTO: 0.02 X10(3)/MCL
BASOPHILS NFR BLD AUTO: 0.5 %
BILIRUBIN DIRECT+TOT PNL SERPL-MCNC: 0.2 MG/DL
BUN SERPL-MCNC: 23.2 MG/DL (ref 9.8–20.1)
CALCIUM SERPL-MCNC: 8.5 MG/DL (ref 8.4–10.2)
CHLORIDE SERPL-SCNC: 110 MMOL/L (ref 98–107)
CHOLEST SERPL-MCNC: 174 MG/DL
CHOLEST/HDLC SERPL: 2 {RATIO} (ref 0–5)
CO2 SERPL-SCNC: 25 MMOL/L (ref 23–31)
CORTIS SERPL-SCNC: 39.5 UG/DL
CREAT SERPL-MCNC: 0.64 MG/DL (ref 0.55–1.02)
EOSINOPHIL # BLD AUTO: 0.13 X10(3)/MCL (ref 0–0.9)
EOSINOPHIL NFR BLD AUTO: 3.1 %
ERYTHROCYTE [DISTWIDTH] IN BLOOD BY AUTOMATED COUNT: 14.6 % (ref 11.5–17)
GFR SERPLBLD CREATININE-BSD FMLA CKD-EPI: >60 MLS/MIN/1.73/M2
GLOBULIN SER-MCNC: 2.6 GM/DL (ref 2.4–3.5)
GLUCOSE SERPL-MCNC: 86 MG/DL (ref 82–115)
HCT VFR BLD AUTO: 32.7 % (ref 37–47)
HDLC SERPL-MCNC: 72 MG/DL (ref 35–60)
HGB BLD-MCNC: 10.5 G/DL (ref 12–16)
IMM GRANULOCYTES # BLD AUTO: 0.01 X10(3)/MCL (ref 0–0.04)
IMM GRANULOCYTES NFR BLD AUTO: 0.2 %
LDLC SERPL CALC-MCNC: 80 MG/DL (ref 50–140)
LYMPHOCYTES # BLD AUTO: 1.15 X10(3)/MCL (ref 0.6–4.6)
LYMPHOCYTES NFR BLD AUTO: 27.7 %
MCH RBC QN AUTO: 31.7 PG (ref 27–31)
MCHC RBC AUTO-ENTMCNC: 32.1 G/DL (ref 33–36)
MCV RBC AUTO: 98.8 FL (ref 80–94)
MONOCYTES # BLD AUTO: 0.46 X10(3)/MCL (ref 0.1–1.3)
MONOCYTES NFR BLD AUTO: 11.1 %
NEUTROPHILS # BLD AUTO: 2.38 X10(3)/MCL (ref 2.1–9.2)
NEUTROPHILS NFR BLD AUTO: 57.4 %
PLATELET # BLD AUTO: 266 X10(3)/MCL (ref 130–400)
PMV BLD AUTO: 10.3 FL (ref 7.4–10.4)
POTASSIUM SERPL-SCNC: 4.2 MMOL/L (ref 3.5–5.1)
PROT SERPL-MCNC: 5.5 GM/DL (ref 5.8–7.6)
RBC # BLD AUTO: 3.31 X10(6)/MCL (ref 4.2–5.4)
SODIUM SERPL-SCNC: 144 MMOL/L (ref 136–145)
T4 FREE SERPL-MCNC: 0.86 NG/DL (ref 0.7–1.48)
TRIGL SERPL-MCNC: 110 MG/DL (ref 37–140)
TSH SERPL-ACNC: 0.67 UIU/ML (ref 0.35–4.94)
VLDLC SERPL CALC-MCNC: 22 MG/DL
WBC # SPEC AUTO: 4.15 X10(3)/MCL (ref 4.5–11.5)

## 2023-07-18 PROCEDURE — 80053 COMPREHEN METABOLIC PANEL: CPT

## 2023-07-18 PROCEDURE — 82533 TOTAL CORTISOL: CPT

## 2023-07-18 PROCEDURE — 82024 ASSAY OF ACTH: CPT

## 2023-07-18 PROCEDURE — 84244 ASSAY OF RENIN: CPT

## 2023-07-18 PROCEDURE — 84439 ASSAY OF FREE THYROXINE: CPT

## 2023-07-18 PROCEDURE — 36415 COLL VENOUS BLD VENIPUNCTURE: CPT

## 2023-07-18 PROCEDURE — 85025 COMPLETE CBC W/AUTO DIFF WBC: CPT

## 2023-07-18 PROCEDURE — 84443 ASSAY THYROID STIM HORMONE: CPT

## 2023-07-18 PROCEDURE — 82627 DEHYDROEPIANDROSTERONE: CPT

## 2023-07-18 PROCEDURE — 82088 ASSAY OF ALDOSTERONE: CPT

## 2023-07-18 PROCEDURE — 80061 LIPID PANEL: CPT

## 2023-07-19 LAB
ACTH PLAS-MCNC: 36 PG/ML
DHEA-S SERPL-MCNC: <5 MCG/DL (ref 9.7–159)

## 2023-07-21 LAB — RENIN PLAS-CCNC: 1.5 NG/ML/H

## 2023-07-22 LAB — ALDOST SERPL-MCNC: <4 NG/DL

## 2023-07-24 LAB — MAYO GENERIC ORDERABLE RESULT: NORMAL

## 2023-07-26 ENCOUNTER — HOSPITAL ENCOUNTER (OUTPATIENT)
Dept: RADIOLOGY | Facility: HOSPITAL | Age: 63
Discharge: HOME | End: 2023-07-26
Attending: INTERNAL MEDICINE
Payer: COMMERCIAL

## 2023-07-26 DIAGNOSIS — C74.00 MALIGNANT NEOPLASM OF ADRENAL CORTEX, UNSPECIFIED LATERALITY: ICD-10-CM

## 2023-07-26 DIAGNOSIS — C79.51 SECONDARY MALIGNANT NEOPLASM OF BONE: ICD-10-CM

## 2023-07-26 PROCEDURE — A9577 INJ MULTIHANCE: HCPCS | Performed by: INTERNAL MEDICINE

## 2023-07-26 PROCEDURE — 72197 MRI PELVIS W/O & W/DYE: CPT | Mod: TC

## 2023-07-26 PROCEDURE — 72158 MRI LUMBAR SPINE W/O & W/DYE: CPT | Mod: TC

## 2023-07-26 PROCEDURE — 25500020 PHARM REV CODE 255: Performed by: INTERNAL MEDICINE

## 2023-07-26 RX ADMIN — GADOBENATE DIMEGLUMINE 11 ML: 529 INJECTION, SOLUTION INTRAVENOUS at 09:07

## 2023-07-28 ENCOUNTER — HOSPITAL ENCOUNTER (OUTPATIENT)
Dept: RADIOLOGY | Facility: HOSPITAL | Age: 63
Discharge: HOME | End: 2023-07-28
Attending: INTERNAL MEDICINE
Payer: COMMERCIAL

## 2023-07-28 DIAGNOSIS — C79.51 SECONDARY MALIGNANT NEOPLASM OF BONE: ICD-10-CM

## 2023-07-28 DIAGNOSIS — C74.00 MALIGNANT NEOPLASM OF ADRENAL CORTEX, UNSPECIFIED LATERALITY: ICD-10-CM

## 2023-07-28 PROCEDURE — 25500020 PHARM REV CODE 255: Performed by: INTERNAL MEDICINE

## 2023-07-28 PROCEDURE — 71260 CT THORAX DX C+: CPT | Mod: TC

## 2023-07-28 RX ADMIN — IOPAMIDOL 100 ML: 755 INJECTION, SOLUTION INTRAVENOUS at 07:07

## 2023-07-28 NOTE — PROGRESS NOTES
Subjective:       Patient ID: Shreya Reyes is a 63 y.o. female.    Chief Complaint:  Intermittent left hip/leg pain    Diagnosis: Metastatic adrenocortical carcinoma                    Multiple osteoporotic vertebral compression fractures    Treatment History  Adjuvant XRT (completed 9/30/21)  Mitotane 1/25/22-7/26/22, Resumed 8/22  XRT right scapula, L5,  R ischium, L femoral neck completed 8/26/22  SBRT L2-3 2/06/23  Left IM nail 2/08/23  SBRT R iliac wing 5/15/23    Current Treatment:   Mitotane resumed 8/22 - current dose 1000 mg/d   Hydrocortisone 15 mg Q AM, 10 mg Q PM  Levothyroxine 75 mcg/d              Clinical History:  Patient initially presented to the [facility] 3/16/21 with acute left flank pain. CT A/P showed a heterogeneous enhancing mass of the left adrenal gland measuring 5.5 x 4.8 x 5 cm (24 Hu), with no definite microscopic fat. There was mild displacement of the left renal vein. Right adrenal gland was unremarkable.  Hormonal workup was negative for pheochromocytoma. She was felt to have had an acute adrenal hemorrhage and close observation was recommended. Follow-up CT A/P 6/9/21 showed increased size of the adrenal mass to 8.0 x 4.5 cm appearing hypervascular with some central necrosis. She underwent a laparoscopic/robotic left adrenalectomy 6/11/21.  Final pathology showed an adrenal cortical carcinoma predominant oncocytic/trabecular morphology with focally marked cytologic atypia and increased mitotic rate (up to 10/50 HPF's). There were large areas of necrosis and multiple foci of capsular and lymphovascular invasion. Ki-67 expression was 10-15%.    She had a Telemedicine consultation with Dr. Dion Lynch at [facility] 8/3/21 who specializes in treatment of adrenocortical carcinoma. Her case was reviewed and discussed in their tumor board. Recommendations were for treatment with adjuvant radiation therapy and systemic treatment with Mitotane. Baseline postoperative CT scans of  the chest, abdomen and pelvis 8/4/21 showed postoperative changes with no evidence of measurable metastatic disease.    She was seen as a new patient at [facility] 8/9/21.  She had recovered well from her surgery and was asymptomatic.  Treatment recommendations from the [facility] were reviewed and discussed.  Treatment was started with Mitotane 1000 mg BID on 8/16/2021.  No dose escalation was recommended until she completed radiation therapy.  She was started on replacement hydrocortisone 20 mg Q AM and 10 mg Q PM.  Surveillance CT scans were recommended in 3 months.      She was seen for an office visit 9/16/21 after completing 4 weeks of treatment with Mitotane.  She was not having any significant side effects associated with her therapy.  She was detention through her adjuvant radiation therapy.  Laboratory testing showed marked transaminase elevations with an AST of 493,  and alkaline phosphatase 175.  Bilirubin was normal.  The remainder of her CMP was unremarkable.  Baseline mitotane level drawn at that visit was 2.5 mcg/mL.  Mitotane was held and adjuvant radiation therapy was continued.  Weekly laboratory monitoring showed gradual improvement in her LFTs.  Although mitotane had been associated with transaminase elevations, >5x elevations are rare occurring in <1% of patients.  She was also instructed to hold her lovastatin.  She completed adjuvant radiation therapy 9/30/2021.     Follow-up laboratory continued to show transaminase elevations.  GI was consulted and ordered additional laboratory testing which did not reveal evidence of viral or autoimmune etiologies for her hepatitis.  Mitotane was not resumed due to her persistent transaminase elevations. Follow-up CT scans of the chest, abdomen and pelvis 10/28/21 showed a few stable subcentimeter pulmonary nodules and changes related to her previous left adrenalectomy with no evidence of disease recurrence.  Following  normalization of her LFTs, treatment was resumed with Mitotane 1/25/22.  Her LFTs remained normal even during dose escalation.    She had an injury at home in mid March after carrying in several arms of firewood with acute mid back pain.  Plain x-ray 3/17/22 showed a compression deformity at L2 of questionable age.  MRI of the lumbar spine 3/23/22 showed a subacute severe compression fracture deformity of the L1 vertebral body and mild superior endplate compression fracture of L3 vertebral body with osteoporotic appearance and no findings suspicious for pathologic fracture.  However, there were scattered small osseous lesions in the right L3 vertebral body and L5 vertebral body concerning for metastatic disease.  CT PET scan 3/28/22 showed mildly hypermetabolic osseous lesions in the lumbar spine, pelvis and proximal left femur consistent with metastatic disease.  There was no significant hypermetabolic uptake at the sites of her compression fractures.  She did not have pain at any of the sites prior to the acute compression fracture.  Bone density exam showed osteoporosis of the lumbar spine with a T score of -3.4, left femoral neck -3.4 and left total hip -2.7.  She was started on Prolia 3/31/22 and underwent L1 and L3 kyphoplasty 4//8/22.  Biopsies of the L3 vertebral body showed no evidence of metastatic carcinoma.  She continued to have significant pain following the kyphoplasty.  Further review of her films showed a possible compression fracture at T11.  MRI of the thoracic spine 4/15/22 showed a compression fracture of T11 with 50% loss of vertebral body height.  She underwent kyphoplasty 4/21/22 with symptomatic improvement.    CT scans of the chest, abdomen and pelvis 4/27/22 showed sclerotic osseous lesions at L4, right ischium and left femur correlating with the hypermetabolic lesions on CT-PET scan.  There were stable sub-6 mm pulmonary nodules in the RML and LLL unchanged from previous imaging.  MRI of  the cervical and lumbar spine 7/5/22 showed moderate disc disease at C5-6 and C6-7 without significant spinal canal stenosis or foraminal stenosis.  She developed progressive symptoms of right arm pain, numbness and tingling and updated MRI 7/13/22 showed foraminal stenosis secondary to disc osteophyte on the right side at C5-6 and C6-7.  She underwent a right foraminotomy with relief of her symptoms.    CT C/A/P 08/01/22:  Multiple compression fractures and postsurgical changes.  Area of sclerosis in the left sacrum was stable compared to the prior exam.  There was no evidence of visceral metastatic disease.  She had a teleconference with [facility] 8/2/22 and a follow-up PET scan was recommended.  Mitotane was discontinued 7/26/22.   CT-PET 08/05/22:  Hypermetabolic osseous metastatic disease involving L5, right ischium, left femoral neck and a new lesion in the inferior right scapula.  There was a 12 mm area of hypermetabolic activity adjacent to the right adrenal gland without a definite CT correlate.    She completed palliative radiation therapy to the right scapula, L5, R ischium and left femoral neck on 8/26/22.  She resumed Mitotane at 1000 mg BID on 8/29/22.      CT-PET 11/21/22:  Improved FDG uptake in all of the treated sites.  Right scapula SUV decreased from 4.2 to 2.1, left femoral neck 9.1 to 5, right ischium 13 to 2.4 and L5 8.2 to 4.6.  Hypermetabolic activity at the site of the previous compression fractures had also improved.  There were no findings suspicious for new sites of disease or visceral metastatic disease.  MRI lumbar spine and left hip 1/29/23:  Metastatic disease involving right ischial tuberosity, left femoral neck and L3 vertebral body, similar in size to CT-PET scan 11/21/22:  L3 lesion showed interval progression with ventral and paraspinal extension causing moderate-to-severe narrowing of the spinal canal.  Left femoral neck lesion involved greater than 50% of the  total diameter of the femoral neck.  She was treated with stereotactic XRT to L3 and underwent prophylactic IM nail of the left femoral neck 2/8/23.  CT C/A/P 4/24/23:  13 mm sclerotic met inferior right scapula, bandlike densities RUL and RLL secondary to previous XRT.  Stable thoracic spine compression fractures.  Increased density L4 vertebral body metastasis.  No evidence of visceral metastatic disease.  MRI L-spine/pelvis 4/24/23:  Stable L3 lesion with mild epidural extension and enhancement measuring 8 mm.  Sclerotic 10 mm focus of abnormal marrow signal right iliac wing.    Guardant 360 2/20/23:  Positive TP53 mutation, microsatellite stable     Prolia was resumed for her metastatic bone disease 4/11/23.  She completed SBRT to the right iliac wing metastasis 5/15/23 receiving 2700 cGy in 3 fractions.  Treatment was well tolerated.      MRI L-spine and pelvis 7/26/23:  Resolution of previous epidural enhancing mass posterior to L3 vertebral body.  Right iliac wing lesion 12 mm (previous 10 mm).  No new metastatic lesions.  CT C/A/P 7/28/23:  Stable sclerotic bone lesions right scapula, right iliac wing and L4.  Stable kyphoplasty changes T10, T11, T12 and L2.  Stable L1 compression deformity.  No new sites of disease identified.    Interval History  She returns to the office today for a 4 week follow-up visit accompanied by her .  She looks and feels well.  She has intermittent pain involving the left hip and leg related to previous intramedullary oneil.  She is ambulatory without assistance.  She denies any falls.  Restaging CT scans and MRI show no definite disease progression.  She remains on mitotane, current dose 1000 mg daily.  Most recent level had decreased slightly to 8.6.  She is scheduled for a telemedicine visit with Dr. Lynch at [facility] 8/15/23.  She had a screening colonoscopy 7/25/23 showing no polyps or other abnormalities.  Follow-up exam was recommended in 7  "years.      Review of Systems   Constitutional:  Positive for fatigue (Improved). Negative for appetite change, fever and unexpected weight change.   HENT:  Negative for mouth sores, sore throat and trouble swallowing.    Eyes: Negative.    Respiratory:  Negative for cough and shortness of breath.    Cardiovascular:  Negative for chest pain, palpitations and leg swelling.   Gastrointestinal:  Negative for abdominal distention, abdominal pain, constipation, diarrhea, nausea and vomiting.   Genitourinary:  Negative for dysuria, frequency and urgency.   Musculoskeletal:  Positive for arthralgias and back pain (improved). Negative for myalgias.        Left hip/leg pain   Integumentary:  Negative for rash and mole/lesion.   Neurological:  Negative for dizziness, weakness, numbness and headaches.   Hematological:  Negative for adenopathy. Does not bruise/bleed easily.   Psychiatric/Behavioral:  Negative for confusion and dysphoric mood. The patient is nervous/anxious (intermittent).        PMHx:  Hyperlipidemia, osteoporosis  PSHx:  Tonsils, left cataract, ORIF left tibia/fib, left adrenalectomy, multiple vertebral kyphoplasties  SH:  Former smoker 1 PPD, quit 2009. + Social alcohol use. Lives in [place] with her . 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  FH:  Her mother had lymphoma.     Objective:        BP (!) 156/89   Pulse 71   Temp 98 °F (36.7 °C)   Resp 16   Ht 5' 4" (1.626 m)   Wt 56.5 kg (124 lb 9.6 oz)   SpO2 100%   BMI 21.39 kg/m²    Physical Exam  Constitutional:       Comments: Well-developed white female in NAD.   HENT:      Head: Normocephalic.      Mouth/Throat:      Mouth: Mucous membranes are moist.      Pharynx: Oropharynx is clear. No posterior oropharyngeal erythema.   Eyes:      Extraocular Movements: Extraocular movements intact.      Conjunctiva/sclera: Conjunctivae normal.      Pupils: Pupils are equal, round, and reactive to light.   Cardiovascular:      Rate and " Rhythm: Normal rate and regular rhythm.      Heart sounds: No murmur heard.  Pulmonary:      Comments: Lungs clear to auscultation  Abdominal:      General: Bowel sounds are normal. There is no distension.      Palpations: Abdomen is soft. There is no mass.      Tenderness: There is no abdominal tenderness.   Musculoskeletal:         General: No swelling or tenderness.      Cervical back: Neck supple. No tenderness.      Comments: No vertebral body or rib cage tenderness.   Skin:     General: Skin is warm and dry.      Findings: No rash.   Neurological:      General: No focal deficit present.      Mental Status: She is alert and oriented to person, place, and time.      Motor: No weakness.   Psychiatric:         Behavior: Behavior is cooperative.       ECOG SCORE    1 - Restricted in strenuous activity-ambulatory and able to carry out work of a light nature        LABORATORY  No results found for this or any previous visit (from the past 336 hour(s)).    Laboratory testing from 7/18/23 reviewed.    Assessment:   Metastatic adrenocortical carcinoma  Multiple osteoporotic vertebral body compression fractures  S/p prophylactic left femur IM nail 2/8/23  Treatment induced leukopenia - stable      Plan:   CT and MRI images reviewed.  Right pelvic lesion appears sclerotic and stable.  No evidence of new disease or overt progression.  Continue mitotane 1000 mg daily.  Continue supplemental hydrocortisone and levothyroxine at the current doses.  Films have been forwarded to [facility] for their review.  She has a telemedicine visit 8/15/23.  RTC in 4 weeks for a follow-up visit with repeat laboratory.      MARY NORMAN MD    Other Physicians  Dr. Dion Lynch ([facility])

## 2023-08-01 ENCOUNTER — OFFICE VISIT (OUTPATIENT)
Dept: HEMATOLOGY/ONCOLOGY | Facility: CLINIC | Age: 63
End: 2023-08-01
Payer: COMMERCIAL

## 2023-08-01 VITALS
TEMPERATURE: 98 F | RESPIRATION RATE: 16 BRPM | SYSTOLIC BLOOD PRESSURE: 156 MMHG | HEIGHT: 64 IN | WEIGHT: 124.63 LBS | OXYGEN SATURATION: 100 % | HEART RATE: 71 BPM | DIASTOLIC BLOOD PRESSURE: 89 MMHG | BODY MASS INDEX: 21.28 KG/M2

## 2023-08-01 DIAGNOSIS — C79.51 SECONDARY MALIGNANT NEOPLASM OF BONE: ICD-10-CM

## 2023-08-01 DIAGNOSIS — C74.00 MALIGNANT NEOPLASM OF ADRENAL CORTEX, UNSPECIFIED LATERALITY: Primary | ICD-10-CM

## 2023-08-01 DIAGNOSIS — G89.3 CANCER RELATED PAIN: ICD-10-CM

## 2023-08-01 PROCEDURE — 3008F PR BODY MASS INDEX (BMI) DOCUMENTED: ICD-10-PCS | Mod: CPTII,S$GLB,, | Performed by: INTERNAL MEDICINE

## 2023-08-01 PROCEDURE — 3079F DIAST BP 80-89 MM HG: CPT | Mod: CPTII,S$GLB,, | Performed by: INTERNAL MEDICINE

## 2023-08-01 PROCEDURE — 3008F BODY MASS INDEX DOCD: CPT | Mod: CPTII,S$GLB,, | Performed by: INTERNAL MEDICINE

## 2023-08-01 PROCEDURE — 1159F PR MEDICATION LIST DOCUMENTED IN MEDICAL RECORD: ICD-10-PCS | Mod: CPTII,S$GLB,, | Performed by: INTERNAL MEDICINE

## 2023-08-01 PROCEDURE — 99999 PR PBB SHADOW E&M-EST. PATIENT-LVL IV: CPT | Mod: PBBFAC,,, | Performed by: INTERNAL MEDICINE

## 2023-08-01 PROCEDURE — 3077F PR MOST RECENT SYSTOLIC BLOOD PRESSURE >= 140 MM HG: ICD-10-PCS | Mod: CPTII,S$GLB,, | Performed by: INTERNAL MEDICINE

## 2023-08-01 PROCEDURE — 99214 OFFICE O/P EST MOD 30 MIN: CPT | Mod: S$GLB,,, | Performed by: INTERNAL MEDICINE

## 2023-08-01 PROCEDURE — 1160F PR REVIEW ALL MEDS BY PRESCRIBER/CLIN PHARMACIST DOCUMENTED: ICD-10-PCS | Mod: CPTII,S$GLB,, | Performed by: INTERNAL MEDICINE

## 2023-08-01 PROCEDURE — 3079F PR MOST RECENT DIASTOLIC BLOOD PRESSURE 80-89 MM HG: ICD-10-PCS | Mod: CPTII,S$GLB,, | Performed by: INTERNAL MEDICINE

## 2023-08-01 PROCEDURE — 99214 PR OFFICE/OUTPT VISIT, EST, LEVL IV, 30-39 MIN: ICD-10-PCS | Mod: S$GLB,,, | Performed by: INTERNAL MEDICINE

## 2023-08-01 PROCEDURE — 1159F MED LIST DOCD IN RCRD: CPT | Mod: CPTII,S$GLB,, | Performed by: INTERNAL MEDICINE

## 2023-08-01 PROCEDURE — 99999 PR PBB SHADOW E&M-EST. PATIENT-LVL IV: ICD-10-PCS | Mod: PBBFAC,,, | Performed by: INTERNAL MEDICINE

## 2023-08-01 PROCEDURE — 1160F RVW MEDS BY RX/DR IN RCRD: CPT | Mod: CPTII,S$GLB,, | Performed by: INTERNAL MEDICINE

## 2023-08-01 PROCEDURE — 4010F ACE/ARB THERAPY RXD/TAKEN: CPT | Mod: CPTII,S$GLB,, | Performed by: INTERNAL MEDICINE

## 2023-08-01 PROCEDURE — 4010F PR ACE/ARB THEARPY RXD/TAKEN: ICD-10-PCS | Mod: CPTII,S$GLB,, | Performed by: INTERNAL MEDICINE

## 2023-08-01 PROCEDURE — 3077F SYST BP >= 140 MM HG: CPT | Mod: CPTII,S$GLB,, | Performed by: INTERNAL MEDICINE

## 2023-08-01 RX ORDER — HYDROCODONE BITARTRATE AND ACETAMINOPHEN 7.5; 325 MG/1; MG/1
1 TABLET ORAL EVERY 6 HOURS PRN
Qty: 60 TABLET | Refills: 0 | Status: SHIPPED | OUTPATIENT
Start: 2023-08-01 | End: 2023-08-29 | Stop reason: SDUPTHER

## 2023-08-01 RX ORDER — ALPRAZOLAM 0.25 MG/1
0.25 TABLET ORAL 3 TIMES DAILY PRN
Qty: 60 TABLET | Refills: 0 | Status: SHIPPED | OUTPATIENT
Start: 2023-08-01 | End: 2023-09-28 | Stop reason: SDUPTHER

## 2023-08-10 DIAGNOSIS — C74.00 MALIGNANT NEOPLASM OF ADRENAL CORTEX, UNSPECIFIED LATERALITY: Primary | ICD-10-CM

## 2023-08-10 DIAGNOSIS — C79.51 SECONDARY MALIGNANT NEOPLASM OF BONE: ICD-10-CM

## 2023-08-15 ENCOUNTER — LAB VISIT (OUTPATIENT)
Dept: LAB | Facility: HOSPITAL | Age: 63
End: 2023-08-15
Attending: INTERNAL MEDICINE
Payer: COMMERCIAL

## 2023-08-15 DIAGNOSIS — C74.00 MALIGNANT NEOPLASM OF ADRENAL CORTEX, UNSPECIFIED LATERALITY: ICD-10-CM

## 2023-08-15 DIAGNOSIS — C79.51 SECONDARY MALIGNANT NEOPLASM OF BONE: ICD-10-CM

## 2023-08-15 DIAGNOSIS — C74.02 MALIGNANT NEOPLASM OF CORTEX OF LEFT ADRENAL GLAND: ICD-10-CM

## 2023-08-15 LAB
ALBUMIN SERPL-MCNC: 3.2 G/DL (ref 3.4–4.8)
ALBUMIN/GLOB SERPL: 1.1 RATIO (ref 1.1–2)
ALP SERPL-CCNC: 73 UNIT/L (ref 40–150)
ALT SERPL-CCNC: 13 UNIT/L (ref 0–55)
AST SERPL-CCNC: 20 UNIT/L (ref 5–34)
BASOPHILS # BLD AUTO: 0.03 X10(3)/MCL
BASOPHILS NFR BLD AUTO: 0.6 %
BILIRUB SERPL-MCNC: 0.2 MG/DL
BUN SERPL-MCNC: 22 MG/DL (ref 9.8–20.1)
CALCIUM SERPL-MCNC: 8.9 MG/DL (ref 8.4–10.2)
CHLORIDE SERPL-SCNC: 109 MMOL/L (ref 98–107)
CHOLEST SERPL-MCNC: 205 MG/DL
CHOLEST/HDLC SERPL: 3 {RATIO} (ref 0–5)
CO2 SERPL-SCNC: 25 MMOL/L (ref 23–31)
CORTIS SERPL-SCNC: 22.8 UG/DL
CREAT SERPL-MCNC: 0.68 MG/DL (ref 0.55–1.02)
EOSINOPHIL # BLD AUTO: 0.1 X10(3)/MCL (ref 0–0.9)
EOSINOPHIL NFR BLD AUTO: 1.9 %
ERYTHROCYTE [DISTWIDTH] IN BLOOD BY AUTOMATED COUNT: 15.1 % (ref 11.5–17)
GFR SERPLBLD CREATININE-BSD FMLA CKD-EPI: >60 MLS/MIN/1.73/M2
GLOBULIN SER-MCNC: 3 GM/DL (ref 2.4–3.5)
GLUCOSE SERPL-MCNC: 85 MG/DL (ref 82–115)
HCT VFR BLD AUTO: 35.3 % (ref 37–47)
HDLC SERPL-MCNC: 71 MG/DL (ref 35–60)
HGB BLD-MCNC: 11.3 G/DL (ref 12–16)
IMM GRANULOCYTES # BLD AUTO: 0.01 X10(3)/MCL (ref 0–0.04)
IMM GRANULOCYTES NFR BLD AUTO: 0.2 %
LDLC SERPL CALC-MCNC: 107 MG/DL (ref 50–140)
LYMPHOCYTES # BLD AUTO: 1.06 X10(3)/MCL (ref 0.6–4.6)
LYMPHOCYTES NFR BLD AUTO: 20.6 %
MCH RBC QN AUTO: 31.8 PG (ref 27–31)
MCHC RBC AUTO-ENTMCNC: 32 G/DL (ref 33–36)
MCV RBC AUTO: 99.4 FL (ref 80–94)
MONOCYTES # BLD AUTO: 0.48 X10(3)/MCL (ref 0.1–1.3)
MONOCYTES NFR BLD AUTO: 9.3 %
NEUTROPHILS # BLD AUTO: 3.46 X10(3)/MCL (ref 2.1–9.2)
NEUTROPHILS NFR BLD AUTO: 67.4 %
PLATELET # BLD AUTO: 285 X10(3)/MCL (ref 130–400)
PMV BLD AUTO: 11.1 FL (ref 7.4–10.4)
POTASSIUM SERPL-SCNC: 4.5 MMOL/L (ref 3.5–5.1)
PROT SERPL-MCNC: 6.2 GM/DL (ref 5.8–7.6)
RBC # BLD AUTO: 3.55 X10(6)/MCL (ref 4.2–5.4)
SODIUM SERPL-SCNC: 144 MMOL/L (ref 136–145)
T4 FREE SERPL-MCNC: 0.78 NG/DL (ref 0.7–1.48)
TRIGL SERPL-MCNC: 135 MG/DL (ref 37–140)
TSH SERPL-ACNC: 0.8 UIU/ML (ref 0.35–4.94)
VLDLC SERPL CALC-MCNC: 27 MG/DL
WBC # SPEC AUTO: 5.14 X10(3)/MCL (ref 4.5–11.5)

## 2023-08-15 PROCEDURE — 84439 ASSAY OF FREE THYROXINE: CPT

## 2023-08-15 PROCEDURE — 84443 ASSAY THYROID STIM HORMONE: CPT

## 2023-08-15 PROCEDURE — 82627 DEHYDROEPIANDROSTERONE: CPT

## 2023-08-15 PROCEDURE — 36415 COLL VENOUS BLD VENIPUNCTURE: CPT

## 2023-08-15 PROCEDURE — 82024 ASSAY OF ACTH: CPT

## 2023-08-15 PROCEDURE — 82088 ASSAY OF ALDOSTERONE: CPT

## 2023-08-15 PROCEDURE — 80053 COMPREHEN METABOLIC PANEL: CPT

## 2023-08-15 PROCEDURE — 84244 ASSAY OF RENIN: CPT

## 2023-08-15 PROCEDURE — 85025 COMPLETE CBC W/AUTO DIFF WBC: CPT

## 2023-08-15 PROCEDURE — 80061 LIPID PANEL: CPT

## 2023-08-15 PROCEDURE — 80375 DRUG/SUBSTANCE NOS 1-3: CPT

## 2023-08-15 PROCEDURE — 82533 TOTAL CORTISOL: CPT

## 2023-08-16 LAB
ACTH PLAS-MCNC: 7.7 PG/ML
DHEA-S SERPL-MCNC: <5 MCG/DL (ref 9.7–159)

## 2023-08-17 LAB — ALDOST SERPL-MCNC: <4 NG/DL

## 2023-08-18 LAB — RENIN PLAS-CCNC: 1 NG/ML/H

## 2023-08-20 LAB
COLLECT DURATION TIME UR: 24 H
CORTIS 24H UR-MRATE: 36 MCG/24 H (ref 3.5–45)
SPECIMEN VOL 24H UR: 1300 ML

## 2023-08-21 LAB — MITOTANE SERPL-MCNC: 12.1 UG/ML

## 2023-08-29 ENCOUNTER — OFFICE VISIT (OUTPATIENT)
Dept: HEMATOLOGY/ONCOLOGY | Facility: CLINIC | Age: 63
End: 2023-08-29
Payer: COMMERCIAL

## 2023-08-29 ENCOUNTER — PATIENT MESSAGE (OUTPATIENT)
Dept: HEMATOLOGY/ONCOLOGY | Facility: CLINIC | Age: 63
End: 2023-08-29

## 2023-08-29 VITALS
DIASTOLIC BLOOD PRESSURE: 92 MMHG | WEIGHT: 127.5 LBS | BODY MASS INDEX: 21.77 KG/M2 | SYSTOLIC BLOOD PRESSURE: 153 MMHG | HEIGHT: 64 IN | HEART RATE: 78 BPM | OXYGEN SATURATION: 100 % | RESPIRATION RATE: 16 BRPM | TEMPERATURE: 98 F

## 2023-08-29 DIAGNOSIS — C79.51 SECONDARY MALIGNANT NEOPLASM OF BONE: ICD-10-CM

## 2023-08-29 DIAGNOSIS — G89.3 CANCER RELATED PAIN: ICD-10-CM

## 2023-08-29 DIAGNOSIS — M79.2 NEUROPATHIC PAIN DUE TO RADIATION: ICD-10-CM

## 2023-08-29 DIAGNOSIS — C74.00 MALIGNANT NEOPLASM OF ADRENAL CORTEX, UNSPECIFIED LATERALITY: ICD-10-CM

## 2023-08-29 DIAGNOSIS — C74.00 MALIGNANT NEOPLASM OF ADRENAL CORTEX, UNSPECIFIED LATERALITY: Primary | ICD-10-CM

## 2023-08-29 PROCEDURE — 99214 OFFICE O/P EST MOD 30 MIN: CPT | Mod: S$GLB,,, | Performed by: NURSE PRACTITIONER

## 2023-08-29 PROCEDURE — 3077F PR MOST RECENT SYSTOLIC BLOOD PRESSURE >= 140 MM HG: ICD-10-PCS | Mod: CPTII,S$GLB,, | Performed by: NURSE PRACTITIONER

## 2023-08-29 PROCEDURE — 1159F MED LIST DOCD IN RCRD: CPT | Mod: CPTII,S$GLB,, | Performed by: NURSE PRACTITIONER

## 2023-08-29 PROCEDURE — 3077F SYST BP >= 140 MM HG: CPT | Mod: CPTII,S$GLB,, | Performed by: NURSE PRACTITIONER

## 2023-08-29 PROCEDURE — 3008F PR BODY MASS INDEX (BMI) DOCUMENTED: ICD-10-PCS | Mod: CPTII,S$GLB,, | Performed by: NURSE PRACTITIONER

## 2023-08-29 PROCEDURE — 99214 PR OFFICE/OUTPT VISIT, EST, LEVL IV, 30-39 MIN: ICD-10-PCS | Mod: S$GLB,,, | Performed by: NURSE PRACTITIONER

## 2023-08-29 PROCEDURE — 3080F DIAST BP >= 90 MM HG: CPT | Mod: CPTII,S$GLB,, | Performed by: NURSE PRACTITIONER

## 2023-08-29 PROCEDURE — 1159F PR MEDICATION LIST DOCUMENTED IN MEDICAL RECORD: ICD-10-PCS | Mod: CPTII,S$GLB,, | Performed by: NURSE PRACTITIONER

## 2023-08-29 PROCEDURE — 4010F ACE/ARB THERAPY RXD/TAKEN: CPT | Mod: CPTII,S$GLB,, | Performed by: NURSE PRACTITIONER

## 2023-08-29 PROCEDURE — 99999 PR PBB SHADOW E&M-EST. PATIENT-LVL V: ICD-10-PCS | Mod: PBBFAC,,, | Performed by: NURSE PRACTITIONER

## 2023-08-29 PROCEDURE — 3008F BODY MASS INDEX DOCD: CPT | Mod: CPTII,S$GLB,, | Performed by: NURSE PRACTITIONER

## 2023-08-29 PROCEDURE — 3080F PR MOST RECENT DIASTOLIC BLOOD PRESSURE >= 90 MM HG: ICD-10-PCS | Mod: CPTII,S$GLB,, | Performed by: NURSE PRACTITIONER

## 2023-08-29 PROCEDURE — 1160F PR REVIEW ALL MEDS BY PRESCRIBER/CLIN PHARMACIST DOCUMENTED: ICD-10-PCS | Mod: CPTII,S$GLB,, | Performed by: NURSE PRACTITIONER

## 2023-08-29 PROCEDURE — 4010F PR ACE/ARB THEARPY RXD/TAKEN: ICD-10-PCS | Mod: CPTII,S$GLB,, | Performed by: NURSE PRACTITIONER

## 2023-08-29 PROCEDURE — 1160F RVW MEDS BY RX/DR IN RCRD: CPT | Mod: CPTII,S$GLB,, | Performed by: NURSE PRACTITIONER

## 2023-08-29 PROCEDURE — 99999 PR PBB SHADOW E&M-EST. PATIENT-LVL V: CPT | Mod: PBBFAC,,, | Performed by: NURSE PRACTITIONER

## 2023-08-29 RX ORDER — DULOXETIN HYDROCHLORIDE 30 MG/1
30 CAPSULE, DELAYED RELEASE ORAL DAILY
Qty: 30 CAPSULE | Refills: 11 | Status: SHIPPED | OUTPATIENT
Start: 2023-08-29 | End: 2024-08-28

## 2023-08-29 RX ORDER — HYDROCODONE BITARTRATE AND ACETAMINOPHEN 7.5; 325 MG/1; MG/1
1 TABLET ORAL EVERY 6 HOURS PRN
Qty: 60 TABLET | Refills: 0 | Status: SHIPPED | OUTPATIENT
Start: 2023-08-29 | End: 2023-09-28

## 2023-08-29 NOTE — PROGRESS NOTES
Subjective:       Patient ID: Shreya Reyes is a 63 y.o. female.    Chief Complaint:  Pain in the buttock region    Diagnosis: Metastatic adrenocortical carcinoma                    Multiple osteoporotic vertebral compression fractures    Treatment History  Adjuvant XRT (completed 9/30/21)  Mitotane 1/25/22-7/26/22, Resumed 8/22  XRT right scapula, L5,  R ischium, L femoral neck completed 8/26/22  SBRT L2-3 2/06/23  Left IM nail 2/08/23  SBRT R iliac wing 5/15/23    Current Treatment:   Mitotane resumed 8/22 - current dose 1500 mg/d   Hydrocortisone 20 mg Q AM, 10 mg Q PM  Levothyroxine 75 mcg/d              Clinical History:  Patient initially presented to the [facility] ER 3/16/21 with acute left flank pain. CT A/P showed a heterogeneous enhancing mass of the left adrenal gland measuring 5.5 x 4.8 x 5 cm (24 Hu), with no definite microscopic fat. There was mild displacement of the left renal vein. Right adrenal gland was unremarkable.  Hormonal workup was negative for pheochromocytoma. She was felt to have had an acute adrenal hemorrhage and close observation was recommended. Follow-up CT A/P 6/9/21 showed increased size of the adrenal mass to 8.0 x 4.5 cm appearing hypervascular with some central necrosis. She underwent a laparoscopic/robotic left adrenalectomy 6/11/21.  Final pathology showed an adrenal cortical carcinoma predominant oncocytic/trabecular morphology with focally marked cytologic atypia and increased mitotic rate (up to 10/50 HPF's). There were large areas of necrosis and multiple foci of capsular and lymphovascular invasion. Ki-67 expression was 10-15%.    She had a Telemedicine consultation with Dr. Dion Lynch at [facility] 8/3/21 who specializes in treatment of adrenocortical carcinoma. Her case was reviewed and discussed in their tumor board. Recommendations were for treatment with adjuvant radiation therapy and systemic treatment with Mitotane. Baseline postoperative CT scans of the  chest, abdomen and pelvis 8/4/21 showed postoperative changes with no evidence of measurable metastatic disease.    She was seen as a new patient at [facility] 8/9/21.  She had recovered well from her surgery and was asymptomatic.  Treatment recommendations from the [facility] were reviewed and discussed.  Treatment was started with Mitotane 1000 mg BID on 8/16/2021.  No dose escalation was recommended until she completed radiation therapy.  She was started on replacement hydrocortisone 20 mg Q AM and 10 mg Q PM.  Surveillance CT scans were recommended in 3 months.      She was seen for an office visit 9/16/21 after completing 4 weeks of treatment with Mitotane.  She was not having any significant side effects associated with her therapy.  She was custodial through her adjuvant radiation therapy.  Laboratory testing showed marked transaminase elevations with an AST of 493,  and alkaline phosphatase 175.  Bilirubin was normal.  The remainder of her CMP was unremarkable.  Baseline mitotane level drawn at that visit was 2.5 mcg/mL.  Mitotane was held and adjuvant radiation therapy was continued.  Weekly laboratory monitoring showed gradual improvement in her LFTs.  Although mitotane had been associated with transaminase elevations, >5x elevations are rare occurring in <1% of patients.  She was also instructed to hold her lovastatin.  She completed adjuvant radiation therapy 9/30/2021.     Follow-up laboratory continued to show transaminase elevations.  GI was consulted and ordered additional laboratory testing which did not reveal evidence of viral or autoimmune etiologies for her hepatitis.  Mitotane was not resumed due to her persistent transaminase elevations. Follow-up CT scans of the chest, abdomen and pelvis 10/28/21 showed a few stable subcentimeter pulmonary nodules and changes related to her previous left adrenalectomy with no evidence of disease recurrence.  Following  normalization of her LFTs, treatment was resumed with Mitotane 1/25/22.  Her LFTs remained normal even during dose escalation.    She had an injury at home in mid March after carrying in several arms of firewood with acute mid back pain.  Plain x-ray 3/17/22 showed a compression deformity at L2 of questionable age.  MRI of the lumbar spine 3/23/22 showed a subacute severe compression fracture deformity of the L1 vertebral body and mild superior endplate compression fracture of L3 vertebral body with osteoporotic appearance and no findings suspicious for pathologic fracture.  However, there were scattered small osseous lesions in the right L3 vertebral body and L5 vertebral body concerning for metastatic disease.  CT PET scan 3/28/22 showed mildly hypermetabolic osseous lesions in the lumbar spine, pelvis and proximal left femur consistent with metastatic disease.  There was no significant hypermetabolic uptake at the sites of her compression fractures.  She did not have pain at any of the sites prior to the acute compression fracture.  Bone density exam showed osteoporosis of the lumbar spine with a T score of -3.4, left femoral neck -3.4 and left total hip -2.7.  She was started on Prolia 3/31/22 and underwent L1 and L3 kyphoplasty 4//8/22.  Biopsies of the L3 vertebral body showed no evidence of metastatic carcinoma.  She continued to have significant pain following the kyphoplasty.  Further review of her films showed a possible compression fracture at T11.  MRI of the thoracic spine 4/15/22 showed a compression fracture of T11 with 50% loss of vertebral body height.  She underwent kyphoplasty 4/21/22 with symptomatic improvement.    CT scans of the chest, abdomen and pelvis 4/27/22 showed sclerotic osseous lesions at L4, right ischium and left femur correlating with the hypermetabolic lesions on CT-PET scan.  There were stable sub-6 mm pulmonary nodules in the RML and LLL unchanged from previous imaging.  MRI of  the cervical and lumbar spine 7/5/22 showed moderate disc disease at C5-6 and C6-7 without significant spinal canal stenosis or foraminal stenosis.  She developed progressive symptoms of right arm pain, numbness and tingling and updated MRI 7/13/22 showed foraminal stenosis secondary to disc osteophyte on the right side at C5-6 and C6-7.  She underwent a right foraminotomy with relief of her symptoms.    CT C/A/P 08/01/22:  Multiple compression fractures and postsurgical changes.  Area of sclerosis in the left sacrum was stable compared to the prior exam.  There was no evidence of visceral metastatic disease.  She had a teleconference with [facility] 8/2/22 and a follow-up PET scan was recommended.  Mitotane was discontinued 7/26/22.   CT-PET 08/05/22:  Hypermetabolic osseous metastatic disease involving L5, right ischium, left femoral neck and a new lesion in the inferior right scapula.  There was a 12 mm area of hypermetabolic activity adjacent to the right adrenal gland without a definite CT correlate.    She completed palliative radiation therapy to the right scapula, L5, R ischium and left femoral neck on 8/26/22.  She resumed Mitotane at 1000 mg BID on 8/29/22.      CT-PET 11/21/22:  Improved FDG uptake in all of the treated sites.  Right scapula SUV decreased from 4.2 to 2.1, left femoral neck 9.1 to 5, right ischium 13 to 2.4 and L5 8.2 to 4.6.  Hypermetabolic activity at the site of the previous compression fractures had also improved.  There were no findings suspicious for new sites of disease or visceral metastatic disease.  MRI lumbar spine and left hip 1/29/23:  Metastatic disease involving right ischial tuberosity, left femoral neck and L3 vertebral body, similar in size to CT-PET scan 11/21/22:  L3 lesion showed interval progression with ventral and paraspinal extension causing moderate-to-severe narrowing of the spinal canal.  Left femoral neck lesion involved greater than 50% of the  total diameter of the femoral neck.  She was treated with stereotactic XRT to L3 and underwent prophylactic IM nail of the left femoral neck 2/8/23.  CT C/A/P 4/24/23:  13 mm sclerotic met inferior right scapula, bandlike densities RUL and RLL secondary to previous XRT.  Stable thoracic spine compression fractures.  Increased density L4 vertebral body metastasis.  No evidence of visceral metastatic disease.  MRI L-spine/pelvis 4/24/23:  Stable L3 lesion with mild epidural extension and enhancement measuring 8 mm.  Sclerotic 10 mm focus of abnormal marrow signal right iliac wing.    Guardant 360 2/20/23:  Positive TP53 mutation, microsatellite stable     Prolia was resumed for her metastatic bone disease 4/11/23.  She completed SBRT to the right iliac wing metastasis 5/15/23 receiving 2700 cGy in 3 fractions.  Treatment was well tolerated.      MRI L-spine and pelvis 7/26/23:  Resolution of previous epidural enhancing mass posterior to L3 vertebral body.  Right iliac wing lesion 12 mm (previous 10 mm).  No new metastatic lesions.  CT C/A/P 7/28/23:  Stable sclerotic bone lesions right scapula, right iliac wing and L4.  Stable kyphoplasty changes T10, T11, T12 and L2.  Stable L1 compression deformity.  No new sites of disease identified.    She had a screening colonoscopy 7/25/23 showing no polyps or other abnormalities.  Follow-up exam was recommended in 7 years.    Interval History  Mrs. Reyes is here today for a four week follow-up visit accompanied by her .  She is ambulatory without assistance.  She had a telemedicine follow-up with Dr. Lynch on 8/14/23.  Her Mitotane was increased to 1500 mg daily due to serum level consistently <14.  Hydrocortisone was also increased to 20 mg in the morning and 10 mg in the evening.  Recommendations were to repeat CT scans and MR spine/pelvis in 3 months.  She continues with low back pain radiating into the buttock region on the right, and pain in the proximal LLE due  to previous oneil.  No paresthesias or weakness of the lower extremities. She takes Norco 7.5 mg as needed, which mostly just helps her rest.  She is requesting a referral back to PT.        Review of Systems   Constitutional:  Positive for fatigue (Improved). Negative for appetite change, fever and unexpected weight change.   HENT:  Negative for mouth sores, sore throat and trouble swallowing.    Eyes: Negative.    Respiratory:  Negative for cough and shortness of breath.    Cardiovascular:  Negative for chest pain, palpitations and leg swelling.   Gastrointestinal:  Negative for abdominal distention, abdominal pain, constipation, diarrhea, nausea and vomiting.   Genitourinary:  Negative for dysuria, frequency and urgency.   Musculoskeletal:  Positive for arthralgias and back pain (improved). Negative for myalgias.        Proximal left leg pain, pain in right buttock region   Integumentary:  Negative for rash and mole/lesion.   Neurological:  Negative for dizziness, weakness, numbness and headaches.   Hematological:  Negative for adenopathy. Does not bruise/bleed easily.   Psychiatric/Behavioral:  Negative for confusion and dysphoric mood. The patient is nervous/anxious (intermittent).        PMHx:  Hyperlipidemia, osteoporosis  PSHx:  Tonsils, left cataract, ORIF left tibia/fib, left adrenalectomy, multiple vertebral kyphoplasties  SH:  Former smoker 1 PPD, quit 2009. + Social alcohol use. Lives in [place] with her . 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  FH:  Her mother had lymphoma.     Objective:        There were no vitals taken for this visit.   Physical Exam  Constitutional:       Comments: Well-developed white female in NAD.   HENT:      Head: Normocephalic.      Mouth/Throat:      Mouth: Mucous membranes are moist.      Pharynx: Oropharynx is clear. No posterior oropharyngeal erythema.   Eyes:      Extraocular Movements: Extraocular movements intact.      Conjunctiva/sclera:  Conjunctivae normal.      Pupils: Pupils are equal, round, and reactive to light.   Cardiovascular:      Rate and Rhythm: Normal rate and regular rhythm.      Heart sounds: No murmur heard.  Pulmonary:      Comments: Lungs clear to auscultation  Abdominal:      General: Bowel sounds are normal. There is no distension.      Palpations: Abdomen is soft. There is no mass.      Tenderness: There is no abdominal tenderness.   Musculoskeletal:         General: No swelling or tenderness.      Cervical back: Neck supple. No tenderness.      Comments: No vertebral body or rib cage tenderness.   Skin:     General: Skin is warm and dry.      Findings: No rash.   Neurological:      General: No focal deficit present.      Mental Status: She is alert and oriented to person, place, and time.      Motor: No weakness.   Psychiatric:         Behavior: Behavior is cooperative.       ECOG SCORE            LABORATORY  Recent Results (from the past 336 hour(s))   Cortisol, Urine, Free    Collection Time: 08/15/23 10:17 AM   Result Value Ref Range    Cortisol 36 3.5 - 45 mcg/24 h    Collection Duration 24 h    Urine Volume 1300 mL   Lipid Panel    Collection Time: 08/15/23 10:17 AM   Result Value Ref Range    Cholesterol Total 205 (H) <=200 mg/dL    HDL Cholesterol 71 (H) 35 - 60 mg/dL    Triglyceride 135 37 - 140 mg/dL    Cholesterol/HDL Ratio 3 0 - 5    Very Low Density Lipoprotein 27     LDL Cholesterol 107.00 50.00 - 140.00 mg/dL   Comprehensive Metabolic Panel    Collection Time: 08/15/23 10:17 AM   Result Value Ref Range    Sodium Level 144 136 - 145 mmol/L    Potassium Level 4.5 3.5 - 5.1 mmol/L    Chloride 109 (H) 98 - 107 mmol/L    Carbon Dioxide 25 23 - 31 mmol/L    Glucose Level 85 82 - 115 mg/dL    Blood Urea Nitrogen 22.0 (H) 9.8 - 20.1 mg/dL    Creatinine 0.68 0.55 - 1.02 mg/dL    Calcium Level Total 8.9 8.4 - 10.2 mg/dL    Protein Total 6.2 5.8 - 7.6 gm/dL    Albumin Level 3.2 (L) 3.4 - 4.8 g/dL    Globulin 3.0 2.4 - 3.5  gm/dL    Albumin/Globulin Ratio 1.1 1.1 - 2.0 ratio    Bilirubin Total 0.2 <=1.5 mg/dL    Alkaline Phosphatase 73 40 - 150 unit/L    Alanine Aminotransferase 13 0 - 55 unit/L    Aspartate Aminotransferase 20 5 - 34 unit/L    eGFR >60 mls/min/1.73/m2   TSH    Collection Time: 08/15/23 10:17 AM   Result Value Ref Range    Thyroid Stimulating Hormone 0.795 0.350 - 4.940 uIU/mL   ACTH    Collection Time: 08/15/23 10:17 AM   Result Value Ref Range    Adrenocorticotropic Hormone, P 7.7 pg/mL   Cortisol    Collection Time: 08/15/23 10:17 AM   Result Value Ref Range    Cortisol Level 22.8 ug/dL   T4, Free    Collection Time: 08/15/23 10:17 AM   Result Value Ref Range    Thyroxine Free 0.78 0.70 - 1.48 ng/dL   Aldosterone    Collection Time: 08/15/23 10:17 AM   Result Value Ref Range    Aldosterone, S <4.0 <=21 ng/dL   CBC with Differential    Collection Time: 08/15/23 10:17 AM   Result Value Ref Range    WBC 5.14 4.50 - 11.50 x10(3)/mcL    RBC 3.55 (L) 4.20 - 5.40 x10(6)/mcL    Hgb 11.3 (L) 12.0 - 16.0 g/dL    Hct 35.3 (L) 37.0 - 47.0 %    MCV 99.4 (H) 80.0 - 94.0 fL    MCH 31.8 (H) 27.0 - 31.0 pg    MCHC 32.0 (L) 33.0 - 36.0 g/dL    RDW 15.1 11.5 - 17.0 %    Platelet 285 130 - 400 x10(3)/mcL    MPV 11.1 (H) 7.4 - 10.4 fL    Neut % 67.4 %    Lymph % 20.6 %    Mono % 9.3 %    Eos % 1.9 %    Basophil % 0.6 %    Lymph # 1.06 0.6 - 4.6 x10(3)/mcL    Neut # 3.46 2.1 - 9.2 x10(3)/mcL    Mono # 0.48 0.1 - 1.3 x10(3)/mcL    Eos # 0.10 0 - 0.9 x10(3)/mcL    Baso # 0.03 <=0.2 x10(3)/mcL    IG# 0.01 0 - 0.04 x10(3)/mcL    IG% 0.2 %   DHEA-Sulfate    Collection Time: 08/15/23 10:23 AM   Result Value Ref Range    DHEA SULFATE <5.0 (L) 9.7 - 159 mcg/dL   Renin    Collection Time: 08/15/23 10:23 AM   Result Value Ref Range    Renin Activity, P 1.0 ng/mL/h   Mitotane (Lysodren)    Collection Time: 08/15/23 10:24 AM   Result Value Ref Range    Mitotane 12.1 ug/ml         Assessment:   Metastatic adrenocortical carcinoma  Multiple  osteoporotic vertebral body compression fractures  S/p prophylactic left femur IM nail 2/8/23  Treatment induced leukopenia - stable  Cancer related pain      Plan:   Continue mitotane 2000 mg daily.  Continue supplemental hydrocortisone and levothyroxine at the current doses.  Continue Prolia.  We discussed a trial of Cymbalta versus Neurontin or Lyrica.  Side effect profile of each discussed.  Patient agreeable to trial of Cymbalta 30 mg daily for musculoskeletal and neuropathic pain.  May increase to 60 mg daily if needed.  PT referral.  Continue monthly protocol laboratory.  Will proceed with scheduling restaging MRI spine, pelvis and CT C/A/P in early November.  Toxicity check in 4 weeks.    All questions answered to the satisfaction of the patient.    CRISTINA WILSON, FNP-C  [facility]     Other Physicians  Dr. Dion Lynch ([facility])

## 2023-09-12 ENCOUNTER — LAB VISIT (OUTPATIENT)
Dept: LAB | Facility: HOSPITAL | Age: 63
End: 2023-09-12
Attending: INTERNAL MEDICINE
Payer: COMMERCIAL

## 2023-09-12 DIAGNOSIS — C74.00 MALIGNANT NEOPLASM OF ADRENAL CORTEX, UNSPECIFIED LATERALITY: ICD-10-CM

## 2023-09-12 DIAGNOSIS — C79.51 SECONDARY MALIGNANT NEOPLASM OF BONE: ICD-10-CM

## 2023-09-12 LAB
ALBUMIN SERPL-MCNC: 3.1 G/DL (ref 3.4–4.8)
ALBUMIN/GLOB SERPL: 1 RATIO (ref 1.1–2)
ALP SERPL-CCNC: 78 UNIT/L (ref 40–150)
ALT SERPL-CCNC: 12 UNIT/L (ref 0–55)
AST SERPL-CCNC: 18 UNIT/L (ref 5–34)
BASOPHILS # BLD AUTO: 0.03 X10(3)/MCL
BASOPHILS NFR BLD AUTO: 0.4 %
BILIRUB SERPL-MCNC: 0.2 MG/DL
BUN SERPL-MCNC: 23.6 MG/DL (ref 9.8–20.1)
CALCIUM SERPL-MCNC: 8.9 MG/DL (ref 8.4–10.2)
CHLORIDE SERPL-SCNC: 106 MMOL/L (ref 98–107)
CHOLEST SERPL-MCNC: 209 MG/DL
CHOLEST/HDLC SERPL: 3 {RATIO} (ref 0–5)
CO2 SERPL-SCNC: 27 MMOL/L (ref 23–31)
CORTIS SERPL-SCNC: 34.2 UG/DL
CREAT SERPL-MCNC: 0.68 MG/DL (ref 0.55–1.02)
EOSINOPHIL # BLD AUTO: 0.16 X10(3)/MCL (ref 0–0.9)
EOSINOPHIL NFR BLD AUTO: 2.2 %
ERYTHROCYTE [DISTWIDTH] IN BLOOD BY AUTOMATED COUNT: 14 % (ref 11.5–17)
GFR SERPLBLD CREATININE-BSD FMLA CKD-EPI: >60 MLS/MIN/1.73/M2
GLOBULIN SER-MCNC: 3 GM/DL (ref 2.4–3.5)
GLUCOSE SERPL-MCNC: 89 MG/DL (ref 82–115)
HCT VFR BLD AUTO: 35.9 % (ref 37–47)
HDLC SERPL-MCNC: 62 MG/DL (ref 35–60)
HGB BLD-MCNC: 11.3 G/DL (ref 12–16)
IMM GRANULOCYTES # BLD AUTO: 0.02 X10(3)/MCL (ref 0–0.04)
IMM GRANULOCYTES NFR BLD AUTO: 0.3 %
LDLC SERPL CALC-MCNC: 110 MG/DL (ref 50–140)
LYMPHOCYTES # BLD AUTO: 1.37 X10(3)/MCL (ref 0.6–4.6)
LYMPHOCYTES NFR BLD AUTO: 19.1 %
MCH RBC QN AUTO: 31.3 PG (ref 27–31)
MCHC RBC AUTO-ENTMCNC: 31.5 G/DL (ref 33–36)
MCV RBC AUTO: 99.4 FL (ref 80–94)
MONOCYTES # BLD AUTO: 0.76 X10(3)/MCL (ref 0.1–1.3)
MONOCYTES NFR BLD AUTO: 10.6 %
NEUTROPHILS # BLD AUTO: 4.83 X10(3)/MCL (ref 2.1–9.2)
NEUTROPHILS NFR BLD AUTO: 67.4 %
PLATELET # BLD AUTO: 316 X10(3)/MCL (ref 130–400)
PMV BLD AUTO: 9.7 FL (ref 7.4–10.4)
POTASSIUM SERPL-SCNC: 3.9 MMOL/L (ref 3.5–5.1)
PROT SERPL-MCNC: 6.1 GM/DL (ref 5.8–7.6)
RBC # BLD AUTO: 3.61 X10(6)/MCL (ref 4.2–5.4)
SODIUM SERPL-SCNC: 142 MMOL/L (ref 136–145)
T4 FREE SERPL-MCNC: 0.76 NG/DL (ref 0.7–1.48)
TRIGL SERPL-MCNC: 184 MG/DL (ref 37–140)
TSH SERPL-ACNC: 0.88 UIU/ML (ref 0.35–4.94)
VLDLC SERPL CALC-MCNC: 37 MG/DL
WBC # SPEC AUTO: 7.17 X10(3)/MCL (ref 4.5–11.5)

## 2023-09-12 PROCEDURE — 82088 ASSAY OF ALDOSTERONE: CPT

## 2023-09-12 PROCEDURE — 82533 TOTAL CORTISOL: CPT

## 2023-09-12 PROCEDURE — 84443 ASSAY THYROID STIM HORMONE: CPT

## 2023-09-12 PROCEDURE — 80053 COMPREHEN METABOLIC PANEL: CPT

## 2023-09-12 PROCEDURE — 82627 DEHYDROEPIANDROSTERONE: CPT

## 2023-09-12 PROCEDURE — 84439 ASSAY OF FREE THYROXINE: CPT

## 2023-09-12 PROCEDURE — 36415 COLL VENOUS BLD VENIPUNCTURE: CPT

## 2023-09-12 PROCEDURE — 80061 LIPID PANEL: CPT

## 2023-09-12 PROCEDURE — 85025 COMPLETE CBC W/AUTO DIFF WBC: CPT

## 2023-09-12 PROCEDURE — 82024 ASSAY OF ACTH: CPT

## 2023-09-12 PROCEDURE — 80375 DRUG/SUBSTANCE NOS 1-3: CPT

## 2023-09-12 PROCEDURE — 84244 ASSAY OF RENIN: CPT

## 2023-09-13 LAB
ACTH PLAS-MCNC: 31 PG/ML
DHEA-S SERPL-MCNC: <5 MCG/DL (ref 9.7–159)

## 2023-09-14 LAB — ALDOST SERPL-MCNC: <4 NG/DL

## 2023-09-17 LAB — RENIN PLAS-CCNC: 4 NG/ML/H

## 2023-09-18 LAB — MITOTANE SERPL-MCNC: 15 UG/ML

## 2023-09-22 ENCOUNTER — APPOINTMENT (OUTPATIENT)
Dept: LAB | Facility: HOSPITAL | Age: 63
End: 2023-09-22
Attending: INTERNAL MEDICINE
Payer: COMMERCIAL

## 2023-09-28 ENCOUNTER — OFFICE VISIT (OUTPATIENT)
Dept: HEMATOLOGY/ONCOLOGY | Facility: CLINIC | Age: 63
End: 2023-09-28
Payer: COMMERCIAL

## 2023-09-28 VITALS
SYSTOLIC BLOOD PRESSURE: 171 MMHG | TEMPERATURE: 98 F | OXYGEN SATURATION: 97 % | DIASTOLIC BLOOD PRESSURE: 94 MMHG | RESPIRATION RATE: 16 BRPM | HEART RATE: 71 BPM | WEIGHT: 125.31 LBS | HEIGHT: 64 IN | BODY MASS INDEX: 21.39 KG/M2

## 2023-09-28 DIAGNOSIS — C74.00 MALIGNANT NEOPLASM OF ADRENAL CORTEX, UNSPECIFIED LATERALITY: Primary | ICD-10-CM

## 2023-09-28 DIAGNOSIS — C79.51 SECONDARY MALIGNANT NEOPLASM OF BONE: ICD-10-CM

## 2023-09-28 DIAGNOSIS — G89.3 CANCER RELATED PAIN: ICD-10-CM

## 2023-09-28 PROCEDURE — 99999 PR PBB SHADOW E&M-EST. PATIENT-LVL IV: ICD-10-PCS | Mod: PBBFAC,,, | Performed by: NURSE PRACTITIONER

## 2023-09-28 PROCEDURE — 99999 PR PBB SHADOW E&M-EST. PATIENT-LVL IV: CPT | Mod: PBBFAC,,, | Performed by: NURSE PRACTITIONER

## 2023-09-28 PROCEDURE — 99214 OFFICE O/P EST MOD 30 MIN: CPT | Mod: S$GLB,,, | Performed by: NURSE PRACTITIONER

## 2023-09-28 PROCEDURE — 3077F PR MOST RECENT SYSTOLIC BLOOD PRESSURE >= 140 MM HG: ICD-10-PCS | Mod: CPTII,S$GLB,, | Performed by: NURSE PRACTITIONER

## 2023-09-28 PROCEDURE — 3080F PR MOST RECENT DIASTOLIC BLOOD PRESSURE >= 90 MM HG: ICD-10-PCS | Mod: CPTII,S$GLB,, | Performed by: NURSE PRACTITIONER

## 2023-09-28 PROCEDURE — 1160F RVW MEDS BY RX/DR IN RCRD: CPT | Mod: CPTII,S$GLB,, | Performed by: NURSE PRACTITIONER

## 2023-09-28 PROCEDURE — 1159F MED LIST DOCD IN RCRD: CPT | Mod: CPTII,S$GLB,, | Performed by: NURSE PRACTITIONER

## 2023-09-28 PROCEDURE — 99214 PR OFFICE/OUTPT VISIT, EST, LEVL IV, 30-39 MIN: ICD-10-PCS | Mod: S$GLB,,, | Performed by: NURSE PRACTITIONER

## 2023-09-28 PROCEDURE — 1160F PR REVIEW ALL MEDS BY PRESCRIBER/CLIN PHARMACIST DOCUMENTED: ICD-10-PCS | Mod: CPTII,S$GLB,, | Performed by: NURSE PRACTITIONER

## 2023-09-28 PROCEDURE — 3008F BODY MASS INDEX DOCD: CPT | Mod: CPTII,S$GLB,, | Performed by: NURSE PRACTITIONER

## 2023-09-28 PROCEDURE — 3080F DIAST BP >= 90 MM HG: CPT | Mod: CPTII,S$GLB,, | Performed by: NURSE PRACTITIONER

## 2023-09-28 PROCEDURE — 4010F PR ACE/ARB THEARPY RXD/TAKEN: ICD-10-PCS | Mod: CPTII,S$GLB,, | Performed by: NURSE PRACTITIONER

## 2023-09-28 PROCEDURE — 3077F SYST BP >= 140 MM HG: CPT | Mod: CPTII,S$GLB,, | Performed by: NURSE PRACTITIONER

## 2023-09-28 PROCEDURE — 1159F PR MEDICATION LIST DOCUMENTED IN MEDICAL RECORD: ICD-10-PCS | Mod: CPTII,S$GLB,, | Performed by: NURSE PRACTITIONER

## 2023-09-28 PROCEDURE — 3008F PR BODY MASS INDEX (BMI) DOCUMENTED: ICD-10-PCS | Mod: CPTII,S$GLB,, | Performed by: NURSE PRACTITIONER

## 2023-09-28 PROCEDURE — 4010F ACE/ARB THERAPY RXD/TAKEN: CPT | Mod: CPTII,S$GLB,, | Performed by: NURSE PRACTITIONER

## 2023-09-28 RX ORDER — ALPRAZOLAM 0.25 MG/1
0.25 TABLET ORAL 3 TIMES DAILY PRN
Qty: 60 TABLET | Refills: 1 | Status: SHIPPED | OUTPATIENT
Start: 2023-09-28 | End: 2023-11-07 | Stop reason: SDUPTHER

## 2023-09-28 NOTE — PROGRESS NOTES
Subjective:       Patient ID: Shreya Reyes is a 63 y.o. female.    Chief Complaint:  Right leg pain    Diagnosis: Metastatic adrenocortical carcinoma                    Multiple osteoporotic vertebral compression fractures    Treatment History  Adjuvant XRT (completed 9/30/21)  Mitotane 1/25/22-7/26/22, Resumed 8/22  XRT right scapula, L5,  R ischium, L femoral neck completed 8/26/22  SBRT L2-3 2/06/23  Left IM nail 2/08/23  SBRT R iliac wing 5/15/23    Current Treatment:   Mitotane resumed 8/22 - current dose 1500 mg/d   Hydrocortisone 20 mg Q AM, 10 mg at noon, 10 mg Q PM  Levothyroxine 75 mcg/d              Clinical History:  Patient initially presented to the [facility] 3/16/21 with acute left flank pain. CT A/P showed a heterogeneous enhancing mass of the left adrenal gland measuring 5.5 x 4.8 x 5 cm (24 Hu), with no definite microscopic fat. There was mild displacement of the left renal vein. Right adrenal gland was unremarkable.  Hormonal workup was negative for pheochromocytoma. She was felt to have had an acute adrenal hemorrhage and close observation was recommended. Follow-up CT A/P 6/9/21 showed increased size of the adrenal mass to 8.0 x 4.5 cm appearing hypervascular with some central necrosis. She underwent a laparoscopic/robotic left adrenalectomy 6/11/21.  Final pathology showed an adrenal cortical carcinoma predominant oncocytic/trabecular morphology with focally marked cytologic atypia and increased mitotic rate (up to 10/50 HPF's). There were large areas of necrosis and multiple foci of capsular and lymphovascular invasion. Ki-67 expression was 10-15%.    She had a Telemedicine consultation with Dr. Dion Lynch at [facility] 8/3/21 who specializes in treatment of adrenocortical carcinoma. Her case was reviewed and discussed in their tumor board. Recommendations were for treatment with adjuvant radiation therapy and systemic treatment with Mitotane. Baseline postoperative CT scans of the  chest, abdomen and pelvis 8/4/21 showed postoperative changes with no evidence of measurable metastatic disease.    She was seen as a new patient at [facility] 8/9/21.  She had recovered well from her surgery and was asymptomatic.  Treatment recommendations from the [facility] were reviewed and discussed.  Treatment was started with Mitotane 1000 mg BID on 8/16/2021.  No dose escalation was recommended until she completed radiation therapy.  She was started on replacement hydrocortisone 20 mg Q AM and 10 mg Q PM.  Surveillance CT scans were recommended in 3 months.      She was seen for an office visit 9/16/21 after completing 4 weeks of treatment with Mitotane.  She was not having any significant side effects associated with her therapy.  She was FPC through her adjuvant radiation therapy.  Laboratory testing showed marked transaminase elevations with an AST of 493,  and alkaline phosphatase 175.  Bilirubin was normal.  The remainder of her CMP was unremarkable.  Baseline mitotane level drawn at that visit was 2.5 mcg/mL.  Mitotane was held and adjuvant radiation therapy was continued.  Weekly laboratory monitoring showed gradual improvement in her LFTs.  Although mitotane had been associated with transaminase elevations, >5x elevations are rare occurring in <1% of patients.  She was also instructed to hold her lovastatin.  She completed adjuvant radiation therapy 9/30/2021.     Follow-up laboratory continued to show transaminase elevations.  GI was consulted and ordered additional laboratory testing which did not reveal evidence of viral or autoimmune etiologies for her hepatitis.  Mitotane was not resumed due to her persistent transaminase elevations. Follow-up CT scans of the chest, abdomen and pelvis 10/28/21 showed a few stable subcentimeter pulmonary nodules and changes related to her previous left adrenalectomy with no evidence of disease recurrence.  Following  normalization of her LFTs, treatment was resumed with Mitotane 1/25/22.  Her LFTs remained normal even during dose escalation.    She had an injury at home in mid March after carrying in several arms of firewood with acute mid back pain.  Plain x-ray 3/17/22 showed a compression deformity at L2 of questionable age.  MRI of the lumbar spine 3/23/22 showed a subacute severe compression fracture deformity of the L1 vertebral body and mild superior endplate compression fracture of L3 vertebral body with osteoporotic appearance and no findings suspicious for pathologic fracture.  However, there were scattered small osseous lesions in the right L3 vertebral body and L5 vertebral body concerning for metastatic disease.  CT PET scan 3/28/22 showed mildly hypermetabolic osseous lesions in the lumbar spine, pelvis and proximal left femur consistent with metastatic disease.  There was no significant hypermetabolic uptake at the sites of her compression fractures.  She did not have pain at any of the sites prior to the acute compression fracture.  Bone density exam showed osteoporosis of the lumbar spine with a T score of -3.4, left femoral neck -3.4 and left total hip -2.7.  She was started on Prolia 3/31/22 and underwent L1 and L3 kyphoplasty 4//8/22.  Biopsies of the L3 vertebral body showed no evidence of metastatic carcinoma.  She continued to have significant pain following the kyphoplasty.  Further review of her films showed a possible compression fracture at T11.  MRI of the thoracic spine 4/15/22 showed a compression fracture of T11 with 50% loss of vertebral body height.  She underwent kyphoplasty 4/21/22 with symptomatic improvement.    CT scans of the chest, abdomen and pelvis 4/27/22 showed sclerotic osseous lesions at L4, right ischium and left femur correlating with the hypermetabolic lesions on CT-PET scan.  There were stable sub-6 mm pulmonary nodules in the RML and LLL unchanged from previous imaging.  MRI of  the cervical and lumbar spine 7/5/22 showed moderate disc disease at C5-6 and C6-7 without significant spinal canal stenosis or foraminal stenosis.  She developed progressive symptoms of right arm pain, numbness and tingling and updated MRI 7/13/22 showed foraminal stenosis secondary to disc osteophyte on the right side at C5-6 and C6-7.  She underwent a right foraminotomy with relief of her symptoms.    CT C/A/P 08/01/22:  Multiple compression fractures and postsurgical changes.  Area of sclerosis in the left sacrum was stable compared to the prior exam.  There was no evidence of visceral metastatic disease.  She had a teleconference with [facility] 8/2/22 and a follow-up PET scan was recommended.  Mitotane was discontinued 7/26/22.   CT-PET 08/05/22:  Hypermetabolic osseous metastatic disease involving L5, right ischium, left femoral neck and a new lesion in the inferior right scapula.  There was a 12 mm area of hypermetabolic activity adjacent to the right adrenal gland without a definite CT correlate.    She completed palliative radiation therapy to the right scapula, L5, R ischium and left femoral neck on 8/26/22.  She resumed Mitotane at 1000 mg BID on 8/29/22.      CT-PET 11/21/22:  Improved FDG uptake in all of the treated sites.  Right scapula SUV decreased from 4.2 to 2.1, left femoral neck 9.1 to 5, right ischium 13 to 2.4 and L5 8.2 to 4.6.  Hypermetabolic activity at the site of the previous compression fractures had also improved.  There were no findings suspicious for new sites of disease or visceral metastatic disease.  MRI lumbar spine and left hip 1/29/23:  Metastatic disease involving right ischial tuberosity, left femoral neck and L3 vertebral body, similar in size to CT-PET scan 11/21/22:  L3 lesion showed interval progression with ventral and paraspinal extension causing moderate-to-severe narrowing of the spinal canal.  Left femoral neck lesion involved greater than 50% of the  total diameter of the femoral neck.  She was treated with stereotactic XRT to L3 and underwent prophylactic IM nail of the left femoral neck 2/8/23.  CT C/A/P 4/24/23:  13 mm sclerotic met inferior right scapula, bandlike densities RUL and RLL secondary to previous XRT.  Stable thoracic spine compression fractures.  Increased density L4 vertebral body metastasis.  No evidence of visceral metastatic disease.  MRI L-spine/pelvis 4/24/23:  Stable L3 lesion with mild epidural extension and enhancement measuring 8 mm.  Sclerotic 10 mm focus of abnormal marrow signal right iliac wing.    Guardant 360 2/20/23:  Positive TP53 mutation, microsatellite stable     Prolia was resumed for her metastatic bone disease 4/11/23.  She completed SBRT to the right iliac wing metastasis 5/15/23 receiving 2700 cGy in 3 fractions.  Treatment was well tolerated.      MRI L-spine and pelvis 7/26/23:  Resolution of previous epidural enhancing mass posterior to L3 vertebral body.  Right iliac wing lesion 12 mm (previous 10 mm).  No new metastatic lesions.  CT C/A/P 7/28/23:  Stable sclerotic bone lesions right scapula, right iliac wing and L4.  Stable kyphoplasty changes T10, T11, T12 and L2.  Stable L1 compression deformity.  No new sites of disease identified.    She had a screening colonoscopy 7/25/23 showing no polyps or other abnormalities.  Follow-up exam was recommended in 7 years.    Interval History  Mrs. Reyes is here today by herself for a four week follow-up visit.  She feels good today.  She resumed Cymbalta 30 mg at her last visit.  She is tolerating that well.  Dr. Lynch reviewed her lab results from 9/12/23.  Mitotane was maintained at 1500 mg/day.  Due complaints of fatigue, her Hydrocortisone was increased to 20 mg in the morning, 10 mg at noon, and 10 mg in the evening.  She notes minor improvement in her fatigue.  She is starting PT again next week.  She reports ongoing pain in the RLE, just below the buttock and the  "lateral mid thigh region.  The pain is throbbing.  Heat and NSAIDS help.  She takes Norco only at night.    Laboratory results reviewed with patient.  Mitotane level was 15 ug/mL.  24 hour urine is still pending.    Review of Systems   Constitutional:  Positive for fatigue (Improved). Negative for appetite change, fever and unexpected weight change.   HENT:  Negative for mouth sores, sore throat and trouble swallowing.    Eyes: Negative.  Negative for visual disturbance.   Respiratory:  Negative for cough and shortness of breath.    Cardiovascular:  Negative for chest pain, palpitations and leg swelling.   Gastrointestinal:  Negative for abdominal distention, abdominal pain, constipation, diarrhea, nausea and vomiting.   Genitourinary:  Negative for dysuria, frequency and urgency.   Musculoskeletal:  Positive for arthralgias and back pain (improved). Negative for myalgias.        Proximal left leg pain, pain in right buttock region, lateral right thigh   Integumentary:  Negative for rash and mole/lesion.   Neurological:  Negative for dizziness, weakness, numbness and headaches.   Hematological:  Negative for adenopathy. Does not bruise/bleed easily.   Psychiatric/Behavioral:  Negative for confusion and dysphoric mood. The patient is nervous/anxious (intermittent).        PMHx:  Hyperlipidemia, osteoporosis  PSHx:  Tonsils, left cataract, ORIF left tibia/fib, left adrenalectomy, multiple vertebral kyphoplasties  SH:  Former smoker 1 PPD, quit 2009. + Social alcohol use. Lives in [place] with her . 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  FH:  Her mother had lymphoma.     Objective:        BP (!) 171/94   Pulse 71   Temp 98.2 °F (36.8 °C)   Resp 16   Ht 5' 4" (1.626 m)   Wt 56.8 kg (125 lb 4.8 oz)   SpO2 97%   BMI 21.51 kg/m²    Physical Exam  Constitutional:       Comments: Well-developed white female in NAD.   HENT:      Head: Normocephalic.      Mouth/Throat:      Mouth: Mucous " membranes are moist.      Pharynx: Oropharynx is clear. No posterior oropharyngeal erythema.   Eyes:      Extraocular Movements: Extraocular movements intact.      Conjunctiva/sclera: Conjunctivae normal.      Pupils: Pupils are equal, round, and reactive to light.   Cardiovascular:      Rate and Rhythm: Normal rate and regular rhythm.      Heart sounds: No murmur heard.  Pulmonary:      Comments: Lungs clear to auscultation  Abdominal:      General: Bowel sounds are normal. There is no distension.      Palpations: Abdomen is soft. There is no mass.      Tenderness: There is no abdominal tenderness.   Musculoskeletal:         General: No swelling or tenderness.      Cervical back: Neck supple. No tenderness.      Comments: No vertebral body or rib cage tenderness.  No tenderness of RLE   Skin:     General: Skin is warm and dry.      Findings: No rash.   Neurological:      General: No focal deficit present.      Mental Status: She is alert and oriented to person, place, and time.      Motor: No weakness.   Psychiatric:         Behavior: Behavior is cooperative.       ECOG SCORE    2 - Capable of all selfcare but unable to carry out any work activities, active > 50% of hours        LABORATORY     Latest Reference Range & Units 09/12/23 09:57   WBC 4.50 - 11.50 x10(3)/mcL 7.17   RBC 4.20 - 5.40 x10(6)/mcL 3.61 (L)   Hemoglobin 12.0 - 16.0 g/dL 11.3 (L)   Hematocrit 37.0 - 47.0 % 35.9 (L)   MCV 80.0 - 94.0 fL 99.4 (H)   MCH 27.0 - 31.0 pg 31.3 (H)   MCHC 33.0 - 36.0 g/dL 31.5 (L)   RDW 11.5 - 17.0 % 14.0   Platelets 130 - 400 x10(3)/mcL 316   MPV 7.4 - 10.4 fL 9.7   Neut % % 67.4   LYMPH % % 19.1   Mono % % 10.6   Eosinophil % % 2.2   Basophil % % 0.4   Immature Granulocytes % 0.3   Neut # 2.1 - 9.2 x10(3)/mcL 4.83   Lymph # 0.6 - 4.6 x10(3)/mcL 1.37   Mono # 0.1 - 1.3 x10(3)/mcL 0.76   Eos # 0 - 0.9 x10(3)/mcL 0.16   Baso # <=0.2 x10(3)/mcL 0.03   Immature Grans (Abs) 0 - 0.04 x10(3)/mcL 0.02   Sodium 136 - 145 mmol/L  142   Potassium 3.5 - 5.1 mmol/L 3.9   Chloride 98 - 107 mmol/L 106   CO2 23 - 31 mmol/L 27   BUN 9.8 - 20.1 mg/dL 23.6 (H)   Creatinine 0.55 - 1.02 mg/dL 0.68   eGFR mls/min/1.73/m2 >60   Glucose 82 - 115 mg/dL 89   Calcium 8.4 - 10.2 mg/dL 8.9   Alkaline Phosphatase 40 - 150 unit/L 78   PROTEIN TOTAL 5.8 - 7.6 gm/dL 6.1   Albumin 3.4 - 4.8 g/dL 3.1 (L)   Albumin/Globulin Ratio 1.1 - 2.0 ratio 1.0 (L)   BILIRUBIN TOTAL <=1.5 mg/dL 0.2   AST 5 - 34 unit/L 18   ALT 0 - 55 unit/L 12   Globulin, Total 2.4 - 3.5 gm/dL 3.0   Cholesterol <=200 mg/dL 209 (H)   HDL 35 - 60 mg/dL 62 (H)   LDL Cholesterol External 50.00 - 140.00 mg/dL 110.00   Total Cholesterol/HDL Ratio 0 - 5  3   Triglycerides 37 - 140 mg/dL 184 (H)   Very Low Density Lipoprotein  37   Thyroid Stimulating Hormone 0.350 - 4.940 uIU/mL 0.884   Cortisol ug/dL 34.2   Free T4 0.70 - 1.48 ng/dL 0.76       Assessment:   Metastatic adrenocortical carcinoma  Multiple osteoporotic vertebral body compression fractures  S/p prophylactic left femur IM nail 2/8/23  Cancer related pain, fatigue      Plan:   Patient examined and plan of care formulated with Dr. Arzate.  Continue mitotane 2000 mg daily.  Continue supplemental hydrocortisone and levothyroxine at the current doses.  Continue Prolia.  Next dose due 11/23.  Repeat laboratory 10/5/23.  All imaging studies dating back to 1/23 reviewed personally by Dr. Arzate.  No suspicious or worrisome lesions noted in the RLE where the patient is experiencing pain.  Symptoms may be post radiation in nature.  Reassurance provided.  Routine labs due 10/5/23.  Restaging scans as scheduled in early November.  Keep follow-up visit here with Dr. Arzate 11/7/23.    All questions answered to the satisfaction of the patient.    CRISTINA WILSON, FNP-C  [facility]     Other Physicians  Dr. Dion Lynch ([facility])

## 2023-09-29 LAB
COLLECT DURATION TIME UR: 24 H
CORTIS 24H UR-MRATE: 71 MCG/24 H (ref 3.5–45)
SPECIMEN VOL 24H UR: 1400 ML

## 2023-10-05 ENCOUNTER — LAB VISIT (OUTPATIENT)
Dept: LAB | Facility: HOSPITAL | Age: 63
End: 2023-10-05
Attending: INTERNAL MEDICINE
Payer: COMMERCIAL

## 2023-10-05 DIAGNOSIS — C79.51 SECONDARY MALIGNANT NEOPLASM OF BONE: ICD-10-CM

## 2023-10-05 DIAGNOSIS — C74.00 MALIGNANT NEOPLASM OF ADRENAL CORTEX, UNSPECIFIED LATERALITY: ICD-10-CM

## 2023-10-05 LAB
ALBUMIN SERPL-MCNC: 3 G/DL (ref 3.4–4.8)
ALBUMIN/GLOB SERPL: 1 RATIO (ref 1.1–2)
ALP SERPL-CCNC: 81 UNIT/L (ref 40–150)
ALT SERPL-CCNC: 11 UNIT/L (ref 0–55)
AST SERPL-CCNC: 18 UNIT/L (ref 5–34)
BASOPHILS # BLD AUTO: 0.03 X10(3)/MCL
BASOPHILS NFR BLD AUTO: 0.6 %
BILIRUB SERPL-MCNC: 0.2 MG/DL
BUN SERPL-MCNC: 21.2 MG/DL (ref 9.8–20.1)
CALCIUM SERPL-MCNC: 9.1 MG/DL (ref 8.4–10.2)
CHLORIDE SERPL-SCNC: 106 MMOL/L (ref 98–107)
CHOLEST SERPL-MCNC: 206 MG/DL
CHOLEST/HDLC SERPL: 3 {RATIO} (ref 0–5)
CO2 SERPL-SCNC: 26 MMOL/L (ref 23–31)
CORTIS SERPL-SCNC: 53.8 UG/DL
CREAT SERPL-MCNC: 0.68 MG/DL (ref 0.55–1.02)
EOSINOPHIL # BLD AUTO: 0.16 X10(3)/MCL (ref 0–0.9)
EOSINOPHIL NFR BLD AUTO: 3.3 %
ERYTHROCYTE [DISTWIDTH] IN BLOOD BY AUTOMATED COUNT: 13.7 % (ref 11.5–17)
GFR SERPLBLD CREATININE-BSD FMLA CKD-EPI: >60 MLS/MIN/1.73/M2
GLOBULIN SER-MCNC: 2.9 GM/DL (ref 2.4–3.5)
GLUCOSE SERPL-MCNC: 84 MG/DL (ref 82–115)
HCT VFR BLD AUTO: 35.2 % (ref 37–47)
HDLC SERPL-MCNC: 66 MG/DL (ref 35–60)
HGB BLD-MCNC: 11.3 G/DL (ref 12–16)
IMM GRANULOCYTES # BLD AUTO: 0.01 X10(3)/MCL (ref 0–0.04)
IMM GRANULOCYTES NFR BLD AUTO: 0.2 %
LDLC SERPL CALC-MCNC: 109 MG/DL (ref 50–140)
LYMPHOCYTES # BLD AUTO: 1.43 X10(3)/MCL (ref 0.6–4.6)
LYMPHOCYTES NFR BLD AUTO: 29.1 %
MCH RBC QN AUTO: 31.6 PG (ref 27–31)
MCHC RBC AUTO-ENTMCNC: 32.1 G/DL (ref 33–36)
MCV RBC AUTO: 98.3 FL (ref 80–94)
MONOCYTES # BLD AUTO: 0.54 X10(3)/MCL (ref 0.1–1.3)
MONOCYTES NFR BLD AUTO: 11 %
NEUTROPHILS # BLD AUTO: 2.75 X10(3)/MCL (ref 2.1–9.2)
NEUTROPHILS NFR BLD AUTO: 55.8 %
PLATELET # BLD AUTO: 331 X10(3)/MCL (ref 130–400)
PMV BLD AUTO: 9.7 FL (ref 7.4–10.4)
POTASSIUM SERPL-SCNC: 4 MMOL/L (ref 3.5–5.1)
PROT SERPL-MCNC: 5.9 GM/DL (ref 5.8–7.6)
RBC # BLD AUTO: 3.58 X10(6)/MCL (ref 4.2–5.4)
SODIUM SERPL-SCNC: 142 MMOL/L (ref 136–145)
T4 FREE SERPL-MCNC: 0.74 NG/DL (ref 0.7–1.48)
TRIGL SERPL-MCNC: 157 MG/DL (ref 37–140)
TSH SERPL-ACNC: 0.79 UIU/ML (ref 0.35–4.94)
VLDLC SERPL CALC-MCNC: 31 MG/DL
WBC # SPEC AUTO: 4.92 X10(3)/MCL (ref 4.5–11.5)

## 2023-10-05 PROCEDURE — 84443 ASSAY THYROID STIM HORMONE: CPT

## 2023-10-05 PROCEDURE — 82533 TOTAL CORTISOL: CPT

## 2023-10-05 PROCEDURE — 82627 DEHYDROEPIANDROSTERONE: CPT

## 2023-10-05 PROCEDURE — 80061 LIPID PANEL: CPT

## 2023-10-05 PROCEDURE — 80375 DRUG/SUBSTANCE NOS 1-3: CPT

## 2023-10-05 PROCEDURE — 84439 ASSAY OF FREE THYROXINE: CPT

## 2023-10-05 PROCEDURE — 82088 ASSAY OF ALDOSTERONE: CPT

## 2023-10-05 PROCEDURE — 85025 COMPLETE CBC W/AUTO DIFF WBC: CPT

## 2023-10-05 PROCEDURE — 80053 COMPREHEN METABOLIC PANEL: CPT

## 2023-10-05 PROCEDURE — 36415 COLL VENOUS BLD VENIPUNCTURE: CPT

## 2023-10-05 PROCEDURE — 84244 ASSAY OF RENIN: CPT

## 2023-10-05 PROCEDURE — 82024 ASSAY OF ACTH: CPT

## 2023-10-06 LAB
ACTH PLAS-MCNC: 19 PG/ML
DHEA-S SERPL-MCNC: <5 MCG/DL (ref 9.7–159)

## 2023-10-09 LAB — ALDOST SERPL-MCNC: <4 NG/DL

## 2023-10-10 ENCOUNTER — INFUSION (OUTPATIENT)
Dept: INFUSION THERAPY | Facility: HOSPITAL | Age: 63
End: 2023-10-10
Attending: INTERNAL MEDICINE
Payer: COMMERCIAL

## 2023-10-10 VITALS
SYSTOLIC BLOOD PRESSURE: 175 MMHG | TEMPERATURE: 98 F | HEART RATE: 66 BPM | DIASTOLIC BLOOD PRESSURE: 76 MMHG | BODY MASS INDEX: 21.34 KG/M2 | OXYGEN SATURATION: 98 % | RESPIRATION RATE: 16 BRPM | WEIGHT: 125 LBS | HEIGHT: 64 IN

## 2023-10-10 DIAGNOSIS — M81.0 OSTEOPOROSIS, UNSPECIFIED OSTEOPOROSIS TYPE, UNSPECIFIED PATHOLOGICAL FRACTURE PRESENCE: Primary | ICD-10-CM

## 2023-10-10 LAB — RENIN PLAS-CCNC: 2.8 NG/ML/H

## 2023-10-10 PROCEDURE — 63600175 PHARM REV CODE 636 W HCPCS: Mod: JZ,JG | Performed by: NURSE PRACTITIONER

## 2023-10-10 PROCEDURE — 96372 THER/PROPH/DIAG INJ SC/IM: CPT

## 2023-10-10 RX ADMIN — DENOSUMAB 60 MG: 60 INJECTION SUBCUTANEOUS at 09:10

## 2023-10-12 LAB
COLLECT DURATION TIME UR: 24 H
CORTIS 24H UR-MRATE: 43 MCG/24 H (ref 3.5–45)
SPECIMEN VOL 24H UR: 1300 ML

## 2023-10-16 LAB — MITOTANE SERPL-MCNC: 15 UG/ML

## 2023-10-31 NOTE — PROGRESS NOTES
Subjective:       Patient ID: Shreya Reyes is a 63 y.o. female.    Chief Complaint:  Nausea and diarrhea last week    Diagnosis: Metastatic adrenocortical carcinoma                    Multiple osteoporotic vertebral compression fractures    Treatment History  Adjuvant XRT (completed 9/30/21)  Mitotane 1/25/22-7/26/22, Resumed 8/22  XRT right scapula, L5,  R ischium, L femoral neck completed 8/26/22  SBRT L2-3 2/06/23  Left IM nail 2/08/23  SBRT R iliac wing 5/15/23    Current Treatment:   Mitotane resumed 8/22 - current dose 1500 mg/d   Hydrocortisone 20 mg Q AM, 10 mg at noon, 10 mg Q PM  Levothyroxine 75 mcg/d              Clinical History:  Patient initially presented to the [facility] 3/16/21 with acute left flank pain. CT A/P showed a heterogeneous enhancing mass of the left adrenal gland measuring 5.5 x 4.8 x 5 cm (24 Hu), with no definite microscopic fat. There was mild displacement of the left renal vein. Right adrenal gland was unremarkable.  Hormonal workup was negative for pheochromocytoma. She was felt to have had an acute adrenal hemorrhage and close observation was recommended. Follow-up CT A/P 6/9/21 showed increased size of the adrenal mass to 8.0 x 4.5 cm appearing hypervascular with some central necrosis. She underwent a laparoscopic/robotic left adrenalectomy 6/11/21.  Final pathology showed an adrenal cortical carcinoma predominant oncocytic/trabecular morphology with focally marked cytologic atypia and increased mitotic rate (up to 10/50 HPF's). There were large areas of necrosis and multiple foci of capsular and lymphovascular invasion. Ki-67 expression was 10-15%.    She had a Telemedicine consultation with Dr. Dion Lynch at [facility] 8/3/21 who specializes in treatment of adrenocortical carcinoma. Her case was reviewed and discussed in their tumor board. Recommendations were for treatment with adjuvant radiation therapy and systemic treatment with Mitotane. Baseline postoperative  CT scans of the chest, abdomen and pelvis 8/4/21 showed postoperative changes with no evidence of measurable metastatic disease.    She was seen as a new patient at [facility] 8/9/21.  She had recovered well from her surgery and was asymptomatic.  Treatment recommendations from the [facility] were reviewed and discussed.  Treatment was started with Mitotane 1000 mg BID on 8/16/2021.  No dose escalation was recommended until she completed radiation therapy.  She was started on replacement hydrocortisone 20 mg Q AM and 10 mg Q PM.  Surveillance CT scans were recommended in 3 months.      She was seen for an office visit 9/16/21 after completing 4 weeks of treatment with Mitotane.  She was not having any significant side effects associated with her therapy.  She was snf through her adjuvant radiation therapy.  Laboratory testing showed marked transaminase elevations with an AST of 493,  and alkaline phosphatase 175.  Bilirubin was normal.  The remainder of her CMP was unremarkable.  Baseline mitotane level drawn at that visit was 2.5 mcg/mL.  Mitotane was held and adjuvant radiation therapy was continued.  Weekly laboratory monitoring showed gradual improvement in her LFTs.  Although mitotane had been associated with transaminase elevations, >5x elevations are rare occurring in <1% of patients.  She was also instructed to hold her lovastatin.  She completed adjuvant radiation therapy 9/30/2021.     Follow-up laboratory continued to show transaminase elevations.  GI was consulted and ordered additional laboratory testing which did not reveal evidence of viral or autoimmune etiologies for her hepatitis.  Mitotane was not resumed due to her persistent transaminase elevations. Follow-up CT scans of the chest, abdomen and pelvis 10/28/21 showed a few stable subcentimeter pulmonary nodules and changes related to her previous left adrenalectomy with no evidence of disease recurrence.   Following normalization of her LFTs, treatment was resumed with Mitotane 1/25/22.  Her LFTs remained normal even during dose escalation.    She had an injury at home in mid March after carrying in several arms of firewood with acute mid back pain.  Plain x-ray 3/17/22 showed a compression deformity at L2 of questionable age.  MRI of the lumbar spine 3/23/22 showed a subacute severe compression fracture deformity of the L1 vertebral body and mild superior endplate compression fracture of L3 vertebral body with osteoporotic appearance and no findings suspicious for pathologic fracture.  However, there were scattered small osseous lesions in the right L3 vertebral body and L5 vertebral body concerning for metastatic disease.  CT PET scan 3/28/22 showed mildly hypermetabolic osseous lesions in the lumbar spine, pelvis and proximal left femur consistent with metastatic disease.  There was no significant hypermetabolic uptake at the sites of her compression fractures.  She did not have pain at any of the sites prior to the acute compression fracture.  Bone density exam showed osteoporosis of the lumbar spine with a T score of -3.4, left femoral neck -3.4 and left total hip -2.7.  She was started on Prolia 3/31/22 and underwent L1 and L3 kyphoplasty 4//8/22.  Biopsies of the L3 vertebral body showed no evidence of metastatic carcinoma.  She continued to have significant pain following the kyphoplasty.  Further review of her films showed a possible compression fracture at T11.  MRI of the thoracic spine 4/15/22 showed a compression fracture of T11 with 50% loss of vertebral body height.  She underwent kyphoplasty 4/21/22 with symptomatic improvement.    CT scans of the chest, abdomen and pelvis 4/27/22 showed sclerotic osseous lesions at L4, right ischium and left femur correlating with the hypermetabolic lesions on CT-PET scan.  There were stable sub-6 mm pulmonary nodules in the RML and LLL unchanged from previous  imaging.  MRI of the cervical and lumbar spine 7/5/22 showed moderate disc disease at C5-6 and C6-7 without significant spinal canal stenosis or foraminal stenosis.  She developed progressive symptoms of right arm pain, numbness and tingling and updated MRI 7/13/22 showed foraminal stenosis secondary to disc osteophyte on the right side at C5-6 and C6-7.  She underwent a right foraminotomy with relief of her symptoms.    CT C/A/P 08/01/22:  Multiple compression fractures and postsurgical changes.  Area of sclerosis in the left sacrum was stable compared to the prior exam.  There was no evidence of visceral metastatic disease.  She had a teleconference with [facility] 8/2/22 and a follow-up PET scan was recommended.  Mitotane was discontinued 7/26/22.   CT-PET 08/05/22:  Hypermetabolic osseous metastatic disease involving L5, right ischium, left femoral neck and a new lesion in the inferior right scapula.  There was a 12 mm area of hypermetabolic activity adjacent to the right adrenal gland without a definite CT correlate.    She completed palliative radiation therapy to the right scapula, L5, R ischium and left femoral neck on 8/26/22.  She resumed Mitotane at 1000 mg BID on 8/29/22.      CT-PET 11/21/22:  Improved FDG uptake in all of the treated sites.  Right scapula SUV decreased from 4.2 to 2.1, left femoral neck 9.1 to 5, right ischium 13 to 2.4 and L5 8.2 to 4.6.  Hypermetabolic activity at the site of the previous compression fractures had also improved.  There were no findings suspicious for new sites of disease or visceral metastatic disease.  MRI lumbar spine and left hip 1/29/23:  Metastatic disease involving right ischial tuberosity, left femoral neck and L3 vertebral body, similar in size to CT-PET scan 11/21/22:  L3 lesion showed interval progression with ventral and paraspinal extension causing moderate-to-severe narrowing of the spinal canal.  Left femoral neck lesion involved greater  than 50% of the total diameter of the femoral neck.  She was treated with stereotactic XRT to L3 and underwent prophylactic IM nail of the left femoral neck 2/8/23.  CT C/A/P 4/24/23:  13 mm sclerotic met inferior right scapula, bandlike densities RUL and RLL secondary to previous XRT.  Stable thoracic spine compression fractures.  Increased density L4 vertebral body metastasis.  No evidence of visceral metastatic disease.  MRI L-spine/pelvis 4/24/23:  Stable L3 lesion with mild epidural extension and enhancement measuring 8 mm.  Sclerotic 10 mm focus of abnormal marrow signal right iliac wing.    Guardant 360 2/20/23:  Positive TP53 mutation, microsatellite stable     Prolia was resumed for her metastatic bone disease 4/11/23.  She completed SBRT to the right iliac wing metastasis 5/15/23 receiving 2700 cGy in 3 fractions.  Treatment was well tolerated.      MRI L-spine and pelvis 7/26/23:  Resolution of previous epidural enhancing mass posterior to L3 vertebral body.  Right iliac wing lesion 12 mm (previous 10 mm).  No new metastatic lesions.  CT C/A/P 7/28/23:  Stable sclerotic bone lesions right scapula, right iliac wing and L4.  Stable kyphoplasty changes T10, T11, T12 and L2.  Stable L1 compression deformity.  No new sites of disease identified.  Colonoscopy 7/25/23:  No polyps or other abnormalities.  Follow-up exam recommended in 7 years.  CT C/A/P 11/06/23:  S/p kyphoplasty at T10, T11, T12 and L2.  Stable L1 compression deformity.  Increased L4 height loss with increased sclerosis of the vertebral body.  Stable sclerotic lesion right iliac wing 13 mm.  Inferior right scapular lesion more vaguely seen.  No findings suspicious for new sites of disease.      Interval History  She returns to clinic today for a 4 week follow-up visit accompanied by her .  They both had a viral gastroenteritis last week.  She required home IV fluid administration for clinical dehydration.  She feels better, but not fully  "recovered.  She remains on mitotane at 1500 mg daily.  She missed a few days during her viral illness.  Her lower back paraspinous pain/discomfort remains stable.  Minor left hip discomfort.  She has been going to Pilates regularly.  She routinely takes NSAIDs in the morning and Norco at bedtime.  She is scheduled for MRI of the T/L-spine and pelvis 11/10/23.  She has a telemedicine appointment scheduled with Dr. Lynch in [place] 11/21/23.      Review of Systems   Constitutional:  Negative for appetite change, fatigue, fever and unexpected weight change.   HENT:  Negative for mouth sores, sore throat and trouble swallowing.    Eyes: Negative.    Respiratory:  Negative for cough and shortness of breath.    Cardiovascular:  Negative for chest pain, palpitations and leg swelling.   Gastrointestinal:  Negative for abdominal distention, abdominal pain, constipation, diarrhea, nausea and vomiting.   Genitourinary:  Negative for dysuria, frequency and urgency.   Musculoskeletal:  Positive for arthralgias and back pain (stable).        Mild left hip pain   Integumentary:  Negative for pallor and rash.   Neurological:  Negative for dizziness, weakness, numbness and headaches.   Hematological:  Negative for adenopathy. Does not bruise/bleed easily.   Psychiatric/Behavioral:  Negative for confusion and dysphoric mood. The patient is nervous/anxious (intermittent).        PMHx:  Hyperlipidemia, osteoporosis  PSHx:  Tonsils, left cataract, ORIF left tibia/fib, left adrenalectomy, multiple vertebral kyphoplasties  SH:  Former smoker 1 PPD, quit 2009. + Social alcohol use. Lives in [place] with her . 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  FH:  Her mother had lymphoma.     Objective:        /75   Pulse 73   Temp 98.3 °F (36.8 °C)   Resp 15   Ht 5' 4" (1.626 m)   Wt 54.9 kg (121 lb 1.6 oz)   SpO2 98%   BMI 20.79 kg/m²    Physical Exam  Constitutional:       Comments: Well-developed white " female in NAD.   HENT:      Head: Normocephalic.      Mouth/Throat:      Mouth: Mucous membranes are moist.      Pharynx: Oropharynx is clear. No posterior oropharyngeal erythema.   Eyes:      Extraocular Movements: Extraocular movements intact.      Conjunctiva/sclera: Conjunctivae normal.      Pupils: Pupils are equal, round, and reactive to light.   Cardiovascular:      Rate and Rhythm: Normal rate and regular rhythm.      Heart sounds: No murmur heard.  Pulmonary:      Comments: Lungs clear to auscultation  Abdominal:      General: Bowel sounds are normal. There is no distension.      Palpations: Abdomen is soft. There is no mass.      Tenderness: There is no abdominal tenderness.   Musculoskeletal:         General: No swelling or tenderness.      Cervical back: Neck supple. No tenderness.      Comments: No vertebral body or rib cage tenderness.     Skin:     General: Skin is warm and dry.      Findings: No rash.   Neurological:      General: No focal deficit present.      Mental Status: She is alert and oriented to person, place, and time.      Motor: No weakness.   Psychiatric:         Behavior: Behavior is cooperative.       ECOG SCORE    1 - Restricted in strenuous activity-ambulatory and able to carry out work of a light nature        LABORATORY  Laboratory from 11/6/23 reviewed.  Mitotane level pending.      Assessment:   Metastatic adrenocortical carcinoma  Multiple osteoporotic vertebral body compression fractures  S/p prophylactic left femur IM nail 2/8/23  Cancer related pain - stable      Plan:   CT scans reviewed and discussed in the office in the presence of the patient and her .  No evidence of progressive metastatic disease or new sites of disease.  MRI of the T/L-spine and pelvis scheduled for 11/6/23.  Continue mitotane, hydrocortisone and Synthroid at the current doses.  She will keep her telemedicine follow-up with Dr. Lynch 11/21/23.  RTC in 4 weeks for a follow-up visit with repeat  laboratory.      MARY NORMAN MD    Other Physicians  Dr. Dion Lynch ([facility])

## 2023-11-06 ENCOUNTER — HOSPITAL ENCOUNTER (OUTPATIENT)
Dept: RADIOLOGY | Facility: HOSPITAL | Age: 63
Discharge: HOME | End: 2023-11-06
Attending: NURSE PRACTITIONER
Payer: MEDICARE

## 2023-11-06 DIAGNOSIS — C79.51 SECONDARY MALIGNANT NEOPLASM OF BONE: ICD-10-CM

## 2023-11-06 DIAGNOSIS — C74.00 MALIGNANT NEOPLASM OF ADRENAL CORTEX, UNSPECIFIED LATERALITY: ICD-10-CM

## 2023-11-06 LAB
CREAT SERPL-MCNC: 0.9 MG/DL (ref 0.5–1.4)
SAMPLE: NORMAL

## 2023-11-06 PROCEDURE — 25500020 PHARM REV CODE 255: Performed by: NURSE PRACTITIONER

## 2023-11-06 PROCEDURE — 71260 CT THORAX DX C+: CPT | Mod: TC

## 2023-11-06 PROCEDURE — 74177 CT ABD & PELVIS W/CONTRAST: CPT | Mod: TC

## 2023-11-06 RX ADMIN — IOPAMIDOL 100 ML: 755 INJECTION, SOLUTION INTRAVENOUS at 10:11

## 2023-11-07 ENCOUNTER — OFFICE VISIT (OUTPATIENT)
Dept: HEMATOLOGY/ONCOLOGY | Facility: CLINIC | Age: 63
End: 2023-11-07
Payer: COMMERCIAL

## 2023-11-07 VITALS
SYSTOLIC BLOOD PRESSURE: 113 MMHG | BODY MASS INDEX: 20.68 KG/M2 | WEIGHT: 121.13 LBS | RESPIRATION RATE: 15 BRPM | DIASTOLIC BLOOD PRESSURE: 75 MMHG | TEMPERATURE: 98 F | HEIGHT: 64 IN | OXYGEN SATURATION: 98 % | HEART RATE: 73 BPM

## 2023-11-07 DIAGNOSIS — G89.3 CANCER RELATED PAIN: ICD-10-CM

## 2023-11-07 DIAGNOSIS — C74.00 MALIGNANT NEOPLASM OF ADRENAL CORTEX, UNSPECIFIED LATERALITY: ICD-10-CM

## 2023-11-07 DIAGNOSIS — C79.51 SECONDARY MALIGNANT NEOPLASM OF BONE: ICD-10-CM

## 2023-11-07 DIAGNOSIS — C74.02 MALIGNANT NEOPLASM OF CORTEX OF LEFT ADRENAL GLAND: Primary | ICD-10-CM

## 2023-11-07 DIAGNOSIS — G89.3 CANCER ASSOCIATED PAIN: Primary | ICD-10-CM

## 2023-11-07 PROCEDURE — 1160F RVW MEDS BY RX/DR IN RCRD: CPT | Mod: CPTII,S$GLB,, | Performed by: INTERNAL MEDICINE

## 2023-11-07 PROCEDURE — 3078F PR MOST RECENT DIASTOLIC BLOOD PRESSURE < 80 MM HG: ICD-10-PCS | Mod: CPTII,S$GLB,, | Performed by: INTERNAL MEDICINE

## 2023-11-07 PROCEDURE — 3008F BODY MASS INDEX DOCD: CPT | Mod: CPTII,S$GLB,, | Performed by: INTERNAL MEDICINE

## 2023-11-07 PROCEDURE — 1159F PR MEDICATION LIST DOCUMENTED IN MEDICAL RECORD: ICD-10-PCS | Mod: CPTII,S$GLB,, | Performed by: INTERNAL MEDICINE

## 2023-11-07 PROCEDURE — 1160F PR REVIEW ALL MEDS BY PRESCRIBER/CLIN PHARMACIST DOCUMENTED: ICD-10-PCS | Mod: CPTII,S$GLB,, | Performed by: INTERNAL MEDICINE

## 2023-11-07 PROCEDURE — 3008F PR BODY MASS INDEX (BMI) DOCUMENTED: ICD-10-PCS | Mod: CPTII,S$GLB,, | Performed by: INTERNAL MEDICINE

## 2023-11-07 PROCEDURE — 1159F MED LIST DOCD IN RCRD: CPT | Mod: CPTII,S$GLB,, | Performed by: INTERNAL MEDICINE

## 2023-11-07 PROCEDURE — 99999 PR PBB SHADOW E&M-EST. PATIENT-LVL IV: CPT | Mod: PBBFAC,,, | Performed by: INTERNAL MEDICINE

## 2023-11-07 PROCEDURE — 4010F ACE/ARB THERAPY RXD/TAKEN: CPT | Mod: CPTII,S$GLB,, | Performed by: INTERNAL MEDICINE

## 2023-11-07 PROCEDURE — 3078F DIAST BP <80 MM HG: CPT | Mod: CPTII,S$GLB,, | Performed by: INTERNAL MEDICINE

## 2023-11-07 PROCEDURE — 99999 PR PBB SHADOW E&M-EST. PATIENT-LVL IV: ICD-10-PCS | Mod: PBBFAC,,, | Performed by: INTERNAL MEDICINE

## 2023-11-07 PROCEDURE — 3074F PR MOST RECENT SYSTOLIC BLOOD PRESSURE < 130 MM HG: ICD-10-PCS | Mod: CPTII,S$GLB,, | Performed by: INTERNAL MEDICINE

## 2023-11-07 PROCEDURE — 99213 OFFICE O/P EST LOW 20 MIN: CPT | Mod: S$GLB,,, | Performed by: INTERNAL MEDICINE

## 2023-11-07 PROCEDURE — 99213 PR OFFICE/OUTPT VISIT, EST, LEVL III, 20-29 MIN: ICD-10-PCS | Mod: S$GLB,,, | Performed by: INTERNAL MEDICINE

## 2023-11-07 PROCEDURE — 3074F SYST BP LT 130 MM HG: CPT | Mod: CPTII,S$GLB,, | Performed by: INTERNAL MEDICINE

## 2023-11-07 PROCEDURE — 4010F PR ACE/ARB THEARPY RXD/TAKEN: ICD-10-PCS | Mod: CPTII,S$GLB,, | Performed by: INTERNAL MEDICINE

## 2023-11-07 RX ORDER — LOSARTAN POTASSIUM 100 MG/1
100 TABLET ORAL DAILY
COMMUNITY
Start: 2023-09-25

## 2023-11-07 RX ORDER — HYDROCORTISONE 5 MG/1
TABLET ORAL 2 TIMES DAILY
COMMUNITY
Start: 2023-10-07

## 2023-11-07 RX ORDER — AZELASTINE 1 MG/ML
SPRAY, METERED NASAL
COMMUNITY
Start: 2023-10-07

## 2023-11-07 RX ORDER — HYDROCODONE BITARTRATE AND ACETAMINOPHEN 7.5; 325 MG/1; MG/1
1 TABLET ORAL EVERY 6 HOURS PRN
Qty: 60 TABLET | Refills: 0 | Status: SHIPPED | OUTPATIENT
Start: 2023-11-07 | End: 2023-12-05 | Stop reason: SDUPTHER

## 2023-11-07 RX ORDER — ALPRAZOLAM 0.25 MG/1
0.25 TABLET ORAL 3 TIMES DAILY PRN
Qty: 60 TABLET | Refills: 1 | Status: SHIPPED | OUTPATIENT
Start: 2023-11-07 | End: 2023-12-05 | Stop reason: SDUPTHER

## 2023-11-07 RX ORDER — AMLODIPINE BESYLATE 10 MG/1
10 TABLET ORAL DAILY
COMMUNITY
Start: 2023-10-23

## 2023-11-07 RX ORDER — FLUTICASONE PROPIONATE 50 MCG
SPRAY, SUSPENSION (ML) NASAL
COMMUNITY
Start: 2023-10-07

## 2023-11-10 ENCOUNTER — HOSPITAL ENCOUNTER (OUTPATIENT)
Dept: RADIOLOGY | Facility: HOSPITAL | Age: 63
Discharge: HOME | End: 2023-11-10
Attending: NURSE PRACTITIONER
Payer: COMMERCIAL

## 2023-11-10 DIAGNOSIS — C74.00 MALIGNANT NEOPLASM OF ADRENAL CORTEX, UNSPECIFIED LATERALITY: ICD-10-CM

## 2023-11-10 DIAGNOSIS — C79.51 SECONDARY MALIGNANT NEOPLASM OF BONE: ICD-10-CM

## 2023-11-10 PROCEDURE — 72158 MRI LUMBAR SPINE W/O & W/DYE: CPT | Mod: TC

## 2023-11-10 PROCEDURE — 72197 MRI PELVIS W/O & W/DYE: CPT | Mod: TC

## 2023-11-10 PROCEDURE — A9577 INJ MULTIHANCE: HCPCS | Performed by: NURSE PRACTITIONER

## 2023-11-10 PROCEDURE — 25500020 PHARM REV CODE 255: Performed by: NURSE PRACTITIONER

## 2023-11-10 RX ADMIN — GADOBENATE DIMEGLUMINE 11 ML: 529 INJECTION, SOLUTION INTRAVENOUS at 01:11

## 2023-11-14 ENCOUNTER — APPOINTMENT (OUTPATIENT)
Dept: RADIATION THERAPY | Facility: HOSPITAL | Age: 63
End: 2023-11-14
Attending: RADIOLOGY
Payer: COMMERCIAL

## 2023-11-14 PROCEDURE — 77334 RADIATION TREATMENT AID(S): CPT | Performed by: RADIOLOGY

## 2023-11-20 PROCEDURE — 77334 RADIATION TREATMENT AID(S): CPT | Performed by: RADIOLOGY

## 2023-11-27 PROCEDURE — 77300 RADIATION THERAPY DOSE PLAN: CPT | Performed by: RADIOLOGY

## 2023-11-27 PROCEDURE — 77301 RADIOTHERAPY DOSE PLAN IMRT: CPT | Performed by: RADIOLOGY

## 2023-11-27 PROCEDURE — 77338 DESIGN MLC DEVICE FOR IMRT: CPT | Performed by: RADIOLOGY

## 2023-11-29 PROCEDURE — 77373 STRTCTC BDY RAD THER TX DLVR: CPT | Performed by: RADIOLOGY

## 2023-12-01 ENCOUNTER — APPOINTMENT (OUTPATIENT)
Dept: RADIATION THERAPY | Facility: HOSPITAL | Age: 63
End: 2023-12-01
Attending: RADIOLOGY
Payer: MEDICARE

## 2023-12-01 PROCEDURE — 77373 STRTCTC BDY RAD THER TX DLVR: CPT | Performed by: RADIOLOGY

## 2023-12-04 ENCOUNTER — LAB VISIT (OUTPATIENT)
Dept: LAB | Facility: HOSPITAL | Age: 63
End: 2023-12-04
Attending: INTERNAL MEDICINE
Payer: MEDICARE

## 2023-12-04 DIAGNOSIS — C79.51 SECONDARY MALIGNANT NEOPLASM OF BONE: ICD-10-CM

## 2023-12-04 DIAGNOSIS — C74.00 MALIGNANT NEOPLASM OF ADRENAL CORTEX, UNSPECIFIED LATERALITY: ICD-10-CM

## 2023-12-04 PROCEDURE — 77373 STRTCTC BDY RAD THER TX DLVR: CPT | Performed by: RADIOLOGY

## 2023-12-04 PROCEDURE — 82530 CORTISOL FREE: CPT

## 2023-12-04 PROCEDURE — 77336 RADIATION PHYSICS CONSULT: CPT | Performed by: RADIOLOGY

## 2023-12-04 NOTE — PROGRESS NOTES
"Subjective:       Patient ID: Shreya Reyes is a 63 y.o. female.    Chief Complaint:  "I feel okay"    Diagnosis: Metastatic adrenocortical carcinoma                    Multiple osteoporotic vertebral compression fractures    Treatment History  Adjuvant XRT (completed 9/30/21)  Mitotane 1/25/22-7/26/22, Resumed 8/22  XRT right scapula, L5,  R ischium, L femoral neck completed 8/26/22  SBRT L2-3 2/06/23  Left IM nail 2/08/23  SBRT R iliac wing 5/15/23  SBRT L5, R ischium 12/4/23    Current Treatment:   Mitotane resumed 8/22 - current dose 1500 mg/d   Hydrocortisone 20 mg Q AM, 10 mg at noon, 10 mg Q PM  Levothyroxine 75 mcg/d              Clinical History:  [place] [facility] [place] with acute left flank pain. CT A/P showed a heterogeneous enhancing mass of the left adrenal gland measuring 5.5 x 4.8 x 5 cm (24 Hu), with no definite microscopic fat. There was mild displacement of the left renal vein. Right adrenal gland was unremarkable.  Hormonal workup was negative for pheochromocytoma. She was felt to have had an acute adrenal hemorrhage and close observation was recommended. Follow-up CT A/P 6/9/21 showed increased size of the adrenal mass to 8.0 x 4.5 cm appearing hypervascular with some central necrosis. She underwent a laparoscopic/robotic left adrenalectomy 6/11/21.  Final pathology showed an adrenal cortical carcinoma predominant oncocytic/trabecular morphology with focally marked cytologic atypia and increased mitotic rate (up to 10/50 HPF's). There were large areas of necrosis and multiple foci of capsular and lymphovascular invasion. Ki-67 expression was 10-15%.    She had a Telemedicine consultation with Dr. Dion Lynch at [facility] 8/3/21 who specializes in treatment of adrenocortical carcinoma. Her case was reviewed and discussed in their tumor board. Recommendations were for treatment with adjuvant radiation therapy and systemic treatment with Mitotane. Baseline " postoperative CT scans of the chest, abdomen and pelvis 8/4/21 showed postoperative changes with no evidence of measurable metastatic disease.    She was seen as a new patient at [facility] 8/9/21.  She had recovered well from her surgery and was asymptomatic.  Treatment recommendations from the [facility] were reviewed and discussed.  Treatment was started with Mitotane 1000 mg BID on 8/16/2021.  No dose escalation was recommended until she completed radiation therapy.  She was started on replacement hydrocortisone 20 mg Q AM and 10 mg Q PM.  Surveillance CT scans were recommended in 3 months.      She was seen for an office visit 9/16/21 after completing 4 weeks of treatment with Mitotane.  She was not having any significant side effects associated with her therapy.  She was group home through her adjuvant radiation therapy.  Laboratory testing showed marked transaminase elevations with an AST of 493,  and alkaline phosphatase 175.  Bilirubin was normal.  The remainder of her CMP was unremarkable.  Baseline mitotane level drawn at that visit was 2.5 mcg/mL.  Mitotane was held and adjuvant radiation therapy was continued.  Weekly laboratory monitoring showed gradual improvement in her LFTs.  Although mitotane had been associated with transaminase elevations, >5x elevations are rare occurring in <1% of patients.  She was also instructed to hold her lovastatin.  She completed adjuvant radiation therapy 9/30/2021.     Follow-up laboratory continued to show transaminase elevations.  GI was consulted and ordered additional laboratory testing which did not reveal evidence of viral or autoimmune etiologies for her hepatitis.  Mitotane was not resumed due to her persistent transaminase elevations. Follow-up CT scans of the chest, abdomen and pelvis 10/28/21 showed a few stable subcentimeter pulmonary nodules and changes related to her previous left adrenalectomy with no evidence of disease  recurrence.  Following normalization of her LFTs, treatment was resumed with Mitotane 1/25/22.  Her LFTs remained normal even during dose escalation.    She had an injury at home in mid March after carrying in several arms of firewood with acute mid back pain.  Plain x-ray 3/17/22 showed a compression deformity at L2 of questionable age.  MRI of the lumbar spine 3/23/22 showed a subacute severe compression fracture deformity of the L1 vertebral body and mild superior endplate compression fracture of L3 vertebral body with osteoporotic appearance and no findings suspicious for pathologic fracture.  However, there were scattered small osseous lesions in the right L3 vertebral body and L5 vertebral body concerning for metastatic disease.  CT PET scan 3/28/22 showed mildly hypermetabolic osseous lesions in the lumbar spine, pelvis and proximal left femur consistent with metastatic disease.  There was no significant hypermetabolic uptake at the sites of her compression fractures.  She did not have pain at any of the sites prior to the acute compression fracture.  Bone density exam showed osteoporosis of the lumbar spine with a T score of -3.4, left femoral neck -3.4 and left total hip -2.7.  She was started on Prolia 3/31/22 and underwent L1 and L3 kyphoplasty 4//8/22.  Biopsies of the L3 vertebral body showed no evidence of metastatic carcinoma.  She continued to have significant pain following the kyphoplasty.  Further review of her films showed a possible compression fracture at T11.  MRI of the thoracic spine 4/15/22 showed a compression fracture of T11 with 50% loss of vertebral body height.  She underwent kyphoplasty 4/21/22 with symptomatic improvement.    CT scans of the chest, abdomen and pelvis 4/27/22 showed sclerotic osseous lesions at L4, right ischium and left femur correlating with the hypermetabolic lesions on CT-PET scan.  There were stable sub-6 mm pulmonary nodules in the RML and LLL unchanged from  previous imaging.  MRI of the cervical and lumbar spine 7/5/22 showed moderate disc disease at C5-6 and C6-7 without significant spinal canal stenosis or foraminal stenosis.  She developed progressive symptoms of right arm pain, numbness and tingling and updated MRI 7/13/22 showed foraminal stenosis secondary to disc osteophyte on the right side at C5-6 and C6-7.  She underwent a right foraminotomy with relief of her symptoms.    CT C/A/P 08/01/22:  Multiple compression fractures and postsurgical changes.  Area of sclerosis in the left sacrum was stable compared to the prior exam.  There was no evidence of visceral metastatic disease.  She had a teleconference with [facility] 8/2/22 and a follow-up PET scan was recommended.  Mitotane was discontinued 7/26/22.   CT-PET 08/05/22:  Hypermetabolic osseous metastatic disease involving L5, right ischium, left femoral neck and a new lesion in the inferior right scapula.  There was a 12 mm area of hypermetabolic activity adjacent to the right adrenal gland without a definite CT correlate.    She completed palliative radiation therapy to the right scapula, L5, R ischium and left femoral neck on 8/26/22.  She resumed Mitotane at 1000 mg BID on 8/29/22.      CT-PET 11/21/22:  Improved FDG uptake in all of the treated sites.  Right scapula SUV decreased from 4.2 to 2.1, left femoral neck 9.1 to 5, right ischium 13 to 2.4 and L5 8.2 to 4.6.  Hypermetabolic activity at the site of the previous compression fractures had also improved.  There were no findings suspicious for new sites of disease or visceral metastatic disease.  MRI lumbar spine and left hip 1/29/23:  Metastatic disease involving right ischial tuberosity, left femoral neck and L3 vertebral body, similar in size to CT-PET scan 11/21/22:  L3 lesion showed interval progression with ventral and paraspinal extension causing moderate-to-severe narrowing of the spinal canal.  Left femoral neck lesion involved  greater than 50% of the total diameter of the femoral neck.  She was treated with stereotactic XRT to L3 and underwent prophylactic IM nail of the left femoral neck 2/8/23.  CT C/A/P 4/24/23:  13 mm sclerotic met inferior right scapula, bandlike densities RUL and RLL secondary to previous XRT.  Stable thoracic spine compression fractures.  Increased density L4 vertebral body metastasis.  No evidence of visceral metastatic disease.  MRI L-spine/pelvis 4/24/23:  Stable L3 lesion with mild epidural extension and enhancement measuring 8 mm.  Sclerotic 10 mm focus of abnormal marrow signal right iliac wing.    Guardant 360 2/20/23:  Positive TP53 mutation, microsatellite stable     Prolia was resumed for her metastatic bone disease 4/11/23.  She completed SBRT to the right iliac wing metastasis 5/15/23 receiving 2700 cGy in 3 fractions.  Treatment was well tolerated.      MRI L-spine and pelvis 7/26/23:  Resolution of previous epidural enhancing mass posterior to L3 vertebral body.  Right iliac wing lesion 12 mm (previous 10 mm).  No new metastatic lesions.  CT C/A/P 7/28/23:  Stable sclerotic bone lesions right scapula, right iliac wing and L4.  Stable kyphoplasty changes T10, T11, T12 and L2.  Stable L1 compression deformity.  No new sites of disease identified.  Colonoscopy 7/25/23:  No polyps or other abnormalities.  Follow-up exam recommended in 7 years.  CT C/A/P 11/06/23:  S/p kyphoplasty at T10, T11, T12 and L2.  Stable L1 compression deformity.  Increased L4 height loss with increased sclerosis of the vertebral body.  Stable sclerotic lesion right iliac wing 13 mm.  Inferior right scapular lesion more vaguely seen.  No findings suspicious for new sites of disease.    MRI pelvis 11/10/23 showed interval increase in the size of the metastatic lesion involving the right ischial tuberosity measuring 3.2 cm x 1.8 cm, previously 2.1 x 1.3 cm.    MRI lumbar spine 11/10/2023 showed progressive metastatic disease at L5  with a new pathologic compression deformity.    Interval History  Mrs. Reyes is here today for a 4 week on treatment follow-up visit accompanied by her .  She feels well.  She is ambulatory without assistance.  She completed palliative radiation therapy 12/04/2023, receiving 3 fractions to the progressive metastatic disease involving L5 and the right ischium.  Treatment was well tolerated with no appreciable side effects.  She is still having low back pain radiating into both extremities, with more of a neuropathic feature on the left.  The pain involving the right lower extremity is primarily when she is in the right lateral decubitus position.  She is using Norco primarily at night.  She had Telemed follow-up through [facility] 11/21/2023 with recommendations to continue Mitotane 1500 mg daily with hydrocortisone at 20/10/5.  Imaging would be repeated in 3 months (CT C AP and MRI spine) and MRI pelvis 3 months post radiation to the right iliac wing.  She remains on Prolia every 6 months with her next dose due 4/24.  Her standard laboratory panel is being performed every 4 weeks.  CBC in office today shows mild progression of her anemia for which she has no significant symptoms.      Review of Systems   Constitutional:  Negative for appetite change, fatigue, fever and unexpected weight change.   HENT:  Negative for mouth sores, sore throat and trouble swallowing.    Eyes: Negative.    Respiratory:  Negative for cough and shortness of breath.    Cardiovascular:  Negative for chest pain, palpitations and leg swelling.   Gastrointestinal:  Negative for abdominal distention, abdominal pain, constipation, diarrhea, nausea and vomiting.   Genitourinary:  Negative for dysuria, frequency and urgency.   Musculoskeletal:  Positive for arthralgias and back pain (stable).        Bilateral LE pain with neuropathic features   Integumentary:  Negative for pallor and rash.   Neurological:  Negative for  "dizziness, weakness, numbness and headaches.   Hematological:  Negative for adenopathy. Does not bruise/bleed easily.   Psychiatric/Behavioral:  Negative for confusion and dysphoric mood. The patient is nervous/anxious (intermittent).        PMHx:  Hyperlipidemia, osteoporosis  PSHx:  Tonsils, left cataract, ORIF left tibia/fib, left adrenalectomy, multiple vertebral kyphoplasties  SH:  Former smoker 1 PPD, quit 2009. + Social alcohol use. Lives in [place] with her . 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  FH:  Her mother had lymphoma.     Objective:        BP (!) 146/85   Pulse 74   Temp 98.3 °F (36.8 °C)   Resp 15   Ht 5' 4" (1.626 m)   Wt 56.1 kg (123 lb 9.6 oz)   SpO2 100%   BMI 21.22 kg/m²    Physical Exam  Constitutional:       Comments: Well-developed white female in NAD.   HENT:      Head: Normocephalic.      Mouth/Throat:      Mouth: Mucous membranes are moist.      Pharynx: Oropharynx is clear. No posterior oropharyngeal erythema.   Eyes:      Extraocular Movements: Extraocular movements intact.      Conjunctiva/sclera: Conjunctivae normal.      Pupils: Pupils are equal, round, and reactive to light.   Cardiovascular:      Rate and Rhythm: Normal rate and regular rhythm.      Heart sounds: No murmur heard.  Pulmonary:      Comments: Lungs clear to auscultation  Abdominal:      General: Bowel sounds are normal. There is no distension.      Palpations: Abdomen is soft. There is no mass.      Tenderness: There is no abdominal tenderness.   Musculoskeletal:         General: No swelling or tenderness.      Cervical back: Neck supple. No tenderness.      Comments: No vertebral body or rib cage tenderness.     Skin:     General: Skin is warm and dry.      Findings: No rash.   Neurological:      General: No focal deficit present.      Mental Status: She is alert and oriented to person, place, and time.      Motor: No weakness.   Psychiatric:         Behavior: Behavior is " cooperative.       ECOG SCORE    2 - Capable of all selfcare but unable to carry out any work activities, active > 50% of hours        LABORATORY     Latest Reference Range & Units 12/05/23 08:46   WBC 4.50 - 11.50 x10(3)/mcL 4.55   RBC 4.20 - 5.40 x10(6)/mcL 3.26 (L)   Hemoglobin 12.0 - 16.0 g/dL 10.0 (L)   Hematocrit 37.0 - 47.0 % 31.9 (L)   MCV 80.0 - 94.0 fL 97.9 (H)   MCH 27.0 - 31.0 pg 30.7   MCHC 33.0 - 36.0 g/dL 31.3 (L)   RDW 11.5 - 17.0 % 14.9   Platelet Count 130 - 400 x10(3)/mcL 321   MPV 7.4 - 10.4 fL 9.9   Neut % % 67.0   LYMPH % % 18.0   Mono % % 10.8   Eosinophil % % 3.5   Basophil % % 0.7   Immature Granulocytes % 0.0   Neut # 2.1 - 9.2 x10(3)/mcL 3.05   Lymph # 0.6 - 4.6 x10(3)/mcL 0.82   Mono # 0.1 - 1.3 x10(3)/mcL 0.49   Eos # 0 - 0.9 x10(3)/mcL 0.16   Baso # <=0.2 x10(3)/mcL 0.03   Immature Grans (Abs) 0 - 0.04 x10(3)/mcL 0.00   Sodium 136 - 145 mmol/L 142   Potassium 3.5 - 5.1 mmol/L 3.9   Chloride 98 - 107 mmol/L 107   CO2 23 - 31 mmol/L 27   BUN 9.8 - 20.1 mg/dL 21.1 (H)   Creatinine 0.55 - 1.02 mg/dL 0.66   eGFR mls/min/1.73/m2 >60   Glucose 82 - 115 mg/dL 82   Calcium 8.4 - 10.2 mg/dL 8.3 (L)   ALP 40 - 150 unit/L 79   PROTEIN TOTAL 5.8 - 7.6 gm/dL 5.6 (L)   Albumin 3.4 - 4.8 g/dL 2.8 (L)   Albumin/Globulin Ratio 1.1 - 2.0 ratio 1.0 (L)   BILIRUBIN TOTAL <=1.5 mg/dL 0.3   AST 5 - 34 unit/L 15   ALT 0 - 55 unit/L 10   Globulin, Total 2.4 - 3.5 gm/dL 2.8   Cholesterol Total <=200 mg/dL 163   HDL 35 - 60 mg/dL 55   Total Cholesterol/HDL Ratio 0 - 5  3   Triglycerides 37 - 140 mg/dL 166 (H)   LDL Cholesterol 50.00 - 140.00 mg/dL 75.00   Very Low Density Lipoprotein  33   Cortisol ug/dL 41.6   TSH 0.350 - 4.940 uIU/mL 0.782   Free T4 0.70 - 1.48 ng/dL 0.81   (L): Data is abnormally low  (H): Data is abnormally high  Assessment:   Metastatic adrenocortical carcinoma  Multiple osteoporotic vertebral body compression fractures  S/p prophylactic left femur IM nail 2/8/23  Cancer related pain -  stable      Plan:   Patient recently completed a course of stereotactic radiation to her progressive sites of disease involving the lumbar spine and right ischium.    She will continue mitotane at her current dose and schedule.    Continue replacement hydrocortisone.    She will be due for repeat CT scans and an MRI of the lumbar spine in February.    MRI of the pelvis due to be repeated in early March.    Continue treatment protocol laboratory monitoring every 4 weeks.    RTC 2 months for on treatment follow-up with CT C/A/P and MRI L spine.  Magnolia and Xanax refilled.  All questions answered to the satisfaction of the patient and her .  They are in agreement with the treatment plan as outlined above.    CRISTINA WILSON, FNP-C  [facility] [place]     Other Physicians  Dr. Dion Lynch ([facility])

## 2023-12-05 ENCOUNTER — OFFICE VISIT (OUTPATIENT)
Dept: HEMATOLOGY/ONCOLOGY | Facility: CLINIC | Age: 63
End: 2023-12-05
Payer: MEDICARE

## 2023-12-05 ENCOUNTER — LAB VISIT (OUTPATIENT)
Dept: LAB | Facility: HOSPITAL | Age: 63
End: 2023-12-05
Attending: INTERNAL MEDICINE
Payer: MEDICARE

## 2023-12-05 VITALS
WEIGHT: 123.63 LBS | HEIGHT: 64 IN | DIASTOLIC BLOOD PRESSURE: 85 MMHG | BODY MASS INDEX: 21.11 KG/M2 | SYSTOLIC BLOOD PRESSURE: 146 MMHG | RESPIRATION RATE: 15 BRPM | HEART RATE: 74 BPM | OXYGEN SATURATION: 100 % | TEMPERATURE: 98 F

## 2023-12-05 DIAGNOSIS — C79.51 SECONDARY MALIGNANT NEOPLASM OF BONE: ICD-10-CM

## 2023-12-05 DIAGNOSIS — F41.9 ANXIETY: Primary | ICD-10-CM

## 2023-12-05 DIAGNOSIS — C74.00 MALIGNANT NEOPLASM OF ADRENAL CORTEX, UNSPECIFIED LATERALITY: ICD-10-CM

## 2023-12-05 DIAGNOSIS — G89.3 CANCER ASSOCIATED PAIN: ICD-10-CM

## 2023-12-05 DIAGNOSIS — G89.3 CANCER RELATED PAIN: ICD-10-CM

## 2023-12-05 LAB
ALBUMIN SERPL-MCNC: 2.8 G/DL (ref 3.4–4.8)
ALBUMIN/GLOB SERPL: 1 RATIO (ref 1.1–2)
ALP SERPL-CCNC: 79 UNIT/L (ref 40–150)
ALT SERPL-CCNC: 10 UNIT/L (ref 0–55)
AST SERPL-CCNC: 15 UNIT/L (ref 5–34)
BASOPHILS # BLD AUTO: 0.03 X10(3)/MCL
BASOPHILS NFR BLD AUTO: 0.7 %
BILIRUB SERPL-MCNC: 0.3 MG/DL
BUN SERPL-MCNC: 21.1 MG/DL (ref 9.8–20.1)
CALCIUM SERPL-MCNC: 8.3 MG/DL (ref 8.4–10.2)
CHLORIDE SERPL-SCNC: 107 MMOL/L (ref 98–107)
CHOLEST SERPL-MCNC: 163 MG/DL
CHOLEST/HDLC SERPL: 3 {RATIO} (ref 0–5)
CO2 SERPL-SCNC: 27 MMOL/L (ref 23–31)
CORTIS SERPL-SCNC: 41.6 UG/DL
CREAT SERPL-MCNC: 0.66 MG/DL (ref 0.55–1.02)
EOSINOPHIL # BLD AUTO: 0.16 X10(3)/MCL (ref 0–0.9)
EOSINOPHIL NFR BLD AUTO: 3.5 %
ERYTHROCYTE [DISTWIDTH] IN BLOOD BY AUTOMATED COUNT: 14.9 % (ref 11.5–17)
GFR SERPLBLD CREATININE-BSD FMLA CKD-EPI: >60 MLS/MIN/1.73/M2
GLOBULIN SER-MCNC: 2.8 GM/DL (ref 2.4–3.5)
GLUCOSE SERPL-MCNC: 82 MG/DL (ref 82–115)
HCT VFR BLD AUTO: 31.9 % (ref 37–47)
HDLC SERPL-MCNC: 55 MG/DL (ref 35–60)
HGB BLD-MCNC: 10 G/DL (ref 12–16)
IMM GRANULOCYTES # BLD AUTO: 0 X10(3)/MCL (ref 0–0.04)
IMM GRANULOCYTES NFR BLD AUTO: 0 %
LDLC SERPL CALC-MCNC: 75 MG/DL (ref 50–140)
LYMPHOCYTES # BLD AUTO: 0.82 X10(3)/MCL (ref 0.6–4.6)
LYMPHOCYTES NFR BLD AUTO: 18 %
MCH RBC QN AUTO: 30.7 PG (ref 27–31)
MCHC RBC AUTO-ENTMCNC: 31.3 G/DL (ref 33–36)
MCV RBC AUTO: 97.9 FL (ref 80–94)
MONOCYTES # BLD AUTO: 0.49 X10(3)/MCL (ref 0.1–1.3)
MONOCYTES NFR BLD AUTO: 10.8 %
NEUTROPHILS # BLD AUTO: 3.05 X10(3)/MCL (ref 2.1–9.2)
NEUTROPHILS NFR BLD AUTO: 67 %
PLATELET # BLD AUTO: 321 X10(3)/MCL (ref 130–400)
PMV BLD AUTO: 9.9 FL (ref 7.4–10.4)
POTASSIUM SERPL-SCNC: 3.9 MMOL/L (ref 3.5–5.1)
PROT SERPL-MCNC: 5.6 GM/DL (ref 5.8–7.6)
RBC # BLD AUTO: 3.26 X10(6)/MCL (ref 4.2–5.4)
SODIUM SERPL-SCNC: 142 MMOL/L (ref 136–145)
T4 FREE SERPL-MCNC: 0.81 NG/DL (ref 0.7–1.48)
TRIGL SERPL-MCNC: 166 MG/DL (ref 37–140)
TSH SERPL-ACNC: 0.78 UIU/ML (ref 0.35–4.94)
VLDLC SERPL CALC-MCNC: 33 MG/DL
WBC # SPEC AUTO: 4.55 X10(3)/MCL (ref 4.5–11.5)

## 2023-12-05 PROCEDURE — 80061 LIPID PANEL: CPT

## 2023-12-05 PROCEDURE — 80375 DRUG/SUBSTANCE NOS 1-3: CPT

## 2023-12-05 PROCEDURE — 82627 DEHYDROEPIANDROSTERONE: CPT

## 2023-12-05 PROCEDURE — 84443 ASSAY THYROID STIM HORMONE: CPT

## 2023-12-05 PROCEDURE — 82024 ASSAY OF ACTH: CPT

## 2023-12-05 PROCEDURE — 99214 OFFICE O/P EST MOD 30 MIN: CPT | Mod: PBBFAC | Performed by: NURSE PRACTITIONER

## 2023-12-05 PROCEDURE — 84244 ASSAY OF RENIN: CPT

## 2023-12-05 PROCEDURE — 82533 TOTAL CORTISOL: CPT

## 2023-12-05 PROCEDURE — 84439 ASSAY OF FREE THYROXINE: CPT

## 2023-12-05 PROCEDURE — 99999 PR PBB SHADOW E&M-EST. PATIENT-LVL IV: CPT | Mod: PBBFAC,,, | Performed by: NURSE PRACTITIONER

## 2023-12-05 PROCEDURE — 85025 COMPLETE CBC W/AUTO DIFF WBC: CPT

## 2023-12-05 PROCEDURE — 36415 COLL VENOUS BLD VENIPUNCTURE: CPT

## 2023-12-05 PROCEDURE — 99214 PR OFFICE/OUTPT VISIT, EST, LEVL IV, 30-39 MIN: ICD-10-PCS | Mod: S$PBB,,, | Performed by: NURSE PRACTITIONER

## 2023-12-05 PROCEDURE — 99999 PR PBB SHADOW E&M-EST. PATIENT-LVL IV: ICD-10-PCS | Mod: PBBFAC,,, | Performed by: NURSE PRACTITIONER

## 2023-12-05 PROCEDURE — 80053 COMPREHEN METABOLIC PANEL: CPT

## 2023-12-05 PROCEDURE — 99214 OFFICE O/P EST MOD 30 MIN: CPT | Mod: S$PBB,,, | Performed by: NURSE PRACTITIONER

## 2023-12-05 PROCEDURE — 82088 ASSAY OF ALDOSTERONE: CPT

## 2023-12-05 RX ORDER — ALPRAZOLAM 0.25 MG/1
0.25 TABLET ORAL 3 TIMES DAILY PRN
Qty: 60 TABLET | Refills: 1 | Status: SHIPPED | OUTPATIENT
Start: 2023-12-05 | End: 2024-03-11 | Stop reason: SDUPTHER

## 2023-12-05 RX ORDER — HYDROCODONE BITARTRATE AND ACETAMINOPHEN 7.5; 325 MG/1; MG/1
1 TABLET ORAL EVERY 6 HOURS PRN
Qty: 60 TABLET | Refills: 0 | Status: SHIPPED | OUTPATIENT
Start: 2023-12-05 | End: 2024-03-11 | Stop reason: SDUPTHER

## 2023-12-06 LAB
ACTH PLAS-MCNC: 40 PG/ML
DHEA-S SERPL-MCNC: <5 MCG/DL (ref 9.7–159)

## 2023-12-07 LAB — ALDOST SERPL-MCNC: <4 NG/DL

## 2023-12-08 LAB
COLLECT DURATION TIME UR: 24 H
CORTIS 24H UR-MRATE: 105 MCG/24 H (ref 3.5–45)
RENIN PLAS-CCNC: 5.3 NG/ML/H
SPECIMEN VOL 24H UR: 1700 ML

## 2023-12-11 LAB — MITOTANE SERPL-MCNC: 12.5 UG/ML

## 2024-01-04 ENCOUNTER — LAB VISIT (OUTPATIENT)
Dept: LAB | Facility: HOSPITAL | Age: 64
End: 2024-01-04
Attending: INTERNAL MEDICINE
Payer: MEDICARE

## 2024-01-04 DIAGNOSIS — C74.00 MALIGNANT NEOPLASM OF ADRENAL CORTEX, UNSPECIFIED LATERALITY: ICD-10-CM

## 2024-01-04 DIAGNOSIS — C79.51 SECONDARY MALIGNANT NEOPLASM OF BONE: ICD-10-CM

## 2024-01-04 LAB
ALBUMIN SERPL-MCNC: 2.7 G/DL (ref 3.4–4.8)
ALBUMIN/GLOB SERPL: 0.9 RATIO (ref 1.1–2)
ALP SERPL-CCNC: 84 UNIT/L (ref 40–150)
ALT SERPL-CCNC: 10 UNIT/L (ref 0–55)
AST SERPL-CCNC: 15 UNIT/L (ref 5–34)
BASOPHILS # BLD AUTO: 0.03 X10(3)/MCL
BASOPHILS NFR BLD AUTO: 0.9 %
BILIRUB SERPL-MCNC: 0.1 MG/DL
BUN SERPL-MCNC: 20.4 MG/DL (ref 9.8–20.1)
CALCIUM SERPL-MCNC: 8.2 MG/DL (ref 8.4–10.2)
CHLORIDE SERPL-SCNC: 109 MMOL/L (ref 98–107)
CHOLEST SERPL-MCNC: 182 MG/DL
CHOLEST/HDLC SERPL: 3 {RATIO} (ref 0–5)
CO2 SERPL-SCNC: 26 MMOL/L (ref 23–31)
CORTIS SERPL-SCNC: 25.6 UG/DL
CREAT SERPL-MCNC: 0.62 MG/DL (ref 0.55–1.02)
EOSINOPHIL # BLD AUTO: 0.06 X10(3)/MCL (ref 0–0.9)
EOSINOPHIL NFR BLD AUTO: 1.9 %
ERYTHROCYTE [DISTWIDTH] IN BLOOD BY AUTOMATED COUNT: 16.1 % (ref 11.5–17)
GFR SERPLBLD CREATININE-BSD FMLA CKD-EPI: >60 MLS/MIN/1.73/M2
GLOBULIN SER-MCNC: 3.1 GM/DL (ref 2.4–3.5)
GLUCOSE SERPL-MCNC: 85 MG/DL (ref 82–115)
HCT VFR BLD AUTO: 32.3 % (ref 37–47)
HDLC SERPL-MCNC: 59 MG/DL (ref 35–60)
HGB BLD-MCNC: 10.4 G/DL (ref 12–16)
IMM GRANULOCYTES # BLD AUTO: 0.01 X10(3)/MCL (ref 0–0.04)
IMM GRANULOCYTES NFR BLD AUTO: 0.3 %
LDLC SERPL CALC-MCNC: 97 MG/DL (ref 50–140)
LYMPHOCYTES # BLD AUTO: 1.24 X10(3)/MCL (ref 0.6–4.6)
LYMPHOCYTES NFR BLD AUTO: 38.6 %
MCH RBC QN AUTO: 31.7 PG (ref 27–31)
MCHC RBC AUTO-ENTMCNC: 32.2 G/DL (ref 33–36)
MCV RBC AUTO: 98.5 FL (ref 80–94)
MONOCYTES # BLD AUTO: 0.51 X10(3)/MCL (ref 0.1–1.3)
MONOCYTES NFR BLD AUTO: 15.9 %
NEUTROPHILS # BLD AUTO: 1.36 X10(3)/MCL (ref 2.1–9.2)
NEUTROPHILS NFR BLD AUTO: 42.4 %
PLATELET # BLD AUTO: 381 X10(3)/MCL (ref 130–400)
PMV BLD AUTO: 10.3 FL (ref 7.4–10.4)
POTASSIUM SERPL-SCNC: 3.9 MMOL/L (ref 3.5–5.1)
PROT SERPL-MCNC: 5.8 GM/DL (ref 5.8–7.6)
RBC # BLD AUTO: 3.28 X10(6)/MCL (ref 4.2–5.4)
SODIUM SERPL-SCNC: 141 MMOL/L (ref 136–145)
T4 FREE SERPL-MCNC: 0.78 NG/DL (ref 0.7–1.48)
TRIGL SERPL-MCNC: 131 MG/DL (ref 37–140)
TSH SERPL-ACNC: 0.91 UIU/ML (ref 0.35–4.94)
VLDLC SERPL CALC-MCNC: 26 MG/DL
WBC # SPEC AUTO: 3.21 X10(3)/MCL (ref 4.5–11.5)

## 2024-01-04 PROCEDURE — 36415 COLL VENOUS BLD VENIPUNCTURE: CPT

## 2024-01-04 PROCEDURE — 82533 TOTAL CORTISOL: CPT

## 2024-01-04 PROCEDURE — 82024 ASSAY OF ACTH: CPT

## 2024-01-04 PROCEDURE — 80053 COMPREHEN METABOLIC PANEL: CPT

## 2024-01-04 PROCEDURE — 84443 ASSAY THYROID STIM HORMONE: CPT

## 2024-01-04 PROCEDURE — 82627 DEHYDROEPIANDROSTERONE: CPT

## 2024-01-04 PROCEDURE — 82088 ASSAY OF ALDOSTERONE: CPT

## 2024-01-04 PROCEDURE — 85025 COMPLETE CBC W/AUTO DIFF WBC: CPT

## 2024-01-04 PROCEDURE — 84244 ASSAY OF RENIN: CPT

## 2024-01-04 PROCEDURE — 84439 ASSAY OF FREE THYROXINE: CPT

## 2024-01-04 PROCEDURE — 80375 DRUG/SUBSTANCE NOS 1-3: CPT

## 2024-01-04 PROCEDURE — 80061 LIPID PANEL: CPT

## 2024-01-05 LAB
ACTH PLAS-MCNC: 15 PG/ML
DHEA-S SERPL-MCNC: <5 MCG/DL (ref 9.7–159)

## 2024-01-08 LAB
ALDOST SERPL-MCNC: <4 NG/DL
RENIN PLAS-CCNC: 1.2 NG/ML/H

## 2024-01-10 LAB
COLLECT DURATION TIME UR: 24 H
CORTIS 24H UR-MRATE: 32 MCG/24 H (ref 3.5–45)
MITOTANE SERPL-MCNC: 15.6 UG/ML
SPECIMEN VOL 24H UR: 900 ML

## 2024-01-30 ENCOUNTER — LAB VISIT (OUTPATIENT)
Dept: LAB | Facility: HOSPITAL | Age: 64
End: 2024-01-30
Attending: INTERNAL MEDICINE
Payer: MEDICARE

## 2024-01-30 DIAGNOSIS — C79.51 SECONDARY MALIGNANT NEOPLASM OF BONE: ICD-10-CM

## 2024-01-30 DIAGNOSIS — C74.00 MALIGNANT NEOPLASM OF ADRENAL CORTEX, UNSPECIFIED LATERALITY: ICD-10-CM

## 2024-01-30 LAB
ALBUMIN SERPL-MCNC: 2.7 G/DL (ref 3.4–4.8)
ALBUMIN/GLOB SERPL: 0.9 RATIO (ref 1.1–2)
ALP SERPL-CCNC: 73 UNIT/L (ref 40–150)
ALT SERPL-CCNC: 8 UNIT/L (ref 0–55)
AST SERPL-CCNC: 13 UNIT/L (ref 5–34)
BASOPHILS # BLD AUTO: 0.04 X10(3)/MCL
BASOPHILS NFR BLD AUTO: 1 %
BILIRUB SERPL-MCNC: 0.1 MG/DL
BUN SERPL-MCNC: 28.1 MG/DL (ref 9.8–20.1)
CALCIUM SERPL-MCNC: 8.7 MG/DL (ref 8.4–10.2)
CHLORIDE SERPL-SCNC: 111 MMOL/L (ref 98–107)
CHOLEST SERPL-MCNC: 181 MG/DL
CHOLEST/HDLC SERPL: 3 {RATIO} (ref 0–5)
CO2 SERPL-SCNC: 24 MMOL/L (ref 23–31)
CORTIS SERPL-SCNC: 41.4 UG/DL
CREAT SERPL-MCNC: 0.71 MG/DL (ref 0.55–1.02)
EOSINOPHIL # BLD AUTO: 0.16 X10(3)/MCL (ref 0–0.9)
EOSINOPHIL NFR BLD AUTO: 4.1 %
ERYTHROCYTE [DISTWIDTH] IN BLOOD BY AUTOMATED COUNT: 15 % (ref 11.5–17)
GFR SERPLBLD CREATININE-BSD FMLA CKD-EPI: >60 MLS/MIN/1.73/M2
GLOBULIN SER-MCNC: 3 GM/DL (ref 2.4–3.5)
GLUCOSE SERPL-MCNC: 83 MG/DL (ref 82–115)
HCT VFR BLD AUTO: 31.6 % (ref 37–47)
HDLC SERPL-MCNC: 67 MG/DL (ref 35–60)
HGB BLD-MCNC: 10.4 G/DL (ref 12–16)
IMM GRANULOCYTES # BLD AUTO: 0.01 X10(3)/MCL (ref 0–0.04)
IMM GRANULOCYTES NFR BLD AUTO: 0.3 %
LDLC SERPL CALC-MCNC: 90 MG/DL (ref 50–140)
LYMPHOCYTES # BLD AUTO: 1.26 X10(3)/MCL (ref 0.6–4.6)
LYMPHOCYTES NFR BLD AUTO: 32.2 %
MCH RBC QN AUTO: 32.5 PG (ref 27–31)
MCHC RBC AUTO-ENTMCNC: 32.9 G/DL (ref 33–36)
MCV RBC AUTO: 98.8 FL (ref 80–94)
MONOCYTES # BLD AUTO: 0.56 X10(3)/MCL (ref 0.1–1.3)
MONOCYTES NFR BLD AUTO: 14.3 %
NEUTROPHILS # BLD AUTO: 1.88 X10(3)/MCL (ref 2.1–9.2)
NEUTROPHILS NFR BLD AUTO: 48.1 %
PLATELET # BLD AUTO: 333 X10(3)/MCL (ref 130–400)
PMV BLD AUTO: 10.3 FL (ref 7.4–10.4)
POTASSIUM SERPL-SCNC: 3.9 MMOL/L (ref 3.5–5.1)
PROT SERPL-MCNC: 5.7 GM/DL (ref 5.8–7.6)
RBC # BLD AUTO: 3.2 X10(6)/MCL (ref 4.2–5.4)
SODIUM SERPL-SCNC: 142 MMOL/L (ref 136–145)
T4 FREE SERPL-MCNC: 0.73 NG/DL (ref 0.7–1.48)
TRIGL SERPL-MCNC: 118 MG/DL (ref 37–140)
TSH SERPL-ACNC: 0.52 UIU/ML (ref 0.35–4.94)
VLDLC SERPL CALC-MCNC: 24 MG/DL
WBC # SPEC AUTO: 3.91 X10(3)/MCL (ref 4.5–11.5)

## 2024-01-30 PROCEDURE — 85025 COMPLETE CBC W/AUTO DIFF WBC: CPT

## 2024-01-30 PROCEDURE — 84443 ASSAY THYROID STIM HORMONE: CPT

## 2024-01-30 PROCEDURE — 82024 ASSAY OF ACTH: CPT

## 2024-01-30 PROCEDURE — 80061 LIPID PANEL: CPT

## 2024-01-30 PROCEDURE — 80375 DRUG/SUBSTANCE NOS 1-3: CPT

## 2024-01-30 PROCEDURE — 36415 COLL VENOUS BLD VENIPUNCTURE: CPT

## 2024-01-30 PROCEDURE — 82627 DEHYDROEPIANDROSTERONE: CPT

## 2024-01-30 PROCEDURE — 82533 TOTAL CORTISOL: CPT

## 2024-01-30 PROCEDURE — 82088 ASSAY OF ALDOSTERONE: CPT

## 2024-01-30 PROCEDURE — 80053 COMPREHEN METABOLIC PANEL: CPT

## 2024-01-30 PROCEDURE — 84439 ASSAY OF FREE THYROXINE: CPT

## 2024-01-30 PROCEDURE — 84244 ASSAY OF RENIN: CPT

## 2024-01-31 LAB
ACTH PLAS-MCNC: 9.2 PG/ML
DHEA-S SERPL-MCNC: <5 MCG/DL (ref 9.7–159)

## 2024-02-01 LAB — ALDOST SERPL-MCNC: <4 NG/DL

## 2024-02-02 LAB — RENIN PLAS-CCNC: 2.4 NG/ML/H

## 2024-02-04 LAB — MITOTANE SERPL-MCNC: 16.1 UG/ML

## 2024-02-05 ENCOUNTER — LAB VISIT (OUTPATIENT)
Dept: LAB | Facility: HOSPITAL | Age: 64
End: 2024-02-05
Attending: INTERNAL MEDICINE
Payer: MEDICARE

## 2024-02-05 DIAGNOSIS — C74.00 MALIGNANT NEOPLASM OF ADRENAL CORTEX, UNSPECIFIED LATERALITY: ICD-10-CM

## 2024-02-05 DIAGNOSIS — C79.51 SECONDARY MALIGNANT NEOPLASM OF BONE: ICD-10-CM

## 2024-02-05 PROCEDURE — 82530 CORTISOL FREE: CPT

## 2024-02-09 LAB
COLLECT DURATION TIME UR: 24 H
CORTIS 24H UR-MRATE: 50 MCG/24 H (ref 3.5–45)
SPECIMEN VOL 24H UR: 1200 ML

## 2024-02-22 ENCOUNTER — HOSPITAL ENCOUNTER (OUTPATIENT)
Dept: RADIOLOGY | Facility: HOSPITAL | Age: 64
Discharge: HOME | End: 2024-02-22
Attending: NURSE PRACTITIONER
Payer: MEDICARE

## 2024-02-22 DIAGNOSIS — C79.51 SECONDARY MALIGNANT NEOPLASM OF BONE: ICD-10-CM

## 2024-02-22 DIAGNOSIS — C74.00 MALIGNANT NEOPLASM OF ADRENAL CORTEX, UNSPECIFIED LATERALITY: ICD-10-CM

## 2024-02-22 PROCEDURE — 72158 MRI LUMBAR SPINE W/O & W/DYE: CPT | Mod: TC

## 2024-02-22 PROCEDURE — A9577 INJ MULTIHANCE: HCPCS | Performed by: NURSE PRACTITIONER

## 2024-02-22 PROCEDURE — 25500020 PHARM REV CODE 255: Performed by: NURSE PRACTITIONER

## 2024-02-22 RX ADMIN — GADOBENATE DIMEGLUMINE 10 ML: 529 INJECTION, SOLUTION INTRAVENOUS at 10:02

## 2024-02-27 ENCOUNTER — HOSPITAL ENCOUNTER (OUTPATIENT)
Dept: RADIOLOGY | Facility: HOSPITAL | Age: 64
Discharge: HOME | End: 2024-02-27
Attending: NURSE PRACTITIONER
Payer: MEDICARE

## 2024-02-27 DIAGNOSIS — C79.51 SECONDARY MALIGNANT NEOPLASM OF BONE: ICD-10-CM

## 2024-02-27 DIAGNOSIS — C74.00 MALIGNANT NEOPLASM OF ADRENAL CORTEX, UNSPECIFIED LATERALITY: ICD-10-CM

## 2024-02-27 PROCEDURE — 25500020 PHARM REV CODE 255: Performed by: NURSE PRACTITIONER

## 2024-02-27 PROCEDURE — 74177 CT ABD & PELVIS W/CONTRAST: CPT | Mod: TC

## 2024-02-27 RX ADMIN — DIATRIZOATE MEGLUMINE AND DIATRIZOATE SODIUM 30 ML: 600; 100 SOLUTION ORAL; RECTAL at 09:02

## 2024-02-27 RX ADMIN — IOHEXOL 100 ML: 350 INJECTION, SOLUTION INTRAVENOUS at 10:02

## 2024-03-05 ENCOUNTER — LAB VISIT (OUTPATIENT)
Dept: LAB | Facility: HOSPITAL | Age: 64
End: 2024-03-05
Attending: INTERNAL MEDICINE
Payer: MEDICARE

## 2024-03-05 DIAGNOSIS — C79.51 SECONDARY MALIGNANT NEOPLASM OF BONE: ICD-10-CM

## 2024-03-05 DIAGNOSIS — C74.00 MALIGNANT NEOPLASM OF ADRENAL CORTEX, UNSPECIFIED LATERALITY: ICD-10-CM

## 2024-03-05 LAB
ALBUMIN SERPL-MCNC: 2.9 G/DL (ref 3.4–4.8)
ALBUMIN/GLOB SERPL: 0.9 RATIO (ref 1.1–2)
ALP SERPL-CCNC: 82 UNIT/L (ref 40–150)
ALT SERPL-CCNC: 11 UNIT/L (ref 0–55)
AST SERPL-CCNC: 19 UNIT/L (ref 5–34)
BASOPHILS # BLD AUTO: 0.04 X10(3)/MCL
BASOPHILS NFR BLD AUTO: 0.8 %
BILIRUB SERPL-MCNC: 0.1 MG/DL
BUN SERPL-MCNC: 26.7 MG/DL (ref 9.8–20.1)
CALCIUM SERPL-MCNC: 8.7 MG/DL (ref 8.4–10.2)
CHLORIDE SERPL-SCNC: 107 MMOL/L (ref 98–107)
CHOLEST SERPL-MCNC: 175 MG/DL
CHOLEST/HDLC SERPL: 2 {RATIO} (ref 0–5)
CO2 SERPL-SCNC: 24 MMOL/L (ref 23–31)
CORTIS SERPL-SCNC: 53 UG/DL
CREAT SERPL-MCNC: 0.65 MG/DL (ref 0.55–1.02)
EOSINOPHIL # BLD AUTO: 0.16 X10(3)/MCL (ref 0–0.9)
EOSINOPHIL NFR BLD AUTO: 3.2 %
ERYTHROCYTE [DISTWIDTH] IN BLOOD BY AUTOMATED COUNT: 13.4 % (ref 11.5–17)
GFR SERPLBLD CREATININE-BSD FMLA CKD-EPI: >60 MLS/MIN/1.73/M2
GLOBULIN SER-MCNC: 3.2 GM/DL (ref 2.4–3.5)
GLUCOSE SERPL-MCNC: 88 MG/DL (ref 82–115)
HCT VFR BLD AUTO: 32.4 % (ref 37–47)
HDLC SERPL-MCNC: 75 MG/DL (ref 35–60)
HGB BLD-MCNC: 10.7 G/DL (ref 12–16)
IMM GRANULOCYTES # BLD AUTO: 0.01 X10(3)/MCL (ref 0–0.04)
IMM GRANULOCYTES NFR BLD AUTO: 0.2 %
LDLC SERPL CALC-MCNC: 81 MG/DL (ref 50–140)
LYMPHOCYTES # BLD AUTO: 1.32 X10(3)/MCL (ref 0.6–4.6)
LYMPHOCYTES NFR BLD AUTO: 26.2 %
MCH RBC QN AUTO: 31.8 PG (ref 27–31)
MCHC RBC AUTO-ENTMCNC: 33 G/DL (ref 33–36)
MCV RBC AUTO: 96.1 FL (ref 80–94)
MONOCYTES # BLD AUTO: 0.68 X10(3)/MCL (ref 0.1–1.3)
MONOCYTES NFR BLD AUTO: 13.5 %
NEUTROPHILS # BLD AUTO: 2.83 X10(3)/MCL (ref 2.1–9.2)
NEUTROPHILS NFR BLD AUTO: 56.1 %
PLATELET # BLD AUTO: 356 X10(3)/MCL (ref 130–400)
PMV BLD AUTO: 10.1 FL (ref 7.4–10.4)
POTASSIUM SERPL-SCNC: 4.2 MMOL/L (ref 3.5–5.1)
PROT SERPL-MCNC: 6.1 GM/DL (ref 5.8–7.6)
RBC # BLD AUTO: 3.37 X10(6)/MCL (ref 4.2–5.4)
SODIUM SERPL-SCNC: 140 MMOL/L (ref 136–145)
T4 FREE SERPL-MCNC: 0.77 NG/DL (ref 0.7–1.48)
TRIGL SERPL-MCNC: 95 MG/DL (ref 37–140)
TSH SERPL-ACNC: 1.75 UIU/ML (ref 0.35–4.94)
VLDLC SERPL CALC-MCNC: 19 MG/DL
WBC # SPEC AUTO: 5.04 X10(3)/MCL (ref 4.5–11.5)

## 2024-03-05 PROCEDURE — 36415 COLL VENOUS BLD VENIPUNCTURE: CPT

## 2024-03-05 PROCEDURE — 80061 LIPID PANEL: CPT

## 2024-03-05 PROCEDURE — 82533 TOTAL CORTISOL: CPT

## 2024-03-05 PROCEDURE — 84443 ASSAY THYROID STIM HORMONE: CPT

## 2024-03-05 PROCEDURE — 82024 ASSAY OF ACTH: CPT

## 2024-03-05 PROCEDURE — 85025 COMPLETE CBC W/AUTO DIFF WBC: CPT

## 2024-03-05 PROCEDURE — 82627 DEHYDROEPIANDROSTERONE: CPT

## 2024-03-05 PROCEDURE — 80375 DRUG/SUBSTANCE NOS 1-3: CPT

## 2024-03-05 PROCEDURE — 82530 CORTISOL FREE: CPT

## 2024-03-05 PROCEDURE — 82088 ASSAY OF ALDOSTERONE: CPT

## 2024-03-05 PROCEDURE — 80053 COMPREHEN METABOLIC PANEL: CPT

## 2024-03-05 PROCEDURE — 84244 ASSAY OF RENIN: CPT

## 2024-03-05 PROCEDURE — 84439 ASSAY OF FREE THYROXINE: CPT

## 2024-03-06 LAB
ACTH PLAS-MCNC: 12 PG/ML
DHEA-S SERPL-MCNC: <5 MCG/DL (ref 9.7–159)

## 2024-03-06 NOTE — PROGRESS NOTES
Subjective:       Patient ID: Shreya Reyes is a 64 y.o. female.    Chief Complaint:  I am doing okay, intermittent pain right hip and upper leg    Diagnosis: Metastatic adrenocortical carcinoma                    Multiple osteoporotic vertebral compression fractures    Treatment History  Adjuvant XRT (completed 9/30/21)  Mitotane 1/25/22-7/26/22, Resumed 8/22  XRT right scapula, L5,  R ischium, L femoral neck completed 8/26/22  SBRT L2-3 2/06/23  Left IM nail 2/08/23  SBRT R iliac wing 5/15/23  SBRT L5, R ischium 12/4/23    Current Treatment:   Mitotane resumed 8/22 - current dose 1500 mg/d   Hydrocortisone 20 mg Q AM, 10 mg at noon, 5 mg Q PM  Levothyroxine 75 mcg/d              Clinical History:  Patient initially presented to the [facility] ER 3/16/21 with acute left flank pain. CT A/P showed a heterogeneous enhancing mass of the left adrenal gland measuring 5.5 x 4.8 x 5 cm (24 Hu), with no definite microscopic fat. There was mild displacement of the left renal vein. Right adrenal gland was unremarkable.  Hormonal workup was negative for pheochromocytoma. She was felt to have had an acute adrenal hemorrhage and close observation was recommended. Follow-up CT A/P 6/9/21 showed increased size of the adrenal mass to 8.0 x 4.5 cm appearing hypervascular with some central necrosis. She underwent a laparoscopic/robotic left adrenalectomy 6/11/21.  Final pathology showed an adrenal cortical carcinoma predominant oncocytic/trabecular morphology with focally marked cytologic atypia and increased mitotic rate (up to 10/50 HPF's). There were large areas of necrosis and multiple foci of capsular and lymphovascular invasion. Ki-67 expression was 10-15%.    She had a Telemedicine consultation with Dr. Dion Lynch at [facility] 8/3/21 who specializes in treatment of adrenocortical carcinoma. Her case was reviewed and discussed in their tumor board. Recommendations were for treatment with adjuvant radiation therapy and  systemic treatment with Mitotane. Baseline postoperative CT scans of the chest, abdomen and pelvis 8/4/21 showed postoperative changes with no evidence of measurable metastatic disease.    She was seen as a new patient [facility] 8[facility]9/21.  She had recovered well from her surgery and was asymptomatic.  Treatment recommendations from the [facility] were reviewed and discussed.  Treatment was started with Mitotane 1000 mg BID on 8/16/2021.  No dose escalation was recommended until she completed radiation therapy.  She was started on replacement hydrocortisone 20 mg Q AM and 10 mg Q PM.  Surveillance CT scans were recommended in 3 months.      She was seen for an office visit 9/16/21 after completing 4 weeks of treatment with Mitotane.  She was not having any significant side effects associated with her therapy.  She was custodial through her adjuvant radiation therapy.  Laboratory testing showed marked transaminase elevations with an AST of 493,  and alkaline phosphatase 175.  Bilirubin was normal.  The remainder of her CMP was unremarkable.  Baseline mitotane level drawn at that visit was 2.5 mcg/mL.  Mitotane was held and adjuvant radiation therapy was continued.  Weekly laboratory monitoring showed gradual improvement in her LFTs.  Although mitotane had been associated with transaminase elevations, >5x elevations are rare occurring in <1% of patients.  She was also instructed to hold her lovastatin.  She completed adjuvant radiation therapy 9/30/2021.     Follow-up laboratory continued to show transaminase elevations.  GI was consulted and ordered additional laboratory testing which did not reveal evidence of viral or autoimmune etiologies for her hepatitis.  Mitotane was not resumed due to her persistent transaminase elevations. Follow-up CT scans of the chest, abdomen and pelvis 10/28/21 showed a few stable subcentimeter pulmonary nodules and changes related to her previous left  adrenalectomy with no evidence of disease recurrence.  Following normalization of her LFTs, treatment was resumed with Mitotane 1/25/22.  Her LFTs remained normal even during dose escalation.    She had an injury at home in mid March after carrying in several arms of firewood with acute mid back pain.  Plain x-ray 3/17/22 showed a compression deformity at L2 of questionable age.  MRI of the lumbar spine 3/23/22 showed a subacute severe compression fracture deformity of the L1 vertebral body and mild superior endplate compression fracture of L3 vertebral body with osteoporotic appearance and no findings suspicious for pathologic fracture.  However, there were scattered small osseous lesions in the right L3 vertebral body and L5 vertebral body concerning for metastatic disease.  CT PET scan 3/28/22 showed mildly hypermetabolic osseous lesions in the lumbar spine, pelvis and proximal left femur consistent with metastatic disease.  There was no significant hypermetabolic uptake at the sites of her compression fractures.  She did not have pain at any of the sites prior to the acute compression fracture.  Bone density exam showed osteoporosis of the lumbar spine with a T score of -3.4, left femoral neck -3.4 and left total hip -2.7.  She was started on Prolia 3/31/22 and underwent L1 and L3 kyphoplasty 4//8/22.  Biopsies of the L3 vertebral body showed no evidence of metastatic carcinoma.  She continued to have significant pain following the kyphoplasty.  Further review of her films showed a possible compression fracture at T11.  MRI of the thoracic spine 4/15/22 showed a compression fracture of T11 with 50% loss of vertebral body height.  She underwent kyphoplasty 4/21/22 with symptomatic improvement.    CT scans of the chest, abdomen and pelvis 4/27/22 showed sclerotic osseous lesions at L4, right ischium and left femur correlating with the hypermetabolic lesions on CT-PET scan.  There were stable sub-6 mm pulmonary  nodules in the RML and LLL unchanged from previous imaging.  MRI of the cervical and lumbar spine 7/5/22 showed moderate disc disease at C5-6 and C6-7 without significant spinal canal stenosis or foraminal stenosis.  She developed progressive symptoms of right arm pain, numbness and tingling and updated MRI 7/13/22 showed foraminal stenosis secondary to disc osteophyte on the right side at C5-6 and C6-7.  She underwent a right foraminotomy with relief of her symptoms.    CT C/A/P 08/01/22:  Multiple compression fractures and postsurgical changes.  Area of sclerosis in the left sacrum was stable compared to the prior exam.  There was no evidence of visceral metastatic disease.  She had a teleconference with [facility] 8/2/22 and a follow-up PET scan was recommended.  Mitotane was discontinued 7/26/22.   CT-PET 08/05/22:  Hypermetabolic osseous metastatic disease involving L5, right ischium, left femoral neck and a new lesion in the inferior right scapula.  There was a 12 mm area of hypermetabolic activity adjacent to the right adrenal gland without a definite CT correlate.    She completed palliative radiation therapy to the right scapula, L5, R ischium and left femoral neck on 8/26/22.  She resumed Mitotane at 1000 mg BID on 8/29/22.      CT-PET 11/21/22:  Improved FDG uptake in all of the treated sites.  Right scapula SUV decreased from 4.2 to 2.1, left femoral neck 9.1 to 5, right ischium 13 to 2.4 and L5 8.2 to 4.6.  Hypermetabolic activity at the site of the previous compression fractures had also improved.  There were no findings suspicious for new sites of disease or visceral metastatic disease.  MRI lumbar spine and left hip 1/29/23:  Metastatic disease involving right ischial tuberosity, left femoral neck and L3 vertebral body, similar in size to CT-PET scan 11/21/22:  L3 lesion showed interval progression with ventral and paraspinal extension causing moderate-to-severe narrowing of the spinal  canal.  Left femoral neck lesion involved greater than 50% of the total diameter of the femoral neck.  She was treated with stereotactic XRT to L3 and underwent prophylactic IM nail of the left femoral neck 2/8/23.  CT C/A/P 4/24/23:  13 mm sclerotic met inferior right scapula, bandlike densities RUL and RLL secondary to previous XRT.  Stable thoracic spine compression fractures.  Increased density L4 vertebral body metastasis.  No evidence of visceral metastatic disease.  MRI L-spine/pelvis 4/24/23:  Stable L3 lesion with mild epidural extension and enhancement measuring 8 mm.  Sclerotic 10 mm focus of abnormal marrow signal right iliac wing.    Guardant 360 2/20/23:  Positive TP53 mutation, microsatellite stable     Prolia was resumed for her metastatic bone disease 4/11/23.  She completed SBRT to the right iliac wing metastasis 5/15/23 receiving 2700 cGy in 3 fractions.  Treatment was well tolerated.      MRI L-spine and pelvis 7/26/23:  Resolution of previous epidural enhancing mass posterior to L3 vertebral body.  Right iliac wing lesion 12 mm (previous 10 mm).  No new metastatic lesions.  CT C/A/P 7/28/23:  Stable sclerotic bone lesions right scapula, right iliac wing and L4.  Stable kyphoplasty changes T10, T11, T12 and L2.  Stable L1 compression deformity.  No new sites of disease identified.  Colonoscopy 7/25/23:  No polyps or other abnormalities.  Follow-up exam recommended in 7 years.  CT C/A/P 11/06/23:  S/p kyphoplasty at T10, T11, T12 and L2.  Stable L1 compression deformity.  Increased L4 height loss with increased sclerosis of the vertebral body.  Stable sclerotic lesion right iliac wing 13 mm.  Inferior right scapular lesion more vaguely seen.  No findings suspicious for new sites of disease.  MRI pelvis 11/10/23:  Interval increase in the size of the metastatic lesion involving the right ischial tuberosity measuring 3.2 cm x 1.8 cm, previously 2.1 x 1.3 cm.  MRI L-spine 11/10/23:  Progressive  metastatic disease at L5 with a new pathologic compression deformity.  MRI L-spine 2/22/24:  New L4 superior endplate compression fracture with minimal loss of height, otherwise unchanged from 11/10/23.  CT C/A/P 2/27/24:  L4 superior endplate compression fracture with minimal loss of height.  Otherwise, no evidence of disease progression or new sites of disease.      Interval History  She returns to the office today by herself for a three-month follow-up visit.  She remains on mitotane 1500 mg daily and replacement hydrocortisone and levothyroxine.  Her energy level has been fairly good.  She has chronic back pain and intermittent right hip pain which is stable.  She is ambulatory without assistance.  She denies any falls.  Follow-up imaging at the end of February showed a new L4 superior endplate compression fracture with minimal loss of height.  Films were reviewed with Radiology and findings were not felt to be pathologic.  Otherwise, imaging showed no significant disease progression or new sites of involvement.  She is unable to have telemedicine visits with her treatment team in [place] since the guidelines have changed following the COVID-19 pandemic.  She has an in-person appointment with Dr. Lynch at [facility] in May.      Review of Systems   Constitutional:  Negative for appetite change, fatigue, fever and unexpected weight change.   HENT:  Negative for mouth sores, sore throat and trouble swallowing.    Eyes: Negative.    Respiratory:  Negative for cough and shortness of breath.    Cardiovascular:  Negative for chest pain, palpitations and leg swelling.   Gastrointestinal:  Negative for abdominal distention, abdominal pain, constipation, diarrhea, nausea and vomiting.   Genitourinary:  Negative for dysuria, frequency and urgency.   Musculoskeletal:  Positive for arthralgias and back pain (stable).        Bilateral LE pain with neuropathic features   Integumentary:  Negative for pallor and  "rash.   Neurological:  Negative for dizziness, weakness, numbness and headaches.   Hematological:  Negative for adenopathy. Does not bruise/bleed easily.   Psychiatric/Behavioral:  Negative for confusion and dysphoric mood. The patient is nervous/anxious (intermittent).        PMHx:  Hyperlipidemia, osteoporosis  PSHx:  Tonsils, left cataract, ORIF left tibia/fib, left adrenalectomy, multiple vertebral kyphoplasties  SH:  Former smoker 1 PPD, quit 2009. + Social alcohol use. Lives in [place] with her . 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  FH:  Her mother had lymphoma.     Objective:        BP (!) 172/85   Pulse 82   Temp 98.4 °F (36.9 °C)   Resp 16   Ht 5' 4" (1.626 m)   Wt 57.3 kg (126 lb 4.8 oz)   SpO2 98%   BMI 21.68 kg/m²    Physical Exam  Constitutional:       Comments: Well-developed white female in NAD.   HENT:      Head: Normocephalic.      Mouth/Throat:      Mouth: Mucous membranes are moist.      Pharynx: Oropharynx is clear. No posterior oropharyngeal erythema.   Eyes:      Extraocular Movements: Extraocular movements intact.      Conjunctiva/sclera: Conjunctivae normal.      Pupils: Pupils are equal, round, and reactive to light.   Cardiovascular:      Rate and Rhythm: Normal rate and regular rhythm.      Heart sounds: No murmur heard.  Pulmonary:      Comments: Lungs clear to auscultation  Abdominal:      General: Bowel sounds are normal. There is no distension.      Palpations: Abdomen is soft. There is no mass.      Tenderness: There is no abdominal tenderness.   Musculoskeletal:         General: No swelling or tenderness.      Cervical back: Neck supple. No tenderness.      Comments: No vertebral body or rib cage tenderness.     Skin:     General: Skin is warm and dry.      Findings: No rash.   Neurological:      General: No focal deficit present.      Mental Status: She is alert and oriented to person, place, and time.      Motor: No weakness.   Psychiatric:    "      Behavior: Behavior is cooperative.       ECOG SCORE    1 - Restricted in strenuous activity-ambulatory and able to carry out work of a light nature        LABORATORY      Assessment:   Metastatic adrenocortical carcinoma  Multiple osteoporotic vertebral body compression fractures  S/p prophylactic left femur IM nail 2/8/23  Cancer related pain - stable      Plan:   CT scans and MRI lumbar spine 2/24 show a new minor compression fracture at the superior endplate of L4 felt due to her osteoporosis, previous kyphoplasties and radiation therapy.  Findings were not suspicious for disease progression.    She will remain on treatment with mitotane, current dose 1500 mg daily.  Continue laboratory monitoring every 4 weeks.  Follow-up imaging to be scheduled in 3 months unless recommendations change from her visit to [facility] in May.  RTC in 3 months for a follow-up visit and clinical exam.      MARY NORMAN MD    Other Physicians  Dr. Dion Lynch ([facility])

## 2024-03-07 LAB — ALDOST SERPL-MCNC: <4 NG/DL

## 2024-03-08 LAB
COLLECT DURATION TIME UR: 24 H
CORTIS 24H UR-MRATE: 37 MCG/24 H (ref 3.5–45)
RENIN PLAS-CCNC: 5 NG/ML/H
SPECIMEN VOL 24H UR: 600 ML

## 2024-03-09 LAB — MITOTANE SERPL-MCNC: 10.9 UG/ML

## 2024-03-11 ENCOUNTER — OFFICE VISIT (OUTPATIENT)
Dept: HEMATOLOGY/ONCOLOGY | Facility: CLINIC | Age: 64
End: 2024-03-11
Payer: MEDICARE

## 2024-03-11 VITALS
BODY MASS INDEX: 21.56 KG/M2 | HEART RATE: 82 BPM | OXYGEN SATURATION: 98 % | DIASTOLIC BLOOD PRESSURE: 85 MMHG | SYSTOLIC BLOOD PRESSURE: 172 MMHG | RESPIRATION RATE: 16 BRPM | TEMPERATURE: 98 F | WEIGHT: 126.31 LBS | HEIGHT: 64 IN

## 2024-03-11 DIAGNOSIS — C74.00 MALIGNANT NEOPLASM OF ADRENAL CORTEX, UNSPECIFIED LATERALITY: Primary | ICD-10-CM

## 2024-03-11 DIAGNOSIS — F41.9 ANXIETY: ICD-10-CM

## 2024-03-11 DIAGNOSIS — G89.3 CANCER ASSOCIATED PAIN: ICD-10-CM

## 2024-03-11 DIAGNOSIS — C79.51 SECONDARY MALIGNANT NEOPLASM OF BONE: ICD-10-CM

## 2024-03-11 PROCEDURE — 99214 OFFICE O/P EST MOD 30 MIN: CPT | Mod: S$PBB,,, | Performed by: INTERNAL MEDICINE

## 2024-03-11 PROCEDURE — 99214 OFFICE O/P EST MOD 30 MIN: CPT | Mod: PBBFAC | Performed by: INTERNAL MEDICINE

## 2024-03-11 PROCEDURE — 99999 PR PBB SHADOW E&M-EST. PATIENT-LVL IV: CPT | Mod: PBBFAC,,, | Performed by: INTERNAL MEDICINE

## 2024-03-11 RX ORDER — HYDROCODONE BITARTRATE AND ACETAMINOPHEN 7.5; 325 MG/1; MG/1
1 TABLET ORAL EVERY 6 HOURS PRN
Qty: 60 TABLET | Refills: 0 | Status: SHIPPED | OUTPATIENT
Start: 2024-03-11

## 2024-03-11 RX ORDER — ALPRAZOLAM 0.25 MG/1
0.25 TABLET ORAL 3 TIMES DAILY PRN
Qty: 60 TABLET | Refills: 1 | Status: SHIPPED | OUTPATIENT
Start: 2024-03-11 | End: 2025-03-11

## 2024-03-26 ENCOUNTER — PATIENT MESSAGE (OUTPATIENT)
Dept: HEMATOLOGY/ONCOLOGY | Facility: CLINIC | Age: 64
End: 2024-03-26
Payer: MEDICARE

## 2024-04-09 ENCOUNTER — INFUSION (OUTPATIENT)
Dept: INFUSION THERAPY | Facility: HOSPITAL | Age: 64
End: 2024-04-09
Attending: INTERNAL MEDICINE
Payer: MEDICARE

## 2024-04-09 DIAGNOSIS — M81.0 OSTEOPOROSIS, UNSPECIFIED OSTEOPOROSIS TYPE, UNSPECIFIED PATHOLOGICAL FRACTURE PRESENCE: Primary | ICD-10-CM

## 2024-04-09 PROCEDURE — 96372 THER/PROPH/DIAG INJ SC/IM: CPT

## 2024-04-09 PROCEDURE — 63600175 PHARM REV CODE 636 W HCPCS: Mod: JZ,JG | Performed by: NURSE PRACTITIONER

## 2024-04-09 RX ADMIN — DENOSUMAB 60 MG: 60 INJECTION SUBCUTANEOUS at 08:04

## 2024-05-10 ENCOUNTER — LAB VISIT (OUTPATIENT)
Dept: LAB | Facility: HOSPITAL | Age: 64
End: 2024-05-10
Attending: INTERNAL MEDICINE
Payer: MEDICARE

## 2024-05-10 DIAGNOSIS — C79.51 SECONDARY MALIGNANT NEOPLASM OF BONE: ICD-10-CM

## 2024-05-10 DIAGNOSIS — C74.00 MALIGNANT NEOPLASM OF ADRENAL CORTEX, UNSPECIFIED LATERALITY: ICD-10-CM

## 2024-05-10 LAB
ALBUMIN SERPL-MCNC: 2.6 G/DL (ref 3.4–4.8)
ALBUMIN/GLOB SERPL: 0.8 RATIO (ref 1.1–2)
ALP SERPL-CCNC: 84 UNIT/L (ref 40–150)
ALT SERPL-CCNC: 11 UNIT/L (ref 0–55)
AST SERPL-CCNC: 18 UNIT/L (ref 5–34)
BASOPHILS # BLD AUTO: 0.04 X10(3)/MCL
BASOPHILS NFR BLD AUTO: 0.6 %
BILIRUB SERPL-MCNC: 0.1 MG/DL
BUN SERPL-MCNC: 25.9 MG/DL (ref 9.8–20.1)
CALCIUM SERPL-MCNC: 8.6 MG/DL (ref 8.4–10.2)
CHLORIDE SERPL-SCNC: 105 MMOL/L (ref 98–107)
CHOLEST SERPL-MCNC: 181 MG/DL
CHOLEST/HDLC SERPL: 3 {RATIO} (ref 0–5)
CO2 SERPL-SCNC: 25 MMOL/L (ref 23–31)
CORTIS SERPL-SCNC: 40.6 UG/DL
CREAT SERPL-MCNC: 0.79 MG/DL (ref 0.55–1.02)
EOSINOPHIL # BLD AUTO: 0.19 X10(3)/MCL (ref 0–0.9)
EOSINOPHIL NFR BLD AUTO: 2.7 %
ERYTHROCYTE [DISTWIDTH] IN BLOOD BY AUTOMATED COUNT: 15.7 % (ref 11.5–17)
GFR SERPLBLD CREATININE-BSD FMLA CKD-EPI: >60 ML/MIN/1.73/M2
GLOBULIN SER-MCNC: 3.3 GM/DL (ref 2.4–3.5)
GLUCOSE SERPL-MCNC: 105 MG/DL (ref 82–115)
HCT VFR BLD AUTO: 29.5 % (ref 37–47)
HDLC SERPL-MCNC: 62 MG/DL (ref 35–60)
HGB BLD-MCNC: 9.6 G/DL (ref 12–16)
IMM GRANULOCYTES # BLD AUTO: 0.02 X10(3)/MCL (ref 0–0.04)
IMM GRANULOCYTES NFR BLD AUTO: 0.3 %
LDLC SERPL CALC-MCNC: 79 MG/DL (ref 50–140)
LYMPHOCYTES # BLD AUTO: 1.2 X10(3)/MCL (ref 0.6–4.6)
LYMPHOCYTES NFR BLD AUTO: 17.1 %
MCH RBC QN AUTO: 29.4 PG (ref 27–31)
MCHC RBC AUTO-ENTMCNC: 32.5 G/DL (ref 33–36)
MCV RBC AUTO: 90.5 FL (ref 80–94)
MONOCYTES # BLD AUTO: 0.59 X10(3)/MCL (ref 0.1–1.3)
MONOCYTES NFR BLD AUTO: 8.4 %
NEUTROPHILS # BLD AUTO: 4.96 X10(3)/MCL (ref 2.1–9.2)
NEUTROPHILS NFR BLD AUTO: 70.9 %
PLATELET # BLD AUTO: 423 X10(3)/MCL (ref 130–400)
PMV BLD AUTO: 9.6 FL (ref 7.4–10.4)
POTASSIUM SERPL-SCNC: 4.4 MMOL/L (ref 3.5–5.1)
PROT SERPL-MCNC: 5.9 GM/DL (ref 5.8–7.6)
RBC # BLD AUTO: 3.26 X10(6)/MCL (ref 4.2–5.4)
SODIUM SERPL-SCNC: 137 MMOL/L (ref 136–145)
T4 FREE SERPL-MCNC: 0.67 NG/DL (ref 0.7–1.48)
TRIGL SERPL-MCNC: 200 MG/DL (ref 37–140)
TSH SERPL-ACNC: 0.27 UIU/ML (ref 0.35–4.94)
VLDLC SERPL CALC-MCNC: 40 MG/DL
WBC # SPEC AUTO: 7 X10(3)/MCL (ref 4.5–11.5)

## 2024-05-10 PROCEDURE — 84443 ASSAY THYROID STIM HORMONE: CPT

## 2024-05-10 PROCEDURE — 80375 DRUG/SUBSTANCE NOS 1-3: CPT

## 2024-05-10 PROCEDURE — 80053 COMPREHEN METABOLIC PANEL: CPT

## 2024-05-10 PROCEDURE — 85025 COMPLETE CBC W/AUTO DIFF WBC: CPT

## 2024-05-10 PROCEDURE — 82533 TOTAL CORTISOL: CPT

## 2024-05-10 PROCEDURE — 82024 ASSAY OF ACTH: CPT

## 2024-05-10 PROCEDURE — 36415 COLL VENOUS BLD VENIPUNCTURE: CPT

## 2024-05-10 PROCEDURE — 82627 DEHYDROEPIANDROSTERONE: CPT

## 2024-05-10 PROCEDURE — 80061 LIPID PANEL: CPT

## 2024-05-10 PROCEDURE — 82088 ASSAY OF ALDOSTERONE: CPT

## 2024-05-10 PROCEDURE — 84244 ASSAY OF RENIN: CPT

## 2024-05-10 PROCEDURE — 84439 ASSAY OF FREE THYROXINE: CPT

## 2024-05-13 LAB
ACTH PLAS-MCNC: 7.8 PG/ML
DHEA-S SERPL-MCNC: <5 MCG/DL (ref 9.7–159)

## 2024-05-14 ENCOUNTER — LAB VISIT (OUTPATIENT)
Dept: LAB | Facility: HOSPITAL | Age: 64
End: 2024-05-14
Attending: INTERNAL MEDICINE
Payer: MEDICARE

## 2024-05-14 DIAGNOSIS — C74.00 MALIGNANT NEOPLASM OF ADRENAL CORTEX, UNSPECIFIED LATERALITY: Primary | ICD-10-CM

## 2024-05-14 DIAGNOSIS — C79.51 SECONDARY MALIGNANT NEOPLASM OF BONE: ICD-10-CM

## 2024-05-14 DIAGNOSIS — C74.02 MALIGNANT NEOPLASM OF CORTEX OF LEFT ADRENAL GLAND: ICD-10-CM

## 2024-05-14 LAB
ALDOST SERPL-MCNC: <4 NG/DL
MITOTANE SERPL-MCNC: 20.7 UG/ML
RENIN PLAS-CCNC: 3.7 NG/ML/H

## 2024-05-14 PROCEDURE — 82530 CORTISOL FREE: CPT

## 2024-05-17 LAB
COLLECT DURATION TIME UR: 24 H
CORTIS 24H UR-MRATE: 62 MCG/24 H (ref 3.5–45)
SPECIMEN VOL 24H UR: 1400 ML

## 2024-05-28 DIAGNOSIS — C74.00 MALIGNANT NEOPLASM OF ADRENAL CORTEX, UNSPECIFIED LATERALITY: Primary | ICD-10-CM

## 2024-05-28 DIAGNOSIS — C79.51 SECONDARY MALIGNANT NEOPLASM OF BONE: ICD-10-CM

## 2024-05-30 ENCOUNTER — LAB VISIT (OUTPATIENT)
Dept: LAB | Facility: HOSPITAL | Age: 64
End: 2024-05-30
Attending: INTERNAL MEDICINE
Payer: MEDICARE

## 2024-05-30 DIAGNOSIS — C74.00 MALIGNANT NEOPLASM OF ADRENAL CORTEX, UNSPECIFIED LATERALITY: ICD-10-CM

## 2024-05-30 DIAGNOSIS — C79.51 SECONDARY MALIGNANT NEOPLASM OF BONE: ICD-10-CM

## 2024-05-30 DIAGNOSIS — C74.00 MALIGNANT NEOPLASM OF ADRENAL CORTEX, UNSPECIFIED LATERALITY: Primary | ICD-10-CM

## 2024-05-30 DIAGNOSIS — C74.02 MALIGNANT NEOPLASM OF CORTEX OF LEFT ADRENAL GLAND: ICD-10-CM

## 2024-05-30 LAB
ALBUMIN SERPL-MCNC: 2.8 G/DL (ref 3.4–4.8)
ALBUMIN/GLOB SERPL: 0.9 RATIO (ref 1.1–2)
ALP SERPL-CCNC: 86 UNIT/L (ref 40–150)
ALT SERPL-CCNC: 9 UNIT/L (ref 0–55)
ANION GAP SERPL CALC-SCNC: 7 MEQ/L
AST SERPL-CCNC: 15 UNIT/L (ref 5–34)
BASOPHILS # BLD AUTO: 0.06 X10(3)/MCL
BASOPHILS NFR BLD AUTO: 0.7 %
BILIRUB SERPL-MCNC: 0.1 MG/DL
BUN SERPL-MCNC: 31.1 MG/DL (ref 9.8–20.1)
CALCIUM SERPL-MCNC: 9.1 MG/DL (ref 8.4–10.2)
CHLORIDE SERPL-SCNC: 111 MMOL/L (ref 98–107)
CHOLEST SERPL-MCNC: 188 MG/DL
CHOLEST/HDLC SERPL: 3 {RATIO} (ref 0–5)
CO2 SERPL-SCNC: 25 MMOL/L (ref 23–31)
CORTIS SERPL-SCNC: 43.8 UG/DL
CREAT SERPL-MCNC: 0.75 MG/DL (ref 0.55–1.02)
CREAT/UREA NIT SERPL: 41
EOSINOPHIL # BLD AUTO: 0.2 X10(3)/MCL (ref 0–0.9)
EOSINOPHIL NFR BLD AUTO: 2.5 %
ERYTHROCYTE [DISTWIDTH] IN BLOOD BY AUTOMATED COUNT: 16 % (ref 11.5–17)
GFR SERPLBLD CREATININE-BSD FMLA CKD-EPI: >60 ML/MIN/1.73/M2
GLOBULIN SER-MCNC: 3.2 GM/DL (ref 2.4–3.5)
GLUCOSE SERPL-MCNC: 93 MG/DL (ref 82–115)
HCT VFR BLD AUTO: 30.5 % (ref 37–47)
HDLC SERPL-MCNC: 70 MG/DL (ref 35–60)
HGB BLD-MCNC: 9.7 G/DL (ref 12–16)
IMM GRANULOCYTES # BLD AUTO: 0.02 X10(3)/MCL (ref 0–0.04)
IMM GRANULOCYTES NFR BLD AUTO: 0.2 %
LDLC SERPL CALC-MCNC: 92 MG/DL (ref 50–140)
LYMPHOCYTES # BLD AUTO: 1.5 X10(3)/MCL (ref 0.6–4.6)
LYMPHOCYTES NFR BLD AUTO: 18.4 %
MCH RBC QN AUTO: 28.7 PG (ref 27–31)
MCHC RBC AUTO-ENTMCNC: 31.8 G/DL (ref 33–36)
MCV RBC AUTO: 90.2 FL (ref 80–94)
MONOCYTES # BLD AUTO: 0.75 X10(3)/MCL (ref 0.1–1.3)
MONOCYTES NFR BLD AUTO: 9.2 %
NEUTROPHILS # BLD AUTO: 5.63 X10(3)/MCL (ref 2.1–9.2)
NEUTROPHILS NFR BLD AUTO: 69 %
PLATELET # BLD AUTO: 448 X10(3)/MCL (ref 130–400)
PMV BLD AUTO: 10 FL (ref 7.4–10.4)
POTASSIUM SERPL-SCNC: 5 MMOL/L (ref 3.5–5.1)
PROT SERPL-MCNC: 6 GM/DL (ref 5.8–7.6)
RBC # BLD AUTO: 3.38 X10(6)/MCL (ref 4.2–5.4)
SODIUM SERPL-SCNC: 143 MMOL/L (ref 136–145)
T4 FREE SERPL-MCNC: 0.77 NG/DL (ref 0.7–1.48)
TRIGL SERPL-MCNC: 129 MG/DL (ref 37–140)
TSH SERPL-ACNC: 0.62 UIU/ML (ref 0.35–4.94)
VLDLC SERPL CALC-MCNC: 26 MG/DL
WBC # SPEC AUTO: 8.16 X10(3)/MCL (ref 4.5–11.5)

## 2024-05-30 PROCEDURE — 80061 LIPID PANEL: CPT

## 2024-05-30 PROCEDURE — 80299 QUANTITATIVE ASSAY DRUG: CPT

## 2024-05-30 PROCEDURE — 80053 COMPREHEN METABOLIC PANEL: CPT

## 2024-05-30 PROCEDURE — 82533 TOTAL CORTISOL: CPT

## 2024-05-30 PROCEDURE — 84443 ASSAY THYROID STIM HORMONE: CPT

## 2024-05-30 PROCEDURE — 84244 ASSAY OF RENIN: CPT

## 2024-05-30 PROCEDURE — 82627 DEHYDROEPIANDROSTERONE: CPT

## 2024-05-30 PROCEDURE — 85025 COMPLETE CBC W/AUTO DIFF WBC: CPT

## 2024-05-30 PROCEDURE — 84439 ASSAY OF FREE THYROXINE: CPT

## 2024-05-30 PROCEDURE — 36415 COLL VENOUS BLD VENIPUNCTURE: CPT

## 2024-05-30 PROCEDURE — 82024 ASSAY OF ACTH: CPT

## 2024-05-30 PROCEDURE — 82088 ASSAY OF ALDOSTERONE: CPT

## 2024-05-31 LAB
ACTH PLAS-MCNC: <5 PG/ML
DHEA-S SERPL-MCNC: <5 MCG/DL (ref 9.7–159)

## 2024-06-03 LAB — RENIN PLAS-CCNC: 3.7 NG/ML/H

## 2024-06-04 LAB — ALDOST SERPL-MCNC: <4 NG/DL

## 2024-06-06 LAB — MITOTANE SERPL-MCNC: 13.6 UG/ML

## 2024-06-13 NOTE — H&P (VIEW-ONLY)
Subjective:       Patient ID: Shreya Reyes is a 64 y.o. female.    Chief Complaint:  I don't have any new symptoms    Diagnosis: Metastatic adrenocortical carcinoma                    Multiple osteoporotic vertebral compression fractures    Treatment History  Adjuvant XRT (completed 9/30/21)  Mitotane 1/25/22-7/26/22, Resumed 8/22  XRT right scapula, L5,  R ischium, L femoral neck completed 8/26/22  SBRT L2-3 2/06/23  Left IM nail 2/08/23  SBRT R iliac wing 5/15/23  SBRT L5, R ischium 12/4/23    Current Treatment:   Mitotane resumed 8/22 - current dose 1500 mg/d   Hydrocortisone 20 mg Q AM, 10 mg at noon, 5 mg Q PM  Levothyroxine 75 mcg/d              Clinical History:  Patient initially presented to the [facility] 3/16/21 with acute left flank pain. CT A/P showed a heterogeneous enhancing mass of the left adrenal gland measuring 5.5 x 4.8 x 5 cm (24 Hu), with no definite microscopic fat. There was mild displacement of the left renal vein. Right adrenal gland was unremarkable.  Hormonal workup was negative for pheochromocytoma. She was felt to have had an acute adrenal hemorrhage and close observation was recommended. Follow-up CT A/P 6/9/21 showed increased size of the adrenal mass to 8.0 x 4.5 cm appearing hypervascular with some central necrosis. She underwent a laparoscopic/robotic left adrenalectomy 6/11/21.  Final pathology showed an adrenal cortical carcinoma predominant oncocytic/trabecular morphology with focally marked cytologic atypia and increased mitotic rate (up to 10/50 HPF's). There were large areas of necrosis and multiple foci of capsular and lymphovascular invasion. Ki-67 expression was 10-15%.    She had a Telemedicine consultation with Dr. Dion Lynch at [facility] 8/3/21 who specializes in treatment of adrenocortical carcinoma. Her case was reviewed and discussed in their tumor board. Recommendations were for treatment with adjuvant radiation therapy and systemic treatment with  Mitotane. Baseline postoperative CT scans of the chest, abdomen and pelvis 8/4/21 showed postoperative changes with no evidence of measurable metastatic disease.    She was seen as a new patient at [facility] 8/9/21.  She had recovered well from her surgery and was asymptomatic.  Treatment recommendations from the [facility] were reviewed and discussed.  Treatment was started with Mitotane 1000 mg BID on 8/16/2021.  No dose escalation was recommended until she completed radiation therapy.  She was started on replacement hydrocortisone 20 mg Q AM and 10 mg Q PM.  Surveillance CT scans were recommended in 3 months.      She was seen for an office visit 9/16/21 after completing 4 weeks of treatment with Mitotane.  She was not having any significant side effects associated with her therapy.  She was alf through her adjuvant radiation therapy.  Laboratory testing showed marked transaminase elevations with an AST of 493,  and alkaline phosphatase 175.  Bilirubin was normal.  The remainder of her CMP was unremarkable.  Baseline mitotane level drawn at that visit was 2.5 mcg/mL.  Mitotane was held and adjuvant radiation therapy was continued.  Weekly laboratory monitoring showed gradual improvement in her LFTs.  Although mitotane had been associated with transaminase elevations, >5x elevations are rare occurring in <1% of patients.  She was also instructed to hold her lovastatin.  She completed adjuvant radiation therapy 9/30/2021.     Follow-up laboratory continued to show transaminase elevations.  GI was consulted and ordered additional laboratory testing which did not reveal evidence of viral or autoimmune etiologies for her hepatitis.  Mitotane was not resumed due to her persistent transaminase elevations. Follow-up CT scans of the chest, abdomen and pelvis 10/28/21 showed a few stable subcentimeter pulmonary nodules and changes related to her previous left adrenalectomy with no  evidence of disease recurrence.  Following normalization of her LFTs, treatment was resumed with Mitotane 1/25/22.  Her LFTs remained normal even during dose escalation.    She had an injury at home in mid March after carrying in several arms of firewood with acute mid back pain.  Plain x-ray 3/17/22 showed a compression deformity at L2 of questionable age.  MRI of the lumbar spine 3/23/22 showed a subacute severe compression fracture deformity of the L1 vertebral body and mild superior endplate compression fracture of L3 vertebral body with osteoporotic appearance and no findings suspicious for pathologic fracture.  However, there were scattered small osseous lesions in the right L3 vertebral body and L5 vertebral body concerning for metastatic disease.  CT PET scan 3/28/22 showed mildly hypermetabolic osseous lesions in the lumbar spine, pelvis and proximal left femur consistent with metastatic disease.  There was no significant hypermetabolic uptake at the sites of her compression fractures.  She did not have pain at any of the sites prior to the acute compression fracture.  Bone density exam showed osteoporosis of the lumbar spine with a T score of -3.4, left femoral neck -3.4 and left total hip -2.7.  She was started on Prolia 3/31/22 and underwent L1 and L3 kyphoplasty 4//8/22.  Biopsies of the L3 vertebral body showed no evidence of metastatic carcinoma.  She continued to have significant pain following the kyphoplasty.  Further review of her films showed a possible compression fracture at T11.  MRI of the thoracic spine 4/15/22 showed a compression fracture of T11 with 50% loss of vertebral body height.  She underwent kyphoplasty 4/21/22 with symptomatic improvement.    CT scans of the chest, abdomen and pelvis 4/27/22 showed sclerotic osseous lesions at L4, right ischium and left femur correlating with the hypermetabolic lesions on CT-PET scan.  There were stable sub-6 mm pulmonary nodules in the RML and  LLL unchanged from previous imaging.  MRI of the cervical and lumbar spine 7/5/22 showed moderate disc disease at C5-6 and C6-7 without significant spinal canal stenosis or foraminal stenosis.  She developed progressive symptoms of right arm pain, numbness and tingling and updated MRI 7/13/22 showed foraminal stenosis secondary to disc osteophyte on the right side at C5-6 and C6-7.  She underwent a right foraminotomy with relief of her symptoms.    CT C/A/P 08/01/22:  Multiple compression fractures and postsurgical changes.  Area of sclerosis in the left sacrum was stable compared to the prior exam.  There was no evidence of visceral metastatic disease.  She had a teleconference with [facility] 8/2/22 and a follow-up PET scan was recommended.  Mitotane was discontinued 7/26/22.   CT-PET 08/05/22:  Hypermetabolic osseous metastatic disease involving L5, right ischium, left femoral neck and a new lesion in the inferior right scapula.  There was a 12 mm area of hypermetabolic activity adjacent to the right adrenal gland without a definite CT correlate.    She completed palliative radiation therapy to the right scapula, L5, R ischium and left femoral neck on 8/26/22.  She resumed Mitotane at 1000 mg BID on 8/29/22.      CT-PET 11/21/22:  Improved FDG uptake in all of the treated sites.  Right scapula SUV decreased from 4.2 to 2.1, left femoral neck 9.1 to 5, right ischium 13 to 2.4 and L5 8.2 to 4.6.  Hypermetabolic activity at the site of the previous compression fractures had also improved.  There were no findings suspicious for new sites of disease or visceral metastatic disease.  MRI lumbar spine and left hip 1/29/23:  Metastatic disease involving right ischial tuberosity, left femoral neck and L3 vertebral body, similar in size to CT-PET scan 11/21/22:  L3 lesion showed interval progression with ventral and paraspinal extension causing moderate-to-severe narrowing of the spinal canal.  Left femoral neck  lesion involved greater than 50% of the total diameter of the femoral neck.  She was treated with stereotactic XRT to L3 and underwent prophylactic IM nail of the left femoral neck 2/8/23.  CT C/A/P 4/24/23:  13 mm sclerotic met inferior right scapula, bandlike densities RUL and RLL secondary to previous XRT.  Stable thoracic spine compression fractures.  Increased density L4 vertebral body metastasis.  No evidence of visceral metastatic disease.  MRI L-spine/pelvis 4/24/23:  Stable L3 lesion with mild epidural extension and enhancement measuring 8 mm.  Sclerotic 10 mm focus of abnormal marrow signal right iliac wing.    Guardant 360 2/20/23:  Positive TP53 mutation, microsatellite stable     Prolia was resumed for her metastatic bone disease 4/11/23.  She completed SBRT to the right iliac wing metastasis 5/15/23 receiving 2700 cGy in 3 fractions.  Treatment was well tolerated.      MRI L-spine and pelvis 7/26/23:  Resolution of previous epidural enhancing mass posterior to L3 vertebral body.  Right iliac wing lesion 12 mm (previous 10 mm).  No new metastatic lesions.  CT C/A/P 7/28/23:  Stable sclerotic bone lesions right scapula, right iliac wing and L4.  Stable kyphoplasty changes T10, T11, T12 and L2.  Stable L1 compression deformity.  No new sites of disease identified.  Colonoscopy 7/25/23:  No polyps or other abnormalities.  Follow-up exam recommended in 7 years.  CT C/A/P 11/06/23:  S/p kyphoplasty at T10, T11, T12 and L2.  Stable L1 compression deformity.  Increased L4 height loss with increased sclerosis of the vertebral body.  Stable sclerotic lesion right iliac wing 13 mm.  Inferior right scapular lesion more vaguely seen.  No findings suspicious for new sites of disease.  MRI pelvis 11/10/23:  Interval increase in the size of the metastatic lesion involving the right ischial tuberosity measuring 3.2 cm x 1.8 cm, previously 2.1 x 1.3 cm.  MRI L-spine 11/10/23:  Progressive metastatic disease at L5 with a  new pathologic compression deformity.  MRI L-spine 2/22/24:  New L4 superior endplate compression fracture with minimal loss of height, otherwise unchanged from 11/10/23.  CT C/A/P 2/27/24:  L4 superior endplate compression fracture with minimal loss of height.  Otherwise, no evidence of disease progression or new sites of disease.  CT C/A/P 6/17/24:  New 1.7 cm right hepatic met and 1.6 x 0.9 cm right adrenal gland nodule.  No abnormal lymphadenopathy.  MRI L-spine 06/1924:  Slight progression of mild superior endplate height loss at L4.  Slight increased marrow enhancement at S1-S2.  No definite disease progression or new lesions.      Interval History  She returns to clinic today for a follow-up visit accompanied by her .  She had a follow-up visit in [place] 5/21/24.  Mitotane has been on hold since 5/14/24 secondary to progressive fatigue and a level of 20.7 with no progressive disease on imaging.  Restaging CT scans 6/17/24 show interval development of a solitary right hepatic met measuring 1.7 cm and right adrenal gland nodule 1.6 x 0.9 cm.  She met with Radiation Oncology earlier today and is scheduled for an MRI of the liver to evaluate for smaller lesions.  If no additional lesions present, then she would proceed with stereotactic radiation.  She does not have any abdominal symptoms or complaints.  No weight loss.  She has chronic back pain which is stable.  She is ambulatory without assistance.  She has not had any falls.      Review of Systems   Constitutional:  Negative for appetite change, fatigue, fever and unexpected weight change.   HENT:  Negative for mouth sores, sore throat and trouble swallowing.    Eyes: Negative.    Respiratory:  Negative for cough and shortness of breath.    Cardiovascular:  Negative for chest pain, palpitations and leg swelling.   Gastrointestinal:  Negative for abdominal distention, abdominal pain, constipation, diarrhea, nausea and vomiting.   Genitourinary:   "Negative for dysuria, frequency and urgency.   Musculoskeletal:  Positive for arthralgias and back pain (stable).   Integumentary:  Negative for pallor and rash.   Neurological:  Negative for dizziness, weakness, numbness and headaches.   Hematological:  Negative for adenopathy. Does not bruise/bleed easily.   Psychiatric/Behavioral:  Negative for confusion and dysphoric mood. The patient is nervous/anxious (intermittent).        PMHx:  Hyperlipidemia, osteoporosis  PSHx:  Tonsils, left cataract, ORIF left tibia/fib, left adrenalectomy, multiple vertebral kyphoplasties  SH:  Former smoker 1 PPD, quit 2009. + Social alcohol use. Lives in [place] with her . 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  FH:  Her mother had lymphoma.     Objective:        BP (!) 180/93   Pulse 80   Temp 98.2 °F (36.8 °C)   Resp 14   Ht 5' 4" (1.626 m)   Wt 58.2 kg (128 lb 6.4 oz)   SpO2 99%   BMI 22.04 kg/m²    Physical Exam  Constitutional:       Comments: Well-developed white female in NAD.   HENT:      Head: Normocephalic.      Mouth/Throat:      Mouth: Mucous membranes are moist.      Pharynx: Oropharynx is clear. No posterior oropharyngeal erythema.   Eyes:      Extraocular Movements: Extraocular movements intact.      Conjunctiva/sclera: Conjunctivae normal.      Pupils: Pupils are equal, round, and reactive to light.   Cardiovascular:      Rate and Rhythm: Normal rate and regular rhythm.      Heart sounds: No murmur heard.  Pulmonary:      Comments: Lungs clear to auscultation  Abdominal:      General: Bowel sounds are normal. There is no distension.      Palpations: Abdomen is soft. There is no mass.      Tenderness: There is no abdominal tenderness.   Musculoskeletal:         General: No swelling or tenderness.      Cervical back: Neck supple. No tenderness.      Comments: No vertebral body or rib cage tenderness.     Skin:     General: Skin is warm and dry.      Findings: No rash.   Neurological: "      General: No focal deficit present.      Mental Status: She is alert and oriented to person, place, and time.      Motor: No weakness.   Psychiatric:         Behavior: Behavior is cooperative.       ECOG SCORE    0 - Fully active-able to carry on all pre-disease performance without restriction        LABORATORY  No new laboratory for review    Assessment:   Metastatic adrenocortical carcinoma  Multiple osteoporotic vertebral body compression fractures  S/p prophylactic left femur IM nail 2/8/23  Cancer related pain - stable      Plan:   Restaging CT scans show interval development of a right hepatic lobe and right adrenal gland metastasis.  MRI of the liver ordered by Radiation Oncology to evaluate for potential smaller lesions.  If true oligometastatic disease, she will proceed with stereotactic radiation therapy to both sites.  If evidence of additional disease, we will discuss systemic treatment options.  Options for systemic therapy include:  1) Chemotherapy with EDC (Etoposide, Doxorubicin, Cisplatin) +/- Mitotane or 2) immunotherapy with Pembrolizumab based on emerging phase II data.  A peripheral blood Tempus panel (liquid biopsy) will be submitted today to evaluate for any molecular targets for therapy.  We will continue to coordinate treatment with Dr. Lynch of [facility] given his expertise regarding these tumors.      MARY NORMAN MD    Other Physicians  Dr. Dion Lynch ([facility])

## 2024-06-13 NOTE — PROGRESS NOTES
Subjective:       Patient ID: Shreya Reyes is a 64 y.o. female.    Chief Complaint:  I don't have any new symptoms    Diagnosis: Metastatic adrenocortical carcinoma                    Multiple osteoporotic vertebral compression fractures    Treatment History  Adjuvant XRT (completed 9/30/21)  Mitotane 1/25/22-7/26/22, Resumed 8/22  XRT right scapula, L5,  R ischium, L femoral neck completed 8/26/22  SBRT L2-3 2/06/23  Left IM nail 2/08/23  SBRT R iliac wing 5/15/23  SBRT L5, R ischium 12/4/23    Current Treatment:   Mitotane resumed 8/22 - current dose 1500 mg/d   Hydrocortisone 20 mg Q AM, 10 mg at noon, 5 mg Q PM  Levothyroxine 75 mcg/d              Clinical History:  Patient initially [place] the [facility] [place]16/21 with acute left flank pain. CT A/P showed a heterogeneous enhancing mass of the left adrenal gland measuring 5.5 x 4.8 x 5 cm (24 Hu), with no definite microscopic fat. There was mild displacement of the left renal vein. Right adrenal gland was unremarkable.  Hormonal workup was negative for pheochromocytoma. She was felt to have had an acute adrenal hemorrhage and close observation was recommended. Follow-up CT A/P 6/9/21 showed increased size of the adrenal mass to 8.0 x 4.5 cm appearing hypervascular with some central necrosis. She underwent a laparoscopic/robotic left adrenalectomy 6/11/21.  Final pathology showed an adrenal cortical carcinoma predominant oncocytic/trabecular morphology with focally marked cytologic atypia and increased mitotic rate (up to 10/50 HPF's). There were large areas of necrosis and multiple foci of capsular and lymphovascular invasion. Ki-67 expression was 10-15%.    She had a Telemedicine consultation with Dr. Dion Lynch at [facility] 8/3/21 who specializes in treatment of adrenocortical carcinoma. Her case was reviewed and discussed in their tumor board. Recommendations were for treatment with adjuvant radiation therapy and systemic treatment with  Mitotane. Baseline postoperative CT scans of the chest, abdomen and pelvis 8/4/21 showed postoperative changes with no evidence of measurable metastatic disease.    She was seen as a new patient at [facility] 8/9/21.  She had recovered well from her surgery and was asymptomatic.  Treatment recommendations from the [facility] were reviewed and discussed.  Treatment was started with Mitotane 1000 mg BID on 8/16/2021.  No dose escalation was recommended until she completed radiation therapy.  She was started on replacement hydrocortisone 20 mg Q AM and 10 mg Q PM.  Surveillance CT scans were recommended in 3 months.      She was seen for an office visit 9/16/21 after completing 4 weeks of treatment with Mitotane.  She was not having any significant side effects associated with her therapy.  She was alf through her adjuvant radiation therapy.  Laboratory testing showed marked transaminase elevations with an AST of 493,  and alkaline phosphatase 175.  Bilirubin was normal.  The remainder of her CMP was unremarkable.  Baseline mitotane level drawn at that visit was 2.5 mcg/mL.  Mitotane was held and adjuvant radiation therapy was continued.  Weekly laboratory monitoring showed gradual improvement in her LFTs.  Although mitotane had been associated with transaminase elevations, >5x elevations are rare occurring in <1% of patients.  She was also instructed to hold her lovastatin.  She completed adjuvant radiation therapy 9/30/2021.     Follow-up laboratory continued to show transaminase elevations.  GI was consulted and ordered additional laboratory testing which did not reveal evidence of viral or autoimmune etiologies for her hepatitis.  Mitotane was not resumed due to her persistent transaminase elevations. Follow-up CT scans of the chest, abdomen and pelvis 10/28/21 showed a few stable subcentimeter pulmonary nodules and changes related to her previous left adrenalectomy with no  evidence of disease recurrence.  Following normalization of her LFTs, treatment was resumed with Mitotane 1/25/22.  Her LFTs remained normal even during dose escalation.    She had an injury at home in mid March after carrying in several arms of firewood with acute mid back pain.  Plain x-ray 3/17/22 showed a compression deformity at L2 of questionable age.  MRI of the lumbar spine 3/23/22 showed a subacute severe compression fracture deformity of the L1 vertebral body and mild superior endplate compression fracture of L3 vertebral body with osteoporotic appearance and no findings suspicious for pathologic fracture.  However, there were scattered small osseous lesions in the right L3 vertebral body and L5 vertebral body concerning for metastatic disease.  CT PET scan 3/28/22 showed mildly hypermetabolic osseous lesions in the lumbar spine, pelvis and proximal left femur consistent with metastatic disease.  There was no significant hypermetabolic uptake at the sites of her compression fractures.  She did not have pain at any of the sites prior to the acute compression fracture.  Bone density exam showed osteoporosis of the lumbar spine with a T score of -3.4, left femoral neck -3.4 and left total hip -2.7.  She was started on Prolia 3/31/22 and underwent L1 and L3 kyphoplasty 4//8/22.  Biopsies of the L3 vertebral body showed no evidence of metastatic carcinoma.  She continued to have significant pain following the kyphoplasty.  Further review of her films showed a possible compression fracture at T11.  MRI of the thoracic spine 4/15/22 showed a compression fracture of T11 with 50% loss of vertebral body height.  She underwent kyphoplasty 4/21/22 with symptomatic improvement.    CT scans of the chest, abdomen and pelvis 4/27/22 showed sclerotic osseous lesions at L4, right ischium and left femur correlating with the hypermetabolic lesions on CT-PET scan.  There were stable sub-6 mm pulmonary nodules in the RML and  LLL unchanged from previous imaging.  MRI of the cervical and lumbar spine 7/5/22 showed moderate disc disease at C5-6 and C6-7 without significant spinal canal stenosis or foraminal stenosis.  She developed progressive symptoms of right arm pain, numbness and tingling and updated MRI 7/13/22 showed foraminal stenosis secondary to disc osteophyte on the right side at C5-6 and C6-7.  She underwent a right foraminotomy with relief of her symptoms.    CT C/A/P 08/01/22:  Multiple compression fractures and postsurgical changes.  Area of sclerosis in the left sacrum was stable compared to the prior exam.  There was no evidence of visceral metastatic disease.  She had a teleconference with [facility] 8/2/22 and a follow-up PET scan was recommended.  Mitotane was discontinued 7/26/22.   CT-PET 08/05/22:  Hypermetabolic osseous metastatic disease involving L5, right ischium, left femoral neck and a new lesion in the inferior right scapula.  There was a 12 mm area of hypermetabolic activity adjacent to the right adrenal gland without a definite CT correlate.    She completed palliative radiation therapy to the right scapula, L5, R ischium and left femoral neck on 8/26/22.  She resumed Mitotane at 1000 mg BID on 8/29/22.      CT-PET 11/21/22:  Improved FDG uptake in all of the treated sites.  Right scapula SUV decreased from 4.2 to 2.1, left femoral neck 9.1 to 5, right ischium 13 to 2.4 and L5 8.2 to 4.6.  Hypermetabolic activity at the site of the previous compression fractures had also improved.  There were no findings suspicious for new sites of disease or visceral metastatic disease.  MRI lumbar spine and left hip 1/29/23:  Metastatic disease involving right ischial tuberosity, left femoral neck and L3 vertebral body, similar in size to CT-PET scan 11/21/22:  L3 lesion showed interval progression with ventral and paraspinal extension causing moderate-to-severe narrowing of the spinal canal.  Left femoral neck  lesion involved greater than 50% of the total diameter of the femoral neck.  She was treated with stereotactic XRT to L3 and underwent prophylactic IM nail of the left femoral neck 2/8/23.  CT C/A/P 4/24/23:  13 mm sclerotic met inferior right scapula, bandlike densities RUL and RLL secondary to previous XRT.  Stable thoracic spine compression fractures.  Increased density L4 vertebral body metastasis.  No evidence of visceral metastatic disease.  MRI L-spine/pelvis 4/24/23:  Stable L3 lesion with mild epidural extension and enhancement measuring 8 mm.  Sclerotic 10 mm focus of abnormal marrow signal right iliac wing.    Guardant 360 2/20/23:  Positive TP53 mutation, microsatellite stable     Prolia was resumed for her metastatic bone disease 4/11/23.  She completed SBRT to the right iliac wing metastasis 5/15/23 receiving 2700 cGy in 3 fractions.  Treatment was well tolerated.      MRI L-spine and pelvis 7/26/23:  Resolution of previous epidural enhancing mass posterior to L3 vertebral body.  Right iliac wing lesion 12 mm (previous 10 mm).  No new metastatic lesions.  CT C/A/P 7/28/23:  Stable sclerotic bone lesions right scapula, right iliac wing and L4.  Stable kyphoplasty changes T10, T11, T12 and L2.  Stable L1 compression deformity.  No new sites of disease identified.  Colonoscopy 7/25/23:  No polyps or other abnormalities.  Follow-up exam recommended in 7 years.  CT C/A/P 11/06/23:  S/p kyphoplasty at T10, T11, T12 and L2.  Stable L1 compression deformity.  Increased L4 height loss with increased sclerosis of the vertebral body.  Stable sclerotic lesion right iliac wing 13 mm.  Inferior right scapular lesion more vaguely seen.  No findings suspicious for new sites of disease.  MRI pelvis 11/10/23:  Interval increase in the size of the metastatic lesion involving the right ischial tuberosity measuring 3.2 cm x 1.8 cm, previously 2.1 x 1.3 cm.  MRI L-spine 11/10/23:  Progressive metastatic disease at L5 with a  new pathologic compression deformity.  MRI L-spine 2/22/24:  New L4 superior endplate compression fracture with minimal loss of height, otherwise unchanged from 11/10/23.  CT C/A/P 2/27/24:  L4 superior endplate compression fracture with minimal loss of height.  Otherwise, no evidence of disease progression or new sites of disease.  CT C/A/P 6/17/24:  New 1.7 cm right hepatic met and 1.6 x 0.9 cm right adrenal gland nodule.  No abnormal lymphadenopathy.  MRI L-spine 06/1924:  Slight progression of mild superior endplate height loss at L4.  Slight increased marrow enhancement at S1-S2.  No definite disease progression or new lesions.      Interval History  She returns to clinic today for a follow-up visit accompanied by her .  She had a follow-up visit in [place] 5/21/24.  Mitotane has been on hold since 5/14/24 secondary to progressive fatigue and a level of 20.7 with no progressive disease on imaging.  Restaging CT scans 6/17/24 show interval development of a solitary right hepatic met measuring 1.7 cm and right adrenal gland nodule 1.6 x 0.9 cm.  She met with Radiation Oncology earlier today and is scheduled for an MRI of the liver to evaluate for smaller lesions.  If no additional lesions present, then she would proceed with stereotactic radiation.  She does not have any abdominal symptoms or complaints.  No weight loss.  She has chronic back pain which is stable.  She is ambulatory without assistance.  She has not had any falls.      Review of Systems   Constitutional:  Negative for appetite change, fatigue, fever and unexpected weight change.   HENT:  Negative for mouth sores, sore throat and trouble swallowing.    Eyes: Negative.    Respiratory:  Negative for cough and shortness of breath.    Cardiovascular:  Negative for chest pain, palpitations and leg swelling.   Gastrointestinal:  Negative for abdominal distention, abdominal pain, constipation, diarrhea, nausea and vomiting.   Genitourinary:   "Negative for dysuria, frequency and urgency.   Musculoskeletal:  Positive for arthralgias and back pain (stable).   Integumentary:  Negative for pallor and rash.   Neurological:  Negative for dizziness, weakness, numbness and headaches.   Hematological:  Negative for adenopathy. Does not bruise/bleed easily.   Psychiatric/Behavioral:  Negative for confusion and dysphoric mood. The patient is nervous/anxious (intermittent).        PMHx:  Hyperlipidemia, osteoporosis  PSHx:  Tonsils, left cataract, ORIF left tibia/fib, left adrenalectomy, multiple vertebral kyphoplasties  SH:  Former smoker 1 PPD, quit 2009. + Social alcohol use. Lives in [place] with her . 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  FH:  Her mother had lymphoma.     Objective:        BP (!) 180/93   Pulse 80   Temp 98.2 °F (36.8 °C)   Resp 14   Ht 5' 4" (1.626 m)   Wt 58.2 kg (128 lb 6.4 oz)   SpO2 99%   BMI 22.04 kg/m²    Physical Exam  Constitutional:       Comments: Well-developed white female in NAD.   HENT:      Head: Normocephalic.      Mouth/Throat:      Mouth: Mucous membranes are moist.      Pharynx: Oropharynx is clear. No posterior oropharyngeal erythema.   Eyes:      Extraocular Movements: Extraocular movements intact.      Conjunctiva/sclera: Conjunctivae normal.      Pupils: Pupils are equal, round, and reactive to light.   Cardiovascular:      Rate and Rhythm: Normal rate and regular rhythm.      Heart sounds: No murmur heard.  Pulmonary:      Comments: Lungs clear to auscultation  Abdominal:      General: Bowel sounds are normal. There is no distension.      Palpations: Abdomen is soft. There is no mass.      Tenderness: There is no abdominal tenderness.   Musculoskeletal:         General: No swelling or tenderness.      Cervical back: Neck supple. No tenderness.      Comments: No vertebral body or rib cage tenderness.     Skin:     General: Skin is warm and dry.      Findings: No rash.   Neurological: "      General: No focal deficit present.      Mental Status: She is alert and oriented to person, place, and time.      Motor: No weakness.   Psychiatric:         Behavior: Behavior is cooperative.       ECOG SCORE    0 - Fully active-able to carry on all pre-disease performance without restriction        LABORATORY  No new laboratory for review    Assessment:   Metastatic adrenocortical carcinoma  Multiple osteoporotic vertebral body compression fractures  S/p prophylactic left femur IM nail 2/8/23  Cancer related pain - stable      Plan:   Restaging CT scans show interval development of a right hepatic lobe and right adrenal gland metastasis.  MRI of the liver ordered by Radiation Oncology to evaluate for potential smaller lesions.  If true oligometastatic disease, she will proceed with stereotactic radiation therapy to both sites.  If evidence of additional disease, we will discuss systemic treatment options.  Options for systemic therapy include:  1) Chemotherapy with EDC (Etoposide, Doxorubicin, Cisplatin) +/- Mitotane or 2) immunotherapy with Pembrolizumab based on emerging phase II data.  A peripheral blood Tempus panel (liquid biopsy) will be submitted today to evaluate for any molecular targets for therapy.  We will continue to coordinate treatment with Dr. Lynch of [facility] given his expertise regarding these tumors.      MARY NORMAN MD    Other Physicians  Dr. Dion Lynch ([facility])

## 2024-06-19 ENCOUNTER — HOSPITAL ENCOUNTER (OUTPATIENT)
Dept: RADIOLOGY | Facility: HOSPITAL | Age: 64
Discharge: HOME | End: 2024-06-19
Attending: NURSE PRACTITIONER
Payer: MEDICARE

## 2024-06-19 DIAGNOSIS — C79.51 SECONDARY MALIGNANT NEOPLASM OF BONE: ICD-10-CM

## 2024-06-19 DIAGNOSIS — C74.00 MALIGNANT NEOPLASM OF ADRENAL CORTEX, UNSPECIFIED LATERALITY: ICD-10-CM

## 2024-06-19 PROCEDURE — A9577 INJ MULTIHANCE: HCPCS | Performed by: NURSE PRACTITIONER

## 2024-06-19 PROCEDURE — 25500020 PHARM REV CODE 255: Performed by: NURSE PRACTITIONER

## 2024-06-19 PROCEDURE — 72158 MRI LUMBAR SPINE W/O & W/DYE: CPT | Mod: TC

## 2024-06-19 RX ADMIN — GADOBENATE DIMEGLUMINE 11 ML: 529 INJECTION, SOLUTION INTRAVENOUS at 10:06

## 2024-06-24 ENCOUNTER — OFFICE VISIT (OUTPATIENT)
Dept: HEMATOLOGY/ONCOLOGY | Facility: CLINIC | Age: 64
End: 2024-06-24
Payer: MEDICARE

## 2024-06-24 VITALS
HEART RATE: 80 BPM | WEIGHT: 128.38 LBS | DIASTOLIC BLOOD PRESSURE: 93 MMHG | SYSTOLIC BLOOD PRESSURE: 180 MMHG | OXYGEN SATURATION: 99 % | TEMPERATURE: 98 F | HEIGHT: 64 IN | BODY MASS INDEX: 21.92 KG/M2 | RESPIRATION RATE: 14 BRPM

## 2024-06-24 DIAGNOSIS — C79.51 SECONDARY MALIGNANT NEOPLASM OF BONE: ICD-10-CM

## 2024-06-24 DIAGNOSIS — C74.00 MALIGNANT NEOPLASM OF ADRENAL CORTEX, UNSPECIFIED LATERALITY: Primary | ICD-10-CM

## 2024-06-24 DIAGNOSIS — C74.02 MALIGNANT NEOPLASM OF CORTEX OF LEFT ADRENAL GLAND: ICD-10-CM

## 2024-06-24 DIAGNOSIS — E07.9 THYROID DISEASE: ICD-10-CM

## 2024-06-24 DIAGNOSIS — Z79.899 OTHER LONG TERM (CURRENT) DRUG THERAPY: ICD-10-CM

## 2024-06-24 PROCEDURE — 99214 OFFICE O/P EST MOD 30 MIN: CPT | Mod: PBBFAC | Performed by: INTERNAL MEDICINE

## 2024-06-24 PROCEDURE — 36415 COLL VENOUS BLD VENIPUNCTURE: CPT | Performed by: INTERNAL MEDICINE

## 2024-06-24 PROCEDURE — 99999 PR PBB SHADOW E&M-EST. PATIENT-LVL IV: CPT | Mod: PBBFAC,,, | Performed by: INTERNAL MEDICINE

## 2024-06-24 PROCEDURE — 99214 OFFICE O/P EST MOD 30 MIN: CPT | Mod: S$PBB,,, | Performed by: INTERNAL MEDICINE

## 2024-07-01 DIAGNOSIS — F41.9 ANXIETY: ICD-10-CM

## 2024-07-01 DIAGNOSIS — G89.3 CANCER ASSOCIATED PAIN: ICD-10-CM

## 2024-07-01 RX ORDER — HYDROCODONE BITARTRATE AND ACETAMINOPHEN 7.5; 325 MG/1; MG/1
1 TABLET ORAL EVERY 6 HOURS PRN
Qty: 60 TABLET | Refills: 0 | Status: SHIPPED | OUTPATIENT
Start: 2024-07-01

## 2024-07-01 RX ORDER — ALPRAZOLAM 0.25 MG/1
0.25 TABLET ORAL 3 TIMES DAILY PRN
Qty: 60 TABLET | Refills: 1 | Status: SHIPPED | OUTPATIENT
Start: 2024-07-01 | End: 2025-07-01

## 2024-07-03 ENCOUNTER — TELEPHONE (OUTPATIENT)
Dept: HEMATOLOGY/ONCOLOGY | Facility: CLINIC | Age: 64
End: 2024-07-03
Payer: MEDICARE

## 2024-07-03 DIAGNOSIS — C74.00 MALIGNANT NEOPLASM OF ADRENAL CORTEX, UNSPECIFIED LATERALITY: ICD-10-CM

## 2024-07-03 DIAGNOSIS — R93.5 ABNORMAL CT SCAN, PELVIS: Primary | ICD-10-CM

## 2024-07-16 ENCOUNTER — HOSPITAL ENCOUNTER (OUTPATIENT)
Dept: RADIOLOGY | Facility: HOSPITAL | Age: 64
Discharge: HOME | End: 2024-07-16
Attending: INTERNAL MEDICINE
Payer: MEDICARE

## 2024-07-16 ENCOUNTER — HOSPITAL ENCOUNTER (OUTPATIENT)
Dept: RADIOLOGY | Facility: HOSPITAL | Age: 64
Discharge: HOME | End: 2024-07-16
Attending: RADIOLOGY
Payer: MEDICARE

## 2024-07-16 VITALS
HEART RATE: 94 BPM | OXYGEN SATURATION: 98 % | TEMPERATURE: 97 F | SYSTOLIC BLOOD PRESSURE: 148 MMHG | DIASTOLIC BLOOD PRESSURE: 79 MMHG

## 2024-07-16 VITALS
BODY MASS INDEX: 21.68 KG/M2 | RESPIRATION RATE: 16 BRPM | WEIGHT: 127 LBS | DIASTOLIC BLOOD PRESSURE: 93 MMHG | HEIGHT: 64 IN | OXYGEN SATURATION: 97 % | SYSTOLIC BLOOD PRESSURE: 153 MMHG | HEART RATE: 77 BPM

## 2024-07-16 DIAGNOSIS — C74.00 MALIGNANT NEOPLASM OF ADRENAL CORTEX, UNSPECIFIED LATERALITY: ICD-10-CM

## 2024-07-16 DIAGNOSIS — C74.02 ADRENOCORTICAL CANCER, LEFT: ICD-10-CM

## 2024-07-16 DIAGNOSIS — R93.5 ABNORMAL CT SCAN, PELVIS: ICD-10-CM

## 2024-07-16 LAB
ALBUMIN SERPL-MCNC: 2.9 G/DL (ref 3.4–4.8)
ALBUMIN/GLOB SERPL: 0.7 RATIO (ref 1.1–2)
ALP SERPL-CCNC: 108 UNIT/L (ref 40–150)
ALT SERPL-CCNC: 12 UNIT/L (ref 0–55)
ANION GAP SERPL CALC-SCNC: 14 MEQ/L
AST SERPL-CCNC: 17 UNIT/L (ref 5–34)
BASOPHILS # BLD AUTO: 0.05 X10(3)/MCL
BASOPHILS NFR BLD AUTO: 0.7 %
BILIRUB SERPL-MCNC: 0.2 MG/DL
BUN SERPL-MCNC: 22.5 MG/DL (ref 9.8–20.1)
CALCIUM SERPL-MCNC: 9 MG/DL (ref 8.4–10.2)
CHLORIDE SERPL-SCNC: 108 MMOL/L (ref 98–107)
CO2 SERPL-SCNC: 19 MMOL/L (ref 23–31)
CREAT SERPL-MCNC: 0.73 MG/DL (ref 0.55–1.02)
CREAT/UREA NIT SERPL: 31
EOSINOPHIL # BLD AUTO: 0.3 X10(3)/MCL (ref 0–0.9)
EOSINOPHIL NFR BLD AUTO: 4 %
ERYTHROCYTE [DISTWIDTH] IN BLOOD BY AUTOMATED COUNT: 16 % (ref 11.5–17)
GFR SERPLBLD CREATININE-BSD FMLA CKD-EPI: >60 ML/MIN/1.73/M2
GLOBULIN SER-MCNC: 3.9 GM/DL (ref 2.4–3.5)
GLUCOSE SERPL-MCNC: 96 MG/DL (ref 82–115)
HCT VFR BLD AUTO: 31.5 % (ref 37–47)
HGB BLD-MCNC: 9.8 G/DL (ref 12–16)
IMM GRANULOCYTES # BLD AUTO: 0.02 X10(3)/MCL (ref 0–0.04)
IMM GRANULOCYTES NFR BLD AUTO: 0.3 %
INR PPP: 0.9
LYMPHOCYTES # BLD AUTO: 2.08 X10(3)/MCL (ref 0.6–4.6)
LYMPHOCYTES NFR BLD AUTO: 27.6 %
MCH RBC QN AUTO: 26.8 PG (ref 27–31)
MCHC RBC AUTO-ENTMCNC: 31.1 G/DL (ref 33–36)
MCV RBC AUTO: 86.3 FL (ref 80–94)
MONOCYTES # BLD AUTO: 0.92 X10(3)/MCL (ref 0.1–1.3)
MONOCYTES NFR BLD AUTO: 12.2 %
NEUTROPHILS # BLD AUTO: 4.17 X10(3)/MCL (ref 2.1–9.2)
NEUTROPHILS NFR BLD AUTO: 55.2 %
NRBC BLD AUTO-RTO: 0 %
PLATELET # BLD AUTO: 431 X10(3)/MCL (ref 130–400)
PMV BLD AUTO: 10 FL (ref 7.4–10.4)
POTASSIUM SERPL-SCNC: 3.7 MMOL/L (ref 3.5–5.1)
PROT SERPL-MCNC: 6.8 GM/DL (ref 5.8–7.6)
PROTHROMBIN TIME: 12.1 SECONDS (ref 12.5–14.5)
RBC # BLD AUTO: 3.65 X10(6)/MCL (ref 4.2–5.4)
SODIUM SERPL-SCNC: 141 MMOL/L (ref 136–145)
WBC # BLD AUTO: 7.54 X10(3)/MCL (ref 4.5–11.5)

## 2024-07-16 PROCEDURE — 77012 CT SCAN FOR NEEDLE BIOPSY: CPT | Mod: TC

## 2024-07-16 PROCEDURE — 76856 US EXAM PELVIC COMPLETE: CPT | Mod: TC

## 2024-07-16 PROCEDURE — 80053 COMPREHEN METABOLIC PANEL: CPT | Performed by: RADIOLOGY

## 2024-07-16 PROCEDURE — 63600175 PHARM REV CODE 636 W HCPCS: Performed by: RADIOLOGY

## 2024-07-16 PROCEDURE — 25000003 PHARM REV CODE 250: Performed by: RADIOLOGY

## 2024-07-16 PROCEDURE — 88307 TISSUE EXAM BY PATHOLOGIST: CPT | Performed by: INTERNAL MEDICINE

## 2024-07-16 PROCEDURE — 88342 IMHCHEM/IMCYTCHM 1ST ANTB: CPT

## 2024-07-16 PROCEDURE — 99153 MOD SED SAME PHYS/QHP EA: CPT

## 2024-07-16 PROCEDURE — 36415 COLL VENOUS BLD VENIPUNCTURE: CPT | Performed by: RADIOLOGY

## 2024-07-16 PROCEDURE — 99152 MOD SED SAME PHYS/QHP 5/>YRS: CPT

## 2024-07-16 PROCEDURE — 88341 IMHCHEM/IMCYTCHM EA ADD ANTB: CPT

## 2024-07-16 PROCEDURE — 85610 PROTHROMBIN TIME: CPT | Performed by: RADIOLOGY

## 2024-07-16 PROCEDURE — 85025 COMPLETE CBC W/AUTO DIFF WBC: CPT | Performed by: RADIOLOGY

## 2024-07-16 PROCEDURE — 88161 CYTOPATH SMEAR OTHER SOURCE: CPT

## 2024-07-16 RX ORDER — FENTANYL CITRATE 50 UG/ML
INJECTION, SOLUTION INTRAMUSCULAR; INTRAVENOUS
Status: COMPLETED | OUTPATIENT
Start: 2024-07-16 | End: 2024-07-16

## 2024-07-16 RX ORDER — MIDAZOLAM HYDROCHLORIDE 2 MG/2ML
INJECTION, SOLUTION INTRAMUSCULAR; INTRAVENOUS
Status: COMPLETED | OUTPATIENT
Start: 2024-07-16 | End: 2024-07-16

## 2024-07-16 RX ORDER — FENTANYL CITRATE 50 UG/ML
INJECTION, SOLUTION INTRAMUSCULAR; INTRAVENOUS
Status: DISPENSED
Start: 2024-07-16 | End: 2024-07-16

## 2024-07-16 RX ORDER — LIDOCAINE HYDROCHLORIDE 20 MG/ML
INJECTION, SOLUTION INFILTRATION; PERINEURAL
Status: COMPLETED | OUTPATIENT
Start: 2024-07-16 | End: 2024-07-16

## 2024-07-16 RX ORDER — LIDOCAINE HYDROCHLORIDE 10 MG/ML
INJECTION INFILTRATION; PERINEURAL
Status: DISPENSED
Start: 2024-07-16 | End: 2024-07-16

## 2024-07-16 RX ORDER — MIDAZOLAM HYDROCHLORIDE 2 MG/2ML
INJECTION, SOLUTION INTRAMUSCULAR; INTRAVENOUS
Status: DISPENSED
Start: 2024-07-16 | End: 2024-07-16

## 2024-07-16 RX ADMIN — MIDAZOLAM HYDROCHLORIDE 0.5 MG: 1 INJECTION, SOLUTION INTRAMUSCULAR; INTRAVENOUS at 08:07

## 2024-07-16 RX ADMIN — FENTANYL CITRATE 50 MCG: 50 INJECTION, SOLUTION INTRAMUSCULAR; INTRAVENOUS at 08:07

## 2024-07-16 RX ADMIN — MIDAZOLAM HYDROCHLORIDE 0.5 MG: 1 INJECTION, SOLUTION INTRAMUSCULAR; INTRAVENOUS at 09:07

## 2024-07-16 RX ADMIN — LIDOCAINE HYDROCHLORIDE 10 ML: 20 INJECTION, SOLUTION INFILTRATION; PERINEURAL at 08:07

## 2024-07-16 RX ADMIN — FENTANYL CITRATE 25 MCG: 50 INJECTION, SOLUTION INTRAMUSCULAR; INTRAVENOUS at 08:07

## 2024-07-16 RX ADMIN — FENTANYL CITRATE 25 MCG: 50 INJECTION, SOLUTION INTRAMUSCULAR; INTRAVENOUS at 09:07

## 2024-07-16 RX ADMIN — MIDAZOLAM HYDROCHLORIDE 1 MG: 1 INJECTION, SOLUTION INTRAMUSCULAR; INTRAVENOUS at 08:07

## 2024-07-16 NOTE — DISCHARGE SUMMARY
Radiology Post-Procedure Note    Pre Op Diagnosis: Adrenocortical carcinoma with liver lesion    Post Op Diagnosis: Same    Secondary Diagnoses:   Problem List Items Addressed This Visit    None  Visit Diagnoses       Adrenocortical cancer, left        Relevant Orders    CT Guided Needle Placement             Procedure: CT Guided Liver Mass Fiducial Marker Placement and Biopsy    Procedure performed by: Lamberto Aguilar MD    Assistant: None    Written Informed Consent Obtained: Yes    Specimen Removed: 18g core x 12    Estimated Blood Loss: <10 cc    Condition: Stable    Outcome: Patient tolerated treatment/procedure well without complication and is now ready for discharge.    Disposition: Home or Self Care    Follow Up: With primary care provider    Discharge Instructions:  No discharge procedures on file.       Time Spent On Discharge: 2 minutes

## 2024-07-16 NOTE — INTERVAL H&P NOTE
The patient has been examined and the H&P has been reviewed:    I concur with the findings and no changes have occurred since H&P was written. 65 yo F with metastatic adrenocortical carcinoma presenting for fiducial markers and biopsy of liver lesion.        There are no hospital problems to display for this patient.

## 2024-07-19 LAB — PSYCHE PATHOLOGY RESULT: NORMAL

## 2024-07-22 ENCOUNTER — APPOINTMENT (OUTPATIENT)
Dept: RADIATION THERAPY | Facility: HOSPITAL | Age: 64
End: 2024-07-22
Attending: STUDENT IN AN ORGANIZED HEALTH CARE EDUCATION/TRAINING PROGRAM
Payer: MEDICARE

## 2024-07-22 ENCOUNTER — LAB VISIT (OUTPATIENT)
Dept: LAB | Facility: HOSPITAL | Age: 64
End: 2024-07-22
Attending: INTERNAL MEDICINE
Payer: MEDICARE

## 2024-07-22 DIAGNOSIS — C74.02 MALIGNANT NEOPLASM OF CORTEX OF LEFT ADRENAL GLAND: ICD-10-CM

## 2024-07-22 DIAGNOSIS — D64.9 ANEMIA, UNSPECIFIED TYPE: Primary | ICD-10-CM

## 2024-07-22 DIAGNOSIS — E07.9 THYROID DISEASE: ICD-10-CM

## 2024-07-22 DIAGNOSIS — C74.00 MALIGNANT NEOPLASM OF ADRENAL CORTEX, UNSPECIFIED LATERALITY: ICD-10-CM

## 2024-07-22 DIAGNOSIS — Z79.899 OTHER LONG TERM (CURRENT) DRUG THERAPY: ICD-10-CM

## 2024-07-22 DIAGNOSIS — D64.9 ANEMIA, UNSPECIFIED TYPE: ICD-10-CM

## 2024-07-22 DIAGNOSIS — C79.51 SECONDARY MALIGNANT NEOPLASM OF BONE: ICD-10-CM

## 2024-07-22 LAB
ALBUMIN SERPL-MCNC: 2.8 G/DL (ref 3.4–4.8)
ALBUMIN/GLOB SERPL: 0.9 RATIO (ref 1.1–2)
ALP SERPL-CCNC: 118 UNIT/L (ref 40–150)
ALT SERPL-CCNC: 17 UNIT/L (ref 0–55)
ANION GAP SERPL CALC-SCNC: 9 MEQ/L
AST SERPL-CCNC: 19 UNIT/L (ref 5–34)
BASOPHILS # BLD AUTO: 0.04 X10(3)/MCL
BASOPHILS NFR BLD AUTO: 0.5 %
BILIRUB SERPL-MCNC: 0.2 MG/DL
BUN SERPL-MCNC: 22.4 MG/DL (ref 9.8–20.1)
CALCIUM SERPL-MCNC: 9.1 MG/DL (ref 8.4–10.2)
CHLORIDE SERPL-SCNC: 104 MMOL/L (ref 98–107)
CHOLEST SERPL-MCNC: 189 MG/DL
CHOLEST/HDLC SERPL: 2 {RATIO} (ref 0–5)
CO2 SERPL-SCNC: 26 MMOL/L (ref 23–31)
CORTIS SERPL-SCNC: 41.9 UG/DL
CREAT SERPL-MCNC: 0.68 MG/DL (ref 0.55–1.02)
CREAT/UREA NIT SERPL: 33
EOSINOPHIL # BLD AUTO: 0.26 X10(3)/MCL (ref 0–0.9)
EOSINOPHIL NFR BLD AUTO: 3.2 %
ERYTHROCYTE [DISTWIDTH] IN BLOOD BY AUTOMATED COUNT: 15.7 % (ref 11.5–17)
FERRITIN SERPL-MCNC: 21.02 NG/ML (ref 4.63–204)
GFR SERPLBLD CREATININE-BSD FMLA CKD-EPI: >60 ML/MIN/1.73/M2
GLOBULIN SER-MCNC: 3.2 GM/DL (ref 2.4–3.5)
GLUCOSE SERPL-MCNC: 95 MG/DL (ref 82–115)
HCT VFR BLD AUTO: 29.1 % (ref 37–47)
HDLC SERPL-MCNC: 79 MG/DL (ref 35–60)
HGB BLD-MCNC: 9.3 G/DL (ref 12–16)
IMM GRANULOCYTES # BLD AUTO: 0.02 X10(3)/MCL (ref 0–0.04)
IMM GRANULOCYTES NFR BLD AUTO: 0.2 %
IRON SATN MFR SERPL: 9 % (ref 20–50)
IRON SERPL-MCNC: 37 UG/DL (ref 50–170)
LDLC SERPL CALC-MCNC: 79 MG/DL (ref 50–140)
LYMPHOCYTES # BLD AUTO: 1.32 X10(3)/MCL (ref 0.6–4.6)
LYMPHOCYTES NFR BLD AUTO: 16.2 %
MCH RBC QN AUTO: 27.1 PG (ref 27–31)
MCHC RBC AUTO-ENTMCNC: 32 G/DL (ref 33–36)
MCV RBC AUTO: 84.8 FL (ref 80–94)
MONOCYTES # BLD AUTO: 0.88 X10(3)/MCL (ref 0.1–1.3)
MONOCYTES NFR BLD AUTO: 10.8 %
NEUTROPHILS # BLD AUTO: 5.65 X10(3)/MCL (ref 2.1–9.2)
NEUTROPHILS NFR BLD AUTO: 69.1 %
PLATELET # BLD AUTO: 504 X10(3)/MCL (ref 130–400)
PMV BLD AUTO: 9.6 FL (ref 7.4–10.4)
POTASSIUM SERPL-SCNC: 3.9 MMOL/L (ref 3.5–5.1)
PROT SERPL-MCNC: 6 GM/DL (ref 5.8–7.6)
RBC # BLD AUTO: 3.43 X10(6)/MCL (ref 4.2–5.4)
SODIUM SERPL-SCNC: 139 MMOL/L (ref 136–145)
T4 FREE SERPL-MCNC: 0.84 NG/DL (ref 0.7–1.48)
TIBC SERPL-MCNC: 378 UG/DL (ref 70–310)
TIBC SERPL-MCNC: 415 UG/DL (ref 250–450)
TRANSFERRIN SERPL-MCNC: 408 MG/DL (ref 173–360)
TRIGL SERPL-MCNC: 157 MG/DL (ref 37–140)
TSH SERPL-ACNC: 0.48 UIU/ML (ref 0.35–4.94)
VLDLC SERPL CALC-MCNC: 31 MG/DL
WBC # BLD AUTO: 8.17 X10(3)/MCL (ref 4.5–11.5)

## 2024-07-22 PROCEDURE — 84439 ASSAY OF FREE THYROXINE: CPT

## 2024-07-22 PROCEDURE — 80061 LIPID PANEL: CPT

## 2024-07-22 PROCEDURE — 82728 ASSAY OF FERRITIN: CPT

## 2024-07-22 PROCEDURE — 82024 ASSAY OF ACTH: CPT

## 2024-07-22 PROCEDURE — 82088 ASSAY OF ALDOSTERONE: CPT

## 2024-07-22 PROCEDURE — 82530 CORTISOL FREE: CPT

## 2024-07-22 PROCEDURE — 36415 COLL VENOUS BLD VENIPUNCTURE: CPT

## 2024-07-22 PROCEDURE — 82627 DEHYDROEPIANDROSTERONE: CPT

## 2024-07-22 PROCEDURE — 83540 ASSAY OF IRON: CPT

## 2024-07-22 PROCEDURE — 82533 TOTAL CORTISOL: CPT

## 2024-07-22 PROCEDURE — 84244 ASSAY OF RENIN: CPT

## 2024-07-22 PROCEDURE — 80053 COMPREHEN METABOLIC PANEL: CPT

## 2024-07-22 PROCEDURE — 77332 RADIATION TREATMENT AID(S): CPT | Performed by: RADIOLOGY

## 2024-07-22 PROCEDURE — 84443 ASSAY THYROID STIM HORMONE: CPT

## 2024-07-22 PROCEDURE — 85025 COMPLETE CBC W/AUTO DIFF WBC: CPT

## 2024-07-22 PROCEDURE — 77334 RADIATION TREATMENT AID(S): CPT | Performed by: RADIOLOGY

## 2024-07-23 LAB
ACTH PLAS-MCNC: <5 PG/ML
DHEA-S SERPL-MCNC: <5 MCG/DL (ref 9.7–159)

## 2024-07-25 LAB — ALDOST SERPL-MCNC: <4 NG/DL

## 2024-07-26 LAB
COLLECT DURATION TIME UR: 24 H
CORTIS 24H UR-MRATE: 121 MCG/24 H (ref 3.5–45)
RENIN PLAS-CCNC: 6 NG/ML/H
SPECIMEN VOL 24H UR: 2050 ML

## 2024-07-30 PROCEDURE — 77300 RADIATION THERAPY DOSE PLAN: CPT | Performed by: RADIOLOGY

## 2024-07-30 PROCEDURE — 77338 DESIGN MLC DEVICE FOR IMRT: CPT | Performed by: RADIOLOGY

## 2024-07-30 PROCEDURE — 77301 RADIOTHERAPY DOSE PLAN IMRT: CPT | Performed by: RADIOLOGY

## 2024-08-01 ENCOUNTER — APPOINTMENT (OUTPATIENT)
Dept: RADIATION THERAPY | Facility: HOSPITAL | Age: 64
End: 2024-08-01
Attending: RADIOLOGY
Payer: MEDICARE

## 2024-08-01 DIAGNOSIS — C74.00 MALIGNANT NEOPLASM OF ADRENAL CORTEX, UNSPECIFIED LATERALITY: ICD-10-CM

## 2024-08-01 DIAGNOSIS — M79.2 NEUROPATHIC PAIN DUE TO RADIATION: ICD-10-CM

## 2024-08-01 DIAGNOSIS — C79.51 SECONDARY MALIGNANT NEOPLASM OF BONE: ICD-10-CM

## 2024-08-01 DIAGNOSIS — G89.3 CANCER RELATED PAIN: ICD-10-CM

## 2024-08-01 PROCEDURE — 77373 STRTCTC BDY RAD THER TX DLVR: CPT | Performed by: RADIOLOGY

## 2024-08-01 RX ORDER — DULOXETIN HYDROCHLORIDE 30 MG/1
30 CAPSULE, DELAYED RELEASE ORAL
Qty: 90 CAPSULE | Refills: 3 | Status: SHIPPED | OUTPATIENT
Start: 2024-08-01

## 2024-08-02 PROCEDURE — 77373 STRTCTC BDY RAD THER TX DLVR: CPT | Performed by: RADIOLOGY

## 2024-08-05 PROCEDURE — 77336 RADIATION PHYSICS CONSULT: CPT | Performed by: RADIOLOGY

## 2024-08-05 PROCEDURE — 77373 STRTCTC BDY RAD THER TX DLVR: CPT | Performed by: RADIOLOGY

## 2024-08-07 PROCEDURE — 77373 STRTCTC BDY RAD THER TX DLVR: CPT | Performed by: RADIOLOGY

## 2024-08-08 PROCEDURE — 77373 STRTCTC BDY RAD THER TX DLVR: CPT | Performed by: RADIOLOGY

## 2024-08-22 ENCOUNTER — LAB VISIT (OUTPATIENT)
Dept: LAB | Facility: HOSPITAL | Age: 64
End: 2024-08-22
Attending: INTERNAL MEDICINE
Payer: MEDICARE

## 2024-08-22 DIAGNOSIS — C74.02 MALIGNANT NEOPLASM OF CORTEX OF LEFT ADRENAL GLAND: ICD-10-CM

## 2024-08-22 DIAGNOSIS — E07.9 THYROID DISEASE: ICD-10-CM

## 2024-08-22 DIAGNOSIS — Z79.899 OTHER LONG TERM (CURRENT) DRUG THERAPY: ICD-10-CM

## 2024-08-22 DIAGNOSIS — C79.51 SECONDARY MALIGNANT NEOPLASM OF BONE: ICD-10-CM

## 2024-08-22 DIAGNOSIS — C74.00 MALIGNANT NEOPLASM OF ADRENAL CORTEX, UNSPECIFIED LATERALITY: ICD-10-CM

## 2024-08-22 LAB
ALBUMIN SERPL-MCNC: 2.9 G/DL (ref 3.4–4.8)
ALBUMIN/GLOB SERPL: 0.9 RATIO (ref 1.1–2)
ALP SERPL-CCNC: 114 UNIT/L (ref 40–150)
ALT SERPL-CCNC: 11 UNIT/L (ref 0–55)
ANION GAP SERPL CALC-SCNC: 10 MEQ/L
AST SERPL-CCNC: 21 UNIT/L (ref 5–34)
BASOPHILS # BLD AUTO: 0.02 X10(3)/MCL
BASOPHILS NFR BLD AUTO: 0.4 %
BILIRUB SERPL-MCNC: 0.1 MG/DL
BUN SERPL-MCNC: 20.6 MG/DL (ref 9.8–20.1)
CALCIUM SERPL-MCNC: 9 MG/DL (ref 8.4–10.2)
CHLORIDE SERPL-SCNC: 108 MMOL/L (ref 98–107)
CHOLEST SERPL-MCNC: 206 MG/DL
CHOLEST/HDLC SERPL: 3 {RATIO} (ref 0–5)
CO2 SERPL-SCNC: 24 MMOL/L (ref 23–31)
CORTIS SERPL-SCNC: 19 UG/DL
CREAT SERPL-MCNC: 0.71 MG/DL (ref 0.55–1.02)
CREAT/UREA NIT SERPL: 29
EOSINOPHIL # BLD AUTO: 0.13 X10(3)/MCL (ref 0–0.9)
EOSINOPHIL NFR BLD AUTO: 2.4 %
ERYTHROCYTE [DISTWIDTH] IN BLOOD BY AUTOMATED COUNT: 22.8 % (ref 11.5–17)
GFR SERPLBLD CREATININE-BSD FMLA CKD-EPI: >60 ML/MIN/1.73/M2
GLOBULIN SER-MCNC: 3.3 GM/DL (ref 2.4–3.5)
GLUCOSE SERPL-MCNC: 107 MG/DL (ref 82–115)
HCT VFR BLD AUTO: 32.8 % (ref 37–47)
HDLC SERPL-MCNC: 73 MG/DL (ref 35–60)
HGB BLD-MCNC: 10.7 G/DL (ref 12–16)
IMM GRANULOCYTES # BLD AUTO: 0.03 X10(3)/MCL (ref 0–0.04)
IMM GRANULOCYTES NFR BLD AUTO: 0.6 %
LDLC SERPL CALC-MCNC: 92 MG/DL (ref 50–140)
LYMPHOCYTES # BLD AUTO: 0.47 X10(3)/MCL (ref 0.6–4.6)
LYMPHOCYTES NFR BLD AUTO: 8.8 %
MCH RBC QN AUTO: 28.9 PG (ref 27–31)
MCHC RBC AUTO-ENTMCNC: 32.6 G/DL (ref 33–36)
MCV RBC AUTO: 88.6 FL (ref 80–94)
MONOCYTES # BLD AUTO: 0.51 X10(3)/MCL (ref 0.1–1.3)
MONOCYTES NFR BLD AUTO: 9.5 %
NEUTROPHILS # BLD AUTO: 4.21 X10(3)/MCL (ref 2.1–9.2)
NEUTROPHILS NFR BLD AUTO: 78.3 %
PLATELET # BLD AUTO: 316 X10(3)/MCL (ref 130–400)
PMV BLD AUTO: 9.4 FL (ref 7.4–10.4)
POTASSIUM SERPL-SCNC: 4.4 MMOL/L (ref 3.5–5.1)
PROT SERPL-MCNC: 6.2 GM/DL (ref 5.8–7.6)
RBC # BLD AUTO: 3.7 X10(6)/MCL (ref 4.2–5.4)
SODIUM SERPL-SCNC: 142 MMOL/L (ref 136–145)
T4 FREE SERPL-MCNC: 0.7 NG/DL (ref 0.7–1.48)
TRIGL SERPL-MCNC: 204 MG/DL (ref 37–140)
TSH SERPL-ACNC: 0.23 UIU/ML (ref 0.35–4.94)
VLDLC SERPL CALC-MCNC: 41 MG/DL
WBC # BLD AUTO: 5.37 X10(3)/MCL (ref 4.5–11.5)

## 2024-08-22 PROCEDURE — 82627 DEHYDROEPIANDROSTERONE: CPT

## 2024-08-22 PROCEDURE — 80061 LIPID PANEL: CPT

## 2024-08-22 PROCEDURE — 82533 TOTAL CORTISOL: CPT

## 2024-08-22 PROCEDURE — 84443 ASSAY THYROID STIM HORMONE: CPT

## 2024-08-22 PROCEDURE — 85025 COMPLETE CBC W/AUTO DIFF WBC: CPT

## 2024-08-22 PROCEDURE — 82530 CORTISOL FREE: CPT

## 2024-08-22 PROCEDURE — 82024 ASSAY OF ACTH: CPT

## 2024-08-22 PROCEDURE — 84244 ASSAY OF RENIN: CPT

## 2024-08-22 PROCEDURE — 80053 COMPREHEN METABOLIC PANEL: CPT

## 2024-08-22 PROCEDURE — 80299 QUANTITATIVE ASSAY DRUG: CPT

## 2024-08-22 PROCEDURE — 82088 ASSAY OF ALDOSTERONE: CPT

## 2024-08-22 PROCEDURE — 84439 ASSAY OF FREE THYROXINE: CPT

## 2024-08-22 PROCEDURE — 36415 COLL VENOUS BLD VENIPUNCTURE: CPT

## 2024-08-23 LAB
ACTH PLAS-MCNC: <5 PG/ML
DHEA-S SERPL-MCNC: <5 MCG/DL (ref 9.7–159)

## 2024-08-27 LAB
ALDOST SERPL-MCNC: <4 NG/DL
COLLECT DURATION TIME UR: 24 H
CORTIS 24H UR-MRATE: 94 MCG/24 H (ref 3.5–45)
RENIN PLAS-CCNC: 3.3 NG/ML/H
SPECIMEN VOL 24H UR: 2400 ML

## 2024-08-29 LAB — MITOTANE SERPL-MCNC: 8.8 UG/ML

## 2024-09-05 DIAGNOSIS — C79.51 SECONDARY MALIGNANT NEOPLASM OF BONE: ICD-10-CM

## 2024-09-05 DIAGNOSIS — C74.00 MALIGNANT NEOPLASM OF ADRENAL CORTEX, UNSPECIFIED LATERALITY: Primary | ICD-10-CM

## 2024-09-19 ENCOUNTER — LAB VISIT (OUTPATIENT)
Dept: LAB | Facility: HOSPITAL | Age: 64
End: 2024-09-19
Attending: INTERNAL MEDICINE
Payer: MEDICARE

## 2024-09-19 DIAGNOSIS — C74.02 MALIGNANT NEOPLASM OF CORTEX OF LEFT ADRENAL GLAND: ICD-10-CM

## 2024-09-19 DIAGNOSIS — C79.51 SECONDARY MALIGNANT NEOPLASM OF BONE: ICD-10-CM

## 2024-09-19 DIAGNOSIS — C74.00 MALIGNANT NEOPLASM OF ADRENAL CORTEX, UNSPECIFIED LATERALITY: ICD-10-CM

## 2024-09-19 DIAGNOSIS — E07.9 THYROID DISEASE: ICD-10-CM

## 2024-09-19 DIAGNOSIS — Z79.899 OTHER LONG TERM (CURRENT) DRUG THERAPY: ICD-10-CM

## 2024-09-19 LAB
ALBUMIN SERPL-MCNC: 3.2 G/DL (ref 3.4–4.8)
ALBUMIN/GLOB SERPL: 1.1 RATIO (ref 1.1–2)
ALP SERPL-CCNC: 116 UNIT/L (ref 40–150)
ALT SERPL-CCNC: 15 UNIT/L (ref 0–55)
ANION GAP SERPL CALC-SCNC: 12 MEQ/L
AST SERPL-CCNC: 21 UNIT/L (ref 5–34)
BASOPHILS # BLD AUTO: 0.04 X10(3)/MCL
BASOPHILS NFR BLD AUTO: 0.6 %
BILIRUB SERPL-MCNC: 0.2 MG/DL
BUN SERPL-MCNC: 18.5 MG/DL (ref 9.8–20.1)
CALCIUM SERPL-MCNC: 9.3 MG/DL (ref 8.4–10.2)
CHLORIDE SERPL-SCNC: 107 MMOL/L (ref 98–107)
CHOLEST SERPL-MCNC: 191 MG/DL
CHOLEST/HDLC SERPL: 3 {RATIO} (ref 0–5)
CO2 SERPL-SCNC: 26 MMOL/L (ref 23–31)
CORTIS SERPL-SCNC: 25.1 UG/DL
CREAT SERPL-MCNC: 0.71 MG/DL (ref 0.55–1.02)
CREAT/UREA NIT SERPL: 26
EOSINOPHIL # BLD AUTO: 0.16 X10(3)/MCL (ref 0–0.9)
EOSINOPHIL NFR BLD AUTO: 2.6 %
ERYTHROCYTE [DISTWIDTH] IN BLOOD BY AUTOMATED COUNT: ABNORMAL %
GFR SERPLBLD CREATININE-BSD FMLA CKD-EPI: >60 ML/MIN/1.73/M2
GLOBULIN SER-MCNC: 2.9 GM/DL (ref 2.4–3.5)
GLUCOSE SERPL-MCNC: 101 MG/DL (ref 82–115)
HCT VFR BLD AUTO: 34.6 % (ref 37–47)
HDLC SERPL-MCNC: 65 MG/DL (ref 35–60)
HGB BLD-MCNC: 11.2 G/DL (ref 12–16)
IMM GRANULOCYTES # BLD AUTO: 0.01 X10(3)/MCL (ref 0–0.04)
IMM GRANULOCYTES NFR BLD AUTO: 0.2 %
LDLC SERPL CALC-MCNC: 95 MG/DL (ref 50–140)
LYMPHOCYTES # BLD AUTO: 0.83 X10(3)/MCL (ref 0.6–4.6)
LYMPHOCYTES NFR BLD AUTO: 13.5 %
MCH RBC QN AUTO: 30.1 PG (ref 27–31)
MCHC RBC AUTO-ENTMCNC: 32.4 G/DL (ref 33–36)
MCV RBC AUTO: 93 FL (ref 80–94)
MONOCYTES # BLD AUTO: 0.62 X10(3)/MCL (ref 0.1–1.3)
MONOCYTES NFR BLD AUTO: 10.1 %
NEUTROPHILS # BLD AUTO: 4.5 X10(3)/MCL (ref 2.1–9.2)
NEUTROPHILS NFR BLD AUTO: 73 %
PLATELET # BLD AUTO: 357 X10(3)/MCL (ref 130–400)
PMV BLD AUTO: 9.7 FL (ref 7.4–10.4)
POTASSIUM SERPL-SCNC: 3.7 MMOL/L (ref 3.5–5.1)
PROT SERPL-MCNC: 6.1 GM/DL (ref 5.8–7.6)
RBC # BLD AUTO: 3.72 X10(6)/MCL (ref 4.2–5.4)
SODIUM SERPL-SCNC: 145 MMOL/L (ref 136–145)
T4 FREE SERPL-MCNC: 1.01 NG/DL (ref 0.7–1.48)
TRIGL SERPL-MCNC: 155 MG/DL (ref 37–140)
TSH SERPL-ACNC: 0.25 UIU/ML (ref 0.35–4.94)
VLDLC SERPL CALC-MCNC: 31 MG/DL
WBC # BLD AUTO: 6.16 X10(3)/MCL (ref 4.5–11.5)

## 2024-09-19 PROCEDURE — 82533 TOTAL CORTISOL: CPT

## 2024-09-19 PROCEDURE — 84439 ASSAY OF FREE THYROXINE: CPT

## 2024-09-19 PROCEDURE — 82024 ASSAY OF ACTH: CPT

## 2024-09-19 PROCEDURE — 82088 ASSAY OF ALDOSTERONE: CPT

## 2024-09-19 PROCEDURE — 84443 ASSAY THYROID STIM HORMONE: CPT

## 2024-09-19 PROCEDURE — 80299 QUANTITATIVE ASSAY DRUG: CPT

## 2024-09-19 PROCEDURE — 82627 DEHYDROEPIANDROSTERONE: CPT

## 2024-09-19 PROCEDURE — 36415 COLL VENOUS BLD VENIPUNCTURE: CPT

## 2024-09-19 PROCEDURE — 80053 COMPREHEN METABOLIC PANEL: CPT

## 2024-09-19 PROCEDURE — 80061 LIPID PANEL: CPT

## 2024-09-19 PROCEDURE — 85025 COMPLETE CBC W/AUTO DIFF WBC: CPT

## 2024-09-20 LAB
ACTH PLAS-MCNC: <5 PG/ML
DHEA-S SERPL-MCNC: <5 MCG/DL (ref 9.7–159)

## 2024-09-24 LAB — ALDOST SERPL-MCNC: <4 NG/DL

## 2024-09-25 LAB — MITOTANE SERPL-MCNC: 8.3 UG/ML

## 2024-10-01 PROCEDURE — 82530 CORTISOL FREE: CPT | Performed by: INTERNAL MEDICINE

## 2024-10-08 ENCOUNTER — HOSPITAL ENCOUNTER (OUTPATIENT)
Dept: RADIOLOGY | Facility: HOSPITAL | Age: 64
Discharge: HOME | End: 2024-10-08
Attending: NURSE PRACTITIONER
Payer: MEDICARE

## 2024-10-08 DIAGNOSIS — C74.00 MALIGNANT NEOPLASM OF ADRENAL CORTEX, UNSPECIFIED LATERALITY: ICD-10-CM

## 2024-10-08 DIAGNOSIS — C79.51 SECONDARY MALIGNANT NEOPLASM OF BONE: ICD-10-CM

## 2024-10-08 PROCEDURE — 25500020 PHARM REV CODE 255: Performed by: NURSE PRACTITIONER

## 2024-10-08 PROCEDURE — 72158 MRI LUMBAR SPINE W/O & W/DYE: CPT | Mod: TC

## 2024-10-08 PROCEDURE — A9577 INJ MULTIHANCE: HCPCS | Performed by: NURSE PRACTITIONER

## 2024-10-08 RX ADMIN — GADOBENATE DIMEGLUMINE 11 ML: 529 INJECTION, SOLUTION INTRAVENOUS at 08:10

## 2024-10-09 ENCOUNTER — INFUSION (OUTPATIENT)
Dept: INFUSION THERAPY | Facility: HOSPITAL | Age: 64
End: 2024-10-09
Attending: INTERNAL MEDICINE
Payer: MEDICARE

## 2024-10-09 VITALS
HEART RATE: 69 BPM | OXYGEN SATURATION: 99 % | SYSTOLIC BLOOD PRESSURE: 171 MMHG | DIASTOLIC BLOOD PRESSURE: 95 MMHG | TEMPERATURE: 98 F

## 2024-10-09 DIAGNOSIS — M81.0 OSTEOPOROSIS, UNSPECIFIED OSTEOPOROSIS TYPE, UNSPECIFIED PATHOLOGICAL FRACTURE PRESENCE: Primary | ICD-10-CM

## 2024-10-09 PROCEDURE — 63600175 PHARM REV CODE 636 W HCPCS: Mod: JZ,JG | Performed by: NURSE PRACTITIONER

## 2024-10-09 PROCEDURE — 96372 THER/PROPH/DIAG INJ SC/IM: CPT

## 2024-10-09 RX ADMIN — DENOSUMAB 60 MG: 60 INJECTION SUBCUTANEOUS at 08:10

## 2024-10-09 NOTE — PLAN OF CARE
"Pt received prolia inj; tolerated well.    Bp elevated this am, pt reports "just took own am bp meds" & her sbp normally runs near 160s, has a cuff at home and wants to go home and monitor, will get with pcp if remains high.  "

## 2024-10-10 ENCOUNTER — TELEPHONE (OUTPATIENT)
Dept: NEUROSURGERY | Facility: CLINIC | Age: 64
End: 2024-10-10
Payer: MEDICARE

## 2024-10-10 DIAGNOSIS — M54.16 LUMBAR RADICULITIS: Primary | ICD-10-CM

## 2024-10-10 NOTE — TELEPHONE ENCOUNTER
Patient of Dr Sandoval's called and ask if you could please review her MRI 10/8/24 and CT 10/1/24 - images/reports in chart  -  over the last couple of weeks she has had numbness and tingling in her right thigh and is sometimes also having numbness/tingling in her left thigh. Patient stated that she has not had any surgeries since we last saw her, just radiation.       Last OV w/Dr Sandoval 8/3/22 for 3 week Post Op eval - PRN. OV Note HPI states:  Shreya Reyes is a 62 y.o.-year-old female who presents today for post-operative follow-up.  She is s/p right C5-6, C6-7 laminoforaminotomies that was done on 7/13/2022. Overall she is doing well with significant improvement in preoperative symptoms. Intermittent tingling in 1st and 3rd finger on right side but says this is very infrequent since surgery. She reports she has been able to increase activity level, tolerating well. There have been no postoperative complications.      She is known to Dr. Sandoval following L1 kyphoplasty with spine barbara and L3 kyphoplasty on 4/8/2022; and T11, T12 kyphoplasty on 4/21/2022.       Best CB number is [phone]

## 2024-10-11 RX ORDER — GABAPENTIN 300 MG/1
300 CAPSULE ORAL 3 TIMES DAILY
Qty: 90 EACH | Refills: 11 | Status: SHIPPED | OUTPATIENT
Start: 2024-10-11 | End: 2025-10-11

## 2024-10-11 NOTE — TELEPHONE ENCOUNTER
I spoke to the patient.  Lower back pain is not a big problem for her.  Two weeks ago, she developed numbness tingling and pain along the right anterior thigh and left lateral thigh.  MRI of the lumbar spine was obtained on 10/08/2024.  This study shows slight increase in loss of height at L4.  There is retropulsion of bone at this level.  She does have facet hypertrophy throughout the lumbar spine.  There is foraminal stenosis at L3-4, L4-5, and L5-S1 which is worse on the left than right.    I have encouraged the patient that maybe this is something that can past.  We discussed gabapentin.  I sent in the medication.  She will start with once a day and increase to 3 times a day as needed.  She will touch base with me again in a month to let me know how she is doing.    Dr. Sandoval, she would like your opinion on her MRI.  Please let me know what you think.

## 2024-10-17 ENCOUNTER — LAB VISIT (OUTPATIENT)
Dept: LAB | Facility: HOSPITAL | Age: 64
End: 2024-10-17
Attending: INTERNAL MEDICINE
Payer: MEDICARE

## 2024-10-17 DIAGNOSIS — C74.00 MALIGNANT NEOPLASM OF ADRENAL CORTEX, UNSPECIFIED LATERALITY: ICD-10-CM

## 2024-10-17 DIAGNOSIS — C79.51 SECONDARY MALIGNANT NEOPLASM OF BONE: ICD-10-CM

## 2024-10-17 DIAGNOSIS — Z79.899 OTHER LONG TERM (CURRENT) DRUG THERAPY: ICD-10-CM

## 2024-10-17 DIAGNOSIS — E07.9 THYROID DISEASE: ICD-10-CM

## 2024-10-17 DIAGNOSIS — C74.02 MALIGNANT NEOPLASM OF CORTEX OF LEFT ADRENAL GLAND: ICD-10-CM

## 2024-10-17 LAB
ALBUMIN SERPL-MCNC: 3.4 G/DL (ref 3.4–4.8)
ALBUMIN/GLOB SERPL: 1.1 RATIO (ref 1.1–2)
ALP SERPL-CCNC: 146 UNIT/L (ref 40–150)
ALT SERPL-CCNC: 18 UNIT/L (ref 0–55)
ANION GAP SERPL CALC-SCNC: 10 MEQ/L
AST SERPL-CCNC: 25 UNIT/L (ref 5–34)
BASOPHILS # BLD AUTO: 0.05 X10(3)/MCL
BASOPHILS NFR BLD AUTO: 0.7 %
BILIRUB SERPL-MCNC: 0.2 MG/DL
BUN SERPL-MCNC: 20 MG/DL (ref 9.8–20.1)
CALCIUM SERPL-MCNC: 9.8 MG/DL (ref 8.4–10.2)
CHLORIDE SERPL-SCNC: 107 MMOL/L (ref 98–107)
CHOLEST SERPL-MCNC: 219 MG/DL
CHOLEST/HDLC SERPL: 3 {RATIO} (ref 0–5)
CO2 SERPL-SCNC: 25 MMOL/L (ref 23–31)
CORTIS SERPL-SCNC: 34.7 UG/DL
CREAT SERPL-MCNC: 0.7 MG/DL (ref 0.55–1.02)
CREAT/UREA NIT SERPL: 29
EOSINOPHIL # BLD AUTO: 0.21 X10(3)/MCL (ref 0–0.9)
EOSINOPHIL NFR BLD AUTO: 3 %
ERYTHROCYTE [DISTWIDTH] IN BLOOD BY AUTOMATED COUNT: 20.8 % (ref 11.5–17)
GFR SERPLBLD CREATININE-BSD FMLA CKD-EPI: >60 ML/MIN/1.73/M2
GLOBULIN SER-MCNC: 3 GM/DL (ref 2.4–3.5)
GLUCOSE SERPL-MCNC: 95 MG/DL (ref 82–115)
HCT VFR BLD AUTO: 37.1 % (ref 37–47)
HDLC SERPL-MCNC: 85 MG/DL (ref 35–60)
HGB BLD-MCNC: 12.2 G/DL (ref 12–16)
IMM GRANULOCYTES # BLD AUTO: 0.02 X10(3)/MCL (ref 0–0.04)
IMM GRANULOCYTES NFR BLD AUTO: 0.3 %
LDLC SERPL CALC-MCNC: 108 MG/DL (ref 50–140)
LYMPHOCYTES # BLD AUTO: 1.34 X10(3)/MCL (ref 0.6–4.6)
LYMPHOCYTES NFR BLD AUTO: 19.2 %
MCH RBC QN AUTO: 31.9 PG (ref 27–31)
MCHC RBC AUTO-ENTMCNC: 32.9 G/DL (ref 33–36)
MCV RBC AUTO: 97.1 FL (ref 80–94)
MONOCYTES # BLD AUTO: 0.79 X10(3)/MCL (ref 0.1–1.3)
MONOCYTES NFR BLD AUTO: 11.3 %
NEUTROPHILS # BLD AUTO: 4.56 X10(3)/MCL (ref 2.1–9.2)
NEUTROPHILS NFR BLD AUTO: 65.5 %
PLATELET # BLD AUTO: 285 X10(3)/MCL (ref 130–400)
PMV BLD AUTO: 10.2 FL (ref 7.4–10.4)
POTASSIUM SERPL-SCNC: 4.4 MMOL/L (ref 3.5–5.1)
PROT SERPL-MCNC: 6.4 GM/DL (ref 5.8–7.6)
RBC # BLD AUTO: 3.82 X10(6)/MCL (ref 4.2–5.4)
SODIUM SERPL-SCNC: 142 MMOL/L (ref 136–145)
T4 FREE SERPL-MCNC: 1.03 NG/DL (ref 0.7–1.48)
TRIGL SERPL-MCNC: 128 MG/DL (ref 37–140)
TSH SERPL-ACNC: 0.54 UIU/ML (ref 0.35–4.94)
VLDLC SERPL CALC-MCNC: 26 MG/DL
WBC # BLD AUTO: 6.97 X10(3)/MCL (ref 4.5–11.5)

## 2024-10-17 PROCEDURE — 82533 TOTAL CORTISOL: CPT

## 2024-10-17 PROCEDURE — 85025 COMPLETE CBC W/AUTO DIFF WBC: CPT

## 2024-10-17 PROCEDURE — 36415 COLL VENOUS BLD VENIPUNCTURE: CPT | Mod: 91

## 2024-10-17 PROCEDURE — 82627 DEHYDROEPIANDROSTERONE: CPT

## 2024-10-17 PROCEDURE — 84439 ASSAY OF FREE THYROXINE: CPT

## 2024-10-17 PROCEDURE — 80053 COMPREHEN METABOLIC PANEL: CPT

## 2024-10-17 PROCEDURE — 84244 ASSAY OF RENIN: CPT

## 2024-10-17 PROCEDURE — 82088 ASSAY OF ALDOSTERONE: CPT

## 2024-10-17 PROCEDURE — 80061 LIPID PANEL: CPT

## 2024-10-17 PROCEDURE — 80299 QUANTITATIVE ASSAY DRUG: CPT

## 2024-10-17 PROCEDURE — 84443 ASSAY THYROID STIM HORMONE: CPT

## 2024-10-17 PROCEDURE — 82024 ASSAY OF ACTH: CPT

## 2024-10-18 LAB — DHEA-S SERPL-MCNC: 5.7 MCG/DL (ref 9.7–159)

## 2024-10-19 LAB — ACTH PLAS-MCNC: <5 PG/ML

## 2024-10-21 ENCOUNTER — OFFICE VISIT (OUTPATIENT)
Dept: NEUROSURGERY | Facility: CLINIC | Age: 64
End: 2024-10-21
Payer: MEDICARE

## 2024-10-21 VITALS
RESPIRATION RATE: 16 BRPM | WEIGHT: 128 LBS | SYSTOLIC BLOOD PRESSURE: 136 MMHG | HEIGHT: 64 IN | HEART RATE: 90 BPM | DIASTOLIC BLOOD PRESSURE: 85 MMHG | BODY MASS INDEX: 21.85 KG/M2

## 2024-10-21 DIAGNOSIS — S32.050A CLOSED COMPRESSION FRACTURE OF L5 LUMBAR VERTEBRA, INITIAL ENCOUNTER: ICD-10-CM

## 2024-10-21 DIAGNOSIS — S32.040A CLOSED COMPRESSION FRACTURE OF L4 VERTEBRA, INITIAL ENCOUNTER: Primary | ICD-10-CM

## 2024-10-21 LAB — RENIN PLAS-CCNC: 3.9 NG/ML/H

## 2024-10-21 PROCEDURE — 99214 OFFICE O/P EST MOD 30 MIN: CPT | Mod: ,,, | Performed by: PHYSICIAN ASSISTANT

## 2024-10-21 RX ORDER — HYDROCORTISONE 20 MG/1
20 TABLET ORAL EVERY MORNING
COMMUNITY

## 2024-10-21 RX ORDER — HYDROCORTISONE 5 MG/1
5 TABLET ORAL NIGHTLY
COMMUNITY

## 2024-10-21 RX ORDER — HYDROCORTISONE 10 MG/1
10 TABLET ORAL DAILY
COMMUNITY
Start: 2024-09-26

## 2024-10-21 NOTE — PROGRESS NOTES
[facility]  History & Physical  Neurosurgery      Shreya Reyes   [id number]   1960     SUBJECTIVE:     CHIEF COMPLAINT:  Lower back and bilateral leg pain      HPI:  Shreya Reyes is a 64 y.o. female who presents for neurosurgical evaluation.  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  She has been treated with kyphoplasty by Dr. Sandoval at L1 and L3 on 04/08/2022.  This was followed by T11 and T12 kyphoplasty on 04/21/2022.  After those procedures, the patient had resolution of her back pain.  She was participating in Pilates and tolerating her activity well.  However, 6 months ago she began to have back pain again.  She was found to have a another compression fracture.  She then discontinued the Pilates.    She complains of lower back pain with pain radiating into bilateral buttocks, lateral hips, right anterior thigh, and left anterolateral thigh.  There is numbness and tingling along the right anterior thigh and bilateral feet.  Subjectively, she does not feel as though she has weakness in either lower extremity.  The pain is increased with standing and walking.  The further she walks, the worse her pain gets.  Her leg pain began on 09/16/2024.  This pain has been progressively worsening.  Her activities greatly restricted secondary to pain.  Her pain is improved somewhat in the morning after she takes ibuprofen.  By mid day, her pain is severe and nothing helps to relieve her pain.  She was also having difficulty sleeping at night secondary to the pain.  The patient denies disturbances in bowel or bladder function.  There is no difficulty with balance.       Past Medical History:   Diagnosis Date    Adrenal cancer     Closed compression fracture of L3 lumbar vertebra, sequela 5/4/2022    Closed wedge compression fracture of T11 vertebra 5/4/2022    Compression fracture of L1 vertebra     Compression fracture of L3  vertebra     Elevated liver enzymes 11/02/2021    Hyperlipidemia     Hypertension 05/04/2022    Osteoporosis     Thoracic compression fracture        Past Surgical History:   Procedure Laterality Date    ABDOMINOPLASTY      ADENOIDECTOMY  Karely     ADRENALECTOMY Left     EYE SURGERY  2021    INTRAMEDULLARY RODDING OF FEMUR Left 02/08/2023    Procedure: INSERTION, INTRAMEDULLARY LEELEE, LEFT FEMUR;  Surgeon: Richard Bolanos MD;  Location: [facility];  Service: Orthopedics;  Laterality: Left;  supine, gustavo flat with blankets  synthes TFNA, long    L1 kyphoplasty w/ spine barbara, L3 kyphoplasty      Laproscopy for excision of adrenal mass      left cataract Left     MAGNETIC RESONANCE IMAGING N/A 07/13/2022    Procedure: MRI (MAGNETIC RESONANCE IMAGING);  Surgeon: Nam Sandoval MD;  Location: [facility] OR;  Service: Neurosurgery;  Laterality: N/A;  MRI CERVICAL SPINE WITHOUT CONTRAST WITH ANESTHESIA    MINIMALLY INVASIVE FORAMINOTOMY CERVICAL SPINE Right 07/13/2022    Procedure: FORAMINOTOMY, SPINE, CERVICAL, MINIMALLY INVASIVE;  Surgeon: Nam Sandoval MD;  Location: [facility];  Service: Neurosurgery;  Laterality: Right;  right C5-6, C6-7 posterior foraminotomy    ORIF TIBIA & FIBULA FRACTURES Left     TONSILLECTOMY         Family History   Problem Relation Name Age of Onset    Lymphoma Mother Cyn Gena     Cancer Mother Cyn Gena     Hyperlipidemia Father Henrry gena     Heart disease Father Henrry gena     Heart disease Sister Marla     Heart disease Brother Royce        Social History     Socioeconomic History    Marital status:    Occupational History    Occupation: RN on leave   Tobacco Use    Smoking status: Former     Current packs/day: 0.00     Types: Cigarettes     Quit date: 1/1/2009     Years since quitting: 15.8    Smokeless tobacco: Never   Substance and Sexual Activity    Alcohol use: Yes     Comment: social    Drug use: Not Currently    Sexual activity: Not Currently     Partners:  Male     Social Drivers of Health     Food Insecurity: No Food Insecurity (8/14/2023)    [facility], [facility], [facility], [facility]    Hunger Vital Sign     Worried About Running Out of Food in the Last Year: Never true     Ran Out of Food in the Last Year: Never true   Transportation Needs: No Transportation Needs (8/14/2023)    Received from [facility], [facility], [facility]    PRAPARE - Transportation     Lack of Transportation (Medical): No     Lack of Transportation (Non-Medical): No   Housing Stability: Unknown (2/9/2023)    Housing Stability Vital Sign     Unable to Pay for Housing in the Last Year: No       Current Outpatient Medications   Medication Instructions    ALPRAZolam (XANAX) 0.25 mg, Oral, 3 times daily PRN    amLODIPine (NORVASC) 10 mg, Oral, Daily    calcium carbonate (OS-ORAL) 600 mg, 2 times daily    cholecalciferol (vitamin D3) 250 mcg    DULoxetine (CYMBALTA) 30 mg, Oral    HYDROcodone-acetaminophen (NORCO) 7.5-325 mg per tablet 1 tablet, Oral, Every 6 hours PRN    hydrocortisone (CORTEF) 10 mg, Daily    hydrocortisone (CORTEF) 20 mg, Every morning    hydrocortisone (CORTEF) 5 mg, Nightly    ibuprofen (ADVIL,MOTRIN) 800 mg, Oral, 3 times daily    iron bis-gly/FA/C/B12/Ca/succ (IRON-150 ORAL) Take by mouth.    levothyroxine (SYNTHROID) 75 MCG tablet 1 tablet, Daily    losartan (COZAAR) 50 mg, Oral       Review of patient's allergies indicates:   Allergen Reactions    Omega-3 acid ethyl esters Other (See Comments)     Other reaction(s): elev lft    Elevated liver enzymes       Review of Systems   Constitutional:  Negative for chills and fever.   HENT:  Negative for nosebleeds and sore throat.    Eyes:  Negative for pain and visual disturbance.   Respiratory:  Negative for cough, chest tightness and shortness of breath.    Cardiovascular:  Negative for chest pain.   Gastrointestinal:   "Negative for diarrhea, nausea and vomiting.   Genitourinary:  Negative for difficulty urinating, dysuria and hematuria.   Musculoskeletal:  Positive for back pain and gait problem. Negative for myalgias.   Skin:  Negative for rash.   Neurological:  Positive for numbness. Negative for dizziness, facial asymmetry, weakness and headaches.   Psychiatric/Behavioral:  Negative for confusion and sleep disturbance. The patient is not nervous/anxious.        OBJECTIVE:       Visit Vitals  /85 (BP Location: Right arm, Patient Position: Sitting)   Pulse 90   Resp 16   Ht 5' 4" (1.626 m)   Wt 58.1 kg (128 lb)   BMI 21.97 kg/m²        General:  Pleasant, Well-nourished, Well-groomed.    CV:  Neck is supple.  There are no carotid bruits.  Heart has regular rate and rhythm.    Lungs:  Lungs are clear to auscultation bilaterally with non-labored respirations.    Abdomen:  Soft, non-tender, non-distended    Musculoskeletal:   Straight leg raise is negative bilaterally.  Crossed straight leg raise is negative bilaterally.  Hip rotation is negative bilaterally.    Neurological:  The patient is awake, alert, and oriented in all 4 spheres.  Muscle strength against resistance:    Iliopsoas Quadriceps Knee  Flexion Tibialis  anterior Gastro- cnemius EHL   Lower: R 5/5 5/5 5/5 5/5 5/5 5-/5    L 5/5 5/5 5/5 5/5 5/5 5/5   Sensation is intact to primary modalities in bilateral lower extremities with the exception of being diminished along the right L4 and L5 dermatomes..  Toes are downgoing to Babinski.  There is no clonus at the ankles.  Reflexes:   Right Left   Triceps     Biceps     Brachioradialis     Patellar 2+ 2+   Achilles 2+ 2+   Gait is antalgic.      Imaging:  All pertinent neuroimaging independently reviewed. Discussed these findings in detail with the patient.  MRI of the lumbar spine with and without contrast was obtained on 10/08/2024.  This study shows edema at the L4 and L5 vertebral bodies.  There has been progression " of the compression fracture at L4 with retropulsion of bone causing moderate central stenosis.  This is compared to prior lumbar MRI from 06/19/2024 and 02/22/2024.    ASSESSMENT:     1. Closed compression fracture of L4 vertebra, initial encounter  Ambulatory referral/consult to Interventional Radiology      2. Closed compression fracture of L5 lumbar vertebra, initial encounter          PLAN:     Options were discussed at length with the patient and her .  Dr. Reyes has also spoken to Dr. Aguilar regarding Shreya last week.  We will refer the patient to Dr. Aguilar for kyphoplasty involving the L4 and L5 vertebral body.  He plans to use Spine Brice at L4.  I will see her back at 2 weeks postop with lumbar x-rays just prior to that appointment.      A total of 25 minutes was spent face-to-face with the patient during this encounter.  Over half of that time was spent on counseling and coordination of care.  An additional 10+ minutes were used to review the patient's chart including lumbar MRI images and report, compare with 2 prior lumbar MRI images, CT of the abdomen images, and work on office note.      Juana Nash PA-C      Disclaimer:  This note is prepared using voice recognition software and as such is likely to have errors despite attempts at proofreading.

## 2024-10-21 NOTE — H&P (VIEW-ONLY)
[facility]  History & Physical  Neurosurgery      Shreya Reyes   [id number]   1960     SUBJECTIVE:     CHIEF COMPLAINT:  Lower back and bilateral leg pain      HPI:  Shreya Reyes is a 64 y.o. female who presents for neurosurgical evaluation.  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  She has been treated with kyphoplasty by Dr. Sandoval at L1 and L3 on 04/08/2022.  This was followed by T11 and T12 kyphoplasty on 04/21/2022.  After those procedures, the patient had resolution of her back pain.  She was participating in Pilates and tolerating her activity well.  However, 6 months ago she began to have back pain again.  She was found to have a another compression fracture.  She then discontinued the Pilates.    She complains of lower back pain with pain radiating into bilateral buttocks, lateral hips, right anterior thigh, and left anterolateral thigh.  There is numbness and tingling along the right anterior thigh and bilateral feet.  Subjectively, she does not feel as though she has weakness in either lower extremity.  The pain is increased with standing and walking.  The further she walks, the worse her pain gets.  Her leg pain began on 09/16/2024.  This pain has been progressively worsening.  Her activities greatly restricted secondary to pain.  Her pain is improved somewhat in the morning after she takes ibuprofen.  By mid day, her pain is severe and nothing helps to relieve her pain.  She was also having difficulty sleeping at night secondary to the pain.  The patient denies disturbances in bowel or bladder function.  There is no difficulty with balance.       Past Medical History:   Diagnosis Date    Adrenal cancer     Closed compression fracture of L3 lumbar vertebra, sequela 5/4/2022    Closed wedge compression fracture of T11 vertebra 5/4/2022    Compression fracture of L1 vertebra     Compression fracture of L3  vertebra     Elevated liver enzymes 11/02/2021    Hyperlipidemia     Hypertension 05/04/2022    Osteoporosis     Thoracic compression fracture        Past Surgical History:   Procedure Laterality Date    ABDOMINOPLASTY      ADENOIDECTOMY  Karely     ADRENALECTOMY Left     EYE SURGERY  2021    INTRAMEDULLARY RODDING OF FEMUR Left 02/08/2023    Procedure: INSERTION, INTRAMEDULLARY LEELEE, LEFT FEMUR;  Surgeon: Richard Bolanos MD;  Location: [facility];  Service: Orthopedics;  Laterality: Left;  supine, gustavo flat with blankets  synthes TFNA, long    L1 kyphoplasty w/ spine barbara, L3 kyphoplasty      Laproscopy for excision of adrenal mass      left cataract Left     MAGNETIC RESONANCE IMAGING N/A 07/13/2022    Procedure: MRI (MAGNETIC RESONANCE IMAGING);  Surgeon: Nam Sandoval MD;  Location: [facility] OR;  Service: Neurosurgery;  Laterality: N/A;  MRI CERVICAL SPINE WITHOUT CONTRAST WITH ANESTHESIA    MINIMALLY INVASIVE FORAMINOTOMY CERVICAL SPINE Right 07/13/2022    Procedure: FORAMINOTOMY, SPINE, CERVICAL, MINIMALLY INVASIVE;  Surgeon: Nam Sandoval MD;  Location: [facility];  Service: Neurosurgery;  Laterality: Right;  right C5-6, C6-7 posterior foraminotomy    ORIF TIBIA & FIBULA FRACTURES Left     TONSILLECTOMY         Family History   Problem Relation Name Age of Onset    Lymphoma Mother Cyn Gena     Cancer Mother Cyn Gena     Hyperlipidemia Father Henrry gena     Heart disease Father Henrry gena     Heart disease Sister Marla     Heart disease Brother Royce        Social History     Socioeconomic History    Marital status:    Occupational History    Occupation: RN on leave   Tobacco Use    Smoking status: Former     Current packs/day: 0.00     Types: Cigarettes     Quit date: 1/1/2009     Years since quitting: 15.8    Smokeless tobacco: Never   Substance and Sexual Activity    Alcohol use: Yes     Comment: social    Drug use: Not Currently    Sexual activity: Not Currently     Partners:  Male     Social Drivers of Health     Food Insecurity: No Food Insecurity (8/14/2023)    Received from [facility], [facility], [facility], [facility]    Hunger Vital Sign     Worried About Running Out of Food in the Last Year: Never true     Ran Out of Food in the Last Year: Never true   Transportation Needs: No Transportation Needs (8/14/2023)    Received from [facility], [facility], [facility]    PRAPARE - Transportation     Lack of Transportation (Medical): No     Lack of Transportation (Non-Medical): No   Housing Stability: Unknown (2/9/2023)    Housing Stability Vital Sign     Unable to Pay for Housing in the Last Year: No       Current Outpatient Medications   Medication Instructions    ALPRAZolam (XANAX) 0.25 mg, Oral, 3 times daily PRN    amLODIPine (NORVASC) 10 mg, Oral, Daily    calcium carbonate (OS-ORAL) 600 mg, 2 times daily    cholecalciferol (vitamin D3) 250 mcg    DULoxetine (CYMBALTA) 30 mg, Oral    HYDROcodone-acetaminophen (NORCO) 7.5-325 mg per tablet 1 tablet, Oral, Every 6 hours PRN    hydrocortisone (CORTEF) 10 mg, Daily    hydrocortisone (CORTEF) 20 mg, Every morning    hydrocortisone (CORTEF) 5 mg, Nightly    ibuprofen (ADVIL,MOTRIN) 800 mg, Oral, 3 times daily    iron bis-gly/FA/C/B12/Ca/succ (IRON-150 ORAL) Take by mouth.    levothyroxine (SYNTHROID) 75 MCG tablet 1 tablet, Daily    losartan (COZAAR) 50 mg, Oral       Review of patient's allergies indicates:   Allergen Reactions    Omega-3 acid ethyl esters Other (See Comments)     Other reaction(s): elev lft    Elevated liver enzymes       Review of Systems   Constitutional:  Negative for chills and fever.   HENT:  Negative for nosebleeds and sore throat.    Eyes:  Negative for pain and visual disturbance.   Respiratory:  Negative for cough, chest tightness and shortness of breath.    Cardiovascular:  Negative for chest pain.   Gastrointestinal:   "Negative for diarrhea, nausea and vomiting.   Genitourinary:  Negative for difficulty urinating, dysuria and hematuria.   Musculoskeletal:  Positive for back pain and gait problem. Negative for myalgias.   Skin:  Negative for rash.   Neurological:  Positive for numbness. Negative for dizziness, facial asymmetry, weakness and headaches.   Psychiatric/Behavioral:  Negative for confusion and sleep disturbance. The patient is not nervous/anxious.        OBJECTIVE:       Visit Vitals  /85 (BP Location: Right arm, Patient Position: Sitting)   Pulse 90   Resp 16   Ht 5' 4" (1.626 m)   Wt 58.1 kg (128 lb)   BMI 21.97 kg/m²        General:  Pleasant, Well-nourished, Well-groomed.    CV:  Neck is supple.  There are no carotid bruits.  Heart has regular rate and rhythm.    Lungs:  Lungs are clear to auscultation bilaterally with non-labored respirations.    Abdomen:  Soft, non-tender, non-distended    Musculoskeletal:   Straight leg raise is negative bilaterally.  Crossed straight leg raise is negative bilaterally.  Hip rotation is negative bilaterally.    Neurological:  The patient is awake, alert, and oriented in all 4 spheres.  Muscle strength against resistance:    Iliopsoas Quadriceps Knee  Flexion Tibialis  anterior Gastro- cnemius EHL   Lower: R 5/5 5/5 5/5 5/5 5/5 5-/5    L 5/5 5/5 5/5 5/5 5/5 5/5   Sensation is intact to primary modalities in bilateral lower extremities with the exception of being diminished along the right L4 and L5 dermatomes..  Toes are downgoing to Babinski.  There is no clonus at the ankles.  Reflexes:   Right Left   Triceps     Biceps     Brachioradialis     Patellar 2+ 2+   Achilles 2+ 2+   Gait is antalgic.      Imaging:  All pertinent neuroimaging independently reviewed. Discussed these findings in detail with the patient.  MRI of the lumbar spine with and without contrast was obtained on 10/08/2024.  This study shows edema at the L4 and L5 vertebral bodies.  There has been progression " of the compression fracture at L4 with retropulsion of bone causing moderate central stenosis.  This is compared to prior lumbar MRI from 06/19/2024 and 02/22/2024.    ASSESSMENT:     1. Closed compression fracture of L4 vertebra, initial encounter  Ambulatory referral/consult to Interventional Radiology      2. Closed compression fracture of L5 lumbar vertebra, initial encounter          PLAN:     Options were discussed at length with the patient and her .  Dr. Reyes has also spoken to Dr. Aguilar regarding Shreya last week.  We will refer the patient to Dr. Aguilar for kyphoplasty involving the L4 and L5 vertebral body.  He plans to use Spine Brice at L4.  I will see her back at 2 weeks postop with lumbar x-rays just prior to that appointment.      A total of 25 minutes was spent face-to-face with the patient during this encounter.  Over half of that time was spent on counseling and coordination of care.  An additional 10+ minutes were used to review the patient's chart including lumbar MRI images and report, compare with 2 prior lumbar MRI images, CT of the abdomen images, and work on office note.      Juana Nash PA-C      Disclaimer:  This note is prepared using voice recognition software and as such is likely to have errors despite attempts at proofreading.

## 2024-10-22 ENCOUNTER — ANESTHESIA (OUTPATIENT)
Dept: INTERVENTIONAL RADIOLOGY/VASCULAR | Facility: HOSPITAL | Age: 64
End: 2024-10-22
Payer: MEDICARE

## 2024-10-22 ENCOUNTER — HOSPITAL ENCOUNTER (OUTPATIENT)
Dept: INTERVENTIONAL RADIOLOGY/VASCULAR | Facility: HOSPITAL | Age: 64
Discharge: HOME | End: 2024-10-22
Attending: PHYSICIAN ASSISTANT
Payer: MEDICARE

## 2024-10-22 VITALS
SYSTOLIC BLOOD PRESSURE: 168 MMHG | BODY MASS INDEX: 21.09 KG/M2 | OXYGEN SATURATION: 96 % | DIASTOLIC BLOOD PRESSURE: 80 MMHG | TEMPERATURE: 98 F | HEIGHT: 65 IN | WEIGHT: 126.56 LBS | RESPIRATION RATE: 20 BRPM | HEART RATE: 84 BPM

## 2024-10-22 DIAGNOSIS — S32.040G CLOSED COMPRESSION FRACTURE OF L4 LUMBAR VERTEBRA, WITH DELAYED HEALING, SUBSEQUENT ENCOUNTER: ICD-10-CM

## 2024-10-22 DIAGNOSIS — M80.88XA OTHER OSTEOPOROSIS WITH CURRENT PATHOLOGICAL FRACTURE, VERTEBRA(E), INITIAL ENCOUNTER FOR FRACTURE: ICD-10-CM

## 2024-10-22 LAB — ALDOST SERPL-MCNC: <4 NG/DL

## 2024-10-22 PROCEDURE — 63600175 PHARM REV CODE 636 W HCPCS: Performed by: ANESTHESIOLOGY

## 2024-10-22 PROCEDURE — 63600175 PHARM REV CODE 636 W HCPCS

## 2024-10-22 PROCEDURE — 25000003 PHARM REV CODE 250

## 2024-10-22 PROCEDURE — 22514 PERQ VERTEBRAL AUGMENTATION: CPT

## 2024-10-22 PROCEDURE — 88304 TISSUE EXAM BY PATHOLOGIST: CPT | Performed by: RADIOLOGY

## 2024-10-22 PROCEDURE — 88311 DECALCIFY TISSUE: CPT

## 2024-10-22 RX ORDER — ROCURONIUM BROMIDE 10 MG/ML
INJECTION, SOLUTION INTRAVENOUS
Status: DISCONTINUED | OUTPATIENT
Start: 2024-10-22 | End: 2024-10-22

## 2024-10-22 RX ORDER — SODIUM CHLORIDE 0.9 % (FLUSH) 0.9 %
10 SYRINGE (ML) INJECTION
Status: DISCONTINUED | OUTPATIENT
Start: 2024-10-22 | End: 2024-10-23 | Stop reason: HOSPADM

## 2024-10-22 RX ORDER — CEFAZOLIN SODIUM 1 G/3ML
INJECTION, POWDER, FOR SOLUTION INTRAMUSCULAR; INTRAVENOUS
Status: DISCONTINUED | OUTPATIENT
Start: 2024-10-22 | End: 2024-10-22

## 2024-10-22 RX ORDER — CALCIUM CHLORIDE INJECTION 100 MG/ML
INJECTION, SOLUTION INTRAVENOUS
Status: DISCONTINUED | OUTPATIENT
Start: 2024-10-22 | End: 2024-10-22

## 2024-10-22 RX ORDER — PHENYLEPHRINE HCL IN 0.9% NACL 1 MG/10 ML
SYRINGE (ML) INTRAVENOUS
Status: DISCONTINUED | OUTPATIENT
Start: 2024-10-22 | End: 2024-10-22

## 2024-10-22 RX ORDER — HYDROMORPHONE HYDROCHLORIDE 2 MG/ML
0.4 INJECTION, SOLUTION INTRAMUSCULAR; INTRAVENOUS; SUBCUTANEOUS EVERY 5 MIN PRN
Status: DISCONTINUED | OUTPATIENT
Start: 2024-10-22 | End: 2024-10-23 | Stop reason: HOSPADM

## 2024-10-22 RX ORDER — FENTANYL CITRATE 50 UG/ML
INJECTION, SOLUTION INTRAMUSCULAR; INTRAVENOUS
Status: DISCONTINUED | OUTPATIENT
Start: 2024-10-22 | End: 2024-10-22

## 2024-10-22 RX ORDER — EPHEDRINE SULFATE 50 MG/ML
INJECTION, SOLUTION INTRAVENOUS
Status: DISCONTINUED | OUTPATIENT
Start: 2024-10-22 | End: 2024-10-22

## 2024-10-22 RX ORDER — LIDOCAINE HYDROCHLORIDE 20 MG/ML
INJECTION, SOLUTION EPIDURAL; INFILTRATION; INTRACAUDAL; PERINEURAL
Status: DISCONTINUED | OUTPATIENT
Start: 2024-10-22 | End: 2024-10-22

## 2024-10-22 RX ORDER — ONDANSETRON HYDROCHLORIDE 2 MG/ML
INJECTION, SOLUTION INTRAMUSCULAR; INTRAVENOUS
Status: DISCONTINUED | OUTPATIENT
Start: 2024-10-22 | End: 2024-10-22

## 2024-10-22 RX ORDER — PROPOFOL 10 MG/ML
VIAL (ML) INTRAVENOUS
Status: DISCONTINUED | OUTPATIENT
Start: 2024-10-22 | End: 2024-10-22

## 2024-10-22 RX ORDER — DEXAMETHASONE SODIUM PHOSPHATE 4 MG/ML
INJECTION, SOLUTION INTRA-ARTICULAR; INTRALESIONAL; INTRAMUSCULAR; INTRAVENOUS; SOFT TISSUE
Status: DISCONTINUED | OUTPATIENT
Start: 2024-10-22 | End: 2024-10-22

## 2024-10-22 RX ADMIN — HYDROMORPHONE HYDROCHLORIDE 0.4 MG: 2 INJECTION INTRAMUSCULAR; INTRAVENOUS; SUBCUTANEOUS at 02:10

## 2024-10-22 RX ADMIN — Medication 200 MCG: at 12:10

## 2024-10-22 RX ADMIN — CALCIUM CHLORIDE INJECTION 0.25 G: 100 INJECTION, SOLUTION INTRAVENOUS at 12:10

## 2024-10-22 RX ADMIN — EPHEDRINE SULFATE 5 MG: 50 INJECTION, SOLUTION INTRAMUSCULAR; INTRAVENOUS; SUBCUTANEOUS at 12:10

## 2024-10-22 RX ADMIN — Medication 100 MCG: at 01:10

## 2024-10-22 RX ADMIN — Medication 100 MCG: at 12:10

## 2024-10-22 RX ADMIN — DEXAMETHASONE SODIUM PHOSPHATE 4 MG: 4 INJECTION, SOLUTION INTRA-ARTICULAR; INTRALESIONAL; INTRAMUSCULAR; INTRAVENOUS; SOFT TISSUE at 12:10

## 2024-10-22 RX ADMIN — CEFAZOLIN 2 G: 330 INJECTION, POWDER, FOR SOLUTION INTRAMUSCULAR; INTRAVENOUS at 12:10

## 2024-10-22 RX ADMIN — SODIUM CHLORIDE: 9 INJECTION, SOLUTION INTRAVENOUS at 12:10

## 2024-10-22 RX ADMIN — ONDANSETRON 4 MG: 2 INJECTION INTRAMUSCULAR; INTRAVENOUS at 12:10

## 2024-10-22 RX ADMIN — SUGAMMADEX 200 MG: 100 INJECTION, SOLUTION INTRAVENOUS at 01:10

## 2024-10-22 RX ADMIN — FENTANYL CITRATE 50 MCG: 50 INJECTION, SOLUTION INTRAMUSCULAR; INTRAVENOUS at 01:10

## 2024-10-22 RX ADMIN — HYDROMORPHONE HYDROCHLORIDE 0.4 MG: 2 INJECTION INTRAMUSCULAR; INTRAVENOUS; SUBCUTANEOUS at 01:10

## 2024-10-22 RX ADMIN — FENTANYL CITRATE 50 MCG: 50 INJECTION, SOLUTION INTRAMUSCULAR; INTRAVENOUS at 12:10

## 2024-10-22 RX ADMIN — Medication 150 MCG: at 12:10

## 2024-10-22 RX ADMIN — LIDOCAINE HYDROCHLORIDE 80 MG: 20 INJECTION, SOLUTION INTRAVENOUS at 12:10

## 2024-10-22 RX ADMIN — ROCURONIUM BROMIDE 50 MG: 10 SOLUTION INTRAVENOUS at 12:10

## 2024-10-22 RX ADMIN — PROPOFOL 150 MG: 10 INJECTION, EMULSION INTRAVENOUS at 12:10

## 2024-10-22 NOTE — TRANSFER OF CARE
"Anesthesia Transfer of Care Note    Patient: Shreya Reyes    Procedure(s) Performed: * No procedures listed *    Patient location: PACU    Anesthesia Type: general    Transport from OR: Transported from OR on room air with adequate spontaneous ventilation    Post pain: adequate analgesia    Post assessment: no apparent anesthetic complications    Post vital signs: stable    Level of consciousness: awake, alert and oriented    Nausea/Vomiting: no nausea/vomiting    Complications: none    Transfer of care protocol was followed      Last vitals: Visit Vitals  BP (!) 164/81 (BP Location: Left arm, Patient Position: Lying)   Pulse 86   Temp 36.5 °C (97.7 °F) (Oral)   Resp 14   Ht 5' 5" (1.651 m)   Wt 57.4 kg (126 lb 8.7 oz)   SpO2 99%   BMI 21.06 kg/m²     "

## 2024-10-22 NOTE — ANESTHESIA PROCEDURE NOTES
Intubation    Date/Time: 10/22/2024 12:13 PM    Performed by: Daniela Whitney CRNA  Authorized by: Daniela Whitney CRNA    Intubation:     Induction:  Intravenous    Intubated:  Postinduction    Mask Ventilation:  Easy mask    Attempts:  1    Attempted By:  CRNA    Method of Intubation:  Direct    Blade:  Corral 2    Laryngeal View Grade: Grade I - full view of cords      Difficult Airway Encountered?: No      Complications:  None    Airway Device:  Oral endotracheal tube    Airway Device Size:  7.0    Style/Cuff Inflation:  Cuffed (inflated to minimal occlusive pressure)    Inflation Amount (mL):  6    Tube secured:  22    Secured at:  The lips    Placement Verified By:  Capnometry    Complicating Factors:  None    Findings Post-Intubation:  BS equal bilateral and atraumatic/condition of teeth unchanged

## 2024-10-22 NOTE — ANESTHESIA POSTPROCEDURE EVALUATION
Anesthesia Post Evaluation    Patient: Shreya Reyes    Procedure(s) Performed: * No procedures listed *    Final Anesthesia Type: general      Patient location during evaluation: PACU  Patient participation: Yes- Able to Participate  Level of consciousness: awake and alert  Post-procedure vital signs: reviewed and stable  Pain management: adequate  Airway patency: patent      Anesthetic complications: no      Cardiovascular status: hemodynamically stable  Respiratory status: unassisted  Hydration status: euvolemic  Follow-up not needed.              Vitals Value Taken Time   /76 10/22/24 1412   Temp 36.5 °C (97.7 °F) 10/22/24 1340   Pulse 82 10/22/24 1415   Resp 17 10/22/24 1414   SpO2 97 % 10/22/24 1415   Vitals shown include unfiled device data.      No case tracking events are documented in the log.      Pain/Singh Score: Pain Rating Prior to Med Admin: 6 (10/22/2024  2:00 PM)           Clear

## 2024-10-22 NOTE — INTERVAL H&P NOTE
The patient has been examined and the H&P has been reviewed:    I concur with the findings and no changes have occurred since H&P was written. 65 yo F with osteoporosis and L3 fracture previously treated with kyphoplasty now presents with L4, L5 fractures with delayed healing to undergo kyphoplasty of L4, L5.        There are no hospital problems to display for this patient.

## 2024-10-22 NOTE — DISCHARGE SUMMARY
Radiology Post-Procedure Note    Pre Op Diagnosis: L4 & L5 Fractures    Post Op Diagnosis: Same    Secondary Diagnoses:   Problem List Items Addressed This Visit    None  Visit Diagnoses       Closed compression fracture of L4 lumbar vertebra, with delayed healing, subsequent encounter        Relevant Orders    IR Kyphoplasty Lumbar First Vertebral    Other osteoporosis with current pathological fracture, vertebra(e), initial encounter for fracture        Relevant Orders    IR Kyphoplasty Lumbar First Vertebral             Procedure: L4 & L5 Balloon Kyphoplasty    Procedure performed by: Lamberto Aguilar MD    Assistant: Anderson    Written Informed Consent Obtained: Yes    Specimen Removed: Bone fragments    Estimated Blood Loss: 15 cc    Condition: Stable    Outcome: The patient tolerated the procedure well and was without complications.    For further details please see the imaging report associated.    Disposition: Home or Self Care    Follow Up: With primary care provider    Discharge Instructions:  No discharge procedures on file.       Time Spent On Discharge: 5 minutes

## 2024-10-22 NOTE — ANESTHESIA PREPROCEDURE EVALUATION
10/22/2024  Shreya Reyes is a 64 y.o., female.  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  She has been treated with kyphoplasty by Dr. Sandoval at L1 and L3 on 04/08/2022.  This was followed by T11 and T12 kyphoplasty on 04/21/2022.  After those procedures, the patient had resolution of her back pain.  She was participating in Pilates and tolerating her activity well.  However, 6 months ago she began to have back pain again.  She was found to have a another compression fracture.    L4/5  Pre-op Assessment    I have reviewed the Patient Summary Reports.     I have reviewed the Nursing Notes. I have reviewed the NPO Status.   I have reviewed the Medications.     Review of Systems  Cardiovascular:     Hypertension                                    Hypertension         Musculoskeletal:  Arthritis        Arthritis          Neurological:    Neuromuscular Disease,           Arthritis                         Neuromuscular Disease       Physical Exam  General: Well nourished, Cooperative, Alert and Oriented    Airway:  Mallampati: II   Mouth Opening: Normal  TM Distance: Normal  Tongue: Normal  Neck ROM: Normal ROM    Dental:  Intact, Caps / Implants        Anesthesia Plan  Type of Anesthesia, risks & benefits discussed:    Anesthesia Type: Gen ETT  Intra-op Monitoring Plan: Standard ASA Monitors  Post Op Pain Control Plan: multimodal analgesia  Induction:  IV  Airway Plan: Direct, Post-Induction  Informed Consent: Informed consent signed with the Patient and all parties understand the risks and agree with anesthesia plan.  All questions answered. Patient consented to blood products? Yes  ASA Score: 3  Day of Surgery Review of History & Physical: H&P Update referred to the surgeon/provider.    Ready For Surgery From Anesthesia Perspective.     .

## 2024-10-23 LAB — MITOTANE SERPL-MCNC: 7.3 UG/ML

## 2024-10-24 LAB — PSYCHE PATHOLOGY RESULT: NORMAL

## 2024-10-25 NOTE — PROGRESS NOTES
Subjective:       Patient ID: Shreya Reyes is a 64 y.o. female.    Chief Complaint:  Intermittent pain and numbness bilateral thighs    Diagnosis: Metastatic adrenocortical carcinoma                    Multiple osteoporotic vertebral compression fractures    Treatment History  Adjuvant XRT (completed 9/30/21)  Mitotane 1/25/22-7/26/22, Resumed 8/22  XRT right scapula, L5,  R ischium, L femoral neck completed 8/26/22  SBRT L2-3 2/06/23  Left IM nail 2/08/23  SBRT R iliac wing 5/15/23  SBRT L5, R ischium 12/4/23  Mitotane 8/22-5/24  SBRT liver/R adrenal 8/08/24    Current Treatment:   Hydrocortisone 20 mg Q AM, 10 mg Q PM  Levothyroxine 75 mcg/d              Clinical History:  Patient initially presented [facility]/16/21 with acute left flank pain. CT A/P showed a heterogeneous enhancing mass of the left adrenal gland measuring 5.5 x 4.8 x 5 cm (24 Hu), with no definite microscopic fat. There was mild displacement of the left renal vein. Right adrenal gland was unremarkable.  Hormonal workup was negative for pheochromocytoma. She was felt to have had an acute adrenal hemorrhage and close observation was recommended. Follow-up CT A/P 6/9/21 showed increased size of the adrenal mass to 8.0 x 4.5 cm appearing hypervascular with some central necrosis. She underwent a laparoscopic/robotic left adrenalectomy 6/11/21.  Final pathology showed an adrenal cortical carcinoma predominant oncocytic/trabecular morphology with focally marked cytologic atypia and increased mitotic rate (up to 10/50 HPF's). There were large areas of necrosis and multiple foci of capsular and lymphovascular invasion. Ki-67 expression was 10-15%.    She had a Telemedicine consultation with Dr. Dion Lynch at [facility] 8/3/21 who specializes in treatment of adrenocortical carcinoma. Her case was reviewed and discussed in their tumor board. Recommendations were for treatment with adjuvant radiation therapy and systemic treatment with  Mitotane. Baseline postoperative CT scans of the chest, abdomen and pelvis 8/4/21 showed postoperative changes with no evidence of measurable metastatic disease.    She was seen as a new patient at [facility] 8/9/21.  She had recovered well from her surgery and was asymptomatic.  Treatment recommendations from the [facility] were reviewed and discussed.  Treatment was started with Mitotane 1000 mg BID on 8/16/2021.  No dose escalation was recommended until she completed radiation therapy.  She was started on replacement hydrocortisone 20 mg Q AM and 10 mg Q PM.  Surveillance CT scans were recommended in 3 months.      She was seen for an office visit 9/16/21 after completing 4 weeks of treatment with Mitotane.  She was not having any significant side effects associated with her therapy.  She was assisted through her adjuvant radiation therapy.  Laboratory testing showed marked transaminase elevations with an AST of 493,  and alkaline phosphatase 175.  Bilirubin was normal.  The remainder of her CMP was unremarkable.  Baseline mitotane level drawn at that visit was 2.5 mcg/mL.  Mitotane was held and adjuvant radiation therapy was continued.  Weekly laboratory monitoring showed gradual improvement in her LFTs.  Although mitotane had been associated with transaminase elevations, >5x elevations are rare occurring in <1% of patients.  She was also instructed to hold her lovastatin.  She completed adjuvant radiation therapy 9/30/2021.     Follow-up laboratory continued to show transaminase elevations.  GI was consulted and ordered additional laboratory testing which did not reveal evidence of viral or autoimmune etiologies for her hepatitis.  Mitotane was not resumed due to her persistent transaminase elevations. Follow-up CT scans of the chest, abdomen and pelvis 10/28/21 showed a few stable subcentimeter pulmonary nodules and changes related to her previous left adrenalectomy with no  evidence of disease recurrence.  Following normalization of her LFTs, treatment was resumed with Mitotane 1/25/22.  Her LFTs remained normal even during dose escalation.    She had an injury at home in mid March after carrying in several arms of firewood with acute mid back pain.  Plain x-ray 3/17/22 showed a compression deformity at L2 of questionable age.  MRI of the lumbar spine 3/23/22 showed a subacute severe compression fracture deformity of the L1 vertebral body and mild superior endplate compression fracture of L3 vertebral body with osteoporotic appearance and no findings suspicious for pathologic fracture.  However, there were scattered small osseous lesions in the right L3 vertebral body and L5 vertebral body concerning for metastatic disease.  CT PET scan 3/28/22 showed mildly hypermetabolic osseous lesions in the lumbar spine, pelvis and proximal left femur consistent with metastatic disease.  There was no significant hypermetabolic uptake at the sites of her compression fractures.  She did not have pain at any of the sites prior to the acute compression fracture.  Bone density exam showed osteoporosis of the lumbar spine with a T score of -3.4, left femoral neck -3.4 and left total hip -2.7.  She was started on Prolia 3/31/22 and underwent L1 and L3 kyphoplasty 4//8/22.  Biopsies of the L3 vertebral body showed no evidence of metastatic carcinoma.  She continued to have significant pain following the kyphoplasty.  Further review of her films showed a possible compression fracture at T11.  MRI of the thoracic spine 4/15/22 showed a compression fracture of T11 with 50% loss of vertebral body height.  She underwent kyphoplasty 4/21/22 with symptomatic improvement.    CT scans of the chest, abdomen and pelvis 4/27/22 showed sclerotic osseous lesions at L4, right ischium and left femur correlating with the hypermetabolic lesions on CT-PET scan.  There were stable sub-6 mm pulmonary nodules in the RML and  LLL unchanged from previous imaging.  MRI of the cervical and lumbar spine 7/5/22 showed moderate disc disease at C5-6 and C6-7 without significant spinal canal stenosis or foraminal stenosis.  She developed progressive symptoms of right arm pain, numbness and tingling and updated MRI 7/13/22 showed foraminal stenosis secondary to disc osteophyte on the right side at C5-6 and C6-7.  She underwent a right foraminotomy with relief of her symptoms.    CT C/A/P 08/01/22:  Multiple compression fractures and postsurgical changes.  Area of sclerosis in the left sacrum was stable compared to the prior exam.  There was no evidence of visceral metastatic disease.  She had a teleconference with [facility] 8/2/22 and a follow-up PET scan was recommended.  Mitotane was discontinued 7/26/22.   CT-PET 08/05/22:  Hypermetabolic osseous metastatic disease involving L5, right ischium, left femoral neck and a new lesion in the inferior right scapula.  There was a 12 mm area of hypermetabolic activity adjacent to the right adrenal gland without a definite CT correlate.    She completed palliative radiation therapy to the right scapula, L5, R ischium and left femoral neck on 8/26/22.  She resumed Mitotane at 1000 mg BID on 8/29/22.      CT-PET 11/21/22:  Improved FDG uptake in all of the treated sites.  Right scapula SUV decreased from 4.2 to 2.1, left femoral neck 9.1 to 5, right ischium 13 to 2.4 and L5 8.2 to 4.6.  Hypermetabolic activity at the site of the previous compression fractures had also improved.  There were no findings suspicious for new sites of disease or visceral metastatic disease.  MRI lumbar spine and left hip 1/29/23:  Metastatic disease involving right ischial tuberosity, left femoral neck and L3 vertebral body, similar in size to CT-PET scan 11/21/22:  L3 lesion showed interval progression with ventral and paraspinal extension causing moderate-to-severe narrowing of the spinal canal.  Left femoral neck  lesion involved greater than 50% of the total diameter of the femoral neck.  She was treated with stereotactic XRT to L3 and underwent prophylactic IM nail of the left femoral neck 2/8/23.  CT C/A/P 4/24/23:  13 mm sclerotic met inferior right scapula, bandlike densities RUL and RLL secondary to previous XRT.  Stable thoracic spine compression fractures.  Increased density L4 vertebral body metastasis.  No evidence of visceral metastatic disease.  MRI L-spine/pelvis 4/24/23:  Stable L3 lesion with mild epidural extension and enhancement measuring 8 mm.  Sclerotic 10 mm focus of abnormal marrow signal right iliac wing.    Guardant 360 2/20/23:  Positive TP53 mutation, microsatellite stable     Prolia was resumed for her metastatic bone disease 4/11/23.  She completed SBRT to the right iliac wing metastasis 5/15/23 receiving 2700 cGy in 3 fractions.  Treatment was well tolerated.      MRI L-spine and pelvis 7/26/23:  Resolution of previous epidural enhancing mass posterior to L3 vertebral body.  Right iliac wing lesion 12 mm (previous 10 mm).  No new metastatic lesions.  CT C/A/P 7/28/23:  Stable sclerotic bone lesions right scapula, right iliac wing and L4.  Stable kyphoplasty changes T10, T11, T12 and L2.  Stable L1 compression deformity.  No new sites of disease identified.  Colonoscopy 7/25/23:  No polyps or other abnormalities.  Follow-up exam recommended in 7 years.  CT C/A/P 11/06/23:  S/p kyphoplasty at T10, T11, T12 and L2.  Stable L1 compression deformity.  Increased L4 height loss with increased sclerosis of the vertebral body.  Stable sclerotic lesion right iliac wing 13 mm.  Inferior right scapular lesion more vaguely seen.  No findings suspicious for new sites of disease.  MRI pelvis 11/10/23:  Interval increase in the size of the metastatic lesion involving the right ischial tuberosity measuring 3.2 cm x 1.8 cm, previously 2.1 x 1.3 cm.  MRI L-spine 11/10/23:  Progressive metastatic disease at L5 with a  new pathologic compression deformity.  MRI L-spine 2/22/24:  New L4 superior endplate compression fracture with minimal loss of height, otherwise unchanged from 11/10/23.  CT C/A/P 2/27/24:  L4 superior endplate compression fracture with minimal loss of height.  Otherwise, no evidence of disease progression or new sites of disease.    Seen in consultation by Dr. Lynch at [facility] 5/21/24.  No progressive disease on imaging.  Mitotane discontinued secondary to progressive fatigue and a level of 20.7.    CT C/A/P 6/17/24:  New 1.7 cm right hepatic met and 1.6 x 0.9 cm right adrenal gland nodule.  No abnormal lymphadenopathy.  MRI L-spine 6/19/24:  Slight progression of mild superior endplate height loss at L4.  Slight increased marrow enhancement at S1-S2.  No definite disease progression or new lesions.  CT liver biopsy 7/16/24:  Metastatic adrenocortical carcinoma.  QNS for NGS.    She received SBRT to the liver met receiving a total dose of 6000 cGy in 5 fractions and to the right adrenal met receiving 5000 cGy in 5 fractions completed 8/8/24.  She underwent additional kyphoplasty at L4 and 5 on 10/22/24.  Biopsies of both vertebral bodies were negative for malignancy.    CT C/A/P 10/1/24:  No metastatic disease in the chest.  1.4 cm hepatic hypodensity segment VIII with fiducials in place.  Stable 1.9 cm right adrenal nodule.  Multiple compression fractures lower thoracic and lumbar spine with previous kyphoplasty.  MRI L-spine 10/08/24:  Minimal progression of height loss at L4 with slight progression of associated spinal canal stenosis.  Otherwise stable.      Interval History  She returns to the office today for a follow-up visit accompanied by her .  She underwent additional kyphoplasty at L4 and L5 10/22/24.  Treatment was well tolerated.  Biopsies of both vertebral bodies showed no evidence of metastatic disease.  She reports improvement in her back pain.  She still has intermittent  "paresthesias involving her thighs bilaterally.  She denies any lower extremity weakness or falls.  She had an MRI of the abdomen today to evaluate her response to stereotactic radiation of the liver and right adrenal gland mets.  Final radiology interpretation is pending.  She denies any abdominal pain, nausea or changes in her bowel habits.  Appetite is good, no significant weight loss.  She generally has to take Norco 1-2 doses in the afternoon or evening.        Review of Systems   Constitutional:  Negative for appetite change, fatigue, fever and unexpected weight change.   HENT:  Negative for mouth sores, sore throat and trouble swallowing.    Eyes: Negative.    Respiratory:  Negative for cough and shortness of breath.    Cardiovascular:  Negative for chest pain, palpitations and leg swelling.   Gastrointestinal:  Negative for abdominal distention, abdominal pain, constipation, diarrhea, nausea and vomiting.   Genitourinary:  Negative for dysuria, flank pain and frequency.   Musculoskeletal:  Positive for arthralgias and back pain (Improved). Negative for gait problem.   Integumentary:  Negative for pallor and rash.   Neurological:  Positive for numbness. Negative for dizziness, weakness and headaches.   Hematological:  Negative for adenopathy. Does not bruise/bleed easily.   Psychiatric/Behavioral: Negative.         PMHx:  Hyperlipidemia, osteoporosis  PSHx:  Tonsils, left cataract, ORIF left tibia/fib, left adrenalectomy, multiple vertebral kyphoplasties  SH:  Former smoker 1 PPD, quit 2009. + Social alcohol use. Lives in [place] with her . 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  FH:  Her mother had lymphoma.     Objective:        BP (!) 152/85 (BP Location: Right arm, Patient Position: Sitting)   Pulse 84   Resp 15   Ht 5' 5" (1.651 m)   Wt 57.6 kg (126 lb 14.4 oz)   SpO2 97%   BMI 21.12 kg/m²    Physical Exam  Constitutional:       Comments: Well-developed white female in " NAD.   HENT:      Head: Normocephalic.      Mouth/Throat:      Mouth: Mucous membranes are moist.      Pharynx: Oropharynx is clear. No posterior oropharyngeal erythema.   Eyes:      General: No scleral icterus.     Extraocular Movements: Extraocular movements intact.      Pupils: Pupils are equal, round, and reactive to light.   Cardiovascular:      Rate and Rhythm: Normal rate and regular rhythm.      Heart sounds: No murmur heard.  Pulmonary:      Comments: Lungs clear to auscultation  Abdominal:      General: Bowel sounds are normal. There is no distension.      Palpations: Abdomen is soft. There is no mass.      Tenderness: There is no abdominal tenderness.   Musculoskeletal:         General: No swelling or tenderness.      Cervical back: Neck supple. No tenderness.      Comments: No vertebral body or rib cage tenderness.     Skin:     General: Skin is warm and dry.      Findings: No rash.   Neurological:      General: No focal deficit present.      Mental Status: She is alert and oriented to person, place, and time.      Motor: No weakness.   Psychiatric:         Behavior: Behavior is cooperative.       ECOG SCORE    1 - Restricted in strenuous activity-ambulatory and able to carry out work of a light nature        LABORATORY  Laboratory from 10/17/24 reviewed.      Assessment:   Metastatic adrenocortical carcinoma  Multiple osteoporotic vertebral body compression fractures  S/p prophylactic left femur IM nail 2/8/23  Cancer related pain - stable    Plan:   Patient doing well s/p recent kyphoplasty.  Biopsies were negative for metastatic involvement.  MRI abd done earlier today is pending final radiology interpretation.  I will discuss the results with her when they are available.  Additional NGS could not be performed on her liver biopsy due to insufficient tissue.  Tempus panel on peripheral blood did not reveal any targetable mutations.  We have previously discussed options for systemic therapy in the event  of disease progression includin) Chemotherapy with EDC (Etoposide, Doxorubicin, Cisplatin) +/- Mitotane or 2) immunotherapy with Pembrolizumab based on emerging phase II data.  She will be scheduled to return for follow-up in approximately 8 weeks with follow-up CT scans of the chest, abdomen and pelvis.      MARY NORMAN MD    Other Physicians  Dr. Dion Lynch ([facility])

## 2024-10-28 ENCOUNTER — OFFICE VISIT (OUTPATIENT)
Dept: ORTHOPEDICS | Facility: CLINIC | Age: 64
End: 2024-10-28
Payer: MEDICARE

## 2024-10-28 ENCOUNTER — HOSPITAL ENCOUNTER (OUTPATIENT)
Dept: RADIOLOGY | Facility: CLINIC | Age: 64
Discharge: HOME | End: 2024-10-28
Attending: SPECIALIST
Payer: MEDICARE

## 2024-10-28 VITALS
DIASTOLIC BLOOD PRESSURE: 87 MMHG | SYSTOLIC BLOOD PRESSURE: 146 MMHG | HEIGHT: 65 IN | BODY MASS INDEX: 21.02 KG/M2 | WEIGHT: 126.19 LBS | HEART RATE: 87 BPM

## 2024-10-28 DIAGNOSIS — M75.81 RIGHT ROTATOR CUFF TENDINITIS: ICD-10-CM

## 2024-10-28 DIAGNOSIS — M25.511 RIGHT SHOULDER PAIN, UNSPECIFIED CHRONICITY: ICD-10-CM

## 2024-10-28 DIAGNOSIS — M75.41 SUBACROMIAL IMPINGEMENT OF RIGHT SHOULDER: ICD-10-CM

## 2024-10-28 DIAGNOSIS — M25.511 RIGHT SHOULDER PAIN, UNSPECIFIED CHRONICITY: Primary | ICD-10-CM

## 2024-10-28 PROCEDURE — 99213 OFFICE O/P EST LOW 20 MIN: CPT | Mod: 25,,, | Performed by: SPECIALIST

## 2024-10-28 PROCEDURE — 73030 X-RAY EXAM OF SHOULDER: CPT | Mod: RT,,, | Performed by: SPECIALIST

## 2024-10-28 PROCEDURE — 20610 DRAIN/INJ JOINT/BURSA W/O US: CPT | Mod: RT,,, | Performed by: SPECIALIST

## 2024-10-28 RX ORDER — BETAMETHASONE SODIUM PHOSPHATE AND BETAMETHASONE ACETATE 3; 3 MG/ML; MG/ML
12 INJECTION, SUSPENSION INTRA-ARTICULAR; INTRALESIONAL; INTRAMUSCULAR; SOFT TISSUE
Status: DISCONTINUED | OUTPATIENT
Start: 2024-10-28 | End: 2024-10-28 | Stop reason: HOSPADM

## 2024-10-28 RX ADMIN — BETAMETHASONE SODIUM PHOSPHATE AND BETAMETHASONE ACETATE 12 MG: 3; 3 INJECTION, SUSPENSION INTRA-ARTICULAR; INTRALESIONAL; INTRAMUSCULAR; SOFT TISSUE at 12:10

## 2024-11-04 ENCOUNTER — TELEPHONE (OUTPATIENT)
Dept: NEUROSURGERY | Facility: CLINIC | Age: 64
End: 2024-11-04
Payer: MEDICARE

## 2024-11-05 ENCOUNTER — HOSPITAL ENCOUNTER (OUTPATIENT)
Dept: RADIOLOGY | Facility: HOSPITAL | Age: 64
Discharge: HOME | End: 2024-11-05
Attending: PHYSICIAN ASSISTANT
Payer: MEDICARE

## 2024-11-05 ENCOUNTER — OFFICE VISIT (OUTPATIENT)
Dept: NEUROSURGERY | Facility: CLINIC | Age: 64
End: 2024-11-05
Payer: MEDICARE

## 2024-11-05 VITALS
HEIGHT: 65 IN | RESPIRATION RATE: 16 BRPM | BODY MASS INDEX: 20.83 KG/M2 | WEIGHT: 125 LBS | TEMPERATURE: 98 F | HEART RATE: 90 BPM | SYSTOLIC BLOOD PRESSURE: 131 MMHG | DIASTOLIC BLOOD PRESSURE: 89 MMHG

## 2024-11-05 DIAGNOSIS — S32.050A CLOSED COMPRESSION FRACTURE OF L5 LUMBAR VERTEBRA, INITIAL ENCOUNTER: ICD-10-CM

## 2024-11-05 DIAGNOSIS — S32.040A CLOSED COMPRESSION FRACTURE OF L4 VERTEBRA, INITIAL ENCOUNTER: ICD-10-CM

## 2024-11-05 DIAGNOSIS — Z98.890 STATUS POST KYPHOPLASTY: ICD-10-CM

## 2024-11-05 DIAGNOSIS — M54.16 LUMBAR RADICULITIS: Primary | ICD-10-CM

## 2024-11-05 PROCEDURE — 99214 OFFICE O/P EST MOD 30 MIN: CPT | Mod: ,,, | Performed by: PHYSICIAN ASSISTANT

## 2024-11-05 PROCEDURE — 72100 X-RAY EXAM L-S SPINE 2/3 VWS: CPT | Mod: TC

## 2024-11-05 RX ORDER — PREGABALIN 50 MG/1
50 CAPSULE ORAL 3 TIMES DAILY
Qty: 20 CAPSULE | Refills: 0 | Status: SHIPPED | OUTPATIENT
Start: 2024-11-05 | End: 2025-05-06

## 2024-11-05 NOTE — PROGRESS NOTES
[facility]  History & Physical  Neurosurgery      Shreya Reyes   [id number]   1960       SUBJECTIVE:     CHIEF COMPLAINT:  Bilateral thigh pain      HPI:  Shreya Reyes is a 64 y.o. female who presents for follow up appointment.  On 10/22/2024, the patient underwent L4 and L5 kyphoplasty with Dr. Lamberto Aguilar.  The patient reports she did well after the kyphoplasty for 5 days.  She had persistent pain at the right anterior thigh.  However, she does not have pain along the left lateral thigh.  After the 5 days, her pain returned along the left lateral thigh.  Saturday, a few days ago, her pain was severe.  She was limping due to pain.  She a feeling as though her left leg would give out.  She was unable to recall anything in particular that she would have done to cause an increase in her pain.  Her activity is greatly limited secondary to her pain.  Lower back pain is not a problem for her.    She presents today with her .  She was provided with a prescription of her gabapentin when she was seen last.  The gabapentin caused her vivid in troubling dreams.  She discontinued use of the gabapentin.      Past Medical History:   Diagnosis Date    Adrenal cancer     Closed compression fracture of L3 lumbar vertebra, sequela 5/4/2022    Closed wedge compression fracture of T11 vertebra 5/4/2022    Compression fracture of L1 vertebra     Compression fracture of L3 vertebra     Elevated liver enzymes 11/02/2021    Hyperlipidemia     Hypertension 05/04/2022    Osteoporosis     Thoracic compression fracture        Past Surgical History:   Procedure Laterality Date    ABDOMINOPLASTY      ADENOIDECTOMY  Karely     ADRENALECTOMY Left     EYE SURGERY  2021    INTRAMEDULLARY RODDING OF FEMUR Left 02/08/2023    Procedure: INSERTION, INTRAMEDULLARY LEELEE, LEFT FEMUR;  Surgeon: Richard Bolanos MD;  Location: [facility];  Service: Orthopedics;  Laterality: Left;  supine, gustavo flat with blankets  synthes TFNA,  long    L1 kyphoplasty w/ spine barbara, L3 kyphoplasty      Laproscopy for excision of adrenal mass      left cataract Left     MAGNETIC RESONANCE IMAGING N/A 07/13/2022    Procedure: MRI (MAGNETIC RESONANCE IMAGING);  Surgeon: Nam Sandoval MD;  Location: [facility] OR;  Service: Neurosurgery;  Laterality: N/A;  MRI CERVICAL SPINE WITHOUT CONTRAST WITH ANESTHESIA    MINIMALLY INVASIVE FORAMINOTOMY CERVICAL SPINE Right 07/13/2022    Procedure: FORAMINOTOMY, SPINE, CERVICAL, MINIMALLY INVASIVE;  Surgeon: Nam Sandoval MD;  Location: [facility] OR;  Service: Neurosurgery;  Laterality: Right;  right C5-6, C6-7 posterior foraminotomy    ORIF TIBIA & FIBULA FRACTURES Left     TONSILLECTOMY         Family History   Problem Relation Name Age of Onset    Lymphoma Mother Cyn Sahni     Cancer Mother Cyn Sahni     Hyperlipidemia Father Henrry sahni     Heart disease Father Henrry sahni     Heart disease Sister Marla     Heart disease Brother Royce        Social History     Socioeconomic History    Marital status:    Occupational History    Occupation: RN on leave   Tobacco Use    Smoking status: Former     Current packs/day: 0.00     Types: Cigarettes     Quit date: 1/1/2009     Years since quitting: 15.8    Smokeless tobacco: Never   Substance and Sexual Activity    Alcohol use: Yes     Comment: social    Drug use: Not Currently    Sexual activity: Not Currently     Partners: Male     Social Drivers of Health     Food Insecurity: No Food Insecurity (8/14/2023)    Received from [facility], [facility], [facility]    Hunger Vital Sign     Worried About Running Out of Food in the Last Year: Never true     Ran Out of Food in the Last Year: Never true   Transportation Needs: No Transportation Needs (8/14/2023)    Received from [facility], [facility], [facility]    PRAPARE - Transportation     Lack of Transportation  "(Medical): No     Lack of Transportation (Non-Medical): No   Housing Stability: Unknown (2/9/2023)    Housing Stability Vital Sign     Unable to Pay for Housing in the Last Year: No       Review of patient's allergies indicates:  No Known Allergies     Current Outpatient Medications   Medication Instructions    ALPRAZolam (XANAX) 0.25 mg, Oral, 3 times daily PRN    amLODIPine (NORVASC) 10 mg, Oral, Daily    cholecalciferol (vitamin D3) 250 mcg    DULoxetine (CYMBALTA) 30 mg, Oral    HYDROcodone-acetaminophen (NORCO) 7.5-325 mg per tablet 1 tablet, Oral, Every 6 hours PRN    hydrocortisone (CORTEF) 10 mg, Daily    hydrocortisone (CORTEF) 20 mg, Every morning    ibuprofen (ADVIL,MOTRIN) 800 mg, Oral, 3 times daily    iron bis-gly/FA/C/B12/Ca/succ (IRON-150 ORAL) Take by mouth.    levothyroxine (SYNTHROID) 75 MCG tablet 1 tablet, Daily    losartan (COZAAR) 50 mg, Oral        ROS:    Review of Systems   Constitutional:  Negative for chills and fever.   HENT:  Negative for nosebleeds and sore throat.    Eyes:  Negative for pain and visual disturbance.   Respiratory:  Negative for cough, chest tightness and shortness of breath.    Cardiovascular:  Negative for chest pain.   Gastrointestinal:  Negative for diarrhea, nausea and vomiting.   Genitourinary:  Negative for difficulty urinating, dysuria and hematuria.   Musculoskeletal:  Positive for gait problem. Negative for myalgias.   Skin:  Negative for rash.   Neurological:  Positive for weakness and numbness. Negative for dizziness, facial asymmetry and headaches.   Psychiatric/Behavioral:  Negative for confusion and sleep disturbance. The patient is not nervous/anxious.        OBJECTIVE:       Visit Vitals  /89 (BP Location: Right arm, Patient Position: Sitting)   Pulse 90   Temp 98.1 °F (36.7 °C) (Oral)   Resp 16   Ht 5' 5" (1.651 m)   Wt 56.7 kg (125 lb)   BMI 20.80 kg/m²        General:  Pleasant, Well-nourished, Well-groomed.    Lungs:  Breathing is quiet with " non-labored respirations.    Neurological:  The patient is awake, alert, and oriented in all 4 spheres.  Her memory, cognition, and affect are appropriate.  Speech is fluent.  Gait is slow, slightly antalgic.      Imaging:  All pertinent neuroimaging independently reviewed. Discussed these findings in detail with the patient.  Three views of the lumbar spine were obtained today, 11/05/2024.  The compression fractures at the L4 and L5 vertebral bodies with kyphoplasty are stable.  There are no new fractures.  Alignment is stable.    ASSESSMENT:     1. Lumbar radiculitis  pregabalin (LYRICA) 50 MG capsule      2. Status post kyphoplasty          PLAN:     Options were discussed with the patient.  We reviewed her prior lumbar MRI and CT of her abdomen and pelvis.  Being nothing has changed with the x-rays, I do not feel we need to obtain additional testing at this point.  We discussed trying Lyrica.  She would like to move forward with this.  She was provided with a prescription.  At this time, she will return as needed basis.  I will discuss her case with Dr. Sandoval.      A total of 17 minutes was spent face-to-face with the patient during this encounter.  Over half of that time was spent on counseling and coordination of care. Additional time was used to reviewed the patient's chart including lumbar x-ray images, compare with lumbar MRI images and CT of the abdomen and pelvis, and work on office note.      Juana Nash PA-C      Disclaimer:  This note is prepared using voice recognition software and as such is likely to have errors despite attempts at proofreading.      Normal volume, rate, productivity, spontaneity and articulation

## 2024-11-07 ENCOUNTER — OFFICE VISIT (OUTPATIENT)
Dept: HEMATOLOGY/ONCOLOGY | Facility: CLINIC | Age: 64
End: 2024-11-07
Payer: MEDICARE

## 2024-11-07 VITALS
WEIGHT: 126.88 LBS | HEART RATE: 84 BPM | BODY MASS INDEX: 21.14 KG/M2 | RESPIRATION RATE: 15 BRPM | OXYGEN SATURATION: 97 % | HEIGHT: 65 IN | DIASTOLIC BLOOD PRESSURE: 85 MMHG | SYSTOLIC BLOOD PRESSURE: 152 MMHG

## 2024-11-07 DIAGNOSIS — C74.02 MALIGNANT NEOPLASM OF CORTEX OF LEFT ADRENAL GLAND: Primary | ICD-10-CM

## 2024-11-07 DIAGNOSIS — C78.7 SECONDARY MALIGNANT NEOPLASM OF LIVER: ICD-10-CM

## 2024-11-07 DIAGNOSIS — G89.3 CANCER ASSOCIATED PAIN: ICD-10-CM

## 2024-11-07 DIAGNOSIS — C79.51 SECONDARY MALIGNANT NEOPLASM OF BONE: ICD-10-CM

## 2024-11-07 PROCEDURE — 99214 OFFICE O/P EST MOD 30 MIN: CPT | Mod: PBBFAC | Performed by: INTERNAL MEDICINE

## 2024-11-07 PROCEDURE — 99214 OFFICE O/P EST MOD 30 MIN: CPT | Mod: S$PBB,,, | Performed by: INTERNAL MEDICINE

## 2024-11-07 PROCEDURE — 99999 PR PBB SHADOW E&M-EST. PATIENT-LVL IV: CPT | Mod: PBBFAC,,, | Performed by: INTERNAL MEDICINE

## 2024-11-07 RX ORDER — HYDROCODONE BITARTRATE AND ACETAMINOPHEN 7.5; 325 MG/1; MG/1
1 TABLET ORAL EVERY 6 HOURS PRN
Qty: 60 TABLET | Refills: 0 | Status: SHIPPED | OUTPATIENT
Start: 2024-11-07

## 2024-11-14 ENCOUNTER — APPOINTMENT (OUTPATIENT)
Dept: RADIATION THERAPY | Facility: HOSPITAL | Age: 64
End: 2024-11-14
Attending: RADIOLOGY
Payer: MEDICARE

## 2024-11-18 ENCOUNTER — HOSPITAL ENCOUNTER (OUTPATIENT)
Dept: RADIOLOGY | Facility: HOSPITAL | Age: 64
Discharge: HOME | End: 2024-11-18
Attending: OPHTHALMOLOGY
Payer: MEDICARE

## 2024-11-18 ENCOUNTER — HOSPITAL ENCOUNTER (OUTPATIENT)
Dept: RADIOLOGY | Facility: HOSPITAL | Age: 64
Discharge: HOME | End: 2024-11-18
Payer: MEDICARE

## 2024-11-18 DIAGNOSIS — H05.20 EXOPHTHALMUS: ICD-10-CM

## 2024-11-18 PROCEDURE — 70553 MRI BRAIN STEM W/O & W/DYE: CPT | Mod: TC

## 2024-11-18 PROCEDURE — 25500020 PHARM REV CODE 255

## 2024-11-18 PROCEDURE — A9577 INJ MULTIHANCE: HCPCS

## 2024-11-18 PROCEDURE — 70543 MRI ORBT/FAC/NCK W/O &W/DYE: CPT | Mod: TC

## 2024-11-18 RX ADMIN — GADOBENATE DIMEGLUMINE 12 ML: 529 INJECTION, SOLUTION INTRAVENOUS at 05:11

## 2024-11-19 ENCOUNTER — LAB VISIT (OUTPATIENT)
Dept: LAB | Facility: HOSPITAL | Age: 64
End: 2024-11-19
Attending: INTERNAL MEDICINE
Payer: MEDICARE

## 2024-11-19 DIAGNOSIS — C74.02 MALIGNANT NEOPLASM OF CORTEX OF LEFT ADRENAL GLAND: ICD-10-CM

## 2024-11-19 DIAGNOSIS — E07.9 THYROID DISEASE: ICD-10-CM

## 2024-11-19 DIAGNOSIS — Z79.899 OTHER LONG TERM (CURRENT) DRUG THERAPY: ICD-10-CM

## 2024-11-19 DIAGNOSIS — C74.00 MALIGNANT NEOPLASM OF ADRENAL CORTEX, UNSPECIFIED LATERALITY: ICD-10-CM

## 2024-11-19 DIAGNOSIS — C79.51 SECONDARY MALIGNANT NEOPLASM OF BONE: ICD-10-CM

## 2024-11-19 LAB
ALBUMIN SERPL-MCNC: 2.9 G/DL (ref 3.4–4.8)
ALBUMIN/GLOB SERPL: 0.8 RATIO (ref 1.1–2)
ALP SERPL-CCNC: 211 UNIT/L (ref 40–150)
ALT SERPL-CCNC: 15 UNIT/L (ref 0–55)
ANION GAP SERPL CALC-SCNC: 11 MEQ/L
AST SERPL-CCNC: 23 UNIT/L (ref 5–34)
BASOPHILS # BLD AUTO: 0.05 X10(3)/MCL
BASOPHILS NFR BLD AUTO: 0.6 %
BILIRUB SERPL-MCNC: 0.2 MG/DL
BUN SERPL-MCNC: 24.8 MG/DL (ref 9.8–20.1)
CALCIUM SERPL-MCNC: 9.1 MG/DL (ref 8.4–10.2)
CHLORIDE SERPL-SCNC: 101 MMOL/L (ref 98–107)
CHOLEST SERPL-MCNC: 195 MG/DL
CHOLEST/HDLC SERPL: 3 {RATIO} (ref 0–5)
CO2 SERPL-SCNC: 24 MMOL/L (ref 23–31)
CORTIS SERPL-SCNC: 42.9 UG/DL
CREAT SERPL-MCNC: 0.83 MG/DL (ref 0.55–1.02)
CREAT/UREA NIT SERPL: 30
EOSINOPHIL # BLD AUTO: 0.11 X10(3)/MCL (ref 0–0.9)
EOSINOPHIL NFR BLD AUTO: 1.2 %
ERYTHROCYTE [DISTWIDTH] IN BLOOD BY AUTOMATED COUNT: 14 % (ref 11.5–17)
GFR SERPLBLD CREATININE-BSD FMLA CKD-EPI: >60 ML/MIN/1.73/M2
GLOBULIN SER-MCNC: 3.8 GM/DL (ref 2.4–3.5)
GLUCOSE SERPL-MCNC: 107 MG/DL (ref 82–115)
HCT VFR BLD AUTO: 37.3 % (ref 37–47)
HDLC SERPL-MCNC: 62 MG/DL (ref 35–60)
HGB BLD-MCNC: 12.6 G/DL (ref 12–16)
IMM GRANULOCYTES # BLD AUTO: 0.04 X10(3)/MCL (ref 0–0.04)
IMM GRANULOCYTES NFR BLD AUTO: 0.4 %
LDLC SERPL CALC-MCNC: 91 MG/DL (ref 50–140)
LYMPHOCYTES # BLD AUTO: 1.02 X10(3)/MCL (ref 0.6–4.6)
LYMPHOCYTES NFR BLD AUTO: 11.3 %
MCH RBC QN AUTO: 33.3 PG (ref 27–31)
MCHC RBC AUTO-ENTMCNC: 33.8 G/DL (ref 33–36)
MCV RBC AUTO: 98.7 FL (ref 80–94)
MONOCYTES # BLD AUTO: 1.23 X10(3)/MCL (ref 0.1–1.3)
MONOCYTES NFR BLD AUTO: 13.7 %
NEUTROPHILS # BLD AUTO: 6.55 X10(3)/MCL (ref 2.1–9.2)
NEUTROPHILS NFR BLD AUTO: 72.8 %
PLATELET # BLD AUTO: 301 X10(3)/MCL (ref 130–400)
PMV BLD AUTO: 9.9 FL (ref 7.4–10.4)
POTASSIUM SERPL-SCNC: 4.1 MMOL/L (ref 3.5–5.1)
PROT SERPL-MCNC: 6.7 GM/DL (ref 5.8–7.6)
RBC # BLD AUTO: 3.78 X10(6)/MCL (ref 4.2–5.4)
SODIUM SERPL-SCNC: 136 MMOL/L (ref 136–145)
T4 FREE SERPL-MCNC: 1.09 NG/DL (ref 0.7–1.48)
TRIGL SERPL-MCNC: 209 MG/DL (ref 37–140)
TSH SERPL-ACNC: 0.76 UIU/ML (ref 0.35–4.94)
VLDLC SERPL CALC-MCNC: 42 MG/DL
WBC # BLD AUTO: 9 X10(3)/MCL (ref 4.5–11.5)

## 2024-11-19 PROCEDURE — 82024 ASSAY OF ACTH: CPT

## 2024-11-19 PROCEDURE — 77334 RADIATION TREATMENT AID(S): CPT | Performed by: RADIOLOGY

## 2024-11-19 PROCEDURE — 80299 QUANTITATIVE ASSAY DRUG: CPT

## 2024-11-19 PROCEDURE — 85025 COMPLETE CBC W/AUTO DIFF WBC: CPT

## 2024-11-19 PROCEDURE — 80061 LIPID PANEL: CPT

## 2024-11-19 PROCEDURE — 80053 COMPREHEN METABOLIC PANEL: CPT

## 2024-11-19 PROCEDURE — 82530 CORTISOL FREE: CPT

## 2024-11-19 PROCEDURE — 84439 ASSAY OF FREE THYROXINE: CPT

## 2024-11-19 PROCEDURE — 82088 ASSAY OF ALDOSTERONE: CPT

## 2024-11-19 PROCEDURE — 36415 COLL VENOUS BLD VENIPUNCTURE: CPT

## 2024-11-19 PROCEDURE — 82627 DEHYDROEPIANDROSTERONE: CPT

## 2024-11-19 PROCEDURE — 84443 ASSAY THYROID STIM HORMONE: CPT

## 2024-11-19 PROCEDURE — 84244 ASSAY OF RENIN: CPT

## 2024-11-19 PROCEDURE — 82533 TOTAL CORTISOL: CPT

## 2024-11-20 LAB
ACTH PLAS-MCNC: 35 PG/ML
DHEA-S SERPL-MCNC: 7.2 MCG/DL (ref 9.7–159)

## 2024-11-22 LAB
COLLECT DURATION TIME UR: 24 H
CORTIS 24H UR-MRATE: 9.2 MCG/24 H (ref 3.5–45)
RENIN PLAS-CCNC: 49 NG/ML/H
SPECIMEN VOL 24H UR: 1300 ML

## 2024-11-22 PROCEDURE — 77373 STRTCTC BDY RAD THER TX DLVR: CPT

## 2024-11-25 LAB — ALDOST SERPL-MCNC: <4 NG/DL

## 2024-11-25 PROCEDURE — 77373 STRTCTC BDY RAD THER TX DLVR: CPT | Performed by: RADIOLOGY

## 2024-11-25 PROCEDURE — 77336 RADIATION PHYSICS CONSULT: CPT | Performed by: RADIOLOGY

## 2024-11-26 ENCOUNTER — TELEPHONE (OUTPATIENT)
Dept: NEUROSURGERY | Facility: CLINIC | Age: 64
End: 2024-11-26
Payer: MEDICARE

## 2024-11-26 DIAGNOSIS — M54.16 LUMBAR RADICULITIS: ICD-10-CM

## 2024-11-26 LAB — MITOTANE SERPL-MCNC: 6.5 UG/ML

## 2024-11-26 RX ORDER — PREGABALIN 50 MG/1
100 CAPSULE ORAL 2 TIMES DAILY
Qty: 120 CAPSULE | Refills: 6 | Status: SHIPPED | OUTPATIENT
Start: 2024-11-26 | End: 2025-05-27

## 2024-11-26 NOTE — TELEPHONE ENCOUNTER
I spoke to Shreya.  She ran out of the Lyrica.  Her pain has increased with out the Lyrica.  She was taking 2 at night of the 50 mg.  I have instructed her to increase to 1 in the morning, 1 mid day, and 2 at night.  She voiced understanding.

## 2024-11-27 PROCEDURE — 77373 STRTCTC BDY RAD THER TX DLVR: CPT

## 2024-12-03 ENCOUNTER — APPOINTMENT (OUTPATIENT)
Dept: RADIATION THERAPY | Facility: HOSPITAL | Age: 64
End: 2024-12-03
Attending: RADIOLOGY
Payer: MEDICARE

## 2024-12-03 PROCEDURE — 77373 STRTCTC BDY RAD THER TX DLVR: CPT | Performed by: RADIOLOGY

## 2024-12-05 PROCEDURE — 77334 RADIATION TREATMENT AID(S): CPT | Performed by: RADIOLOGY

## 2024-12-05 PROCEDURE — 77373 STRTCTC BDY RAD THER TX DLVR: CPT | Performed by: RADIOLOGY

## 2024-12-11 PROCEDURE — 77300 RADIATION THERAPY DOSE PLAN: CPT | Performed by: RADIOLOGY

## 2024-12-11 PROCEDURE — 77301 RADIOTHERAPY DOSE PLAN IMRT: CPT | Performed by: RADIOLOGY

## 2024-12-11 PROCEDURE — 77338 DESIGN MLC DEVICE FOR IMRT: CPT | Performed by: RADIOLOGY

## 2024-12-13 PROCEDURE — 77373 STRTCTC BDY RAD THER TX DLVR: CPT

## 2024-12-17 PROCEDURE — 77373 STRTCTC BDY RAD THER TX DLVR: CPT | Performed by: RADIOLOGY

## 2024-12-19 PROCEDURE — 77373 STRTCTC BDY RAD THER TX DLVR: CPT | Performed by: RADIOLOGY

## 2024-12-26 NOTE — PROGRESS NOTES
Subjective:       Patient ID: Shreya Reyes is a 65 y.o. female.    Chief Complaint:  I am starting radiation today on the right scapula    Diagnosis: Metastatic adrenocortical carcinoma                    Multiple osteoporotic vertebral compression fractures    Treatment History  Adjuvant XRT (completed 9/30/21)  Mitotane 1/25/22-7/26/22, Resumed 8/22  XRT right scapula, L5,  R ischium, L femoral neck completed 8/26/22  SBRT L2-3 2/06/23  Left IM nail 2/08/23  SBRT R iliac wing 5/15/23  SBRT L5, R ischium 12/4/23  Mitotane 8/22-5/24  SBRT liver/R adrenal 8/08/24  XRT R orbit/frontal bone 12/5/24  XRT Left 3rd rib 12/1924    Current Treatment:   Hydrocortisone 20 mg Q AM, 10 mg Q PM  Levothyroxine 75 mcg/d              Clinical History:  Patient initially presented to the [facility] ER 3/16/21 with acute left flank pain. CT A/P showed a heterogeneous enhancing mass of the left adrenal gland measuring 5.5 x 4.8 x 5 cm (24 Hu), with no definite microscopic fat. There was mild displacement of the left renal vein. Right adrenal gland was unremarkable.  Hormonal workup was negative for pheochromocytoma. She was felt to have had an acute adrenal hemorrhage and close observation was recommended. Follow-up CT A/P 6/9/21 showed increased size of the adrenal mass to 8.0 x 4.5 cm appearing hypervascular with some central necrosis. She underwent a laparoscopic/robotic left adrenalectomy 6/11/21.  Final pathology showed an adrenal cortical carcinoma predominant oncocytic/trabecular morphology with focally marked cytologic atypia and increased mitotic rate (up to 10/50 HPF's). There were large areas of necrosis and multiple foci of capsular and lymphovascular invasion. Ki-67 expression was 10-15%.    She had a Telemedicine consultation with Dr. Dion Lynch at [facility] 8/3/21 who specializes in treatment of adrenocortical carcinoma. Her case was reviewed and discussed in their tumor board. Recommendations were for  treatment with adjuvant radiation therapy and systemic treatment with Mitotane. Baseline postoperative CT scans of the chest, abdomen and pelvis 8/4/21 showed postoperative changes with no evidence of measurable metastatic disease.    She was seen as a new patient at [facility] 8/9/21.  She had recovered well from her surgery and was asymptomatic.  Treatment recommendations from the [facility] were reviewed and discussed.  Treatment was started with Mitotane 1000 mg BID on 8/16/2021.  No dose escalation was recommended until she completed radiation therapy.  She was started on replacement hydrocortisone 20 mg Q AM and 10 mg Q PM.  Surveillance CT scans were recommended in 3 months.      She was seen for an office visit 9/16/21 after completing 4 weeks of treatment with Mitotane.  She was not having any significant side effects associated with her therapy.  She was longterm through her adjuvant radiation therapy.  Laboratory testing showed marked transaminase elevations with an AST of 493,  and alkaline phosphatase 175.  Bilirubin was normal.  The remainder of her CMP was unremarkable.  Baseline mitotane level drawn at that visit was 2.5 mcg/mL.  Mitotane was held and adjuvant radiation therapy was continued.  Weekly laboratory monitoring showed gradual improvement in her LFTs.  Although mitotane had been associated with transaminase elevations, >5x elevations are rare occurring in <1% of patients.  She was also instructed to hold her lovastatin.  She completed adjuvant radiation therapy 9/30/2021.     Follow-up laboratory continued to show transaminase elevations.  GI was consulted and ordered additional laboratory testing which did not reveal evidence of viral or autoimmune etiologies for her hepatitis.  Mitotane was not resumed due to her persistent transaminase elevations. Follow-up CT scans of the chest, abdomen and pelvis 10/28/21 showed a few stable subcentimeter pulmonary  nodules and changes related to her previous left adrenalectomy with no evidence of disease recurrence.  Following normalization of her LFTs, treatment was resumed with Mitotane 1/25/22.  Her LFTs remained normal even during dose escalation.    She had an injury at home in mid March after carrying in several arms of [employer] with acute mid back pain.  Plain x-ray 3/17/22 showed a compression deformity at L2 of questionable age.  MRI of the lumbar spine 3/23/22 showed a subacute severe compression fracture deformity of the L1 vertebral body and mild superior endplate compression fracture of L3 vertebral body with osteoporotic appearance and no findings suspicious for pathologic fracture.  However, there were scattered small osseous lesions in the right L3 vertebral body and L5 vertebral body concerning for metastatic disease.  CT PET scan 3/28/22 showed mildly hypermetabolic osseous lesions in the lumbar spine, pelvis and proximal left femur consistent with metastatic disease.  There was no significant hypermetabolic uptake at the sites of her compression fractures.  She did not have pain at any of the sites prior to the acute compression fracture.  Bone density exam showed osteoporosis of the lumbar spine with a T score of -3.4, left femoral neck -3.4 and left total hip -2.7.  She was started on Prolia 3/31/22 and underwent L1 and L3 kyphoplasty 4//8/22.  Biopsies of the L3 vertebral body showed no evidence of metastatic carcinoma.  She continued to have significant pain following the kyphoplasty.  Further review of her films showed a possible compression fracture at T11.  MRI of the thoracic spine 4/15/22 showed a compression fracture of T11 with 50% loss of vertebral body height.  She underwent kyphoplasty 4/21/22 with symptomatic improvement.    CT scans of the chest, abdomen and pelvis 4/27/22 showed sclerotic osseous lesions at L4, right ischium and left femur correlating with the hypermetabolic lesions on  CT-PET scan.  There were stable sub-6 mm pulmonary nodules in the RML and LLL unchanged from previous imaging.  MRI of the cervical and lumbar spine 7/5/22 showed moderate disc disease at C5-6 and C6-7 without significant spinal canal stenosis or foraminal stenosis.  She developed progressive symptoms of right arm pain, numbness and tingling and updated MRI 7/13/22 showed foraminal stenosis secondary to disc osteophyte on the right side at C5-6 and C6-7.  She underwent a right foraminotomy with relief of her symptoms.    CT C/A/P 08/01/22:  Multiple compression fractures and postsurgical changes.  Area of sclerosis in the left sacrum was stable compared to the prior exam.  There was no evidence of visceral metastatic disease.  She had a teleconference with [facility] 8/2/22 and a follow-up PET scan was recommended.  Mitotane was discontinued 7/26/22.   CT-PET 08/05/22:  Hypermetabolic osseous metastatic disease involving L5, right ischium, left femoral neck and a new lesion in the inferior right scapula.  There was a 12 mm area of hypermetabolic activity adjacent to the right adrenal gland without a definite CT correlate.    She completed palliative radiation therapy to the right scapula, L5, R ischium and left femoral neck on 8/26/22.  She resumed Mitotane at 1000 mg BID on 8/29/22.      CT-PET 11/21/22:  Improved FDG uptake in all of the treated sites.  Right scapula SUV decreased from 4.2 to 2.1, left femoral neck 9.1 to 5, right ischium 13 to 2.4 and L5 8.2 to 4.6.  Hypermetabolic activity at the site of the previous compression fractures had also improved.  There were no findings suspicious for new sites of disease or visceral metastatic disease.  MRI lumbar spine and left hip 1/29/23:  Metastatic disease involving right ischial tuberosity, left femoral neck and L3 vertebral body, similar in size to CT-PET scan 11/21/22:  L3 lesion showed interval progression with ventral and paraspinal extension  causing moderate-to-severe narrowing of the spinal canal.  Left femoral neck lesion involved greater than 50% of the total diameter of the femoral neck.  She was treated with stereotactic XRT to L3 and underwent prophylactic IM nail of the left femoral neck 2/8/23.  CT C/A/P 4/24/23:  13 mm sclerotic met inferior right scapula, bandlike densities RUL and RLL secondary to previous XRT.  Stable thoracic spine compression fractures.  Increased density L4 vertebral body metastasis.  No evidence of visceral metastatic disease.  MRI L-spine/pelvis 4/24/23:  Stable L3 lesion with mild epidural extension and enhancement measuring 8 mm.  Sclerotic 10 mm focus of abnormal marrow signal right iliac wing.    Guardant 360 2/20/23:  Positive TP53 mutation, microsatellite stable     Prolia was resumed for her metastatic bone disease 4/11/23.  She completed SBRT to the right iliac wing metastasis 5/15/23 receiving 2700 cGy in 3 fractions.  Treatment was well tolerated.      MRI L-spine and pelvis 7/26/23:  Resolution of previous epidural enhancing mass posterior to L3 vertebral body.  Right iliac wing lesion 12 mm (previous 10 mm).  No new metastatic lesions.  CT C/A/P 7/28/23:  Stable sclerotic bone lesions right scapula, right iliac wing and L4.  Stable kyphoplasty changes T10, T11, T12 and L2.  Stable L1 compression deformity.  No new sites of disease identified.  Colonoscopy 7/25/23:  No polyps or other abnormalities.  Follow-up exam recommended in 7 years.  CT C/A/P 11/06/23:  S/p kyphoplasty at T10, T11, T12 and L2.  Stable L1 compression deformity.  Increased L4 height loss with increased sclerosis of the vertebral body.  Stable sclerotic lesion right iliac wing 13 mm.  Inferior right scapular lesion more vaguely seen.  No findings suspicious for new sites of disease.  MRI pelvis 11/10/23:  Interval increase in the size of the metastatic lesion involving the right ischial tuberosity measuring 3.2 cm x 1.8 cm, previously 2.1  x 1.3 cm.  MRI L-spine 11/10/23:  Progressive metastatic disease at L5 with a new pathologic compression deformity.  MRI L-spine 2/22/24:  New L4 superior endplate compression fracture with minimal loss of height, otherwise unchanged from 11/10/23.  CT C/A/P 2/27/24:  L4 superior endplate compression fracture with minimal loss of height.  Otherwise, no evidence of disease progression or new sites of disease.    Seen in consultation by Dr. Lynch at [facility] 5/21/24.  No progressive disease on imaging.  Mitotane discontinued secondary to progressive fatigue and a level of 20.7.    CT C/A/P 6/17/24:  New 1.7 cm right hepatic met and 1.6 x 0.9 cm right adrenal gland nodule.  No abnormal lymphadenopathy.  MRI L-spine 6/19/24:  Slight progression of mild superior endplate height loss at L4.  Slight increased marrow enhancement at S1-S2.  No definite disease progression or new lesions.  CT liver biopsy 7/16/24:  Metastatic adrenocortical carcinoma.  QNS for NGS.    She received SBRT to the liver met receiving a total dose of 6000 cGy in 5 fractions and to the right adrenal met receiving 5000 cGy in 5 fractions completed 8/8/24.  She underwent additional kyphoplasty at L4 and 5 on 10/22/24.  Biopsies of both vertebral bodies were negative for malignancy.    CT C/A/P 10/1/24:  No metastatic disease in the chest.  1.4 cm hepatic hypodensity segment VIII with fiducials in place.  Stable 1.9 cm right adrenal nodule.  Multiple compression fractures lower thoracic and lumbar spine with previous kyphoplasty.  MRI L-spine 10/08/24:  Minimal progression of height loss at L4 with slight progression of associated spinal canal stenosis.  Otherwise stable.  MRI brain 11/18/24:  Metastatic heterogeneous enhancement superior aspect of the right orbit and right frontal bone.  No intra-axial metastasis.  CT C/A/P 12/4/24:  Expansile metastatic lesion left anterior 3rd rib 2.5 x 5 cm, right medial lower scapula metastatic  lesion.  Previously seen right hepatic dome lesion no longer visualized.  Unchanged right adrenal gland nodule 1.8 x 0.7 cm.  Diffuse osteopenia with multiple lower thoracic and lumbar compression fractures and previous vertebroplasty.  MRI Chest 1/02/25:  Metastatic lytic lesion caudal right scapula 1.6 x 4 x 4 cm with associated chronic pathologic fracture.      Interval History  She returns to clinic today for a follow-up visit accompanied by her .  She has received multiple additional courses of palliative radiation therapy to additional sites of osseous metastatic disease including the right orbit and left 3rd rib.  She will be starting treatment today for a right scapular metastatic lesion.  She has mild discomfort in the region of her right scapula.  She has intermittent pain in the right groin region.  No difficulty walking or standing.  She has no abdominal symptoms or complaints.  Appetite remains good, no weight loss.      Review of Systems   Constitutional:  Negative for appetite change, fatigue, fever and unexpected weight change.   HENT:  Negative for mouth sores, sore throat and trouble swallowing.    Eyes: Negative.  Negative for visual disturbance.   Respiratory:  Negative for cough and shortness of breath.    Cardiovascular:  Negative for chest pain, palpitations and leg swelling.   Gastrointestinal:  Negative for abdominal distention, abdominal pain, constipation, diarrhea, nausea and vomiting.   Genitourinary:  Negative for dysuria, flank pain and frequency.   Musculoskeletal:  Positive for arthralgias and back pain (Improved). Negative for gait problem.   Integumentary:  Negative for pallor and rash.   Neurological:  Positive for numbness. Negative for dizziness, weakness and headaches.   Hematological:  Negative for adenopathy. Does not bruise/bleed easily.   Psychiatric/Behavioral: Negative.         PMHx:  Hyperlipidemia, osteoporosis  PSHx:  Tonsils, left cataract, ORIF left tibia/fib,  "left adrenalectomy, multiple vertebral kyphoplasties  SH:  Former smoker 1 PPD, quit 2009. + Social alcohol use. Lives in [place] with her . 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  FH:  Her mother had lymphoma.     Objective:        BP (!) 148/80 (BP Location: Right arm, Patient Position: Sitting)   Pulse 83   Temp 97.8 °F (36.6 °C) (Oral)   Resp 18   Ht 5' 5" (1.651 m)   Wt 59 kg (130 lb 1.6 oz)   BMI 21.65 kg/m²    Physical Exam  Constitutional:       Comments: Well-developed white female in NAD.   HENT:      Head: Normocephalic.      Mouth/Throat:      Mouth: Mucous membranes are moist.      Pharynx: Oropharynx is clear. No posterior oropharyngeal erythema.   Eyes:      General: No scleral icterus.     Extraocular Movements: Extraocular movements intact.      Pupils: Pupils are equal, round, and reactive to light.   Cardiovascular:      Rate and Rhythm: Normal rate and regular rhythm.      Heart sounds: No murmur heard.  Pulmonary:      Comments: Lungs clear to auscultation  Abdominal:      General: Bowel sounds are normal. There is no distension.      Palpations: Abdomen is soft. There is no mass.      Tenderness: There is no abdominal tenderness.   Musculoskeletal:         General: No swelling or tenderness.      Cervical back: Neck supple. No tenderness.      Comments: No vertebral body or rib cage tenderness.  Mild tenderness over right scapula.   Skin:     General: Skin is warm and dry.      Findings: No rash.   Neurological:      General: No focal deficit present.      Mental Status: She is alert and oriented to person, place, and time.      Motor: No weakness.   Psychiatric:         Behavior: Behavior is cooperative.       ECOG SCORE    1 - Restricted in strenuous activity-ambulatory and able to carry out work of a Clone  Laboratory from 12/31/24 reviewed.      Assessment:   Metastatic adrenocortical carcinoma  Multiple osteoporotic vertebral body " compression fractures  S/p prophylactic left femur IM nail 2/8/23  Cancer related pain - stable    Plan:   Patient has had multiple areas of osseous metastatic disease requiring treatment with palliative radiation therapy with favorable responses.  She will be initiating palliative radiation to the right scapular metastasis today and will complete treatment on 1/14/25.  Due to her ongoing disease progression, systemic therapy was recommended with single agent Pembrolizumab based on multiple phase II trials showing response rates ranging from 14-50% as well as disease stabilization in additional patients.  There is also emerging data for treatment with immunotherapy in combination with a TKI.  Her case was also discussed with Dr. Lynch at [facility].  She will be scheduled to start treatment with Pembrolizumab 200 mg every 3 weeks beginning on 01/21/25.  Benefits, risks, toxicities and side effects of Pembrolizumab were discussed and reviewed in detail. All questions were answered. Literature regarding the treatment plan and specific drug information was also provided.  Mitotane level continues to gradually decrease off of therapy.  RTC in 4 weeks for a follow-up visit and clinical assessment prior to her 2nd cycle of treatment.      MARY NORMAN MD    Other Physicians  Dr. Dion Lynch ([facility])

## 2024-12-27 DIAGNOSIS — C74.02 MALIGNANT NEOPLASM OF CORTEX OF LEFT ADRENAL GLAND: Primary | ICD-10-CM

## 2024-12-27 RX ORDER — HYDROCORTISONE 20 MG/1
20 TABLET ORAL EVERY MORNING
Qty: 30 TABLET | Refills: 3 | Status: SHIPPED | OUTPATIENT
Start: 2024-12-27

## 2024-12-27 RX ORDER — HYDROCORTISONE 10 MG/1
10 TABLET ORAL NIGHTLY
Qty: 30 TABLET | Refills: 3 | Status: SHIPPED | OUTPATIENT
Start: 2024-12-27

## 2024-12-31 ENCOUNTER — LAB VISIT (OUTPATIENT)
Dept: LAB | Facility: HOSPITAL | Age: 64
End: 2024-12-31
Attending: INTERNAL MEDICINE
Payer: MEDICARE

## 2024-12-31 DIAGNOSIS — C74.00 MALIGNANT NEOPLASM OF ADRENAL CORTEX, UNSPECIFIED LATERALITY: ICD-10-CM

## 2024-12-31 DIAGNOSIS — Z79.899 OTHER LONG TERM (CURRENT) DRUG THERAPY: ICD-10-CM

## 2024-12-31 DIAGNOSIS — C79.51 SECONDARY MALIGNANT NEOPLASM OF BONE: ICD-10-CM

## 2024-12-31 DIAGNOSIS — C74.02 MALIGNANT NEOPLASM OF CORTEX OF LEFT ADRENAL GLAND: ICD-10-CM

## 2024-12-31 DIAGNOSIS — E07.9 THYROID DISEASE: ICD-10-CM

## 2024-12-31 LAB
ALBUMIN SERPL-MCNC: 2.9 G/DL (ref 3.4–4.8)
ALBUMIN/GLOB SERPL: 0.9 RATIO (ref 1.1–2)
ALP SERPL-CCNC: 221 UNIT/L (ref 40–150)
ALT SERPL-CCNC: 17 UNIT/L (ref 0–55)
ANION GAP SERPL CALC-SCNC: 7 MEQ/L
AST SERPL-CCNC: 21 UNIT/L (ref 5–34)
BASOPHILS # BLD AUTO: 0.06 X10(3)/MCL
BASOPHILS NFR BLD AUTO: 1 %
BILIRUB SERPL-MCNC: 0.3 MG/DL
BUN SERPL-MCNC: 17.4 MG/DL (ref 9.8–20.1)
CALCIUM SERPL-MCNC: 9.1 MG/DL (ref 8.4–10.2)
CHLORIDE SERPL-SCNC: 106 MMOL/L (ref 98–107)
CHOLEST SERPL-MCNC: 187 MG/DL
CHOLEST/HDLC SERPL: 3 {RATIO} (ref 0–5)
CO2 SERPL-SCNC: 25 MMOL/L (ref 23–31)
CORTIS SERPL-SCNC: 40.2 UG/DL
CREAT SERPL-MCNC: 0.69 MG/DL (ref 0.55–1.02)
CREAT/UREA NIT SERPL: 25
EOSINOPHIL # BLD AUTO: 0.13 X10(3)/MCL (ref 0–0.9)
EOSINOPHIL NFR BLD AUTO: 2.2 %
ERYTHROCYTE [DISTWIDTH] IN BLOOD BY AUTOMATED COUNT: 14 % (ref 11.5–17)
GFR SERPLBLD CREATININE-BSD FMLA CKD-EPI: >60 ML/MIN/1.73/M2
GLOBULIN SER-MCNC: 3.4 GM/DL (ref 2.4–3.5)
GLUCOSE SERPL-MCNC: 97 MG/DL (ref 82–115)
HCT VFR BLD AUTO: 34.9 % (ref 37–47)
HDLC SERPL-MCNC: 65 MG/DL (ref 35–60)
HGB BLD-MCNC: 11.4 G/DL (ref 12–16)
IMM GRANULOCYTES # BLD AUTO: 0.02 X10(3)/MCL (ref 0–0.04)
IMM GRANULOCYTES NFR BLD AUTO: 0.3 %
LDLC SERPL CALC-MCNC: 93 MG/DL (ref 50–140)
LYMPHOCYTES # BLD AUTO: 0.7 X10(3)/MCL (ref 0.6–4.6)
LYMPHOCYTES NFR BLD AUTO: 11.9 %
MCH RBC QN AUTO: 32.7 PG (ref 27–31)
MCHC RBC AUTO-ENTMCNC: 32.7 G/DL (ref 33–36)
MCV RBC AUTO: 100 FL (ref 80–94)
MONOCYTES # BLD AUTO: 0.77 X10(3)/MCL (ref 0.1–1.3)
MONOCYTES NFR BLD AUTO: 13.1 %
NEUTROPHILS # BLD AUTO: 4.2 X10(3)/MCL (ref 2.1–9.2)
NEUTROPHILS NFR BLD AUTO: 71.5 %
PLATELET # BLD AUTO: 302 X10(3)/MCL (ref 130–400)
PMV BLD AUTO: 9.8 FL (ref 7.4–10.4)
POTASSIUM SERPL-SCNC: 3.3 MMOL/L (ref 3.5–5.1)
PROT SERPL-MCNC: 6.3 GM/DL (ref 5.8–7.6)
RBC # BLD AUTO: 3.49 X10(6)/MCL (ref 4.2–5.4)
SODIUM SERPL-SCNC: 138 MMOL/L (ref 136–145)
T4 FREE SERPL-MCNC: 1.07 NG/DL (ref 0.7–1.48)
TRIGL SERPL-MCNC: 145 MG/DL (ref 37–140)
TSH SERPL-ACNC: 0.59 UIU/ML (ref 0.35–4.94)
VLDLC SERPL CALC-MCNC: 29 MG/DL
WBC # BLD AUTO: 5.88 X10(3)/MCL (ref 4.5–11.5)

## 2024-12-31 PROCEDURE — 80299 QUANTITATIVE ASSAY DRUG: CPT

## 2024-12-31 PROCEDURE — 80061 LIPID PANEL: CPT

## 2024-12-31 PROCEDURE — 36415 COLL VENOUS BLD VENIPUNCTURE: CPT

## 2024-12-31 PROCEDURE — 85025 COMPLETE CBC W/AUTO DIFF WBC: CPT

## 2024-12-31 PROCEDURE — 82088 ASSAY OF ALDOSTERONE: CPT

## 2024-12-31 PROCEDURE — 82627 DEHYDROEPIANDROSTERONE: CPT

## 2024-12-31 PROCEDURE — 84244 ASSAY OF RENIN: CPT

## 2024-12-31 PROCEDURE — 84439 ASSAY OF FREE THYROXINE: CPT

## 2024-12-31 PROCEDURE — 82530 CORTISOL FREE: CPT

## 2024-12-31 PROCEDURE — 82533 TOTAL CORTISOL: CPT

## 2024-12-31 PROCEDURE — 82024 ASSAY OF ACTH: CPT

## 2024-12-31 PROCEDURE — 80053 COMPREHEN METABOLIC PANEL: CPT

## 2024-12-31 PROCEDURE — 84443 ASSAY THYROID STIM HORMONE: CPT

## 2025-01-02 ENCOUNTER — APPOINTMENT (OUTPATIENT)
Dept: RADIATION THERAPY | Facility: HOSPITAL | Age: 65
End: 2025-01-02
Attending: RADIOLOGY
Payer: MEDICARE

## 2025-01-02 PROCEDURE — 77332 RADIATION TREATMENT AID(S): CPT | Performed by: RADIOLOGY

## 2025-01-02 PROCEDURE — 77334 RADIATION TREATMENT AID(S): CPT | Performed by: RADIOLOGY

## 2025-01-03 LAB
ACTH PLAS-MCNC: 8.7 PG/ML
DHEA-S SERPL-MCNC: 6.7 MCG/DL (ref 9.7–159)

## 2025-01-06 LAB
ALDOST SERPL-MCNC: <4 NG/DL
RENIN PLAS-CCNC: 8.9 NG/ML/H

## 2025-01-07 LAB
COLLECT DURATION TIME UR: 24 H
CORTIS 24H UR-MRATE: 90 MCG/24 H (ref 3.5–45)
SPECIMEN VOL 24H UR: 900 ML

## 2025-01-07 PROCEDURE — 77300 RADIATION THERAPY DOSE PLAN: CPT | Performed by: RADIOLOGY

## 2025-01-07 PROCEDURE — 77338 DESIGN MLC DEVICE FOR IMRT: CPT | Performed by: RADIOLOGY

## 2025-01-07 PROCEDURE — 77301 RADIOTHERAPY DOSE PLAN IMRT: CPT | Performed by: RADIOLOGY

## 2025-01-08 ENCOUNTER — OFFICE VISIT (OUTPATIENT)
Dept: HEMATOLOGY/ONCOLOGY | Facility: CLINIC | Age: 65
End: 2025-01-08
Payer: MEDICARE

## 2025-01-08 VITALS
DIASTOLIC BLOOD PRESSURE: 80 MMHG | SYSTOLIC BLOOD PRESSURE: 148 MMHG | HEART RATE: 83 BPM | HEIGHT: 65 IN | WEIGHT: 130.13 LBS | TEMPERATURE: 98 F | BODY MASS INDEX: 21.68 KG/M2 | RESPIRATION RATE: 18 BRPM

## 2025-01-08 DIAGNOSIS — E07.9 THYROID DISEASE: ICD-10-CM

## 2025-01-08 DIAGNOSIS — G89.3 CANCER ASSOCIATED PAIN: ICD-10-CM

## 2025-01-08 DIAGNOSIS — C74.02 MALIGNANT NEOPLASM OF CORTEX OF LEFT ADRENAL GLAND: Primary | ICD-10-CM

## 2025-01-08 DIAGNOSIS — C79.51 SECONDARY MALIGNANT NEOPLASM OF BONE: ICD-10-CM

## 2025-01-08 DIAGNOSIS — C78.7 SECONDARY MALIGNANT NEOPLASM OF LIVER: ICD-10-CM

## 2025-01-08 DIAGNOSIS — F41.9 ANXIETY: ICD-10-CM

## 2025-01-08 LAB — MITOTANE SERPL-MCNC: 4.6 UG/ML

## 2025-01-08 PROCEDURE — 99214 OFFICE O/P EST MOD 30 MIN: CPT | Mod: S$PBB,,, | Performed by: INTERNAL MEDICINE

## 2025-01-08 PROCEDURE — 99999 PR PBB SHADOW E&M-EST. PATIENT-LVL IV: CPT | Mod: PBBFAC,,, | Performed by: INTERNAL MEDICINE

## 2025-01-08 PROCEDURE — 99214 OFFICE O/P EST MOD 30 MIN: CPT | Mod: PBBFAC | Performed by: INTERNAL MEDICINE

## 2025-01-08 RX ORDER — EPINEPHRINE 0.3 MG/.3ML
0.3 INJECTION SUBCUTANEOUS ONCE AS NEEDED
OUTPATIENT
Start: 2025-01-21

## 2025-01-08 RX ORDER — PROCHLORPERAZINE EDISYLATE 5 MG/ML
5 INJECTION INTRAMUSCULAR; INTRAVENOUS ONCE AS NEEDED
OUTPATIENT
Start: 2025-01-21

## 2025-01-08 RX ORDER — HYDROCODONE BITARTRATE AND ACETAMINOPHEN 7.5; 325 MG/1; MG/1
1 TABLET ORAL EVERY 6 HOURS PRN
Qty: 60 TABLET | Refills: 0 | Status: SHIPPED | OUTPATIENT
Start: 2025-01-08

## 2025-01-08 RX ORDER — DIPHENHYDRAMINE HYDROCHLORIDE 50 MG/ML
50 INJECTION INTRAMUSCULAR; INTRAVENOUS ONCE AS NEEDED
OUTPATIENT
Start: 2025-01-21

## 2025-01-08 RX ORDER — HEPARIN 100 UNIT/ML
500 SYRINGE INTRAVENOUS
OUTPATIENT
Start: 2025-01-21

## 2025-01-08 RX ORDER — ALPRAZOLAM 0.25 MG/1
0.25 TABLET ORAL 3 TIMES DAILY PRN
Qty: 60 TABLET | Refills: 1 | Status: SHIPPED | OUTPATIENT
Start: 2025-01-08 | End: 2026-01-08

## 2025-01-08 RX ORDER — SODIUM CHLORIDE 0.9 % (FLUSH) 0.9 %
10 SYRINGE (ML) INJECTION
OUTPATIENT
Start: 2025-01-21

## 2025-01-10 PROCEDURE — 77373 STRTCTC BDY RAD THER TX DLVR: CPT | Performed by: RADIOLOGY

## 2025-01-13 PROCEDURE — 77336 RADIATION PHYSICS CONSULT: CPT | Performed by: RADIOLOGY

## 2025-01-14 PROCEDURE — 77373 STRTCTC BDY RAD THER TX DLVR: CPT | Performed by: RADIOLOGY

## 2025-01-28 ENCOUNTER — INFUSION (OUTPATIENT)
Dept: INFUSION THERAPY | Facility: HOSPITAL | Age: 65
End: 2025-01-28
Attending: INTERNAL MEDICINE
Payer: MEDICARE

## 2025-01-28 VITALS — DIASTOLIC BLOOD PRESSURE: 76 MMHG | HEART RATE: 79 BPM | SYSTOLIC BLOOD PRESSURE: 129 MMHG

## 2025-01-28 DIAGNOSIS — C74.00 MALIGNANT NEOPLASM OF ADRENAL CORTEX, UNSPECIFIED LATERALITY: Primary | ICD-10-CM

## 2025-01-28 DIAGNOSIS — C79.51 SECONDARY MALIGNANT NEOPLASM OF BONE: ICD-10-CM

## 2025-01-28 PROCEDURE — 63600175 PHARM REV CODE 636 W HCPCS: Mod: JZ,TB | Performed by: INTERNAL MEDICINE

## 2025-01-28 PROCEDURE — 96365 THER/PROPH/DIAG IV INF INIT: CPT

## 2025-01-28 PROCEDURE — 25000003 PHARM REV CODE 250: Performed by: INTERNAL MEDICINE

## 2025-01-28 RX ORDER — EPINEPHRINE 0.3 MG/.3ML
0.3 INJECTION SUBCUTANEOUS ONCE AS NEEDED
Status: DISCONTINUED | OUTPATIENT
Start: 2025-01-28 | End: 2025-01-28 | Stop reason: HOSPADM

## 2025-01-28 RX ORDER — SODIUM CHLORIDE 0.9 % (FLUSH) 0.9 %
10 SYRINGE (ML) INJECTION
Status: DISCONTINUED | OUTPATIENT
Start: 2025-01-28 | End: 2025-01-28 | Stop reason: HOSPADM

## 2025-01-28 RX ORDER — DIPHENHYDRAMINE HYDROCHLORIDE 50 MG/ML
50 INJECTION INTRAMUSCULAR; INTRAVENOUS ONCE AS NEEDED
Status: DISCONTINUED | OUTPATIENT
Start: 2025-01-28 | End: 2025-01-28 | Stop reason: HOSPADM

## 2025-01-28 RX ORDER — HEPARIN 100 UNIT/ML
500 SYRINGE INTRAVENOUS
Status: DISCONTINUED | OUTPATIENT
Start: 2025-01-28 | End: 2025-01-28 | Stop reason: HOSPADM

## 2025-01-28 RX ORDER — PROCHLORPERAZINE EDISYLATE 5 MG/ML
5 INJECTION INTRAMUSCULAR; INTRAVENOUS ONCE AS NEEDED
Status: DISCONTINUED | OUTPATIENT
Start: 2025-01-28 | End: 2025-01-28 | Stop reason: HOSPADM

## 2025-01-28 RX ADMIN — SODIUM CHLORIDE 200 MG: 9 INJECTION, SOLUTION INTRAVENOUS at 12:01

## 2025-02-17 ENCOUNTER — LAB VISIT (OUTPATIENT)
Dept: LAB | Facility: HOSPITAL | Age: 65
End: 2025-02-17
Attending: INTERNAL MEDICINE
Payer: MEDICARE

## 2025-02-17 ENCOUNTER — OFFICE VISIT (OUTPATIENT)
Dept: HEMATOLOGY/ONCOLOGY | Facility: CLINIC | Age: 65
End: 2025-02-17
Payer: MEDICARE

## 2025-02-17 VITALS
HEIGHT: 65 IN | HEART RATE: 72 BPM | BODY MASS INDEX: 21.69 KG/M2 | WEIGHT: 130.19 LBS | OXYGEN SATURATION: 98 % | RESPIRATION RATE: 15 BRPM | DIASTOLIC BLOOD PRESSURE: 85 MMHG | SYSTOLIC BLOOD PRESSURE: 143 MMHG | TEMPERATURE: 98 F

## 2025-02-17 DIAGNOSIS — C79.51 SECONDARY MALIGNANT NEOPLASM OF BONE: ICD-10-CM

## 2025-02-17 DIAGNOSIS — E07.9 THYROID DISEASE: ICD-10-CM

## 2025-02-17 DIAGNOSIS — C74.02 MALIGNANT NEOPLASM OF CORTEX OF LEFT ADRENAL GLAND: Primary | ICD-10-CM

## 2025-02-17 DIAGNOSIS — G89.3 CANCER ASSOCIATED PAIN: ICD-10-CM

## 2025-02-17 DIAGNOSIS — C78.7 SECONDARY MALIGNANT NEOPLASM OF LIVER: ICD-10-CM

## 2025-02-17 DIAGNOSIS — C74.02 MALIGNANT NEOPLASM OF CORTEX OF LEFT ADRENAL GLAND: ICD-10-CM

## 2025-02-17 LAB
ALBUMIN SERPL-MCNC: 3.2 G/DL (ref 3.4–4.8)
ALBUMIN/GLOB SERPL: 1 RATIO (ref 1.1–2)
ALP SERPL-CCNC: 146 UNIT/L (ref 40–150)
ALT SERPL-CCNC: 14 UNIT/L (ref 0–55)
ANION GAP SERPL CALC-SCNC: 7 MEQ/L
AST SERPL-CCNC: 21 UNIT/L (ref 5–34)
BASOPHILS # BLD AUTO: 0.02 X10(3)/MCL
BASOPHILS NFR BLD AUTO: 0.4 %
BILIRUB SERPL-MCNC: <0.5 MG/DL
BUN SERPL-MCNC: 26.6 MG/DL (ref 9.8–20.1)
CALCIUM SERPL-MCNC: 8.9 MG/DL (ref 8.4–10.2)
CHLORIDE SERPL-SCNC: 108 MMOL/L (ref 98–107)
CO2 SERPL-SCNC: 25 MMOL/L (ref 23–31)
CORTIS SERPL-SCNC: 30.8 UG/DL
CREAT SERPL-MCNC: 0.65 MG/DL (ref 0.55–1.02)
CREAT/UREA NIT SERPL: 41
EOSINOPHIL # BLD AUTO: 0.31 X10(3)/MCL (ref 0–0.9)
EOSINOPHIL NFR BLD AUTO: 6 %
ERYTHROCYTE [DISTWIDTH] IN BLOOD BY AUTOMATED COUNT: 15.6 % (ref 11.5–17)
GFR SERPLBLD CREATININE-BSD FMLA CKD-EPI: >60 ML/MIN/1.73/M2
GLOBULIN SER-MCNC: 3.2 GM/DL (ref 2.4–3.5)
GLUCOSE SERPL-MCNC: 95 MG/DL (ref 82–115)
HCT VFR BLD AUTO: 35.6 % (ref 37–47)
HGB BLD-MCNC: 12.1 G/DL (ref 12–16)
IMM GRANULOCYTES # BLD AUTO: 0.01 X10(3)/MCL (ref 0–0.04)
IMM GRANULOCYTES NFR BLD AUTO: 0.2 %
LYMPHOCYTES # BLD AUTO: 0.98 X10(3)/MCL (ref 0.6–4.6)
LYMPHOCYTES NFR BLD AUTO: 18.8 %
MCH RBC QN AUTO: 34 PG (ref 27–31)
MCHC RBC AUTO-ENTMCNC: 34 G/DL (ref 33–36)
MCV RBC AUTO: 100 FL (ref 80–94)
MONOCYTES # BLD AUTO: 0.86 X10(3)/MCL (ref 0.1–1.3)
MONOCYTES NFR BLD AUTO: 16.5 %
NEUTROPHILS # BLD AUTO: 3.03 X10(3)/MCL (ref 2.1–9.2)
NEUTROPHILS NFR BLD AUTO: 58.1 %
PLATELET # BLD AUTO: 261 X10(3)/MCL (ref 130–400)
PMV BLD AUTO: 10.5 FL (ref 7.4–10.4)
POTASSIUM SERPL-SCNC: 3.9 MMOL/L (ref 3.5–5.1)
PROT SERPL-MCNC: 6.4 GM/DL (ref 5.8–7.6)
RBC # BLD AUTO: 3.56 X10(6)/MCL (ref 4.2–5.4)
SODIUM SERPL-SCNC: 140 MMOL/L (ref 136–145)
T4 FREE SERPL-MCNC: 1.19 NG/DL (ref 0.7–1.48)
TSH SERPL-ACNC: 0.32 UIU/ML (ref 0.35–4.94)
WBC # BLD AUTO: 5.21 X10(3)/MCL (ref 4.5–11.5)

## 2025-02-17 PROCEDURE — 84439 ASSAY OF FREE THYROXINE: CPT

## 2025-02-17 PROCEDURE — 99214 OFFICE O/P EST MOD 30 MIN: CPT | Mod: S$PBB,,, | Performed by: NURSE PRACTITIONER

## 2025-02-17 PROCEDURE — 85025 COMPLETE CBC W/AUTO DIFF WBC: CPT

## 2025-02-17 PROCEDURE — 36415 COLL VENOUS BLD VENIPUNCTURE: CPT

## 2025-02-17 PROCEDURE — 82533 TOTAL CORTISOL: CPT

## 2025-02-17 PROCEDURE — 99214 OFFICE O/P EST MOD 30 MIN: CPT | Mod: PBBFAC | Performed by: NURSE PRACTITIONER

## 2025-02-17 PROCEDURE — 80053 COMPREHEN METABOLIC PANEL: CPT

## 2025-02-17 PROCEDURE — 84443 ASSAY THYROID STIM HORMONE: CPT

## 2025-02-17 RX ORDER — HYDROCORTISONE 10 MG/1
10 TABLET ORAL NIGHTLY
Qty: 90 TABLET | Refills: 1 | Status: SHIPPED | OUTPATIENT
Start: 2025-02-17

## 2025-02-17 NOTE — PROGRESS NOTES
"Subjective:       Patient ID: Shreya Reyes is a 65 y.o. female.    Chief Complaint:  "The treatment was fine"    Diagnosis: Metastatic adrenocortical carcinoma                    Multiple osteoporotic vertebral compression fractures    Treatment History  Adjuvant XRT (completed 9/30/21)  Mitotane 1/25/22-7/26/22, Resumed 8/22  XRT right scapula, L5,  R ischium, L femoral neck completed 8/26/22  SBRT L2-3 2/06/23  Left IM nail 2/08/23  SBRT R iliac wing 5/15/23  SBRT L5, R ischium 12/4/23  Mitotane 8/22-5/24  SBRT liver/R adrenal 8/08/24  XRT R orbit/frontal bone 12/5/24  XRT Left 3rd rib 12/1924  XRT R scapula 1/14/25    Current Treatment:   Hydrocortisone 20 mg Q AM, 10 mg Q PM  Levothyroxine 75 mcg/d  Pembrolizumab 200 mg Q3w started 1/28/25              Clinical History:  Patient initially presented to the [facility] 3/16/21 with acute left flank pain. CT A/P showed a heterogeneous enhancing mass of the left adrenal gland measuring 5.5 x 4.8 x 5 cm (24 Hu), with no definite microscopic fat. There was mild displacement of the left renal vein. Right adrenal gland was unremarkable.  Hormonal workup was negative for pheochromocytoma. She was felt to have had an acute adrenal hemorrhage and close observation was recommended. Follow-up CT A/P 6/9/21 showed increased size of the adrenal mass to 8.0 x 4.5 cm appearing hypervascular with some central necrosis. She underwent a laparoscopic/robotic left adrenalectomy 6/11/21.  Final pathology showed an adrenal cortical carcinoma predominant oncocytic/trabecular morphology with focally marked cytologic atypia and increased mitotic rate (up to 10/50 HPF's). There were large areas of necrosis and multiple foci of capsular and lymphovascular invasion. Ki-67 expression was 10-15%.    She had a Telemedicine consultation with Dr. Dion Lynch at [facility] 8/3/21 who specializes in treatment of adrenocortical carcinoma. Her case was reviewed and discussed in their tumor " board. Recommendations were for treatment with adjuvant radiation therapy and systemic treatment with Mitotane. Baseline postoperative CT scans of the chest, abdomen and pelvis 8/4/21 showed postoperative changes with no evidence of measurable metastatic disease.    She was seen as a new patient at [facility] 8/9/21.  She had recovered well from her surgery and was asymptomatic.  Treatment recommendations from the [facility] were reviewed and discussed.  Treatment was started with Mitotane 1000 mg BID on 8/16/2021.  No dose escalation was recommended until she completed radiation therapy.  She was started on replacement hydrocortisone 20 mg Q AM and 10 mg Q PM.  Surveillance CT scans were recommended in 3 months.      She was seen for an office visit 9/16/21 after completing 4 weeks of treatment with Mitotane.  She was not having any significant side effects associated with her therapy.  She was skilled nursing through her adjuvant radiation therapy.  Laboratory testing showed marked transaminase elevations with an AST of 493,  and alkaline phosphatase 175.  Bilirubin was normal.  The remainder of her CMP was unremarkable.  Baseline mitotane level drawn at that visit was 2.5 mcg/mL.  Mitotane was held and adjuvant radiation therapy was continued.  Weekly laboratory monitoring showed gradual improvement in her LFTs.  Although mitotane had been associated with transaminase elevations, >5x elevations are rare occurring in <1% of patients.  She was also instructed to hold her lovastatin.  She completed adjuvant radiation therapy 9/30/2021.     Follow-up laboratory continued to show transaminase elevations.  GI was consulted and ordered additional laboratory testing which did not reveal evidence of viral or autoimmune etiologies for her hepatitis.  Mitotane was not resumed due to her persistent transaminase elevations. Follow-up CT scans of the chest, abdomen and pelvis 10/28/21 showed a few  stable subcentimeter pulmonary nodules and changes related to her previous left adrenalectomy with no evidence of disease recurrence.  Following normalization of her LFTs, treatment was resumed with Mitotane 1/25/22.  Her LFTs remained normal even during dose escalation.    She had an injury at home in mid March after carrying in several arms of firewood with acute mid back pain.  Plain x-ray 3/17/22 showed a compression deformity at L2 of questionable age.  MRI of the lumbar spine 3/23/22 showed a subacute severe compression fracture deformity of the L1 vertebral body and mild superior endplate compression fracture of L3 vertebral body with osteoporotic appearance and no findings suspicious for pathologic fracture.  However, there were scattered small osseous lesions in the right L3 vertebral body and L5 vertebral body concerning for metastatic disease.  CT PET scan 3/28/22 showed mildly hypermetabolic osseous lesions in the lumbar spine, pelvis and proximal left femur consistent with metastatic disease.  There was no significant hypermetabolic uptake at the sites of her compression fractures.  She did not have pain at any of the sites prior to the acute compression fracture.  Bone density exam showed osteoporosis of the lumbar spine with a T score of -3.4, left femoral neck -3.4 and left total hip -2.7.  She was started on Prolia 3/31/22 and underwent L1 and L3 kyphoplasty 4//8/22.  Biopsies of the L3 vertebral body showed no evidence of metastatic carcinoma.  She continued to have significant pain following the kyphoplasty.  Further review of her films showed a possible compression fracture at T11.  MRI of the thoracic spine 4/15/22 showed a compression fracture of T11 with 50% loss of vertebral body height.  She underwent kyphoplasty 4/21/22 with symptomatic improvement.    CT scans of the chest, abdomen and pelvis 4/27/22 showed sclerotic osseous lesions at L4, right ischium and left femur correlating with the  hypermetabolic lesions on CT-PET scan.  There were stable sub-6 mm pulmonary nodules in the RML and LLL unchanged from previous imaging.  MRI of the cervical and lumbar spine 7/5/22 showed moderate disc disease at C5-6 and C6-7 without significant spinal canal stenosis or foraminal stenosis.  She developed progressive symptoms of right arm pain, numbness and tingling and updated MRI 7/13/22 showed foraminal stenosis secondary to disc osteophyte on the right side at C5-6 and C6-7.  She underwent a right foraminotomy with relief of her symptoms.    CT C/A/P 08/01/22:  Multiple compression fractures and postsurgical changes.  Area of sclerosis in the left sacrum was stable compared to the prior exam.  There was no evidence of visceral metastatic disease.  She had a teleconference with [facility] 8/2/22 and a follow-up PET scan was recommended.  Mitotane was discontinued 7/26/22.   CT-PET 08/05/22:  Hypermetabolic osseous metastatic disease involving L5, right ischium, left femoral neck and a new lesion in the inferior right scapula.  There was a 12 mm area of hypermetabolic activity adjacent to the right adrenal gland without a definite CT correlate.    She completed palliative radiation therapy to the right scapula, L5, R ischium and left femoral neck on 8/26/22.  She resumed Mitotane at 1000 mg BID on 8/29/22.      CT-PET 11/21/22:  Improved FDG uptake in all of the treated sites.  Right scapula SUV decreased from 4.2 to 2.1, left femoral neck 9.1 to 5, right ischium 13 to 2.4 and L5 8.2 to 4.6.  Hypermetabolic activity at the site of the previous compression fractures had also improved.  There were no findings suspicious for new sites of disease or visceral metastatic disease.  MRI lumbar spine and left hip 1/29/23:  Metastatic disease involving right ischial tuberosity, left femoral neck and L3 vertebral body, similar in size to CT-PET scan 11/21/22:  L3 lesion showed interval progression with ventral  and paraspinal extension causing moderate-to-severe narrowing of the spinal canal.  Left femoral neck lesion involved greater than 50% of the total diameter of the femoral neck.  She was treated with stereotactic XRT to L3 and underwent prophylactic IM nail of the left femoral neck 2/8/23.  CT C/A/P 4/24/23:  13 mm sclerotic met inferior right scapula, bandlike densities RUL and RLL secondary to previous XRT.  Stable thoracic spine compression fractures.  Increased density L4 vertebral body metastasis.  No evidence of visceral metastatic disease.  MRI L-spine/pelvis 4/24/23:  Stable L3 lesion with mild epidural extension and enhancement measuring 8 mm.  Sclerotic 10 mm focus of abnormal marrow signal right iliac wing.    Guardant 360 2/20/23:  Positive TP53 mutation, microsatellite stable     Prolia was resumed for her metastatic bone disease 4/11/23.  She completed SBRT to the right iliac wing metastasis 5/15/23 receiving 2700 cGy in 3 fractions.  Treatment was well tolerated.      MRI L-spine and pelvis 7/26/23:  Resolution of previous epidural enhancing mass posterior to L3 vertebral body.  Right iliac wing lesion 12 mm (previous 10 mm).  No new metastatic lesions.  CT C/A/P 7/28/23:  Stable sclerotic bone lesions right scapula, right iliac wing and L4.  Stable kyphoplasty changes T10, T11, T12 and L2.  Stable L1 compression deformity.  No new sites of disease identified.  Colonoscopy 7/25/23:  No polyps or other abnormalities.  Follow-up exam recommended in 7 years.  CT C/A/P 11/06/23:  S/p kyphoplasty at T10, T11, T12 and L2.  Stable L1 compression deformity.  Increased L4 height loss with increased sclerosis of the vertebral body.  Stable sclerotic lesion right iliac wing 13 mm.  Inferior right scapular lesion more vaguely seen.  No findings suspicious for new sites of disease.  MRI pelvis 11/10/23:  Interval increase in the size of the metastatic lesion involving the right ischial tuberosity measuring 3.2 cm  x 1.8 cm, previously 2.1 x 1.3 cm.  MRI L-spine 11/10/23:  Progressive metastatic disease at L5 with a new pathologic compression deformity.  MRI L-spine 2/22/24:  New L4 superior endplate compression fracture with minimal loss of height, otherwise unchanged from 11/10/23.  CT C/A/P 2/27/24:  L4 superior endplate compression fracture with minimal loss of height.  Otherwise, no evidence of disease progression or new sites of disease.    Seen in consultation by Dr. Lynch at [facility] 5/21/24.  No progressive disease on imaging.  Mitotane discontinued secondary to progressive fatigue and a level of 20.7.    CT C/A/P 6/17/24:  New 1.7 cm right hepatic met and 1.6 x 0.9 cm right adrenal gland nodule.  No abnormal lymphadenopathy.  MRI L-spine 6/19/24:  Slight progression of mild superior endplate height loss at L4.  Slight increased marrow enhancement at S1-S2.  No definite disease progression or new lesions.  CT liver biopsy 7/16/24:  Metastatic adrenocortical carcinoma.  QNS for NGS.    She received SBRT to the liver met receiving a total dose of 6000 cGy in 5 fractions and to the right adrenal met receiving 5000 cGy in 5 fractions completed 8/8/24.  She underwent additional kyphoplasty at L4 and 5 on 10/22/24.  Biopsies of both vertebral bodies were negative for malignancy.    CT C/A/P 10/1/24:  No metastatic disease in the chest.  1.4 cm hepatic hypodensity segment VIII with fiducials in place.  Stable 1.9 cm right adrenal nodule.  Multiple compression fractures lower thoracic and lumbar spine with previous kyphoplasty.  MRI L-spine 10/08/24:  Minimal progression of height loss at L4 with slight progression of associated spinal canal stenosis.  Otherwise stable.  MRI brain 11/18/24:  Metastatic heterogeneous enhancement superior aspect of the right orbit and right frontal bone.  No intra-axial metastasis.  CT C/A/P 12/4/24:  Expansile metastatic lesion left anterior 3rd rib 2.5 x 5 cm, right medial  lower scapula metastatic lesion.  Previously seen right hepatic dome lesion no longer visualized.  Unchanged right adrenal gland nodule 1.8 x 0.7 cm.  Diffuse osteopenia with multiple lower thoracic and lumbar compression fractures and previous vertebroplasty.  MRI Chest 1/02/25:  Metastatic lytic lesion caudal right scapula 1.6 x 4 x 4 cm with associated chronic pathologic fracture.      Interval History  Mrs. Reyes is here by herself for an on treatment follow-up visit.  She is ambulatory without assistance.  She initiated systemic treatment with Pembrolizumab on 1/28/25. Treatment was well tolerated with no rash, worsening arthralgias, or diarrhea.  She completed radiation to the right scapula with resolution of her pain.  She still has mild residual swelling of the right orbit, no visual changes/complaints.  She is scheduled for restaging MRI/CT C/A/P in March.  She continues on replacement Hydrocortisone and her medication was refilled today.  She denies any new or worsening pain.        Review of Systems   Constitutional:  Negative for appetite change, fatigue, fever and unexpected weight change.   HENT:  Negative for mouth sores, sore throat and trouble swallowing.    Eyes:  Positive for eye dryness. Negative for photophobia and visual disturbance.        Swelling of right eyelid   Respiratory:  Negative for cough and shortness of breath.    Cardiovascular:  Negative for chest pain, palpitations and leg swelling.   Gastrointestinal:  Negative for abdominal distention, abdominal pain, constipation, diarrhea, nausea and vomiting.   Genitourinary:  Negative for dysuria, flank pain and frequency.   Musculoskeletal:  Positive for arthralgias and back pain (Improved). Negative for gait problem.   Integumentary:  Negative for pallor and rash.   Neurological:  Positive for numbness (RLE). Negative for dizziness, weakness and headaches.   Hematological:  Negative for adenopathy. Does not bruise/bleed easily.  "  Psychiatric/Behavioral: Negative.         PMHx:  Hyperlipidemia, osteoporosis  PSHx:  Tonsils, left cataract, ORIF left tibia/fib, left adrenalectomy, multiple vertebral kyphoplasties  SH:  Former smoker 1 PPD, quit 2009. + Social alcohol use. Lives in [place] with her . 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  FH:  Her mother had lymphoma.     Objective:        BP (!) 143/85   Pulse 72   Temp 97.9 °F (36.6 °C)   Resp 15   Ht 5' 5" (1.651 m)   Wt 59.1 kg (130 lb 3.2 oz)   SpO2 98%   BMI 21.67 kg/m²    Physical Exam  Constitutional:       Comments: Well-developed white female in NAD.   HENT:      Head: Normocephalic.      Mouth/Throat:      Mouth: Mucous membranes are moist.      Pharynx: Oropharynx is clear. No posterior oropharyngeal erythema.   Eyes:      General: No scleral icterus.     Extraocular Movements: Extraocular movements intact.      Pupils: Pupils are equal, round, and reactive to light.      Comments: Trace periorbital edema, right   Cardiovascular:      Rate and Rhythm: Normal rate and regular rhythm.      Heart sounds: No murmur heard.  Pulmonary:      Effort: Pulmonary effort is normal.      Breath sounds: Normal breath sounds.   Abdominal:      General: Bowel sounds are normal. There is no distension.      Palpations: Abdomen is soft. There is no mass.      Tenderness: There is no abdominal tenderness.   Musculoskeletal:         General: No swelling or tenderness.      Cervical back: Normal range of motion and neck supple. No tenderness.      Comments: No vertebral body or rib cage tenderness.  Mild tenderness over right scapula.   Skin:     General: Skin is warm and dry.      Findings: No rash.   Neurological:      General: No focal deficit present.      Mental Status: She is alert and oriented to person, place, and time.      Motor: No weakness.   Psychiatric:         Behavior: Behavior is cooperative.       ECOG SCORE    2 - Capable of all selfcare but unable " to carry out any work activities, active > 50% of hours        LABORATORY     Latest Reference Range & Units 02/17/25 09:22   WBC 4.50 - 11.50 x10(3)/mcL 5.21   RBC 4.20 - 5.40 x10(6)/mcL 3.56 (L)   Hemoglobin 12.0 - 16.0 g/dL 12.1   Hematocrit 37.0 - 47.0 % 35.6 (L)   MCV 80.0 - 94.0 fL 100.0 (H)   MCH 27.0 - 31.0 pg 34.0 (H)   MCHC 33.0 - 36.0 g/dL 34.0   RDW 11.5 - 17.0 % 15.6   Platelet Count 130 - 400 x10(3)/mcL 261   MPV 7.4 - 10.4 fL 10.5 (H)   Neut % % 58.1   LYMPH % % 18.8   Mono % % 16.5   Eos % % 6.0   Basophil % % 0.4   Immature Granulocytes % 0.2   Neut # 2.1 - 9.2 x10(3)/mcL 3.03   Lymph # 0.6 - 4.6 x10(3)/mcL 0.98   Mono # 0.1 - 1.3 x10(3)/mcL 0.86   Eos # 0 - 0.9 x10(3)/mcL 0.31   Baso # <=0.2 x10(3)/mcL 0.02   Immature Grans (Abs) 0.00 - 0.04 x10(3)/mcL 0.01   Sodium 136 - 145 mmol/L 140   Potassium 3.5 - 5.1 mmol/L 3.9   Chloride 98 - 107 mmol/L 108 (H)   CO2 23 - 31 mmol/L 25   Anion Gap mEq/L 7.0   BUN 9.8 - 20.1 mg/dL 26.6 (H)   Creatinine 0.55 - 1.02 mg/dL 0.65   BUN/CREAT RATIO  41   eGFR mL/min/1.73/m2 >60   Glucose 82 - 115 mg/dL 95   Calcium 8.4 - 10.2 mg/dL 8.9   ALP 40 - 150 unit/L 146   PROTEIN TOTAL 5.8 - 7.6 gm/dL 6.4   Albumin 3.4 - 4.8 g/dL 3.2 (L)   Albumin/Globulin Ratio 1.1 - 2.0 ratio 1.0 (L)   BILIRUBIN TOTAL <=1.5 mg/dL <0.5   AST 5 - 34 unit/L 21   ALT 0 - 55 unit/L 14   Globulin, Total 2.4 - 3.5 gm/dL 3.2   Cortisol ug/dL 30.80   TSH 0.350 - 4.940 uIU/mL 0.316 (L)   Free T4 0.70 - 1.48 ng/dL 1.19   (L): Data is abnormally low  (H): Data is abnormally high    Assessment:   Metastatic adrenocortical carcinoma  Multiple osteoporotic vertebral body compression fractures  S/p prophylactic left femur IM nail 2/8/23  Cancer related pain - stable    Plan:   Continue systemic therapy with Pembrolizumab every 3 weeks.  Her next infusion is due tomorrow.  Imaging studies are already scheduled by Radiation [facility].  Hydrocortisone prescription was refilled.  Mrs. Cash will call with  questions or concerns in the interim.  Otherwise, she will follow-up in 3 weeks with CBC, CMP, TSH.  All questions answered to the satisfaction of the patient.    CRISTINA WILSON, FNP-C  Cancer Center [facility]     Visit today included increased complexity associated with the care of the episodic problem, metastatic adrenocortical carcinoma, addressed and managing the longitudinal care of the patient due to the serious and/or complex managed problem(s).     Other Physicians  Dr. Dion Lynch ([facility])

## 2025-02-18 ENCOUNTER — INFUSION (OUTPATIENT)
Dept: INFUSION THERAPY | Facility: HOSPITAL | Age: 65
End: 2025-02-18
Attending: INTERNAL MEDICINE
Payer: MEDICARE

## 2025-02-18 VITALS
TEMPERATURE: 98 F | OXYGEN SATURATION: 99 % | BODY MASS INDEX: 21.03 KG/M2 | HEART RATE: 73 BPM | RESPIRATION RATE: 16 BRPM | SYSTOLIC BLOOD PRESSURE: 150 MMHG | DIASTOLIC BLOOD PRESSURE: 82 MMHG | WEIGHT: 126.38 LBS

## 2025-02-18 DIAGNOSIS — C74.00 MALIGNANT NEOPLASM OF ADRENAL CORTEX, UNSPECIFIED LATERALITY: Primary | ICD-10-CM

## 2025-02-18 DIAGNOSIS — C74.00 MALIGNANT NEOPLASM OF ADRENAL CORTEX, UNSPECIFIED LATERALITY: ICD-10-CM

## 2025-02-18 DIAGNOSIS — C79.51 SECONDARY MALIGNANT NEOPLASM OF BONE: ICD-10-CM

## 2025-02-18 PROCEDURE — 96413 CHEMO IV INFUSION 1 HR: CPT

## 2025-02-18 PROCEDURE — 25000003 PHARM REV CODE 250: Performed by: NURSE PRACTITIONER

## 2025-02-18 PROCEDURE — 63600175 PHARM REV CODE 636 W HCPCS: Mod: JZ,TB | Performed by: NURSE PRACTITIONER

## 2025-02-18 RX ORDER — DIPHENHYDRAMINE HYDROCHLORIDE 50 MG/ML
50 INJECTION INTRAMUSCULAR; INTRAVENOUS ONCE AS NEEDED
Status: CANCELLED | OUTPATIENT
Start: 2025-02-18

## 2025-02-18 RX ORDER — LOSARTAN POTASSIUM 50 MG/1
50 TABLET ORAL DAILY
Qty: 90 TABLET | Refills: 3 | Status: SHIPPED | OUTPATIENT
Start: 2025-02-18

## 2025-02-18 RX ORDER — SODIUM CHLORIDE 0.9 % (FLUSH) 0.9 %
10 SYRINGE (ML) INJECTION
Status: DISCONTINUED | OUTPATIENT
Start: 2025-02-18 | End: 2025-02-18 | Stop reason: HOSPADM

## 2025-02-18 RX ORDER — DIPHENHYDRAMINE HYDROCHLORIDE 50 MG/ML
50 INJECTION INTRAMUSCULAR; INTRAVENOUS ONCE AS NEEDED
Status: DISCONTINUED | OUTPATIENT
Start: 2025-02-18 | End: 2025-02-18 | Stop reason: HOSPADM

## 2025-02-18 RX ORDER — PROCHLORPERAZINE EDISYLATE 5 MG/ML
5 INJECTION INTRAMUSCULAR; INTRAVENOUS ONCE AS NEEDED
Status: CANCELLED | OUTPATIENT
Start: 2025-02-18

## 2025-02-18 RX ORDER — SODIUM CHLORIDE 0.9 % (FLUSH) 0.9 %
10 SYRINGE (ML) INJECTION
Status: CANCELLED | OUTPATIENT
Start: 2025-02-18

## 2025-02-18 RX ORDER — PROCHLORPERAZINE EDISYLATE 5 MG/ML
5 INJECTION INTRAMUSCULAR; INTRAVENOUS ONCE AS NEEDED
Status: DISCONTINUED | OUTPATIENT
Start: 2025-02-18 | End: 2025-02-18 | Stop reason: HOSPADM

## 2025-02-18 RX ORDER — EPINEPHRINE 0.3 MG/.3ML
0.3 INJECTION SUBCUTANEOUS ONCE AS NEEDED
Status: DISCONTINUED | OUTPATIENT
Start: 2025-02-18 | End: 2025-02-18 | Stop reason: HOSPADM

## 2025-02-18 RX ORDER — EPINEPHRINE 0.3 MG/.3ML
0.3 INJECTION SUBCUTANEOUS ONCE AS NEEDED
Status: CANCELLED | OUTPATIENT
Start: 2025-02-18

## 2025-02-18 RX ORDER — HEPARIN 100 UNIT/ML
500 SYRINGE INTRAVENOUS
Status: DISCONTINUED | OUTPATIENT
Start: 2025-02-18 | End: 2025-02-18 | Stop reason: HOSPADM

## 2025-02-18 RX ORDER — HEPARIN 100 UNIT/ML
500 SYRINGE INTRAVENOUS
Status: CANCELLED | OUTPATIENT
Start: 2025-02-18

## 2025-02-18 RX ADMIN — SODIUM CHLORIDE 200 MG: 9 INJECTION, SOLUTION INTRAVENOUS at 12:02

## 2025-02-18 RX ADMIN — SODIUM CHLORIDE: 9 INJECTION, SOLUTION INTRAVENOUS at 11:02

## 2025-02-18 NOTE — NURSING
Pt tolerated treatment with no complaints.  Next appt reviewed with pt. IV catheter discontinued intact. Site without signs and symptoms of complications.

## 2025-03-07 NOTE — PROGRESS NOTES
"Subjective:       Patient ID: Shreya Reyes is a 65 y.o. female.    Chief Complaint:  "Doing about the same"    Diagnosis: Metastatic adrenocortical carcinoma                    Multiple osteoporotic vertebral compression fractures    Treatment History  Adjuvant XRT (completed 9/30/21)  Mitotane 1/25/22-7/26/22, Resumed 8/22  XRT right scapula, L5,  R ischium, L femoral neck completed 8/26/22  SBRT L2-3 2/06/23  Left IM nail 2/08/23  SBRT R iliac wing 5/15/23  SBRT L5, R ischium 12/4/23  Mitotane 8/22-5/24  SBRT liver/R adrenal 8/08/24  XRT R orbit/frontal bone 12/5/24  XRT Left 3rd rib 12/1924  XRT R scapula 1/14/25    Current Treatment:   Hydrocortisone 20 mg Q AM, 10 mg Q PM  Levothyroxine 75 mcg/d  Pembrolizumab 200 mg Q3w started 1/28/25              Clinical History:  Patient initially presented to the [facility] 3/16/21 with acute left flank pain. CT A/P showed a heterogeneous enhancing mass of the left adrenal gland measuring 5.5 x 4.8 x 5 cm (24 Hu), with no definite microscopic fat. There was mild displacement of the left renal vein. Right adrenal gland was unremarkable.  Hormonal workup was negative for pheochromocytoma. She was felt to have had an acute adrenal hemorrhage and close observation was recommended. Follow-up CT A/P 6/9/21 showed increased size of the adrenal mass to 8.0 x 4.5 cm appearing hypervascular with some central necrosis. She underwent a laparoscopic/robotic left adrenalectomy 6/11/21.  Final pathology showed an adrenal cortical carcinoma predominant oncocytic/trabecular morphology with focally marked cytologic atypia and increased mitotic rate (up to 10/50 HPF's). There were large areas of necrosis and multiple foci of capsular and lymphovascular invasion. Ki-67 expression was 10-15%.    She had a Telemedicine consultation with Dr. Dion Lynch at [facility] 8/3/21 who specializes in treatment of adrenocortical carcinoma. Her case was reviewed and discussed in their tumor " board. Recommendations were for treatment with adjuvant radiation therapy and systemic treatment with Mitotane. Baseline postoperative CT scans of the chest, abdomen and pelvis 8/4/21 showed postoperative changes with no evidence of measurable metastatic disease.    She was seen as a new patient at [facility] 8/9/21.  She had recovered well from her surgery and was asymptomatic.  Treatment recommendations from the [facility] were reviewed and discussed.  Treatment was started with Mitotane 1000 mg BID on 8/16/2021.  No dose escalation was recommended until she completed radiation therapy.  She was started on replacement hydrocortisone 20 mg Q AM and 10 mg Q PM.  Surveillance CT scans were recommended in 3 months.      She was seen for an office visit 9/16/21 after completing 4 weeks of treatment with Mitotane.  She was not having any significant side effects associated with her therapy.  She was USP through her adjuvant radiation therapy.  Laboratory testing showed marked transaminase elevations with an AST of 493,  and alkaline phosphatase 175.  Bilirubin was normal.  The remainder of her CMP was unremarkable.  Baseline mitotane level drawn at that visit was 2.5 mcg/mL.  Mitotane was held and adjuvant radiation therapy was continued.  Weekly laboratory monitoring showed gradual improvement in her LFTs.  Although mitotane had been associated with transaminase elevations, >5x elevations are rare occurring in <1% of patients.  She was also instructed to hold her lovastatin.  She completed adjuvant radiation therapy 9/30/2021.     Follow-up laboratory continued to show transaminase elevations.  GI was consulted and ordered additional laboratory testing which did not reveal evidence of viral or autoimmune etiologies for her hepatitis.  Mitotane was not resumed due to her persistent transaminase elevations. Follow-up CT scans of the chest, abdomen and pelvis 10/28/21 showed a few  stable subcentimeter pulmonary nodules and changes related to her previous left adrenalectomy with no evidence of disease recurrence.  Following normalization of her LFTs, treatment was resumed with Mitotane 1/25/22.  Her LFTs remained normal even during dose escalation.    She had an injury at home in mid March after carrying in several arms of firewood with acute mid back pain.  Plain x-ray 3/17/22 showed a compression deformity at L2 of questionable age.  MRI of the lumbar spine 3/23/22 showed a subacute severe compression fracture deformity of the L1 vertebral body and mild superior endplate compression fracture of L3 vertebral body with osteoporotic appearance and no findings suspicious for pathologic fracture.  However, there were scattered small osseous lesions in the right L3 vertebral body and L5 vertebral body concerning for metastatic disease.  CT PET scan 3/28/22 showed mildly hypermetabolic osseous lesions in the lumbar spine, pelvis and proximal left femur consistent with metastatic disease.  There was no significant hypermetabolic uptake at the sites of her compression fractures.  She did not have pain at any of the sites prior to the acute compression fracture.  Bone density exam showed osteoporosis of the lumbar spine with a T score of -3.4, left femoral neck -3.4 and left total hip -2.7.  She was started on Prolia 3/31/22 and underwent L1 and L3 kyphoplasty 4//8/22.  Biopsies of the L3 vertebral body showed no evidence of metastatic carcinoma.  She continued to have significant pain following the kyphoplasty.  Further review of her films showed a possible compression fracture at T11.  MRI of the thoracic spine 4/15/22 showed a compression fracture of T11 with 50% loss of vertebral body height.  She underwent kyphoplasty 4/21/22 with symptomatic improvement.    CT scans of the chest, abdomen and pelvis 4/27/22 showed sclerotic osseous lesions at L4, right ischium and left femur correlating with the  hypermetabolic lesions on CT-PET scan.  There were stable sub-6 mm pulmonary nodules in the RML and LLL unchanged from previous imaging.  MRI of the cervical and lumbar spine 7/5/22 showed moderate disc disease at C5-6 and C6-7 without significant spinal canal stenosis or foraminal stenosis.  She developed progressive symptoms of right arm pain, numbness and tingling and updated MRI 7/13/22 showed foraminal stenosis secondary to disc osteophyte on the right side at C5-6 and C6-7.  She underwent a right foraminotomy with relief of her symptoms.    CT C/A/P 08/01/22:  Multiple compression fractures and postsurgical changes.  Area of sclerosis in the left sacrum was stable compared to the prior exam.  There was no evidence of visceral metastatic disease.  She had a teleconference with [facility] 8/2/22 and a follow-up PET scan was recommended.  Mitotane was discontinued 7/26/22.   CT-PET 08/05/22:  Hypermetabolic osseous metastatic disease involving L5, right ischium, left femoral neck and a new lesion in the inferior right scapula.  There was a 12 mm area of hypermetabolic activity adjacent to the right adrenal gland without a definite CT correlate.    She completed palliative radiation therapy to the right scapula, L5, R ischium and left femoral neck on 8/26/22.  She resumed Mitotane at 1000 mg BID on 8/29/22.      CT-PET 11/21/22:  Improved FDG uptake in all of the treated sites.  Right scapula SUV decreased from 4.2 to 2.1, left femoral neck 9.1 to 5, right ischium 13 to 2.4 and L5 8.2 to 4.6.  Hypermetabolic activity at the site of the previous compression fractures had also improved.  There were no findings suspicious for new sites of disease or visceral metastatic disease.  MRI lumbar spine and left hip 1/29/23:  Metastatic disease involving right ischial tuberosity, left femoral neck and L3 vertebral body, similar in size to CT-PET scan 11/21/22:  L3 lesion showed interval progression with ventral  and paraspinal extension causing moderate-to-severe narrowing of the spinal canal.  Left femoral neck lesion involved greater than 50% of the total diameter of the femoral neck.  She was treated with stereotactic XRT to L3 and underwent prophylactic IM nail of the left femoral neck 2/8/23.  CT C/A/P 4/24/23:  13 mm sclerotic met inferior right scapula, bandlike densities RUL and RLL secondary to previous XRT.  Stable thoracic spine compression fractures.  Increased density L4 vertebral body metastasis.  No evidence of visceral metastatic disease.  MRI L-spine/pelvis 4/24/23:  Stable L3 lesion with mild epidural extension and enhancement measuring 8 mm.  Sclerotic 10 mm focus of abnormal marrow signal right iliac wing.    Guardant 360 2/20/23:  Positive TP53 mutation, microsatellite stable     Prolia was resumed for her metastatic bone disease 4/11/23.  She completed SBRT to the right iliac wing metastasis 5/15/23 receiving 2700 cGy in 3 fractions.  Treatment was well tolerated.      MRI L-spine and pelvis 7/26/23:  Resolution of previous epidural enhancing mass posterior to L3 vertebral body.  Right iliac wing lesion 12 mm (previous 10 mm).  No new metastatic lesions.  CT C/A/P 7/28/23:  Stable sclerotic bone lesions right scapula, right iliac wing and L4.  Stable kyphoplasty changes T10, T11, T12 and L2.  Stable L1 compression deformity.  No new sites of disease identified.  Colonoscopy 7/25/23:  No polyps or other abnormalities.  Follow-up exam recommended in 7 years.  CT C/A/P 11/06/23:  S/p kyphoplasty at T10, T11, T12 and L2.  Stable L1 compression deformity.  Increased L4 height loss with increased sclerosis of the vertebral body.  Stable sclerotic lesion right iliac wing 13 mm.  Inferior right scapular lesion more vaguely seen.  No findings suspicious for new sites of disease.  MRI pelvis 11/10/23:  Interval increase in the size of the metastatic lesion involving the right ischial tuberosity measuring 3.2 cm  x 1.8 cm, previously 2.1 x 1.3 cm.  MRI L-spine 11/10/23:  Progressive metastatic disease at L5 with a new pathologic compression deformity.  MRI L-spine 2/22/24:  New L4 superior endplate compression fracture with minimal loss of height, otherwise unchanged from 11/10/23.  CT C/A/P 2/27/24:  L4 superior endplate compression fracture with minimal loss of height.  Otherwise, no evidence of disease progression or new sites of disease.    Seen in consultation by Dr. Lynch at [facility] 5/21/24.  No progressive disease on imaging.  Mitotane discontinued secondary to progressive fatigue and a level of 20.7.    CT C/A/P 6/17/24:  New 1.7 cm right hepatic met and 1.6 x 0.9 cm right adrenal gland nodule.  No abnormal lymphadenopathy.  MRI L-spine 6/19/24:  Slight progression of mild superior endplate height loss at L4.  Slight increased marrow enhancement at S1-S2.  No definite disease progression or new lesions.  CT liver biopsy 7/16/24:  Metastatic adrenocortical carcinoma.  QNS for NGS.    She received SBRT to the liver met receiving a total dose of 6000 cGy in 5 fractions and to the right adrenal met receiving 5000 cGy in 5 fractions completed 8/8/24.  She underwent additional kyphoplasty at L4 and 5 on 10/22/24.  Biopsies of both vertebral bodies were negative for malignancy.    CT C/A/P 10/1/24:  No metastatic disease in the chest.  1.4 cm hepatic hypodensity segment VIII with fiducials in place.  Stable 1.9 cm right adrenal nodule.  Multiple compression fractures lower thoracic and lumbar spine with previous kyphoplasty.  MRI L-spine 10/08/24:  Minimal progression of height loss at L4 with slight progression of associated spinal canal stenosis.  Otherwise stable.  MRI brain 11/18/24:  Metastatic heterogeneous enhancement superior aspect of the right orbit and right frontal bone.  No intra-axial metastasis.  CT C/A/P 12/4/24:  Expansile metastatic lesion left anterior 3rd rib 2.5 x 5 cm, right medial  lower scapula metastatic lesion.  Previously seen right hepatic dome lesion no longer visualized.  Unchanged right adrenal gland nodule 1.8 x 0.7 cm.  Diffuse osteopenia with multiple lower thoracic and lumbar compression fractures and previous vertebroplasty.  MRI Chest 1/02/25:  Metastatic lytic lesion caudal right scapula 1.6 x 4 x 4 cm with associated chronic pathologic fracture.    Interval History  Mrs. Cash is here today by herself for a three week on treatment follow-up visit.  She feels pretty good today.  Her right celso-orbital swelling has improved.  She has completed two cycles of treatment with Pembrolizumab.  She is tolerating treatment very well with no significant side effects, specifically rash, worsening arthralgias or diarrhea.  She has some post nasal drip, but no fever or dyspnea.  She has stable back pain and radiculopathy involving the RLE.  She continues on replacement hydrocortisone.  No medication refills are needed at this time.  CBC in the office today shows mild anemia.  CMP reviewed and stable.  She is currently scheduled for MRI and CT scans next week, ordered by Radiation Oncology.    Review of Systems   Constitutional:  Negative for appetite change, fatigue, fever and unexpected weight change.   HENT:  Negative for mouth sores, sore throat and trouble swallowing.    Eyes:  Positive for eye dryness. Negative for photophobia and visual disturbance.        Swelling of right eyelid, significantly improved   Respiratory:  Negative for cough and shortness of breath.    Cardiovascular:  Negative for chest pain, palpitations and leg swelling.   Gastrointestinal:  Negative for abdominal distention, abdominal pain, constipation, diarrhea, nausea and vomiting.   Genitourinary:  Negative for dysuria, flank pain and frequency.   Musculoskeletal:  Positive for back pain (stable). Negative for arthralgias and gait problem.   Integumentary:  Negative for pallor and rash.   Neurological:  Positive  "for numbness (RLE). Negative for dizziness, weakness and headaches.   Hematological:  Negative for adenopathy. Does not bruise/bleed easily.   Psychiatric/Behavioral: Negative.         PMHx:  Hyperlipidemia, osteoporosis  PSHx:  Tonsils, left cataract, ORIF left tibia/fib, left adrenalectomy, multiple vertebral kyphoplasties  SH:  Former smoker 1 PPD, quit 2009. + Social alcohol use. Lives in [place] with her . 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  FH:  Her mother had lymphoma.     Objective:        BP (!) 164/84 (Patient Position: Sitting)   Pulse 77   Temp 97.5 °F (36.4 °C)   Resp 15   Ht 5' 5" (1.651 m)   Wt 58.5 kg (129 lb)   SpO2 99%   BMI 21.47 kg/m²    Physical Exam  Constitutional:       Comments: Well-developed white female in NAD.   HENT:      Head: Normocephalic.      Mouth/Throat:      Mouth: Mucous membranes are moist.      Pharynx: Oropharynx is clear. No posterior oropharyngeal erythema.   Eyes:      General: No scleral icterus.     Extraocular Movements: Extraocular movements intact.      Pupils: Pupils are equal, round, and reactive to light.      Comments: Minimal residual right periorbital edema   Cardiovascular:      Rate and Rhythm: Normal rate and regular rhythm.   Pulmonary:      Effort: Pulmonary effort is normal.      Breath sounds: Normal breath sounds.   Abdominal:      General: Bowel sounds are normal. There is no distension.      Palpations: Abdomen is soft. There is no mass.      Tenderness: There is no abdominal tenderness.   Musculoskeletal:         General: No swelling or tenderness.      Cervical back: Normal range of motion and neck supple. No tenderness.      Comments: No vertebral body or rib cage tenderness.  Mild tenderness over right scapula.   Skin:     General: Skin is warm and dry.      Findings: No rash.   Neurological:      General: No focal deficit present.      Mental Status: She is alert and oriented to person, place, and time.      " Motor: No weakness.   Psychiatric:         Mood and Affect: Mood normal.         Behavior: Behavior normal. Behavior is cooperative.       ECOG SCORE    2 - Capable of all selfcare but unable to carry out any work activities, active > 50% of hours        LABORATORY   Latest Reference Range & Units 03/10/25 09:11   WBC 4.50 - 11.50 x10(3)/mcL 6.90   RBC 4.20 - 5.40 x10(6)/mcL 3.49 (L)   Hemoglobin 12.0 - 16.0 g/dL 11.8 (L)   Hematocrit 37.0 - 47.0 % 35.4 (L)   MCV 80.0 - 94.0 fL 101.4 (H)   MCH 27.0 - 31.0 pg 33.8 (H)   MCHC 33.0 - 36.0 g/dL 33.3   RDW 11.5 - 17.0 % 15.9   Platelet Count 130 - 400 x10(3)/mcL 295   MPV 7.4 - 10.4 fL 9.6   Neut % % 61.5   LYMPH % % 13.5   Mono % % 15.4   Eos % % 9.1   Basophil % % 0.1   Immature Granulocytes % 0.4   Neut # 2.1 - 9.2 x10(3)/mcL 4.24   Lymph # 0.6 - 4.6 x10(3)/mcL 0.93   Mono # 0.1 - 1.3 x10(3)/mcL 1.06   Eos # 0 - 0.9 x10(3)/mcL 0.63   Baso # <=0.2 x10(3)/mcL 0.01   Immature Grans (Abs) 0.00 - 0.04 x10(3)/mcL 0.03   Sodium 136 - 145 mmol/L 141   Potassium 3.5 - 5.1 mmol/L 4.2   Chloride 98 - 107 mmol/L 105   CO2 23 - 31 mmol/L 28   Anion Gap mEq/L 8.0   BUN 9.8 - 20.1 mg/dL 20.1   Creatinine 0.55 - 1.02 mg/dL 0.69   BUN/CREAT RATIO  29   eGFR mL/min/1.73/m2 >60   Glucose 82 - 115 mg/dL 102   Calcium 8.4 - 10.2 mg/dL 8.5   ALP 40 - 150 unit/L 151 (H)   PROTEIN TOTAL 5.8 - 7.6 gm/dL 6.0   Albumin 3.4 - 4.8 g/dL 3.0 (L)   Albumin/Globulin Ratio 1.1 - 2.0 ratio 1.0 (L)   BILIRUBIN TOTAL <=1.5 mg/dL 0.2   AST 5 - 34 unit/L 29   ALT 0 - 55 unit/L 18   Globulin, Total 2.4 - 3.5 gm/dL 3.0   TSH 0.350 - 4.940 uIU/mL 0.717   (L): Data is abnormally low  (H): Data is abnormally high    Assessment:   Metastatic adrenocortical carcinoma  Multiple osteoporotic vertebral body compression fractures  S/p prophylactic left femur IM nail 2/8/23  Cancer related pain - stable    Plan:   Continue systemic therapy with Pembrolizumab every 3 weeks.  Her next infusion is due tomorrow.  Case  discussed with Radiation Oncology.  MRI this week as scheduled, but will postpone CT C/A/P until after cycle 4 of Pembrolizumab (due 4/1/25).  CBC, CMP, TSH every 3 weeks.  Update fasting lipid profile with next lab draw.  RTC 3 weeks for on treatment follow-up, or sooner if needed.  Patient is in agreement.  All questions answered.    CRISTINA WILSON, FNP-C  [facility]     Visit today included increased complexity associated with the care of the episodic problem, metastatic adrenocortical carcinoma, addressed and managing the longitudinal care of the patient due to the serious and/or complex managed problem(s).     Other Physicians  Dr. Dion Lynch ([facility])

## 2025-03-10 ENCOUNTER — LAB VISIT (OUTPATIENT)
Dept: LAB | Facility: HOSPITAL | Age: 65
End: 2025-03-10
Attending: INTERNAL MEDICINE
Payer: MEDICARE

## 2025-03-10 ENCOUNTER — OFFICE VISIT (OUTPATIENT)
Dept: HEMATOLOGY/ONCOLOGY | Facility: CLINIC | Age: 65
End: 2025-03-10
Payer: MEDICARE

## 2025-03-10 VITALS
HEIGHT: 65 IN | DIASTOLIC BLOOD PRESSURE: 84 MMHG | HEART RATE: 77 BPM | OXYGEN SATURATION: 99 % | RESPIRATION RATE: 15 BRPM | SYSTOLIC BLOOD PRESSURE: 164 MMHG | BODY MASS INDEX: 21.49 KG/M2 | WEIGHT: 129 LBS | TEMPERATURE: 98 F

## 2025-03-10 DIAGNOSIS — C79.51 SECONDARY MALIGNANT NEOPLASM OF BONE: ICD-10-CM

## 2025-03-10 DIAGNOSIS — E07.9 THYROID DISEASE: ICD-10-CM

## 2025-03-10 DIAGNOSIS — Z13.220 ENCOUNTER FOR LIPID SCREENING FOR CARDIOVASCULAR DISEASE: ICD-10-CM

## 2025-03-10 DIAGNOSIS — C78.7 SECONDARY MALIGNANT NEOPLASM OF LIVER: ICD-10-CM

## 2025-03-10 DIAGNOSIS — C74.02 MALIGNANT NEOPLASM OF CORTEX OF LEFT ADRENAL GLAND: ICD-10-CM

## 2025-03-10 DIAGNOSIS — Z13.6 ENCOUNTER FOR LIPID SCREENING FOR CARDIOVASCULAR DISEASE: ICD-10-CM

## 2025-03-10 DIAGNOSIS — E78.9 DISORDER OF LIPID METABOLISM: ICD-10-CM

## 2025-03-10 DIAGNOSIS — C74.02 MALIGNANT NEOPLASM OF CORTEX OF LEFT ADRENAL GLAND: Primary | ICD-10-CM

## 2025-03-10 LAB
ALBUMIN SERPL-MCNC: 3 G/DL (ref 3.4–4.8)
ALBUMIN/GLOB SERPL: 1 RATIO (ref 1.1–2)
ALP SERPL-CCNC: 151 UNIT/L (ref 40–150)
ALT SERPL-CCNC: 18 UNIT/L (ref 0–55)
ANION GAP SERPL CALC-SCNC: 8 MEQ/L
AST SERPL-CCNC: 29 UNIT/L (ref 5–34)
BASOPHILS # BLD AUTO: 0.01 X10(3)/MCL
BASOPHILS NFR BLD AUTO: 0.1 %
BILIRUB SERPL-MCNC: 0.2 MG/DL
BUN SERPL-MCNC: 20.1 MG/DL (ref 9.8–20.1)
CALCIUM SERPL-MCNC: 8.5 MG/DL (ref 8.4–10.2)
CHLORIDE SERPL-SCNC: 105 MMOL/L (ref 98–107)
CO2 SERPL-SCNC: 28 MMOL/L (ref 23–31)
CREAT SERPL-MCNC: 0.69 MG/DL (ref 0.55–1.02)
CREAT/UREA NIT SERPL: 29
EOSINOPHIL # BLD AUTO: 0.63 X10(3)/MCL (ref 0–0.9)
EOSINOPHIL NFR BLD AUTO: 9.1 %
ERYTHROCYTE [DISTWIDTH] IN BLOOD BY AUTOMATED COUNT: 15.9 % (ref 11.5–17)
GFR SERPLBLD CREATININE-BSD FMLA CKD-EPI: >60 ML/MIN/1.73/M2
GLOBULIN SER-MCNC: 3 GM/DL (ref 2.4–3.5)
GLUCOSE SERPL-MCNC: 102 MG/DL (ref 82–115)
HCT VFR BLD AUTO: 35.4 % (ref 37–47)
HGB BLD-MCNC: 11.8 G/DL (ref 12–16)
IMM GRANULOCYTES # BLD AUTO: 0.03 X10(3)/MCL (ref 0–0.04)
IMM GRANULOCYTES NFR BLD AUTO: 0.4 %
LYMPHOCYTES # BLD AUTO: 0.93 X10(3)/MCL (ref 0.6–4.6)
LYMPHOCYTES NFR BLD AUTO: 13.5 %
MCH RBC QN AUTO: 33.8 PG (ref 27–31)
MCHC RBC AUTO-ENTMCNC: 33.3 G/DL (ref 33–36)
MCV RBC AUTO: 101.4 FL (ref 80–94)
MONOCYTES # BLD AUTO: 1.06 X10(3)/MCL (ref 0.1–1.3)
MONOCYTES NFR BLD AUTO: 15.4 %
NEUTROPHILS # BLD AUTO: 4.24 X10(3)/MCL (ref 2.1–9.2)
NEUTROPHILS NFR BLD AUTO: 61.5 %
PLATELET # BLD AUTO: 295 X10(3)/MCL (ref 130–400)
PMV BLD AUTO: 9.6 FL (ref 7.4–10.4)
POTASSIUM SERPL-SCNC: 4.2 MMOL/L (ref 3.5–5.1)
PROT SERPL-MCNC: 6 GM/DL (ref 5.8–7.6)
RBC # BLD AUTO: 3.49 X10(6)/MCL (ref 4.2–5.4)
SODIUM SERPL-SCNC: 141 MMOL/L (ref 136–145)
TSH SERPL-ACNC: 0.72 UIU/ML (ref 0.35–4.94)
WBC # BLD AUTO: 6.9 X10(3)/MCL (ref 4.5–11.5)

## 2025-03-10 PROCEDURE — 85025 COMPLETE CBC W/AUTO DIFF WBC: CPT

## 2025-03-10 PROCEDURE — 99214 OFFICE O/P EST MOD 30 MIN: CPT | Mod: PBBFAC | Performed by: NURSE PRACTITIONER

## 2025-03-10 PROCEDURE — 80053 COMPREHEN METABOLIC PANEL: CPT

## 2025-03-10 PROCEDURE — 99999 PR PBB SHADOW E&M-EST. PATIENT-LVL IV: CPT | Mod: PBBFAC,,, | Performed by: NURSE PRACTITIONER

## 2025-03-10 PROCEDURE — 36415 COLL VENOUS BLD VENIPUNCTURE: CPT

## 2025-03-10 PROCEDURE — 84443 ASSAY THYROID STIM HORMONE: CPT

## 2025-03-10 PROCEDURE — 99214 OFFICE O/P EST MOD 30 MIN: CPT | Mod: S$PBB,,, | Performed by: NURSE PRACTITIONER

## 2025-03-11 ENCOUNTER — INFUSION (OUTPATIENT)
Dept: INFUSION THERAPY | Facility: HOSPITAL | Age: 65
End: 2025-03-11
Attending: INTERNAL MEDICINE
Payer: MEDICARE

## 2025-03-11 VITALS
TEMPERATURE: 98 F | OXYGEN SATURATION: 97 % | BODY MASS INDEX: 23.74 KG/M2 | RESPIRATION RATE: 18 BRPM | SYSTOLIC BLOOD PRESSURE: 144 MMHG | HEIGHT: 62 IN | WEIGHT: 129 LBS | DIASTOLIC BLOOD PRESSURE: 74 MMHG | HEART RATE: 79 BPM

## 2025-03-11 DIAGNOSIS — C79.51 SECONDARY MALIGNANT NEOPLASM OF BONE: ICD-10-CM

## 2025-03-11 DIAGNOSIS — C74.00 MALIGNANT NEOPLASM OF ADRENAL CORTEX, UNSPECIFIED LATERALITY: Primary | ICD-10-CM

## 2025-03-11 PROCEDURE — 25000003 PHARM REV CODE 250: Performed by: NURSE PRACTITIONER

## 2025-03-11 PROCEDURE — 96413 CHEMO IV INFUSION 1 HR: CPT

## 2025-03-11 PROCEDURE — 63600175 PHARM REV CODE 636 W HCPCS: Mod: JZ,TB | Performed by: NURSE PRACTITIONER

## 2025-03-11 RX ORDER — SODIUM CHLORIDE 0.9 % (FLUSH) 0.9 %
10 SYRINGE (ML) INJECTION
Status: DISCONTINUED | OUTPATIENT
Start: 2025-03-11 | End: 2025-03-11 | Stop reason: HOSPADM

## 2025-03-11 RX ORDER — DIPHENHYDRAMINE HYDROCHLORIDE 50 MG/ML
50 INJECTION, SOLUTION INTRAMUSCULAR; INTRAVENOUS ONCE AS NEEDED
Status: DISCONTINUED | OUTPATIENT
Start: 2025-03-11 | End: 2025-03-11 | Stop reason: HOSPADM

## 2025-03-11 RX ORDER — PROCHLORPERAZINE EDISYLATE 5 MG/ML
5 INJECTION INTRAMUSCULAR; INTRAVENOUS ONCE AS NEEDED
Status: DISCONTINUED | OUTPATIENT
Start: 2025-03-11 | End: 2025-03-11 | Stop reason: HOSPADM

## 2025-03-11 RX ORDER — HEPARIN 100 UNIT/ML
500 SYRINGE INTRAVENOUS
Status: DISCONTINUED | OUTPATIENT
Start: 2025-03-11 | End: 2025-03-11 | Stop reason: HOSPADM

## 2025-03-11 RX ORDER — DIPHENHYDRAMINE HYDROCHLORIDE 50 MG/ML
50 INJECTION, SOLUTION INTRAMUSCULAR; INTRAVENOUS ONCE AS NEEDED
Status: CANCELLED | OUTPATIENT
Start: 2025-03-11

## 2025-03-11 RX ORDER — EPINEPHRINE 0.3 MG/.3ML
0.3 INJECTION SUBCUTANEOUS ONCE AS NEEDED
Status: DISCONTINUED | OUTPATIENT
Start: 2025-03-11 | End: 2025-03-11 | Stop reason: HOSPADM

## 2025-03-11 RX ORDER — EPINEPHRINE 0.3 MG/.3ML
0.3 INJECTION SUBCUTANEOUS ONCE AS NEEDED
Status: CANCELLED | OUTPATIENT
Start: 2025-03-11

## 2025-03-11 RX ORDER — SODIUM CHLORIDE 0.9 % (FLUSH) 0.9 %
10 SYRINGE (ML) INJECTION
Status: CANCELLED | OUTPATIENT
Start: 2025-03-11

## 2025-03-11 RX ORDER — PROCHLORPERAZINE EDISYLATE 5 MG/ML
5 INJECTION INTRAMUSCULAR; INTRAVENOUS ONCE AS NEEDED
Status: CANCELLED | OUTPATIENT
Start: 2025-03-11

## 2025-03-11 RX ORDER — HEPARIN 100 UNIT/ML
500 SYRINGE INTRAVENOUS
Status: CANCELLED | OUTPATIENT
Start: 2025-03-11

## 2025-03-11 RX ADMIN — SODIUM CHLORIDE 200 MG: 9 INJECTION, SOLUTION INTRAVENOUS at 11:03

## 2025-03-11 NOTE — PLAN OF CARE
Patient received C3D1, tolerated well. Discussed next appointment verbally. Discharge in stable condition.

## 2025-03-29 RX ORDER — HEPARIN 100 UNIT/ML
500 SYRINGE INTRAVENOUS
Status: CANCELLED | OUTPATIENT
Start: 2025-04-01

## 2025-03-29 RX ORDER — EPINEPHRINE 0.3 MG/.3ML
0.3 INJECTION SUBCUTANEOUS ONCE AS NEEDED
Status: CANCELLED | OUTPATIENT
Start: 2025-04-01

## 2025-03-29 RX ORDER — SODIUM CHLORIDE 0.9 % (FLUSH) 0.9 %
10 SYRINGE (ML) INJECTION
Status: CANCELLED | OUTPATIENT
Start: 2025-04-01

## 2025-03-29 RX ORDER — DIPHENHYDRAMINE HYDROCHLORIDE 50 MG/ML
50 INJECTION, SOLUTION INTRAMUSCULAR; INTRAVENOUS ONCE AS NEEDED
Status: CANCELLED | OUTPATIENT
Start: 2025-04-01

## 2025-03-29 RX ORDER — PROCHLORPERAZINE EDISYLATE 5 MG/ML
5 INJECTION INTRAMUSCULAR; INTRAVENOUS ONCE AS NEEDED
Status: CANCELLED | OUTPATIENT
Start: 2025-04-01

## 2025-03-31 ENCOUNTER — LAB VISIT (OUTPATIENT)
Dept: LAB | Facility: HOSPITAL | Age: 65
End: 2025-03-31
Attending: INTERNAL MEDICINE
Payer: MEDICARE

## 2025-03-31 DIAGNOSIS — C79.51 SECONDARY MALIGNANT NEOPLASM OF BONE: ICD-10-CM

## 2025-03-31 DIAGNOSIS — C74.02 MALIGNANT NEOPLASM OF CORTEX OF LEFT ADRENAL GLAND: ICD-10-CM

## 2025-03-31 DIAGNOSIS — C74.00 MALIGNANT NEOPLASM OF ADRENAL CORTEX, UNSPECIFIED LATERALITY: ICD-10-CM

## 2025-03-31 DIAGNOSIS — C78.7 SECONDARY MALIGNANT NEOPLASM OF LIVER: ICD-10-CM

## 2025-03-31 DIAGNOSIS — E78.9 DISORDER OF LIPID METABOLISM: ICD-10-CM

## 2025-03-31 DIAGNOSIS — E07.9 THYROID DISEASE: ICD-10-CM

## 2025-03-31 LAB
ALBUMIN SERPL-MCNC: 3.1 G/DL (ref 3.4–4.8)
ALBUMIN/GLOB SERPL: 0.9 RATIO (ref 1.1–2)
ALP SERPL-CCNC: 146 UNIT/L (ref 40–150)
ALT SERPL-CCNC: 18 UNIT/L (ref 0–55)
ANION GAP SERPL CALC-SCNC: 8 MEQ/L
AST SERPL-CCNC: 24 UNIT/L (ref 11–45)
BASOPHILS # BLD AUTO: 0.01 X10(3)/MCL
BASOPHILS NFR BLD AUTO: 0.2 %
BILIRUB SERPL-MCNC: 0.2 MG/DL
BUN SERPL-MCNC: 17.3 MG/DL (ref 9.8–20.1)
CALCIUM SERPL-MCNC: 9 MG/DL (ref 8.4–10.2)
CHLORIDE SERPL-SCNC: 104 MMOL/L (ref 98–107)
CHOLEST SERPL-MCNC: 220 MG/DL
CHOLEST/HDLC SERPL: 3 {RATIO} (ref 0–5)
CO2 SERPL-SCNC: 29 MMOL/L (ref 23–31)
CORTIS SERPL-SCNC: 40.9 UG/DL
CREAT SERPL-MCNC: 0.67 MG/DL (ref 0.55–1.02)
CREAT/UREA NIT SERPL: 26
EOSINOPHIL # BLD AUTO: 0.32 X10(3)/MCL (ref 0–0.9)
EOSINOPHIL NFR BLD AUTO: 6.6 %
ERYTHROCYTE [DISTWIDTH] IN BLOOD BY AUTOMATED COUNT: 15 % (ref 11.5–17)
GFR SERPLBLD CREATININE-BSD FMLA CKD-EPI: >60 ML/MIN/1.73/M2
GLOBULIN SER-MCNC: 3.3 GM/DL (ref 2.4–3.5)
GLUCOSE SERPL-MCNC: 86 MG/DL (ref 82–115)
HCT VFR BLD AUTO: 34.9 % (ref 37–47)
HDLC SERPL-MCNC: 63 MG/DL (ref 35–60)
HGB BLD-MCNC: 11.8 G/DL (ref 12–16)
IMM GRANULOCYTES # BLD AUTO: 0.02 X10(3)/MCL (ref 0–0.04)
IMM GRANULOCYTES NFR BLD AUTO: 0.4 %
LDLC SERPL CALC-MCNC: 127 MG/DL (ref 50–140)
LYMPHOCYTES # BLD AUTO: 1.23 X10(3)/MCL (ref 0.6–4.6)
LYMPHOCYTES NFR BLD AUTO: 25.2 %
MCH RBC QN AUTO: 33.9 PG (ref 27–31)
MCHC RBC AUTO-ENTMCNC: 33.8 G/DL (ref 33–36)
MCV RBC AUTO: 100.3 FL (ref 80–94)
MONOCYTES # BLD AUTO: 1 X10(3)/MCL (ref 0.1–1.3)
MONOCYTES NFR BLD AUTO: 20.5 %
NEUTROPHILS # BLD AUTO: 2.3 X10(3)/MCL (ref 2.1–9.2)
NEUTROPHILS NFR BLD AUTO: 47.1 %
PLATELET # BLD AUTO: 320 X10(3)/MCL (ref 130–400)
PMV BLD AUTO: 10.2 FL (ref 7.4–10.4)
POTASSIUM SERPL-SCNC: 4.1 MMOL/L (ref 3.5–5.1)
PROT SERPL-MCNC: 6.4 GM/DL (ref 5.8–7.6)
RBC # BLD AUTO: 3.48 X10(6)/MCL (ref 4.2–5.4)
SODIUM SERPL-SCNC: 141 MMOL/L (ref 136–145)
T4 FREE SERPL-MCNC: 1.24 NG/DL (ref 0.7–1.48)
TRIGL SERPL-MCNC: 150 MG/DL (ref 37–140)
TSH SERPL-ACNC: 0.57 UIU/ML (ref 0.35–4.94)
VLDLC SERPL CALC-MCNC: 30 MG/DL
WBC # BLD AUTO: 4.88 X10(3)/MCL (ref 4.5–11.5)

## 2025-03-31 PROCEDURE — 36415 COLL VENOUS BLD VENIPUNCTURE: CPT

## 2025-03-31 PROCEDURE — 82533 TOTAL CORTISOL: CPT

## 2025-03-31 PROCEDURE — 84443 ASSAY THYROID STIM HORMONE: CPT

## 2025-03-31 PROCEDURE — 80061 LIPID PANEL: CPT

## 2025-03-31 PROCEDURE — 84439 ASSAY OF FREE THYROXINE: CPT

## 2025-03-31 PROCEDURE — 85025 COMPLETE CBC W/AUTO DIFF WBC: CPT

## 2025-03-31 PROCEDURE — 80053 COMPREHEN METABOLIC PANEL: CPT

## 2025-04-01 ENCOUNTER — INFUSION (OUTPATIENT)
Dept: INFUSION THERAPY | Facility: HOSPITAL | Age: 65
End: 2025-04-01
Attending: INTERNAL MEDICINE
Payer: MEDICARE

## 2025-04-01 VITALS — DIASTOLIC BLOOD PRESSURE: 74 MMHG | HEART RATE: 80 BPM | SYSTOLIC BLOOD PRESSURE: 134 MMHG

## 2025-04-01 DIAGNOSIS — C79.51 SECONDARY MALIGNANT NEOPLASM OF BONE: ICD-10-CM

## 2025-04-01 DIAGNOSIS — C74.00 MALIGNANT NEOPLASM OF ADRENAL CORTEX, UNSPECIFIED LATERALITY: Primary | ICD-10-CM

## 2025-04-01 PROCEDURE — 96413 CHEMO IV INFUSION 1 HR: CPT

## 2025-04-01 PROCEDURE — 25000003 PHARM REV CODE 250: Performed by: INTERNAL MEDICINE

## 2025-04-01 PROCEDURE — 63600175 PHARM REV CODE 636 W HCPCS: Mod: JZ,TB | Performed by: INTERNAL MEDICINE

## 2025-04-01 RX ORDER — EPINEPHRINE 0.3 MG/.3ML
0.3 INJECTION SUBCUTANEOUS ONCE AS NEEDED
Status: DISCONTINUED | OUTPATIENT
Start: 2025-04-01 | End: 2025-04-01 | Stop reason: HOSPADM

## 2025-04-01 RX ORDER — HEPARIN 100 UNIT/ML
500 SYRINGE INTRAVENOUS
Status: DISCONTINUED | OUTPATIENT
Start: 2025-04-01 | End: 2025-04-01 | Stop reason: HOSPADM

## 2025-04-01 RX ORDER — SODIUM CHLORIDE 0.9 % (FLUSH) 0.9 %
10 SYRINGE (ML) INJECTION
Status: DISCONTINUED | OUTPATIENT
Start: 2025-04-01 | End: 2025-04-01 | Stop reason: HOSPADM

## 2025-04-01 RX ORDER — DIPHENHYDRAMINE HYDROCHLORIDE 50 MG/ML
50 INJECTION, SOLUTION INTRAMUSCULAR; INTRAVENOUS ONCE AS NEEDED
Status: DISCONTINUED | OUTPATIENT
Start: 2025-04-01 | End: 2025-04-01 | Stop reason: HOSPADM

## 2025-04-01 RX ORDER — PROCHLORPERAZINE EDISYLATE 5 MG/ML
5 INJECTION INTRAMUSCULAR; INTRAVENOUS ONCE AS NEEDED
Status: DISCONTINUED | OUTPATIENT
Start: 2025-04-01 | End: 2025-04-01 | Stop reason: HOSPADM

## 2025-04-01 RX ADMIN — SODIUM CHLORIDE 200 MG: 9 INJECTION, SOLUTION INTRAVENOUS at 11:04

## 2025-04-08 NOTE — PROGRESS NOTES
Subjective:       Patient ID: Shreya Reyes is a 65 y.o. female.    Chief Complaint:  Hip pain    Diagnosis: Metastatic adrenocortical carcinoma                    Multiple osteoporotic vertebral compression fractures    Treatment History  Adjuvant XRT (completed 9/30/21)  Mitotane 1/25/22-7/26/22, Resumed 8/22  XRT right scapula, L5,  R ischium, L femoral neck completed 8/26/22  SBRT L2-3 2/06/23  Left IM nail 2/08/23  SBRT R iliac wing 5/15/23  SBRT L5, R ischium 12/4/23  Mitotane 8/22-5/24  SBRT liver/R adrenal 8/08/24  XRT R orbit/frontal bone 12/5/24  XRT Left 3rd rib 12/1924  XRT R scapula 1/14/25    Current Treatment  Pembrolizumab 200 mg Q3W s/p 4 cycles (started 1/28/25)  Hydrocortisone 20 mg Q AM, 10 mg Q PM  Levothyroxine 75 mcg/d                Clinical History:  [place] [facility] [place] with acute left flank pain. CT A/P showed a heterogeneous enhancing mass of the left adrenal gland measuring 5.5 x 4.8 x 5 cm (24 Hu), with no definite microscopic fat. There was mild displacement of the left renal vein. Right adrenal gland was unremarkable.  Hormonal workup was negative for pheochromocytoma. She was felt to have had an acute adrenal hemorrhage and close observation was recommended. Follow-up CT A/P 6/9/21 showed increased size of the adrenal mass to 8.0 x 4.5 cm appearing hypervascular with some central necrosis. She underwent a laparoscopic/robotic left adrenalectomy 6/11/21.  Final pathology showed an adrenal cortical carcinoma predominant oncocytic/trabecular morphology with focally marked cytologic atypia and increased mitotic rate (up to 10/50 HPF's). There were large areas of necrosis and multiple foci of capsular and lymphovascular invasion. Ki-67 expression was 10-15%.    She had a Telemedicine consultation with Dr. Dion Lynch at [facility] 8/3/21 who specializes in treatment of adrenocortical carcinoma. Her case was reviewed and discussed in their tumor  board. Recommendations were for treatment with adjuvant radiation therapy and systemic treatment with Mitotane. Baseline postoperative CT scans of the chest, abdomen and pelvis 8/4/21 showed postoperative changes with no evidence of measurable metastatic disease.    She was seen as a new patient at [facility] 8/9/21.  She had recovered well from her surgery and was asymptomatic.  Treatment recommendations from the [facility] were reviewed and discussed.  Treatment was started with Mitotane 1000 mg BID on 8/16/2021.  No dose escalation was recommended until she completed radiation therapy.  She was started on replacement hydrocortisone 20 mg Q AM and 10 mg Q PM.  Surveillance CT scans were recommended in 3 months.      She was seen for an office visit 9/16/21 after completing 4 weeks of treatment with Mitotane.  She was not having any significant side effects associated with her therapy.  She was longterm through her adjuvant radiation therapy.  Laboratory testing showed marked transaminase elevations with an AST of 493,  and alkaline phosphatase 175.  Bilirubin was normal.  The remainder of her CMP was unremarkable.  Baseline mitotane level drawn at that visit was 2.5 mcg/mL.  Mitotane was held and adjuvant radiation therapy was continued.  Weekly laboratory monitoring showed gradual improvement in her LFTs.  Although mitotane had been associated with transaminase elevations, >5x elevations are rare occurring in <1% of patients.  She was also instructed to hold her lovastatin.  She completed adjuvant radiation therapy 9/30/2021.     Follow-up laboratory continued to show transaminase elevations.  GI was consulted and ordered additional laboratory testing which did not reveal evidence of viral or autoimmune etiologies for her hepatitis.  Mitotane was not resumed due to her persistent transaminase elevations. Follow-up CT scans of the chest, abdomen and pelvis 10/28/21 showed a few  stable subcentimeter pulmonary nodules and changes related to her previous left adrenalectomy with no evidence of disease recurrence.  Following normalization of her LFTs, treatment was resumed with Mitotane 1/25/22.  Her LFTs remained normal even during dose escalation.    She had an injury at home in mid March after carrying in several arms of firewood with acute mid back pain.  Plain x-ray 3/17/22 showed a compression deformity at L2 of questionable age.  MRI of the lumbar spine 3/23/22 showed a subacute severe compression fracture deformity of the L1 vertebral body and mild superior endplate compression fracture of L3 vertebral body with osteoporotic appearance and no findings suspicious for pathologic fracture.  However, there were scattered small osseous lesions in the right L3 vertebral body and L5 vertebral body concerning for metastatic disease.  CT PET scan 3/28/22 showed mildly hypermetabolic osseous lesions in the lumbar spine, pelvis and proximal left femur consistent with metastatic disease.  There was no significant hypermetabolic uptake at the sites of her compression fractures.  She did not have pain at any of the sites prior to the acute compression fracture.  Bone density exam showed osteoporosis of the lumbar spine with a T score of -3.4, left femoral neck -3.4 and left total hip -2.7.  She was started on Prolia 3/31/22 and underwent L1 and L3 kyphoplasty 4//8/22.  Biopsies of the L3 vertebral body showed no evidence of metastatic carcinoma.  She continued to have significant pain following the kyphoplasty.  Further review of her films showed a possible compression fracture at T11.  MRI of the thoracic spine 4/15/22 showed a compression fracture of T11 with 50% loss of vertebral body height.  She underwent kyphoplasty 4/21/22 with symptomatic improvement.    CT scans of the chest, abdomen and pelvis 4/27/22 showed sclerotic osseous lesions at L4, right ischium and left femur correlating with the  hypermetabolic lesions on CT-PET scan.  There were stable sub-6 mm pulmonary nodules in the RML and LLL unchanged from previous imaging.  MRI of the cervical and lumbar spine 7/5/22 showed moderate disc disease at C5-6 and C6-7 without significant spinal canal stenosis or foraminal stenosis.  She developed progressive symptoms of right arm pain, numbness and tingling and updated MRI 7/13/22 showed foraminal stenosis secondary to disc osteophyte on the right side at C5-6 and C6-7.  She underwent a right foraminotomy with relief of her symptoms.    CT C/A/P 08/01/22:  Multiple compression fractures and postsurgical changes.  Area of sclerosis in the left sacrum was stable compared to the prior exam.  There was no evidence of visceral metastatic disease.  She had a teleconference with [facility] 8/2/22 and a follow-up PET scan was recommended.  Mitotane was discontinued 7/26/22.   CT-PET 08/05/22:  Hypermetabolic osseous metastatic disease involving L5, right ischium, left femoral neck and a new lesion in the inferior right scapula.  There was a 12 mm area of hypermetabolic activity adjacent to the right adrenal gland without a definite CT correlate.    She completed palliative radiation therapy to the right scapula, L5, R ischium and left femoral neck on 8/26/22.  She resumed Mitotane at 1000 mg BID on 8/29/22.      CT-PET 11/21/22:  Improved FDG uptake in all of the treated sites.  Right scapula SUV decreased from 4.2 to 2.1, left femoral neck 9.1 to 5, right ischium 13 to 2.4 and L5 8.2 to 4.6.  Hypermetabolic activity at the site of the previous compression fractures had also improved.  There were no findings suspicious for new sites of disease or visceral metastatic disease.  MRI lumbar spine and left hip 1/29/23:  Metastatic disease involving right ischial tuberosity, left femoral neck and L3 vertebral body, similar in size to CT-PET scan 11/21/22:  L3 lesion showed interval progression with ventral  and paraspinal extension causing moderate-to-severe narrowing of the spinal canal.  Left femoral neck lesion involved greater than 50% of the total diameter of the femoral neck.  She was treated with stereotactic XRT to L3 and underwent prophylactic IM nail of the left femoral neck 2/8/23.  CT C/A/P 4/24/23:  13 mm sclerotic met inferior right scapula, bandlike densities RUL and RLL secondary to previous XRT.  Stable thoracic spine compression fractures.  Increased density L4 vertebral body metastasis.  No evidence of visceral metastatic disease.  MRI L-spine/pelvis 4/24/23:  Stable L3 lesion with mild epidural extension and enhancement measuring 8 mm.  Sclerotic 10 mm focus of abnormal marrow signal right iliac wing.    Guardant 360 2/20/23:  Positive TP53 mutation, microsatellite stable     Prolia was resumed for her metastatic bone disease 4/11/23.  She completed SBRT to the right iliac wing metastasis 5/15/23 receiving 2700 cGy in 3 fractions.  Treatment was well tolerated.      MRI L-spine and pelvis 7/26/23:  Resolution of previous epidural enhancing mass posterior to L3 vertebral body.  Right iliac wing lesion 12 mm (previous 10 mm).  No new metastatic lesions.  CT C/A/P 7/28/23:  Stable sclerotic bone lesions right scapula, right iliac wing and L4.  Stable kyphoplasty changes T10, T11, T12 and L2.  Stable L1 compression deformity.  No new sites of disease identified.  Colonoscopy 7/25/23:  No polyps or other abnormalities.  Follow-up exam recommended in 7 years.  CT C/A/P 11/06/23:  S/p kyphoplasty at T10, T11, T12 and L2.  Stable L1 compression deformity.  Increased L4 height loss with increased sclerosis of the vertebral body.  Stable sclerotic lesion right iliac wing 13 mm.  Inferior right scapular lesion more vaguely seen.  No findings suspicious for new sites of disease.  MRI pelvis 11/10/23:  Interval increase in the size of the metastatic lesion involving the right ischial tuberosity measuring 3.2 cm  x 1.8 cm, previously 2.1 x 1.3 cm.  MRI L-spine 11/10/23:  Progressive metastatic disease at L5 with a new pathologic compression deformity.  MRI L-spine 2/22/24:  New L4 superior endplate compression fracture with minimal loss of height, otherwise unchanged from 11/10/23.  CT C/A/P 2/27/24:  L4 superior endplate compression fracture with minimal loss of height.  Otherwise, no evidence of disease progression or new sites of disease.    Seen in consultation by Dr. Lynch at [facility] 5/21/24.  No progressive disease on imaging.  Mitotane discontinued secondary to progressive fatigue and a level of 20.7.    CT C/A/P 6/17/24:  New 1.7 cm right hepatic met and 1.6 x 0.9 cm right adrenal gland nodule.  No abnormal lymphadenopathy.  MRI L-spine 6/19/24:  Slight progression of mild superior endplate height loss at L4.  Slight increased marrow enhancement at S1-S2.  No definite disease progression or new lesions.  CT liver biopsy 7/16/24:  Metastatic adrenocortical carcinoma.  QNS for NGS.    She received SBRT to the liver met receiving a total dose of 6000 cGy in 5 fractions and to the right adrenal met receiving 5000 cGy in 5 fractions completed 8/8/24.  She underwent additional kyphoplasty at L4 and 5 on 10/22/24.  Biopsies of both vertebral bodies were negative for malignancy.    CT C/A/P 10/1/24:  No metastatic disease in the chest.  1.4 cm hepatic hypodensity segment VIII with fiducials in place.  Stable 1.9 cm right adrenal nodule.  Multiple compression fractures lower thoracic and lumbar spine with previous kyphoplasty.  MRI L-spine 10/08/24:  Minimal progression of height loss at L4 with slight progression of associated spinal canal stenosis.  Otherwise stable.  MRI brain 11/18/24:  Metastatic heterogeneous enhancement superior aspect of the right orbit and right frontal bone.  No intra-axial metastasis.  CT C/A/P 12/4/24:  Expansile metastatic lesion left anterior 3rd rib 2.5 x 5 cm, right medial  lower scapula metastatic lesion.  Previously seen right hepatic dome lesion no longer visualized.  Unchanged right adrenal gland nodule 1.8 x 0.7 cm.  Diffuse osteopenia with multiple lower thoracic and lumbar compression fractures and previous vertebroplasty.  MRI Chest 1/02/25:  Metastatic lytic lesion caudal right scapula 1.6 x 4 x 4 cm with associated chronic pathologic fracture.  MRI orbits 3/13/25:  Grossly stable skeletal metastasis involving the frontal bone at the level of the right supraorbital rim, maximum diameter 3.7 cm.  CT C/A/P 4/8/25:  Clearing of soft tissue component left anterior 3rd rib, radiation pneumonitis anterior CORRIE.  No hilar or mediastinal lymphadenopathy.  Stable wall thickening distal 3rd of the esophagus.  Unchanged pathologic fracture inferior right scapula.  Stable 14 mm right adrenal gland nodule.  No suspicious hepatic lesions.    Interval History  She returns to the office today by herself for a 4 week follow-up visit.  She has now completed 4 cycles of single agent pembrolizumab every 3 weeks with excellent tolerance.  She has no signs or symptoms suspicious for immune mediated toxicity.  She complains of bilateral hip pain, worse in the afternoons.  She typically takes ibuprofen 800 mg in the morning and Lortab 10 mg at bedtime.  A 2nd dose of ibuprofen in the afternoon does not give her the same benefit.  She does not have any rib pain or significant back pain.  She has a chronic nonproductive cough, worse at night.  No significant reflux symptoms or dysphagia.  Restaging CT scans of the chest, abdomen and pelvis 4/8/25 showed stable post treatment changes following radiation to the left 3rd rib and right scapula.  Right adrenal gland nodule was stable.  There was no overt disease progression.  No evidence of new bone lesions.      Review of Systems   Constitutional:  Negative for appetite change, fatigue, fever and unexpected weight change.   HENT:  Negative for mouth sores,  "sore throat and trouble swallowing.    Eyes: Negative.    Respiratory:  Positive for cough. Negative for shortness of breath and wheezing.    Cardiovascular:  Negative for chest pain, palpitations and leg swelling.   Gastrointestinal:  Negative for abdominal distention, abdominal pain, constipation, diarrhea, nausea and vomiting.   Genitourinary:  Negative for dysuria, flank pain and frequency.   Musculoskeletal:  Positive for arthralgias. Negative for back pain and gait problem.        Bilateral hip pain   Integumentary:  Negative for pallor and rash.   Neurological:  Positive for numbness (RLE). Negative for dizziness, weakness and headaches.   Hematological:  Negative for adenopathy. Does not bruise/bleed easily.   Psychiatric/Behavioral: Negative.         PMHx:  Hyperlipidemia, osteoporosis  PSHx:  Tonsils, left cataract, ORIF left tibia/fib, left adrenalectomy, multiple vertebral kyphoplasties  SH:  Former smoker 1 PPD, quit 2009. + Social alcohol use. Lives in [place] with her . 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  FH:  Her mother had lymphoma.     Objective:        BP (!) 168/78 (Patient Position: Sitting)   Pulse 73   Temp 98.4 °F (36.9 °C)   Resp 15   Ht 5' 4" (1.626 m)   Wt 58.7 kg (129 lb 8 oz)   SpO2 98%   BMI 22.23 kg/m²    Physical Exam  Constitutional:       Comments: Well-developed white female in NAD.   HENT:      Head: Normocephalic.      Mouth/Throat:      Mouth: Mucous membranes are moist.      Pharynx: Oropharynx is clear. No posterior oropharyngeal erythema.   Eyes:      General: No scleral icterus.     Extraocular Movements: Extraocular movements intact.      Pupils: Pupils are equal, round, and reactive to light.   Cardiovascular:      Rate and Rhythm: Normal rate and regular rhythm.      Heart sounds: No murmur heard.  Pulmonary:      Comments: Lungs clear to auscultation  Abdominal:      General: Bowel sounds are normal. There is no distension.      " Palpations: Abdomen is soft. There is no mass.      Tenderness: There is no abdominal tenderness.   Musculoskeletal:         General: No swelling.      Cervical back: Neck supple. No tenderness.      Comments: No vertebral body or rib cage tenderness.    Skin:     General: Skin is warm and dry.      Findings: No rash.   Neurological:      General: No focal deficit present.      Mental Status: She is alert and oriented to person, place, and time.      Motor: No weakness.       ECOG SCORE    1 - Restricted in strenuous activity-ambulatory and able to carry out work of a light nature        LABORATORY   Latest Reference Range & Units 04/21/25 08:39   WBC 4.50 - 11.50 x10(3)/mcL 5.85   RBC 4.20 - 5.40 x10(6)/mcL 3.45 (L)   Hemoglobin 12.0 - 16.0 g/dL 11.6 (L)   Hematocrit 37.0 - 47.0 % 34.5 (L)   MCV 80.0 - 94.0 fL 100.0 (H)   MCH 27.0 - 31.0 pg 33.6 (H)   MCHC 33.0 - 36.0 g/dL 33.6   RDW 11.5 - 17.0 % 14.4   Platelet Count 130 - 400 x10(3)/mcL 301   MPV 7.4 - 10.4 fL 9.7   Neut % % 61.6   LYMPH % % 22.4   Mono % % 12.3   Eos % % 3.1       Assessment:   Metastatic adrenocortical carcinoma  Multiple osteoporotic vertebral body compression fractures  S/p prophylactic left femur IM nail 2/8/23  Cancer related pain - stable    Plan:   CT images were reviewed and discussed in the office today in the presence of the patient.  She has no radiographic evidence of disease progression.  Continue treatment with single agent Pembrolizumab.    Dosing will be changed to 400 mg every 6 weeks with her next infusion 4/22/25.  Some of her cough may be due to subclinical reflux.  She already takes a PPI every morning.    She will add OTC Pepcid q.h.s.  Repeat CT of the chest, abdomen and pelvis in 3 months.  RTC after the scans for a follow-up visit and results review.  Patient instructed to call or return sooner for any new symptoms or problems.      MARY VINCENT MD    Other Physicians  Dr. Dion Holt Dr.  Jared Lynch ([facility])

## 2025-04-10 DIAGNOSIS — G89.3 CANCER ASSOCIATED PAIN: ICD-10-CM

## 2025-04-10 RX ORDER — HYDROCODONE BITARTRATE AND ACETAMINOPHEN 7.5; 325 MG/1; MG/1
1 TABLET ORAL EVERY 6 HOURS PRN
Qty: 60 TABLET | Refills: 0 | Status: SHIPPED | OUTPATIENT
Start: 2025-04-10

## 2025-04-15 ENCOUNTER — INFUSION (OUTPATIENT)
Dept: INFUSION THERAPY | Facility: HOSPITAL | Age: 65
End: 2025-04-15
Attending: INTERNAL MEDICINE
Payer: MEDICARE

## 2025-04-15 VITALS
RESPIRATION RATE: 18 BRPM | SYSTOLIC BLOOD PRESSURE: 136 MMHG | OXYGEN SATURATION: 96 % | HEART RATE: 68 BPM | BODY MASS INDEX: 21.17 KG/M2 | WEIGHT: 124 LBS | TEMPERATURE: 97 F | DIASTOLIC BLOOD PRESSURE: 77 MMHG | HEIGHT: 64 IN

## 2025-04-15 DIAGNOSIS — M81.0 OSTEOPOROSIS, UNSPECIFIED OSTEOPOROSIS TYPE, UNSPECIFIED PATHOLOGICAL FRACTURE PRESENCE: Primary | ICD-10-CM

## 2025-04-15 PROCEDURE — 96372 THER/PROPH/DIAG INJ SC/IM: CPT

## 2025-04-15 PROCEDURE — 63600175 PHARM REV CODE 636 W HCPCS: Mod: JZ,TB | Performed by: INTERNAL MEDICINE

## 2025-04-15 RX ADMIN — DENOSUMAB 60 MG: 60 INJECTION SUBCUTANEOUS at 09:04

## 2025-04-15 NOTE — PLAN OF CARE
Plan of care reviewed with patient. Prolia Q6Months completed. Appts reviewed with patient; patient uses portal.

## 2025-04-21 ENCOUNTER — LAB VISIT (OUTPATIENT)
Dept: LAB | Facility: HOSPITAL | Age: 65
End: 2025-04-21
Attending: INTERNAL MEDICINE
Payer: MEDICARE

## 2025-04-21 ENCOUNTER — OFFICE VISIT (OUTPATIENT)
Dept: HEMATOLOGY/ONCOLOGY | Facility: CLINIC | Age: 65
End: 2025-04-21
Payer: MEDICARE

## 2025-04-21 VITALS
HEIGHT: 64 IN | HEART RATE: 73 BPM | SYSTOLIC BLOOD PRESSURE: 168 MMHG | TEMPERATURE: 98 F | OXYGEN SATURATION: 98 % | RESPIRATION RATE: 15 BRPM | DIASTOLIC BLOOD PRESSURE: 78 MMHG | BODY MASS INDEX: 22.11 KG/M2 | WEIGHT: 129.5 LBS

## 2025-04-21 DIAGNOSIS — C74.00 MALIGNANT NEOPLASM OF ADRENAL CORTEX, UNSPECIFIED LATERALITY: ICD-10-CM

## 2025-04-21 DIAGNOSIS — Z13.29 SCREENING FOR HYPOTHYROIDISM: ICD-10-CM

## 2025-04-21 DIAGNOSIS — C79.51 SECONDARY MALIGNANT NEOPLASM OF BONE: ICD-10-CM

## 2025-04-21 DIAGNOSIS — E07.9 THYROID DISEASE: ICD-10-CM

## 2025-04-21 DIAGNOSIS — C74.02 MALIGNANT NEOPLASM OF CORTEX OF LEFT ADRENAL GLAND: Primary | ICD-10-CM

## 2025-04-21 DIAGNOSIS — C78.7 SECONDARY MALIGNANT NEOPLASM OF LIVER: ICD-10-CM

## 2025-04-21 LAB
ALBUMIN SERPL-MCNC: 3.2 G/DL (ref 3.4–4.8)
ALBUMIN/GLOB SERPL: 1 RATIO (ref 1.1–2)
ALP SERPL-CCNC: 168 UNIT/L (ref 40–150)
ALT SERPL-CCNC: 18 UNIT/L (ref 0–55)
ANION GAP SERPL CALC-SCNC: 5 MEQ/L
AST SERPL-CCNC: 23 UNIT/L (ref 11–45)
BASOPHILS # BLD AUTO: 0.02 X10(3)/MCL
BASOPHILS NFR BLD AUTO: 0.3 %
BILIRUB SERPL-MCNC: 0.3 MG/DL
BUN SERPL-MCNC: 25.9 MG/DL (ref 9.8–20.1)
CALCIUM SERPL-MCNC: 9.1 MG/DL (ref 8.4–10.2)
CHLORIDE SERPL-SCNC: 107 MMOL/L (ref 98–107)
CO2 SERPL-SCNC: 26 MMOL/L (ref 23–31)
CORTIS SERPL-SCNC: 46 UG/DL
CREAT SERPL-MCNC: 0.67 MG/DL (ref 0.55–1.02)
CREAT/UREA NIT SERPL: 39
EOSINOPHIL # BLD AUTO: 0.18 X10(3)/MCL (ref 0–0.9)
EOSINOPHIL NFR BLD AUTO: 3.1 %
ERYTHROCYTE [DISTWIDTH] IN BLOOD BY AUTOMATED COUNT: 14.4 % (ref 11.5–17)
GFR SERPLBLD CREATININE-BSD FMLA CKD-EPI: >60 ML/MIN/1.73/M2
GLOBULIN SER-MCNC: 3.3 GM/DL (ref 2.4–3.5)
GLUCOSE SERPL-MCNC: 91 MG/DL (ref 82–115)
HCT VFR BLD AUTO: 34.5 % (ref 37–47)
HGB BLD-MCNC: 11.6 G/DL (ref 12–16)
IMM GRANULOCYTES # BLD AUTO: 0.02 X10(3)/MCL (ref 0–0.04)
IMM GRANULOCYTES NFR BLD AUTO: 0.3 %
LYMPHOCYTES # BLD AUTO: 1.31 X10(3)/MCL (ref 0.6–4.6)
LYMPHOCYTES NFR BLD AUTO: 22.4 %
MCH RBC QN AUTO: 33.6 PG (ref 27–31)
MCHC RBC AUTO-ENTMCNC: 33.6 G/DL (ref 33–36)
MCV RBC AUTO: 100 FL (ref 80–94)
MONOCYTES # BLD AUTO: 0.72 X10(3)/MCL (ref 0.1–1.3)
MONOCYTES NFR BLD AUTO: 12.3 %
NEUTROPHILS # BLD AUTO: 3.6 X10(3)/MCL (ref 2.1–9.2)
NEUTROPHILS NFR BLD AUTO: 61.6 %
PLATELET # BLD AUTO: 301 X10(3)/MCL (ref 130–400)
PMV BLD AUTO: 9.7 FL (ref 7.4–10.4)
POTASSIUM SERPL-SCNC: 4.8 MMOL/L (ref 3.5–5.1)
PROT SERPL-MCNC: 6.5 GM/DL (ref 5.8–7.6)
RBC # BLD AUTO: 3.45 X10(6)/MCL (ref 4.2–5.4)
SODIUM SERPL-SCNC: 138 MMOL/L (ref 136–145)
T4 FREE SERPL-MCNC: 1.14 NG/DL (ref 0.7–1.48)
TSH SERPL-ACNC: 0.31 UIU/ML (ref 0.35–4.94)
WBC # BLD AUTO: 5.85 X10(3)/MCL (ref 4.5–11.5)

## 2025-04-21 PROCEDURE — 85025 COMPLETE CBC W/AUTO DIFF WBC: CPT

## 2025-04-21 PROCEDURE — 99999 PR PBB SHADOW E&M-EST. PATIENT-LVL IV: CPT | Mod: PBBFAC,,, | Performed by: INTERNAL MEDICINE

## 2025-04-21 PROCEDURE — 36415 COLL VENOUS BLD VENIPUNCTURE: CPT

## 2025-04-21 PROCEDURE — 99214 OFFICE O/P EST MOD 30 MIN: CPT | Mod: PBBFAC | Performed by: INTERNAL MEDICINE

## 2025-04-21 PROCEDURE — 80053 COMPREHEN METABOLIC PANEL: CPT

## 2025-04-21 PROCEDURE — 82533 TOTAL CORTISOL: CPT

## 2025-04-21 PROCEDURE — 84439 ASSAY OF FREE THYROXINE: CPT

## 2025-04-21 PROCEDURE — 99214 OFFICE O/P EST MOD 30 MIN: CPT | Mod: S$PBB,,, | Performed by: INTERNAL MEDICINE

## 2025-04-21 PROCEDURE — 84443 ASSAY THYROID STIM HORMONE: CPT

## 2025-04-22 ENCOUNTER — INFUSION (OUTPATIENT)
Dept: INFUSION THERAPY | Facility: HOSPITAL | Age: 65
End: 2025-04-22
Attending: INTERNAL MEDICINE
Payer: MEDICARE

## 2025-04-22 VITALS
WEIGHT: 129.5 LBS | HEART RATE: 72 BPM | TEMPERATURE: 98 F | BODY MASS INDEX: 22.11 KG/M2 | DIASTOLIC BLOOD PRESSURE: 89 MMHG | HEIGHT: 64 IN | OXYGEN SATURATION: 98 % | SYSTOLIC BLOOD PRESSURE: 148 MMHG | RESPIRATION RATE: 18 BRPM

## 2025-04-22 DIAGNOSIS — C74.00 MALIGNANT NEOPLASM OF ADRENAL CORTEX, UNSPECIFIED LATERALITY: Primary | ICD-10-CM

## 2025-04-22 DIAGNOSIS — C79.51 SECONDARY MALIGNANT NEOPLASM OF BONE: ICD-10-CM

## 2025-04-22 PROCEDURE — 25000003 PHARM REV CODE 250: Performed by: INTERNAL MEDICINE

## 2025-04-22 PROCEDURE — 96413 CHEMO IV INFUSION 1 HR: CPT

## 2025-04-22 PROCEDURE — 63600175 PHARM REV CODE 636 W HCPCS: Mod: JZ,TB | Performed by: INTERNAL MEDICINE

## 2025-04-22 PROCEDURE — A4216 STERILE WATER/SALINE, 10 ML: HCPCS | Performed by: INTERNAL MEDICINE

## 2025-04-22 RX ORDER — EPINEPHRINE 0.3 MG/.3ML
0.3 INJECTION SUBCUTANEOUS ONCE AS NEEDED
Status: DISCONTINUED | OUTPATIENT
Start: 2025-04-22 | End: 2025-04-22 | Stop reason: HOSPADM

## 2025-04-22 RX ORDER — DIPHENHYDRAMINE HYDROCHLORIDE 50 MG/ML
50 INJECTION, SOLUTION INTRAMUSCULAR; INTRAVENOUS ONCE AS NEEDED
Status: DISCONTINUED | OUTPATIENT
Start: 2025-04-22 | End: 2025-04-22 | Stop reason: HOSPADM

## 2025-04-22 RX ORDER — HEPARIN 100 UNIT/ML
500 SYRINGE INTRAVENOUS
Status: CANCELLED | OUTPATIENT
Start: 2025-04-22

## 2025-04-22 RX ORDER — DIPHENHYDRAMINE HYDROCHLORIDE 50 MG/ML
50 INJECTION, SOLUTION INTRAMUSCULAR; INTRAVENOUS ONCE AS NEEDED
Status: CANCELLED | OUTPATIENT
Start: 2025-04-22

## 2025-04-22 RX ORDER — PROCHLORPERAZINE EDISYLATE 5 MG/ML
5 INJECTION INTRAMUSCULAR; INTRAVENOUS ONCE AS NEEDED
Status: CANCELLED | OUTPATIENT
Start: 2025-04-22

## 2025-04-22 RX ORDER — HEPARIN 100 UNIT/ML
500 SYRINGE INTRAVENOUS
Status: DISCONTINUED | OUTPATIENT
Start: 2025-04-22 | End: 2025-04-22 | Stop reason: HOSPADM

## 2025-04-22 RX ORDER — EPINEPHRINE 0.3 MG/.3ML
0.3 INJECTION SUBCUTANEOUS ONCE AS NEEDED
Status: CANCELLED | OUTPATIENT
Start: 2025-04-22

## 2025-04-22 RX ORDER — SODIUM CHLORIDE 0.9 % (FLUSH) 0.9 %
10 SYRINGE (ML) INJECTION
Status: CANCELLED | OUTPATIENT
Start: 2025-04-22

## 2025-04-22 RX ORDER — PROCHLORPERAZINE EDISYLATE 5 MG/ML
5 INJECTION INTRAMUSCULAR; INTRAVENOUS ONCE AS NEEDED
Status: DISCONTINUED | OUTPATIENT
Start: 2025-04-22 | End: 2025-04-22 | Stop reason: HOSPADM

## 2025-04-22 RX ORDER — SODIUM CHLORIDE 0.9 % (FLUSH) 0.9 %
10 SYRINGE (ML) INJECTION
Status: DISCONTINUED | OUTPATIENT
Start: 2025-04-22 | End: 2025-04-22 | Stop reason: HOSPADM

## 2025-04-22 RX ADMIN — SODIUM CHLORIDE 400 MG: 9 INJECTION, SOLUTION INTRAVENOUS at 11:04

## 2025-04-22 RX ADMIN — Medication 10 ML: at 11:04

## 2025-04-22 RX ADMIN — SODIUM CHLORIDE: 9 INJECTION, SOLUTION INTRAVENOUS at 11:04

## 2025-04-22 NOTE — NURSING
C5 Keytruda given, tolerated well. Pt discharged home in stable condition, aware of future appts.

## 2025-04-24 ENCOUNTER — TELEPHONE (OUTPATIENT)
Dept: NEUROSURGERY | Facility: CLINIC | Age: 65
End: 2025-04-24
Payer: MEDICARE

## 2025-04-24 DIAGNOSIS — M54.9 DORSALGIA, UNSPECIFIED: ICD-10-CM

## 2025-04-24 DIAGNOSIS — S32.000A COMPRESSION FRACTURE OF LUMBAR VERTEBRA, UNSPECIFIED LUMBAR VERTEBRAL LEVEL, INITIAL ENCOUNTER: ICD-10-CM

## 2025-04-24 DIAGNOSIS — S32.020A CLOSED COMPRESSION FRACTURE OF L2 LUMBAR VERTEBRA, INITIAL ENCOUNTER: ICD-10-CM

## 2025-04-24 DIAGNOSIS — S32.040A CLOSED COMPRESSION FRACTURE OF L4 VERTEBRA, INITIAL ENCOUNTER: Primary | ICD-10-CM

## 2025-04-24 NOTE — TELEPHONE ENCOUNTER
Patient experienced a severe exacerbation of pain in her lower back after pulling up the comforter on her bed.  The pain is getting worse and going into the left buttock.  She has a history of mutiple compression fractures.  We will obtain lumbar MRI and x-rays.    She will call to schedule herself.

## 2025-04-24 NOTE — TELEPHONE ENCOUNTER
Lumbar MRI and thoracic x-rays were obtained today.  There is a new compression fracture at L2.  I discussed with the patient.  Also, I reached out to Dr. Portillo.

## 2025-04-25 ENCOUNTER — OFFICE VISIT (OUTPATIENT)
Dept: NEUROSURGERY | Facility: CLINIC | Age: 65
End: 2025-04-25
Payer: MEDICARE

## 2025-04-25 VITALS
RESPIRATION RATE: 18 BRPM | WEIGHT: 127 LBS | HEART RATE: 82 BPM | HEIGHT: 64 IN | DIASTOLIC BLOOD PRESSURE: 77 MMHG | SYSTOLIC BLOOD PRESSURE: 128 MMHG | BODY MASS INDEX: 21.68 KG/M2

## 2025-04-25 DIAGNOSIS — S32.020A CLOSED COMPRESSION FRACTURE OF L2 LUMBAR VERTEBRA, INITIAL ENCOUNTER: Primary | ICD-10-CM

## 2025-04-25 DIAGNOSIS — M48.062 LUMBAR STENOSIS WITH NEUROGENIC CLAUDICATION: ICD-10-CM

## 2025-04-25 NOTE — H&P (VIEW-ONLY)
[facility]  History & Physical  Neurosurgery      Shreya Reyes   [id number]   1960     SUBJECTIVE:     CHIEF COMPLAINT:  Lower back pain      HPI:  Shreya Reyes is a 65 y.o. female who presents for neurosurgical evaluation.  Is well known to our office.  She has a history of adrenal cancer with metastasis.  She has had multiple thoracic and lumbar compression fractures with subsequent kyphoplasty at T11, T12, L1, L3, L4, and L5 vertebral bodies.  Those procedures were performed by both Dr. Sandoval and Dr. Aguilar.    She contacted me yesterday to report an exacerbation of lower back pain.  On Monday, 4 days ago, she developed acute onset of lower back pain when she pulled her comforter up on her bed.  The pain has progressively worsened.  She is having difficulty with her daily living activities as a result.  She is needing assistance with hygiene care and ambulation.  At times, the pain radiates into bilateral buttocks.  MRI of the lumbar spine without contrast was obtained on 04/24/2025 as well as thoracic x-rays.  The patient was found to have a new L2 compression fracture.  There is also edema within the L3, L4, and L5 vertebral bodies without a change in the shape of the vertebral bodies.    She presents today with her .  She complains of lower back pain.  Currently, she rates her pain at 3/10.  She states the pain is most severe first thing in the morning and at night.  Taking ibuprofen 800 mg has helped some.  She is also taking Norco 5 mg when the ibuprofen does not control the pain.  She complains of lower back pain with pain radiating through the left-greater-than-right lateral hips, and anterolateral thighs.  There is numbness and tingling through the same distribution.  Subjectively, she does not feel as though she has weakness in either lower extremity.  Her activity is greatly limited secondary to pain.  The patient denies disturbances in bowel or bladder function.  There is  no difficulty with balance.       Past Medical History:   Diagnosis Date    Adrenal cancer     Closed compression fracture of L3 lumbar vertebra, sequela 5/4/2022    Closed wedge compression fracture of T11 vertebra 5/4/2022    Compression fracture of L1 vertebra     Compression fracture of L3 vertebra     Elevated liver enzymes 11/02/2021    Hyperlipidemia     Hypertension 05/04/2022    Osteoporosis     Thoracic compression fracture        Past Surgical History:   Procedure Laterality Date    ABDOMINOPLASTY      ADENOIDECTOMY  Karely     ADRENALECTOMY Left     EYE SURGERY  2021    INTRAMEDULLARY RODDING OF FEMUR Left 02/08/2023    Procedure: INSERTION, INTRAMEDULLARY LEELEE, LEFT FEMUR;  Surgeon: Richard Bolanos MD;  Location: [facility] OR;  Service: Orthopedics;  Laterality: Left;  supine, gustavo flat with blankets  synthes TFNA, long    L1 kyphoplasty w/ spine barbara, L3 kyphoplasty      Laproscopy for excision of adrenal mass      left cataract Left     MAGNETIC RESONANCE IMAGING N/A 07/13/2022    Procedure: MRI (MAGNETIC RESONANCE IMAGING);  Surgeon: Nam Sandoval MD;  Location: [facility] OR;  Service: Neurosurgery;  Laterality: N/A;  MRI CERVICAL SPINE WITHOUT CONTRAST WITH ANESTHESIA    MINIMALLY INVASIVE FORAMINOTOMY CERVICAL SPINE Right 07/13/2022    Procedure: FORAMINOTOMY, SPINE, CERVICAL, MINIMALLY INVASIVE;  Surgeon: Nam Sandoval MD;  Location: [facility] OR;  Service: Neurosurgery;  Laterality: Right;  right C5-6, C6-7 posterior foraminotomy    ORIF TIBIA & FIBULA FRACTURES Left     TONSILLECTOMY         Family History   Problem Relation Name Age of Onset    Lymphoma Mother Cyn Gena     Cancer Mother Cyn Gena     Hyperlipidemia Father Henrry gena     Heart disease Father Henrry gena     Heart disease Sister Marla     Heart disease Brother Royce        Social History     Socioeconomic History    Marital status:    Occupational History    Occupation: RN on leave   Tobacco Use    Smoking  status: Former     Current packs/day: 0.00     Types: Cigarettes     Quit date: 2009     Years since quittin.3    Smokeless tobacco: Never   Substance and Sexual Activity    Alcohol use: Yes     Comment: social    Drug use: Not Currently    Sexual activity: Not Currently     Partners: Male     Social Drivers of Health     Food Insecurity: No Food Insecurity (2023)    Received from [facility]    Hunger Vital Sign     Worried About Running Out of Food in the Last Year: Never true     Ran Out of Food in the Last Year: Never true   Transportation Needs: No Transportation Needs (2023)    Received from [facility]    PRAPARE - Transportation     Lack of Transportation (Medical): No     Lack of Transportation (Non-Medical): No   Housing Stability: Unknown (2023)    Housing Stability Vital Sign     Unable to Pay for Housing in the Last Year: No       Current Outpatient Medications   Medication Instructions    ALPRAZolam (XANAX) 0.25 mg, Oral, 3 times daily PRN    amLODIPine (NORVASC) 10 mg, Oral, Daily    cholecalciferol (vitamin D3) 250 mcg    DULoxetine (CYMBALTA) 30 mg, Oral    HYDROcodone-acetaminophen (NORCO) 7.5-325 mg per tablet 1 tablet, Oral, Every 6 hours PRN    hydrocortisone (CORTEF) 20 mg, Oral, Every morning    hydrocortisone (CORTEF) 10 mg, Oral, Nightly    ibuprofen (ADVIL,MOTRIN) 800 mg, Oral, 3 times daily    iron bis-gly/FA/C/B12/Ca/succ (IRON-150 ORAL) Take by mouth.    levothyroxine (SYNTHROID) 75 MCG tablet 1 tablet, Daily    losartan (COZAAR) 50 mg, Oral, Daily    pregabalin (LYRICA) 100 mg, Oral, 2 times daily       Review of patient's allergies indicates:  No Known Allergies       Review of Systems   Constitutional:  Positive for activity change and fatigue. Negative for chills and fever.   HENT:  Negative for nosebleeds and sore throat.    Eyes:  Negative for pain and visual disturbance.   Respiratory:  Negative for  "cough, chest tightness and shortness of breath.    Cardiovascular:  Negative for chest pain.   Gastrointestinal:  Negative for diarrhea, nausea and vomiting.   Genitourinary:  Negative for difficulty urinating, dysuria and hematuria.   Musculoskeletal:  Positive for back pain and gait problem. Negative for myalgias.   Skin:  Negative for rash.   Neurological:  Positive for numbness. Negative for dizziness, facial asymmetry, weakness and headaches.   Psychiatric/Behavioral:  Negative for confusion and sleep disturbance. The patient is not nervous/anxious.        OBJECTIVE:       Visit Vitals  /77 (BP Location: Left arm, Patient Position: Sitting)   Pulse 82   Resp 18   Ht 5' 4" (1.626 m)   Wt 57.6 kg (127 lb)   BMI 21.80 kg/m²        General:  Pleasant, Well-nourished, Well-groomed.    CV:  Neck is supple.  There are no carotid bruits.  Heart has regular rate and rhythm.    Lungs:  Lungs are clear to auscultation bilaterally with non-labored respirations.    Abdomen:  Soft, non-tender, non-distended    Musculoskeletal:   There is tenderness to palpation along the left paraspinal muscles.  There is no rash or abrasion through the lumbar spine.    Neurological:  The patient is awake, alert, and oriented in all 4 spheres.  Muscle strength against resistance:    Iliopsoas Quadriceps Knee  Flexion Tibialis  anterior Gastro- cnemius EHL   Lower: R 5/5 5/5 5/5 5/5 5/5 5/5    L 5/5 5/5 5/5 5/5 5/5 5/5   There is no clonus at the ankles.  Reflexes:   Right Left   Triceps     Biceps     Brachioradialis     Patellar 2+ 2+   Achilles 2+ 2+   Gait is antalgic.      Imaging:  All pertinent neuroimaging independently reviewed. Discussed these findings in detail with the patient.      ASSESSMENT:     1. Closed compression fracture of L2 lumbar vertebra, initial encounter        2. Lumbar stenosis with neurogenic claudication          PLAN:     Options were discussed at length with the patient and her .  Both Dr. Reyes" and myself have contacted Dr. Aguilar.  The patient wishes to move forward with kyphoplasty at L2.  In discussing her symptoms, the kyphoplasty will help to improve her lower back pain.  However, I believe the thigh pain is due to the stenosis at L3-4 from retropulsion of bone at the superior endplate of L4.  She is reluctant to consider L3-4 decompression.  She will move forward with the kyphoplasty at L2 and see what pain she is left with.  We discussed risks, benefits, and possible complications for both the kyphoplasty as well as the lumbar decompression.  All of their questions were answered.  Kyphoplasty is tentatively planned for 04/29/2025.      A total of 21 minutes was spent face-to-face with the patient during this encounter.  Over half of that time was spent on counseling and coordination of care.  An additional 10+ minutes were used to review the patient's chart including lumbar MRI images and report, thoracic x-ray images, compare with prior lumbar MRI and CT abdomen & pelvis, and work on office note.      Juana Nash PA-C      Disclaimer:  This note is prepared using voice recognition software and as such is likely to have errors despite attempts at proofreading.

## 2025-04-25 NOTE — PROGRESS NOTES
[facility]  History & Physical  Neurosurgery      Shreya Reyes   [id number]   1960     SUBJECTIVE:     CHIEF COMPLAINT:  Lower back pain      HPI:  Shreya Reyes is a 65 y.o. female who presents for neurosurgical evaluation.  Is well known to our office.  She has a history of adrenal cancer with metastasis.  She has had multiple thoracic and lumbar compression fractures with subsequent kyphoplasty at T11, T12, L1, L3, L4, and L5 vertebral bodies.  Those procedures were performed by both Dr. Sandoval and Dr. Aguilar.    She contacted me yesterday to report an exacerbation of lower back pain.  On Monday, 4 days ago, she developed acute onset of lower back pain when she pulled her comforter up on her bed.  The pain has progressively worsened.  She is having difficulty with her daily living activities as a result.  She is needing assistance with hygiene care and ambulation.  At times, the pain radiates into bilateral buttocks.  MRI of the lumbar spine without contrast was obtained on 04/24/2025 as well as thoracic x-rays.  The patient was found to have a new L2 compression fracture.  There is also edema within the L3, L4, and L5 vertebral bodies without a change in the shape of the vertebral bodies.    She presents today with her .  She complains of lower back pain.  Currently, she rates her pain at 3/10.  She states the pain is most severe first thing in the morning and at night.  Taking ibuprofen 800 mg has helped some.  She is also taking Norco 5 mg when the ibuprofen does not control the pain.  She complains of lower back pain with pain radiating through the left-greater-than-right lateral hips, and anterolateral thighs.  There is numbness and tingling through the same distribution.  Subjectively, she does not feel as though she has weakness in either lower extremity.  Her activity is greatly limited secondary to pain.  The patient denies disturbances in bowel or bladder function.  There is  no difficulty with balance.       Past Medical History:   Diagnosis Date    Adrenal cancer     Closed compression fracture of L3 lumbar vertebra, sequela 5/4/2022    Closed wedge compression fracture of T11 vertebra 5/4/2022    Compression fracture of L1 vertebra     Compression fracture of L3 vertebra     Elevated liver enzymes 11/02/2021    Hyperlipidemia     Hypertension 05/04/2022    Osteoporosis     Thoracic compression fracture        Past Surgical History:   Procedure Laterality Date    ABDOMINOPLASTY      ADENOIDECTOMY  Karely     ADRENALECTOMY Left     EYE SURGERY  2021    INTRAMEDULLARY RODDING OF FEMUR Left 02/08/2023    Procedure: INSERTION, INTRAMEDULLARY LEELEE, LEFT FEMUR;  Surgeon: Richard Bolanos MD;  Location: [facility] OR;  Service: Orthopedics;  Laterality: Left;  supine, gustavo flat with blankets  synthes TFNA, long    L1 kyphoplasty w/ spine barbara, L3 kyphoplasty      Laproscopy for excision of adrenal mass      left cataract Left     MAGNETIC RESONANCE IMAGING N/A 07/13/2022    Procedure: MRI (MAGNETIC RESONANCE IMAGING);  Surgeon: Nam Sandoval MD;  Location: [facility] OR;  Service: Neurosurgery;  Laterality: N/A;  MRI CERVICAL SPINE WITHOUT CONTRAST WITH ANESTHESIA    MINIMALLY INVASIVE FORAMINOTOMY CERVICAL SPINE Right 07/13/2022    Procedure: FORAMINOTOMY, SPINE, CERVICAL, MINIMALLY INVASIVE;  Surgeon: Nam Sandoval MD;  Location: [facility] OR;  Service: Neurosurgery;  Laterality: Right;  right C5-6, C6-7 posterior foraminotomy    ORIF TIBIA & FIBULA FRACTURES Left     TONSILLECTOMY         Family History   Problem Relation Name Age of Onset    Lymphoma Mother Cyn Gena     Cancer Mother Cyn Gena     Hyperlipidemia Father Henrry gena     Heart disease Father Henrry gena     Heart disease Sister Marla     Heart disease Brother Royce        Social History     Socioeconomic History    Marital status:    Occupational History    Occupation: RN on leave   Tobacco Use    Smoking  status: Former     Current packs/day: 0.00     Types: Cigarettes     Quit date: 2009     Years since quittin.3    Smokeless tobacco: Never   Substance and Sexual Activity    Alcohol use: Yes     Comment: social    Drug use: Not Currently    Sexual activity: Not Currently     Partners: Male     Social Drivers of Health     Food Insecurity: No Food Insecurity (2023)    Received from [facility]    Hunger Vital Sign     Worried About Running Out of Food in the Last Year: Never true     Ran Out of Food in the Last Year: Never true   Transportation Needs: No Transportation Needs (2023)    Received from [facility]    PRAPARE - Transportation     Lack of Transportation (Medical): No     Lack of Transportation (Non-Medical): No   Housing Stability: Unknown (2023)    Housing Stability Vital Sign     Unable to Pay for Housing in the Last Year: No       Current Outpatient Medications   Medication Instructions    ALPRAZolam (XANAX) 0.25 mg, Oral, 3 times daily PRN    amLODIPine (NORVASC) 10 mg, Oral, Daily    cholecalciferol (vitamin D3) 250 mcg    DULoxetine (CYMBALTA) 30 mg, Oral    HYDROcodone-acetaminophen (NORCO) 7.5-325 mg per tablet 1 tablet, Oral, Every 6 hours PRN    hydrocortisone (CORTEF) 20 mg, Oral, Every morning    hydrocortisone (CORTEF) 10 mg, Oral, Nightly    ibuprofen (ADVIL,MOTRIN) 800 mg, Oral, 3 times daily    iron bis-gly/FA/C/B12/Ca/succ (IRON-150 ORAL) Take by mouth.    levothyroxine (SYNTHROID) 75 MCG tablet 1 tablet, Daily    losartan (COZAAR) 50 mg, Oral, Daily    pregabalin (LYRICA) 100 mg, Oral, 2 times daily       Review of patient's allergies indicates:  No Known Allergies       Review of Systems   Constitutional:  Positive for activity change and fatigue. Negative for chills and fever.   HENT:  Negative for nosebleeds and sore throat.    Eyes:  Negative for pain and visual disturbance.   Respiratory:  Negative for  "cough, chest tightness and shortness of breath.    Cardiovascular:  Negative for chest pain.   Gastrointestinal:  Negative for diarrhea, nausea and vomiting.   Genitourinary:  Negative for difficulty urinating, dysuria and hematuria.   Musculoskeletal:  Positive for back pain and gait problem. Negative for myalgias.   Skin:  Negative for rash.   Neurological:  Positive for numbness. Negative for dizziness, facial asymmetry, weakness and headaches.   Psychiatric/Behavioral:  Negative for confusion and sleep disturbance. The patient is not nervous/anxious.        OBJECTIVE:       Visit Vitals  /77 (BP Location: Left arm, Patient Position: Sitting)   Pulse 82   Resp 18   Ht 5' 4" (1.626 m)   Wt 57.6 kg (127 lb)   BMI 21.80 kg/m²        General:  Pleasant, Well-nourished, Well-groomed.    CV:  Neck is supple.  There are no carotid bruits.  Heart has regular rate and rhythm.    Lungs:  Lungs are clear to auscultation bilaterally with non-labored respirations.    Abdomen:  Soft, non-tender, non-distended    Musculoskeletal:   There is tenderness to palpation along the left paraspinal muscles.  There is no rash or abrasion through the lumbar spine.    Neurological:  The patient is awake, alert, and oriented in all 4 spheres.  Muscle strength against resistance:    Iliopsoas Quadriceps Knee  Flexion Tibialis  anterior Gastro- cnemius EHL   Lower: R 5/5 5/5 5/5 5/5 5/5 5/5    L 5/5 5/5 5/5 5/5 5/5 5/5   There is no clonus at the ankles.  Reflexes:   Right Left   Triceps     Biceps     Brachioradialis     Patellar 2+ 2+   Achilles 2+ 2+   Gait is antalgic.      Imaging:  All pertinent neuroimaging independently reviewed. Discussed these findings in detail with the patient.      ASSESSMENT:     1. Closed compression fracture of L2 lumbar vertebra, initial encounter        2. Lumbar stenosis with neurogenic claudication          PLAN:     Options were discussed at length with the patient and her .  Both Dr. Reyes" and myself have contacted Dr. Aguilar.  The patient wishes to move forward with kyphoplasty at L2.  In discussing her symptoms, the kyphoplasty will help to improve her lower back pain.  However, I believe the thigh pain is due to the stenosis at L3-4 from retropulsion of bone at the superior endplate of L4.  She is reluctant to consider L3-4 decompression.  She will move forward with the kyphoplasty at L2 and see what pain she is left with.  We discussed risks, benefits, and possible complications for both the kyphoplasty as well as the lumbar decompression.  All of their questions were answered.  Kyphoplasty is tentatively planned for 04/29/2025.      A total of 21 minutes was spent face-to-face with the patient during this encounter.  Over half of that time was spent on counseling and coordination of care.  An additional 10+ minutes were used to review the patient's chart including lumbar MRI images and report, thoracic x-ray images, compare with prior lumbar MRI and CT abdomen & pelvis, and work on office note.      Juana Nash PA-C      Disclaimer:  This note is prepared using voice recognition software and as such is likely to have errors despite attempts at proofreading.

## 2025-04-29 ENCOUNTER — ANESTHESIA EVENT (OUTPATIENT)
Dept: INTERVENTIONAL RADIOLOGY/VASCULAR | Facility: HOSPITAL | Age: 65
End: 2025-04-29
Payer: MEDICARE

## 2025-04-29 ENCOUNTER — HOSPITAL ENCOUNTER (OUTPATIENT)
Dept: INTERVENTIONAL RADIOLOGY/VASCULAR | Facility: HOSPITAL | Age: 65
Discharge: HOME | End: 2025-04-29
Attending: PHYSICIAN ASSISTANT
Payer: MEDICARE

## 2025-04-29 VITALS
WEIGHT: 122.13 LBS | SYSTOLIC BLOOD PRESSURE: 123 MMHG | BODY MASS INDEX: 20.85 KG/M2 | HEART RATE: 89 BPM | OXYGEN SATURATION: 97 % | HEIGHT: 64 IN | RESPIRATION RATE: 18 BRPM | DIASTOLIC BLOOD PRESSURE: 68 MMHG | TEMPERATURE: 98 F

## 2025-04-29 DIAGNOSIS — M80.88XA OTHER OSTEOPOROSIS WITH CURRENT PATHOLOGICAL FRACTURE, VERTEBRA(E), INITIAL ENCOUNTER FOR FRACTURE: ICD-10-CM

## 2025-04-29 DIAGNOSIS — S32.020G CLOSED COMPRESSION FRACTURE OF L2 LUMBAR VERTEBRA, WITH DELAYED HEALING, SUBSEQUENT ENCOUNTER: Primary | Chronic | ICD-10-CM

## 2025-04-29 DIAGNOSIS — S32.020A CLOSED COMPRESSION FRACTURE OF L2 LUMBAR VERTEBRA, INITIAL ENCOUNTER: ICD-10-CM

## 2025-04-29 LAB
ALBUMIN SERPL-MCNC: 3.2 G/DL (ref 3.4–4.8)
ALBUMIN/GLOB SERPL: 0.9 RATIO (ref 1.1–2)
ALP SERPL-CCNC: 175 UNIT/L (ref 40–150)
ALT SERPL-CCNC: 15 UNIT/L (ref 0–55)
ANION GAP SERPL CALC-SCNC: 9 MEQ/L
AST SERPL-CCNC: 21 UNIT/L (ref 11–45)
BASOPHILS # BLD AUTO: 0.02 X10(3)/MCL
BASOPHILS NFR BLD AUTO: 0.3 %
BILIRUB SERPL-MCNC: 0.3 MG/DL
BUN SERPL-MCNC: 30.7 MG/DL (ref 9.8–20.1)
CALCIUM SERPL-MCNC: 9.4 MG/DL (ref 8.4–10.2)
CHLORIDE SERPL-SCNC: 108 MMOL/L (ref 98–107)
CO2 SERPL-SCNC: 23 MMOL/L (ref 23–31)
CREAT SERPL-MCNC: 0.65 MG/DL (ref 0.55–1.02)
CREAT/UREA NIT SERPL: 47
EOSINOPHIL # BLD AUTO: 0.51 X10(3)/MCL (ref 0–0.9)
EOSINOPHIL NFR BLD AUTO: 7.5 %
ERYTHROCYTE [DISTWIDTH] IN BLOOD BY AUTOMATED COUNT: 14.9 % (ref 11.5–17)
GFR SERPLBLD CREATININE-BSD FMLA CKD-EPI: >60 ML/MIN/1.73/M2
GLOBULIN SER-MCNC: 3.7 GM/DL (ref 2.4–3.5)
GLUCOSE SERPL-MCNC: 99 MG/DL (ref 82–115)
HCT VFR BLD AUTO: 37.9 % (ref 37–47)
HGB BLD-MCNC: 12.5 G/DL (ref 12–16)
IMM GRANULOCYTES # BLD AUTO: 0.03 X10(3)/MCL (ref 0–0.04)
IMM GRANULOCYTES NFR BLD AUTO: 0.4 %
INR PPP: 0.9
LYMPHOCYTES # BLD AUTO: 1.23 X10(3)/MCL (ref 0.6–4.6)
LYMPHOCYTES NFR BLD AUTO: 18 %
MCH RBC QN AUTO: 33.1 PG (ref 27–31)
MCHC RBC AUTO-ENTMCNC: 33 G/DL (ref 33–36)
MCV RBC AUTO: 100.3 FL (ref 80–94)
MONOCYTES # BLD AUTO: 0.96 X10(3)/MCL (ref 0.1–1.3)
MONOCYTES NFR BLD AUTO: 14.1 %
NEUTROPHILS # BLD AUTO: 4.08 X10(3)/MCL (ref 2.1–9.2)
NEUTROPHILS NFR BLD AUTO: 59.7 %
NRBC BLD AUTO-RTO: 0 %
PLATELET # BLD AUTO: 306 X10(3)/MCL (ref 130–400)
PMV BLD AUTO: 9.9 FL (ref 7.4–10.4)
POTASSIUM SERPL-SCNC: 4.3 MMOL/L (ref 3.5–5.1)
PROT SERPL-MCNC: 6.9 GM/DL (ref 5.8–7.6)
PROTHROMBIN TIME: 12.6 SECONDS (ref 12.5–14.5)
RBC # BLD AUTO: 3.78 X10(6)/MCL (ref 4.2–5.4)
SODIUM SERPL-SCNC: 140 MMOL/L (ref 136–145)
WBC # BLD AUTO: 6.83 X10(3)/MCL (ref 4.5–11.5)

## 2025-04-29 PROCEDURE — 63600175 PHARM REV CODE 636 W HCPCS: Performed by: STUDENT IN AN ORGANIZED HEALTH CARE EDUCATION/TRAINING PROGRAM

## 2025-04-29 PROCEDURE — 25000003 PHARM REV CODE 250: Performed by: STUDENT IN AN ORGANIZED HEALTH CARE EDUCATION/TRAINING PROGRAM

## 2025-04-29 PROCEDURE — 88305 TISSUE EXAM BY PATHOLOGIST: CPT | Performed by: PHYSICIAN ASSISTANT

## 2025-04-29 PROCEDURE — 85610 PROTHROMBIN TIME: CPT | Performed by: RADIOLOGY

## 2025-04-29 PROCEDURE — 85025 COMPLETE CBC W/AUTO DIFF WBC: CPT | Performed by: RADIOLOGY

## 2025-04-29 PROCEDURE — 63600175 PHARM REV CODE 636 W HCPCS: Performed by: ANESTHESIOLOGY

## 2025-04-29 PROCEDURE — 27201423 OPTIME MED/SURG SUP & DEVICES STERILE SUPPLY

## 2025-04-29 PROCEDURE — 37000008 HC ANESTHESIA 1ST 15 MINUTES

## 2025-04-29 PROCEDURE — C1726 CATH, BAL DIL, NON-VASCULAR: HCPCS

## 2025-04-29 PROCEDURE — 36415 COLL VENOUS BLD VENIPUNCTURE: CPT | Performed by: RADIOLOGY

## 2025-04-29 PROCEDURE — 80053 COMPREHEN METABOLIC PANEL: CPT | Performed by: RADIOLOGY

## 2025-04-29 PROCEDURE — 37000009 HC ANESTHESIA EA ADD 15 MINS

## 2025-04-29 RX ORDER — ROCURONIUM BROMIDE 10 MG/ML
INJECTION, SOLUTION INTRAVENOUS
Status: DISCONTINUED | OUTPATIENT
Start: 2025-04-29 | End: 2025-04-29

## 2025-04-29 RX ORDER — HYDROMORPHONE HYDROCHLORIDE 2 MG/ML
0.2 INJECTION, SOLUTION INTRAMUSCULAR; INTRAVENOUS; SUBCUTANEOUS EVERY 5 MIN PRN
Status: DISCONTINUED | OUTPATIENT
Start: 2025-04-29 | End: 2025-04-30 | Stop reason: HOSPADM

## 2025-04-29 RX ORDER — GLYCOPYRROLATE 0.2 MG/ML
INJECTION INTRAMUSCULAR; INTRAVENOUS
Status: DISCONTINUED | OUTPATIENT
Start: 2025-04-29 | End: 2025-04-29

## 2025-04-29 RX ORDER — PHENYLEPHRINE HCL IN 0.9% NACL 1 MG/10 ML
SYRINGE (ML) INTRAVENOUS
Status: DISCONTINUED | OUTPATIENT
Start: 2025-04-29 | End: 2025-04-29

## 2025-04-29 RX ORDER — EPHEDRINE SULFATE 50 MG/ML
INJECTION, SOLUTION INTRAVENOUS
Status: DISCONTINUED | OUTPATIENT
Start: 2025-04-29 | End: 2025-04-29

## 2025-04-29 RX ORDER — ONDANSETRON HYDROCHLORIDE 2 MG/ML
INJECTION, SOLUTION INTRAMUSCULAR; INTRAVENOUS
Status: DISCONTINUED | OUTPATIENT
Start: 2025-04-29 | End: 2025-04-29

## 2025-04-29 RX ORDER — FENTANYL CITRATE 50 UG/ML
INJECTION, SOLUTION INTRAMUSCULAR; INTRAVENOUS
Status: DISCONTINUED | OUTPATIENT
Start: 2025-04-29 | End: 2025-04-29

## 2025-04-29 RX ORDER — MIDAZOLAM HYDROCHLORIDE 1 MG/ML
INJECTION INTRAMUSCULAR; INTRAVENOUS
Status: DISCONTINUED | OUTPATIENT
Start: 2025-04-29 | End: 2025-04-29

## 2025-04-29 RX ORDER — GLUCAGON 1 MG
1 KIT INJECTION
Status: DISCONTINUED | OUTPATIENT
Start: 2025-04-29 | End: 2025-04-30 | Stop reason: HOSPADM

## 2025-04-29 RX ORDER — CEFAZOLIN SODIUM 1 G/3ML
INJECTION, POWDER, FOR SOLUTION INTRAMUSCULAR; INTRAVENOUS
Status: DISCONTINUED | OUTPATIENT
Start: 2025-04-29 | End: 2025-04-29

## 2025-04-29 RX ORDER — PROPOFOL 10 MG/ML
VIAL (ML) INTRAVENOUS
Status: DISCONTINUED | OUTPATIENT
Start: 2025-04-29 | End: 2025-04-29

## 2025-04-29 RX ORDER — LIDOCAINE HYDROCHLORIDE 20 MG/ML
INJECTION, SOLUTION EPIDURAL; INFILTRATION; INTRACAUDAL; PERINEURAL
Status: DISCONTINUED | OUTPATIENT
Start: 2025-04-29 | End: 2025-04-29

## 2025-04-29 RX ADMIN — GLYCOPYRROLATE 0.2 MG: 0.2 INJECTION INTRAMUSCULAR; INTRAVENOUS at 01:04

## 2025-04-29 RX ADMIN — Medication 200 MCG: at 01:04

## 2025-04-29 RX ADMIN — Medication 100 MCG: at 01:04

## 2025-04-29 RX ADMIN — PHENYLEPHRINE HYDROCHLORIDE 30 MCG/MIN: 10 INJECTION INTRAVENOUS at 01:04

## 2025-04-29 RX ADMIN — HYDROCORTISONE SODIUM SUCCINATE 100 MG: 100 INJECTION, POWDER, FOR SOLUTION INTRAMUSCULAR; INTRAVENOUS at 12:04

## 2025-04-29 RX ADMIN — EPHEDRINE SULFATE 10 MG: 50 INJECTION, SOLUTION INTRAMUSCULAR; INTRAVENOUS; SUBCUTANEOUS at 01:04

## 2025-04-29 RX ADMIN — FENTANYL CITRATE 100 MCG: 50 INJECTION, SOLUTION INTRAMUSCULAR; INTRAVENOUS at 01:04

## 2025-04-29 RX ADMIN — CEFAZOLIN 2 G: 330 INJECTION, POWDER, FOR SOLUTION INTRAMUSCULAR; INTRAVENOUS at 01:04

## 2025-04-29 RX ADMIN — ONDANSETRON 4 MG: 2 INJECTION INTRAMUSCULAR; INTRAVENOUS at 01:04

## 2025-04-29 RX ADMIN — PROPOFOL 150 MG: 10 INJECTION, EMULSION INTRAVENOUS at 01:04

## 2025-04-29 RX ADMIN — ROCURONIUM BROMIDE 50 MG: 10 SOLUTION INTRAVENOUS at 01:04

## 2025-04-29 RX ADMIN — MIDAZOLAM HYDROCHLORIDE 2 MG: 1 INJECTION, SOLUTION INTRAMUSCULAR; INTRAVENOUS at 01:04

## 2025-04-29 RX ADMIN — EPHEDRINE SULFATE 15 MG: 50 INJECTION, SOLUTION INTRAMUSCULAR; INTRAVENOUS; SUBCUTANEOUS at 01:04

## 2025-04-29 RX ADMIN — LIDOCAINE HYDROCHLORIDE 80 MG: 20 INJECTION, SOLUTION INTRAVENOUS at 01:04

## 2025-04-29 RX ADMIN — SUGAMMADEX 200 MG: 100 INJECTION, SOLUTION INTRAVENOUS at 01:04

## 2025-04-29 RX ADMIN — SODIUM CHLORIDE, SODIUM GLUCONATE, SODIUM ACETATE, POTASSIUM CHLORIDE AND MAGNESIUM CHLORIDE: 526; 502; 368; 37; 30 INJECTION, SOLUTION INTRAVENOUS at 01:04

## 2025-04-29 NOTE — TRANSFER OF CARE
"Anesthesia Transfer of Care Note    Patient: Shreya Reyes    Procedure(s) Performed: * No procedures listed *    Patient location: PACU    Anesthesia Type: general    Transport from OR: Transported from OR on room air with adequate spontaneous ventilation    Post pain: adequate analgesia    Post assessment: no apparent anesthetic complications    Post vital signs: stable    Level of consciousness: sedated    Nausea/Vomiting: no nausea/vomiting    Complications: none    Transfer of care protocol was followed      Last vitals: Visit Vitals  /73   Pulse 78   Temp 36.4 °C (97.5 °F) (Oral)   Ht 5' 4" (1.626 m)   Wt 55.4 kg (122 lb 2.2 oz)   SpO2 97%   Breastfeeding No   BMI 20.96 kg/m²     "

## 2025-04-29 NOTE — DISCHARGE SUMMARY
Radiology Post-Procedure Note    Pre Op Diagnosis: Closed compression fracture of L2 lumbar vertebra, with delayed healing, subsequent encounter    Post Op Diagnosis: Same    Secondary Diagnoses:   Problem List Items Addressed This Visit       Closed compression fracture of L2 lumbar vertebra, initial encounter    Relevant Orders    IR Kyphoplasty Lumbar First Vertebral    * (Principal) Closed compression fracture of L2 lumbar vertebra, with delayed healing, subsequent encounter - Primary (Chronic)     Other Visit Diagnoses         Other osteoporosis with current pathological fracture, vertebra(e), initial encounter for fracture        Relevant Orders    IR Kyphoplasty Lumbar First Vertebral             Procedure: L2 Balloon Kyphoplasty    Procedure performed by: Lamberto Aguilar MD    Assistant: Zainab    Written Informed Consent Obtained: Yes    Specimen Removed: L2 Bone    Estimated Blood Loss: 6 cc    Condition: Stable    Outcome: The patient tolerated the procedure well and was without complications.    For further details please see the imaging report associated.    Disposition: Home or Self Care    Follow Up: With primary care provider    Discharge Instructions:  No discharge procedures on file.       Time Spent On Discharge: 5 minutes

## 2025-04-29 NOTE — ANESTHESIA PREPROCEDURE EVALUATION
04/29/2025  Shreya Reyes is a 65 y.o., female.  HTN  Osteoporosis  H/O Lumbar and thoracic compression Fx's  Adrenal Ca      Pre-op Assessment    I have reviewed the Patient Summary Reports.     I have reviewed the Nursing Notes. I have reviewed the NPO Status.   I have reviewed the Medications.     Review of Systems  Anesthesia Hx:  No problems with previous Anesthesia                Social:  Non-Smoker       Hematology/Oncology:                        --  Cancer in past history (Adrenal with various mets):                     Cardiovascular:     Hypertension, well controlled                                    Hypertension         Pulmonary:  Pulmonary Normal        Denies Sleep Apnea.                Renal/:  Renal/ Normal                 Hepatic/GI:      Liver Disease, (Mets) Hepatitis: adrenocortical suppression.              Musculoskeletal:  Arthritis               Neurological:    Neuromuscular Disease,                                 Neuromuscular Disease   Endocrine:  Endocrine Normal            Psych:  Psychiatric Normal                    Physical Exam  General: Well nourished, Cooperative, Alert and Oriented    Airway:  Mallampati: II   Mouth Opening: Normal  TM Distance: Normal  Tongue: Normal  Neck ROM: Normal ROM    Dental:  Intact    Chest/Lungs:  Clear to auscultation, Normal Respiratory Rate    Heart:  Rate: Normal  Rhythm: Regular Rhythm        Anesthesia Plan  Type of Anesthesia, risks & benefits discussed:    Anesthesia Type: Gen ETT  Intra-op Monitoring Plan: Standard ASA Monitors  Post Op Pain Control Plan: multimodal analgesia  Airway Plan: Direct, Post-Induction  ASA Score: 3  Day of Surgery Review of History & Physical: H&P Update referred to the surgeon/provider.  Anesthesia Plan Notes: Stress dose steroids    Ready For Surgery From Anesthesia Perspective.     .       Follow-up with Dr. Jennie Al as needed.  Return to the emergency department for any significant worsening symptoms including recurrent vomiting or change in mental status or alertness.

## 2025-04-29 NOTE — INTERVAL H&P NOTE
The patient has been examined and the H&P has been reviewed:    I concur with the findings and no changes have occurred since H&P was written. Shreya Reyes is a 65 y.o. 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           There are no hospital problems to display for this patient.

## 2025-04-30 NOTE — ANESTHESIA POSTPROCEDURE EVALUATION
Anesthesia Post Evaluation    Patient: Shreya Reyes    Procedure(s) Performed: * No procedures listed *    Final Anesthesia Type: general      Patient location during evaluation: PACU  Patient participation: Yes- Able to Participate  Level of consciousness: awake and alert  Post-procedure vital signs: reviewed and stable  Pain management: adequate  Airway patency: patent    PONV status at discharge: No PONV  Anesthetic complications: no      Cardiovascular status: hemodynamically stable  Respiratory status: spontaneous ventilation and room air  Hydration status: euvolemic  Follow-up not needed.              Vitals Value Taken Time   /68 04/29/25 16:01   Temp 36.9 °C (98.4 °F) 04/29/25 14:30   Pulse 89 04/29/25 16:15   Resp 18 04/29/25 16:15   SpO2 97 % 04/29/25 16:15         Event Time   Out of Recovery 14:40:00         Pain/Singh Score: Singh Score: 10 (4/29/2025  2:40 PM)

## 2025-05-02 LAB — PSYCHE PATHOLOGY RESULT: NORMAL

## 2025-05-14 ENCOUNTER — OFFICE VISIT (OUTPATIENT)
Dept: NEUROSURGERY | Facility: CLINIC | Age: 65
End: 2025-05-14
Payer: MEDICARE

## 2025-05-14 VITALS
TEMPERATURE: 98 F | RESPIRATION RATE: 16 BRPM | SYSTOLIC BLOOD PRESSURE: 116 MMHG | HEIGHT: 64 IN | DIASTOLIC BLOOD PRESSURE: 72 MMHG | WEIGHT: 127 LBS | HEART RATE: 85 BPM | BODY MASS INDEX: 21.68 KG/M2

## 2025-05-14 DIAGNOSIS — S32.020A CLOSED COMPRESSION FRACTURE OF L2 LUMBAR VERTEBRA, INITIAL ENCOUNTER: Primary | ICD-10-CM

## 2025-05-14 DIAGNOSIS — M48.062 LUMBAR STENOSIS WITH NEUROGENIC CLAUDICATION: ICD-10-CM

## 2025-05-14 PROCEDURE — 99024 POSTOP FOLLOW-UP VISIT: CPT | Mod: POP,,, | Performed by: PHYSICIAN ASSISTANT

## 2025-05-14 NOTE — PROGRESS NOTES
[facility]  History & Physical  Neurosurgery      Shreya Reyes   [id number]   1960       SUBJECTIVE:     CHIEF COMPLAINT:  None      HPI:  Shreya Reyes is a 65 y.o. female who presents for follow up appointment.  On 04/29/2025, the patient underwent L2 kyphoplasty with Dr. Lamberto Aguilar.  She is known to have central stenosis at L3-4 due to retropulsion of bone at the superior endplate of L4 from a compression fracture.    The patient presents today with her .  She is doing very well.  She has seen a resolution of her back pain with the L2 kyphoplasty.  She no longer has radicular pain into either lower extremity.  She is tolerating her activity well.      Past Medical History:   Diagnosis Date    Adrenal cancer     Closed compression fracture of L3 lumbar vertebra, sequela 5/4/2022    Closed wedge compression fracture of T11 vertebra 5/4/2022    Compression fracture of L1 vertebra     Compression fracture of L3 vertebra     Elevated liver enzymes 11/02/2021    Hyperlipidemia     Hypertension 05/04/2022    Osteoporosis     Thoracic compression fracture        Past Surgical History:   Procedure Laterality Date    ABDOMINOPLASTY      ADENOIDECTOMY  Karely     ADRENALECTOMY Left     EYE SURGERY  2021    INTRAMEDULLARY RODDING OF FEMUR Left 02/08/2023    Procedure: INSERTION, INTRAMEDULLARY LEELEE, LEFT FEMUR;  Surgeon: Richard Bolanos MD;  Location: [facility];  Service: Orthopedics;  Laterality: Left;  supine, gustavo flat with blankets  synthes TFNA, long    KYPHOPLASTY  04/29/2025    L2.  Dr. Lamberto Aguilar    L1 kyphoplasty w/ spine barbara, L3 kyphoplasty      Laproscopy for excision of adrenal mass      left cataract Left     MAGNETIC RESONANCE IMAGING N/A 07/13/2022    Procedure: MRI (MAGNETIC RESONANCE IMAGING);  Surgeon: Nam Sandoval MD;  Location: [facility] OR;  Service: Neurosurgery;  Laterality: N/A;  MRI CERVICAL SPINE WITHOUT CONTRAST WITH ANESTHESIA    MINIMALLY INVASIVE FORAMINOTOMY  CERVICAL SPINE Right 2022    C5-6, C6-7 posterior foraminotomy.  Dr. Nam Sandoval    ORIF TIBIA & FIBULA FRACTURES Left     TONSILLECTOMY         Family History   Problem Relation Name Age of Onset    Lymphoma Mother Cyn Sahni     Cancer Mother Cyn Sahni     Hyperlipidemia Father Henrry sahni     Heart disease Father Henrry sahni     Heart disease Sister Marla     Heart disease Brother Royce        Social History     Socioeconomic History    Marital status:    Occupational History    Occupation: RN on leave   Tobacco Use    Smoking status: Former     Current packs/day: 0.00     Types: Cigarettes     Quit date: 2009     Years since quittin.3    Smokeless tobacco: Never   Substance and Sexual Activity    Alcohol use: Yes     Comment: social    Drug use: Not Currently    Sexual activity: Not Currently     Partners: Male     Social Drivers of Health     Food Insecurity: No Food Insecurity (2023)    Received from [facility]    Hunger Vital Sign     Worried About Running Out of Food in the Last Year: Never true     Ran Out of Food in the Last Year: Never true   Transportation Needs: No Transportation Needs (2023)    Received from [facility]    PRAPARE - Transportation     Lack of Transportation (Medical): No     Lack of Transportation (Non-Medical): No   Housing Stability: Unknown (2023)    Housing Stability Vital Sign     Unable to Pay for Housing in the Last Year: No       Review of patient's allergies indicates:  No Known Allergies     Current Outpatient Medications   Medication Instructions    ALPRAZolam (XANAX) 0.25 mg, Oral, 3 times daily PRN    amLODIPine (NORVASC) 10 mg, Oral, Daily    cholecalciferol (vitamin D3) 250 mcg    DULoxetine (CYMBALTA) 30 mg, Oral    HYDROcodone-acetaminophen (NORCO) 7.5-325 mg per tablet 1 tablet, Oral, Every 6 hours PRN    hydrocortisone (CORTEF) 20 mg, Oral, Every  "morning    hydrocortisone (CORTEF) 10 mg, Oral, Nightly    ibuprofen (ADVIL,MOTRIN) 800 mg, Oral, 3 times daily    iron bis-gly/FA/C/B12/Ca/succ (IRON-150 ORAL) 1 tablet, Daily    levothyroxine (SYNTHROID) 75 MCG tablet 1 tablet, Daily    losartan (COZAAR) 50 mg, Oral, Daily    pregabalin (LYRICA) 100 mg, Oral, 2 times daily        Review of Systems  12 point review of systems conducted, negative except as stated in the history of present illness. See HPI for details.      OBJECTIVE:       Visit Vitals  /72 (BP Location: Left arm, Patient Position: Sitting)   Pulse 85   Temp 97.6 °F (36.4 °C) (Oral)   Resp 16   Ht 5' 4" (1.626 m)   Wt 57.6 kg (127 lb)   BMI 21.80 kg/m²        General:  Pleasant, Well-nourished, Well-groomed.    Lungs:  Breathing is quiet with non-labored respirations.    Neurological:  The patient is awake, alert, and oriented in all 4 spheres.  Her memory, cognition, and affect are appropriate.  Speech is fluent.  Moving all extremities well.  Gait is normal.      Imaging:  All pertinent neuroimaging independently reviewed. Discussed these findings in detail with the patient.  Lumbar x-rays, three views, were obtained on 05/12/2025.  This study shows kyphoplasty at L2.  The compression fracture is stable.    ASSESSMENT:     1. Closed compression fracture of L2 lumbar vertebra, initial encounter  Ambulatory Referral/Consult to Physical Therapy    Ambulatory Referral/Consult to Physical Therapy      2. Lumbar stenosis with neurogenic claudication  Ambulatory Referral/Consult to Physical Therapy    Ambulatory Referral/Consult to Physical Therapy        PLAN:     Overall, the patient is doing well.  We had her tentatively scheduled for L3-4 decompression if she would have continued with lower extremity pain.  Her pain has resolved.  She is tolerating her activity.  We will continue with conservative measures.    She was provided with prescriptions for physical therapy.  She will undergo a few " sessions of aquatic therapy to learn what she can do in her own pool.  She will also undergo conventional therapy.  She request the prescription be sent to [facility].  She will return for follow-up on an as-needed basis.        Juana Nash PA-C      Disclaimer:  This note is prepared using voice recognition software and as such is likely to have errors despite attempts at proofreading.

## 2025-05-16 ENCOUNTER — DOCUMENTATION ONLY (OUTPATIENT)
Dept: NEUROSURGERY | Facility: CLINIC | Age: 65
End: 2025-05-16
Payer: MEDICARE

## 2025-05-28 DIAGNOSIS — M54.16 LUMBAR RADICULITIS: ICD-10-CM

## 2025-05-28 DIAGNOSIS — G89.3 CANCER ASSOCIATED PAIN: ICD-10-CM

## 2025-05-28 RX ORDER — HYDROCODONE BITARTRATE AND ACETAMINOPHEN 7.5; 325 MG/1; MG/1
1 TABLET ORAL EVERY 6 HOURS PRN
Qty: 60 TABLET | Refills: 0 | Status: SHIPPED | OUTPATIENT
Start: 2025-05-28

## 2025-05-28 RX ORDER — PREGABALIN 50 MG/1
100 CAPSULE ORAL 2 TIMES DAILY
Qty: 120 CAPSULE | Refills: 3 | Status: SHIPPED | OUTPATIENT
Start: 2025-05-28 | End: 2025-05-29

## 2025-05-28 RX ORDER — PREGABALIN 50 MG/1
100 CAPSULE ORAL 2 TIMES DAILY
Qty: 120 CAPSULE | Refills: 3 | Status: SHIPPED | OUTPATIENT
Start: 2025-05-28 | End: 2025-05-28

## 2025-05-29 RX ORDER — PREGABALIN 100 MG/1
100 CAPSULE ORAL 2 TIMES DAILY
Qty: 60 CAPSULE | Refills: 3 | Status: SHIPPED | OUTPATIENT
Start: 2025-05-29 | End: 2025-11-27

## 2025-06-01 RX ORDER — SODIUM CHLORIDE 0.9 % (FLUSH) 0.9 %
10 SYRINGE (ML) INJECTION
OUTPATIENT
Start: 2025-06-03

## 2025-06-01 RX ORDER — DIPHENHYDRAMINE HYDROCHLORIDE 50 MG/ML
50 INJECTION, SOLUTION INTRAMUSCULAR; INTRAVENOUS ONCE AS NEEDED
OUTPATIENT
Start: 2025-06-03

## 2025-06-01 RX ORDER — PROCHLORPERAZINE EDISYLATE 5 MG/ML
5 INJECTION INTRAMUSCULAR; INTRAVENOUS ONCE AS NEEDED
OUTPATIENT
Start: 2025-06-03

## 2025-06-01 RX ORDER — HEPARIN 100 UNIT/ML
500 SYRINGE INTRAVENOUS
OUTPATIENT
Start: 2025-06-03

## 2025-06-01 RX ORDER — EPINEPHRINE 0.3 MG/.3ML
0.3 INJECTION SUBCUTANEOUS ONCE AS NEEDED
OUTPATIENT
Start: 2025-06-03

## 2025-06-02 ENCOUNTER — LAB VISIT (OUTPATIENT)
Dept: LAB | Facility: HOSPITAL | Age: 65
End: 2025-06-02
Attending: INTERNAL MEDICINE
Payer: MEDICARE

## 2025-06-02 DIAGNOSIS — C79.51 SECONDARY MALIGNANT NEOPLASM OF BONE: ICD-10-CM

## 2025-06-02 DIAGNOSIS — C74.02 MALIGNANT NEOPLASM OF CORTEX OF LEFT ADRENAL GLAND: ICD-10-CM

## 2025-06-02 DIAGNOSIS — Z13.29 SCREENING FOR HYPOTHYROIDISM: ICD-10-CM

## 2025-06-02 DIAGNOSIS — C78.7 SECONDARY MALIGNANT NEOPLASM OF LIVER: ICD-10-CM

## 2025-06-02 LAB
ALBUMIN SERPL-MCNC: 2.9 G/DL (ref 3.4–4.8)
ALBUMIN/GLOB SERPL: 0.7 RATIO (ref 1.1–2)
ALP SERPL-CCNC: 158 UNIT/L (ref 40–150)
ALT SERPL-CCNC: 21 UNIT/L (ref 0–55)
ANION GAP SERPL CALC-SCNC: 10 MEQ/L
AST SERPL-CCNC: 27 UNIT/L (ref 11–45)
BASOPHILS # BLD AUTO: 0.03 X10(3)/MCL
BASOPHILS NFR BLD AUTO: 0.4 %
BILIRUB SERPL-MCNC: 0.2 MG/DL
BUN SERPL-MCNC: 20.1 MG/DL (ref 9.8–20.1)
CALCIUM SERPL-MCNC: 9.2 MG/DL (ref 8.4–10.2)
CHLORIDE SERPL-SCNC: 106 MMOL/L (ref 98–107)
CO2 SERPL-SCNC: 25 MMOL/L (ref 23–31)
CORTIS SERPL-SCNC: 1.4 UG/DL
CREAT SERPL-MCNC: 0.65 MG/DL (ref 0.55–1.02)
CREAT/UREA NIT SERPL: 31
EOSINOPHIL # BLD AUTO: 0.97 X10(3)/MCL (ref 0–0.9)
EOSINOPHIL NFR BLD AUTO: 13.9 %
ERYTHROCYTE [DISTWIDTH] IN BLOOD BY AUTOMATED COUNT: 13.4 % (ref 11.5–17)
GFR SERPLBLD CREATININE-BSD FMLA CKD-EPI: >60 ML/MIN/1.73/M2
GLOBULIN SER-MCNC: 4.1 GM/DL (ref 2.4–3.5)
GLUCOSE SERPL-MCNC: 97 MG/DL (ref 82–115)
HCT VFR BLD AUTO: 35.8 % (ref 37–47)
HGB BLD-MCNC: 11.8 G/DL (ref 12–16)
IMM GRANULOCYTES # BLD AUTO: 0.03 X10(3)/MCL (ref 0–0.04)
IMM GRANULOCYTES NFR BLD AUTO: 0.4 %
LYMPHOCYTES # BLD AUTO: 1.17 X10(3)/MCL (ref 0.6–4.6)
LYMPHOCYTES NFR BLD AUTO: 16.7 %
MCH RBC QN AUTO: 33.1 PG (ref 27–31)
MCHC RBC AUTO-ENTMCNC: 33 G/DL (ref 33–36)
MCV RBC AUTO: 100.6 FL (ref 80–94)
MONOCYTES # BLD AUTO: 1.04 X10(3)/MCL (ref 0.1–1.3)
MONOCYTES NFR BLD AUTO: 14.9 %
NEUTROPHILS # BLD AUTO: 3.76 X10(3)/MCL (ref 2.1–9.2)
NEUTROPHILS NFR BLD AUTO: 53.7 %
PLATELET # BLD AUTO: 372 X10(3)/MCL (ref 130–400)
PMV BLD AUTO: 9.7 FL (ref 7.4–10.4)
POTASSIUM SERPL-SCNC: 4.3 MMOL/L (ref 3.5–5.1)
PROT SERPL-MCNC: 7 GM/DL (ref 5.8–7.6)
RBC # BLD AUTO: 3.56 X10(6)/MCL (ref 4.2–5.4)
SODIUM SERPL-SCNC: 141 MMOL/L (ref 136–145)
T4 FREE SERPL-MCNC: 1.1 NG/DL (ref 0.7–1.48)
TSH SERPL-ACNC: 1.39 UIU/ML (ref 0.35–4.94)
WBC # BLD AUTO: 7 X10(3)/MCL (ref 4.5–11.5)

## 2025-06-02 PROCEDURE — 80053 COMPREHEN METABOLIC PANEL: CPT

## 2025-06-02 PROCEDURE — 84443 ASSAY THYROID STIM HORMONE: CPT

## 2025-06-02 PROCEDURE — 84439 ASSAY OF FREE THYROXINE: CPT

## 2025-06-02 PROCEDURE — 85025 COMPLETE CBC W/AUTO DIFF WBC: CPT

## 2025-06-02 PROCEDURE — 82533 TOTAL CORTISOL: CPT

## 2025-06-02 PROCEDURE — 36415 COLL VENOUS BLD VENIPUNCTURE: CPT

## 2025-06-03 ENCOUNTER — TELEPHONE (OUTPATIENT)
Dept: HEMATOLOGY/ONCOLOGY | Facility: CLINIC | Age: 65
End: 2025-06-03
Payer: MEDICARE

## 2025-06-13 DIAGNOSIS — K12.30 MUCOSITIS: Primary | ICD-10-CM

## 2025-07-02 NOTE — PROGRESS NOTES
Subjective:       Patient ID: Shreya Reyes is a 65 y.o. female.    Chief Complaint:  Lower back and hip pain comes and goes    Diagnosis: Metastatic adrenocortical carcinoma                    Multiple osteoporotic vertebral compression fractures    Treatment History  Adjuvant XRT (completed 9/30/21)  Mitotane 1/25/22-7/26/22, Resumed 8/22  XRT right scapula, L5,  R ischium, L femoral neck completed 8/26/22  SBRT L2-3 2/06/23  Left IM nail 2/08/23  SBRT R iliac wing 5/15/23  SBRT L5, R ischium 12/4/23  Mitotane 8/22-5/24  SBRT liver/R adrenal 8/08/24  XRT R orbit/frontal bone 12/5/24  XRT Left 3rd rib 12/1924  XRT R scapula 1/14/25    Current Treatment  Pembrolizumab 200 mg Q3W x 4 cycles --> 400 mg Q6W (started 1/28/25)  Hydrocortisone 20 mg Q AM, 10 mg Q PM  Levothyroxine 75 mcg/d                Clinical History:  Patient initially presented to the [facility] 3/16/21 with acute left flank pain. CT A/P showed a heterogeneous enhancing mass of the left adrenal gland measuring 5.5 x 4.8 x 5 cm (24 Hu), with no definite microscopic fat. There was mild displacement of the left renal vein. Right adrenal gland was unremarkable.  Hormonal workup was negative for pheochromocytoma. She was felt to have had an acute adrenal hemorrhage and close observation was recommended. Follow-up CT A/P 6/9/21 showed increased size of the adrenal mass to 8.0 x 4.5 cm appearing hypervascular with some central necrosis. She underwent a laparoscopic/robotic left adrenalectomy 6/11/21.  Final pathology showed an adrenal cortical carcinoma predominant oncocytic/trabecular morphology with focally marked cytologic atypia and increased mitotic rate (up to 10/50 HPF's). There were large areas of necrosis and multiple foci of capsular and lymphovascular invasion. Ki-67 expression was 10-15%.    She had a Telemedicine consultation with Dr. Dion Lynch at [facility] 8/3/21 who specializes in treatment of adrenocortical carcinoma. Her case  was reviewed and discussed in their tumor board. Recommendations were for treatment with adjuvant radiation therapy and systemic treatment with Mitotane. Baseline postoperative CT scans of the chest, abdomen and pelvis 8/4/21 showed postoperative changes with no evidence of measurable metastatic disease.    She was seen as a new patient at [facility] 8/9/21.  She had recovered well from her surgery and was asymptomatic.  Treatment recommendations from the [facility] were reviewed and discussed.  Treatment was started with Mitotane 1000 mg BID on 8/16/2021.  No dose escalation was recommended until she completed radiation therapy.  She was started on replacement hydrocortisone 20 mg Q AM and 10 mg Q PM.  Surveillance CT scans were recommended in 3 months.      She was seen for an office visit 9/16/21 after completing 4 weeks of treatment with Mitotane.  She was not having any significant side effects associated with her therapy.  She was alf through her adjuvant radiation therapy.  Laboratory testing showed marked transaminase elevations with an AST of 493,  and alkaline phosphatase 175.  Bilirubin was normal.  The remainder of her CMP was unremarkable.  Baseline mitotane level drawn at that visit was 2.5 mcg/mL.  Mitotane was held and adjuvant radiation therapy was continued.  Weekly laboratory monitoring showed gradual improvement in her LFTs.  Although mitotane had been associated with transaminase elevations, >5x elevations are rare occurring in <1% of patients.  She was also instructed to hold her lovastatin.  She completed adjuvant radiation therapy 9/30/2021.     Follow-up laboratory continued to show transaminase elevations.  GI was consulted and ordered additional laboratory testing which did not reveal evidence of viral or autoimmune etiologies for her hepatitis.  Mitotane was not resumed due to her persistent transaminase elevations. Follow-up CT scans of the chest,  abdomen and pelvis 10/28/21 showed a few stable subcentimeter pulmonary nodules and changes related to her previous left adrenalectomy with no evidence of disease recurrence.  Following normalization of her LFTs, treatment was resumed with Mitotane 1/25/22.  Her LFTs remained normal even during dose escalation.    She had an injury at home in mid March after carrying in several arms of firewood with acute mid back pain.  Plain x-ray 3/17/22 showed a compression deformity at L2 of questionable age.  MRI of the lumbar spine 3/23/22 showed a subacute severe compression fracture deformity of the L1 vertebral body and mild superior endplate compression fracture of L3 vertebral body with osteoporotic appearance and no findings suspicious for pathologic fracture.  However, there were scattered small osseous lesions in the right L3 vertebral body and L5 vertebral body concerning for metastatic disease.  CT PET scan 3/28/22 showed mildly hypermetabolic osseous lesions in the lumbar spine, pelvis and proximal left femur consistent with metastatic disease.  There was no significant hypermetabolic uptake at the sites of her compression fractures.  She did not have pain at any of the sites prior to the acute compression fracture.  Bone density exam showed osteoporosis of the lumbar spine with a T score of -3.4, left femoral neck -3.4 and left total hip -2.7.  She was started on Prolia 3/31/22 and underwent L1 and L3 kyphoplasty 4//8/22.  Biopsies of the L3 vertebral body showed no evidence of metastatic carcinoma.  She continued to have significant pain following the kyphoplasty.  Further review of her films showed a possible compression fracture at T11.  MRI of the thoracic spine 4/15/22 showed a compression fracture of T11 with 50% loss of vertebral body height.  She underwent kyphoplasty 4/21/22 with symptomatic improvement.    CT scans of the chest, abdomen and pelvis 4/27/22 showed sclerotic osseous lesions at L4, right  ischium and left femur correlating with the hypermetabolic lesions on CT-PET scan.  There were stable sub-6 mm pulmonary nodules in the RML and LLL unchanged from previous imaging.  MRI of the cervical and lumbar spine 7/5/22 showed moderate disc disease at C5-6 and C6-7 without significant spinal canal stenosis or foraminal stenosis.  She developed progressive symptoms of right arm pain, numbness and tingling and updated MRI 7/13/22 showed foraminal stenosis secondary to disc osteophyte on the right side at C5-6 and C6-7.  She underwent a right foraminotomy with relief of her symptoms.    CT C/A/P 08/01/22:  Multiple compression fractures and postsurgical changes.  Area of sclerosis in the left sacrum was stable compared to the prior exam.  There was no evidence of visceral metastatic disease.  She had a teleconference with [facility] 8/2/22 and a follow-up PET scan was recommended.  Mitotane was discontinued 7/26/22.   CT-PET 08/05/22:  Hypermetabolic osseous metastatic disease involving L5, right ischium, left femoral neck and a new lesion in the inferior right scapula.  There was a 12 mm area of hypermetabolic activity adjacent to the right adrenal gland without a definite CT correlate.    She completed palliative radiation therapy to the right scapula, L5, R ischium and left femoral neck on 8/26/22.  She resumed Mitotane at 1000 mg BID on 8/29/22.      CT-PET 11/21/22:  Improved FDG uptake in all of the treated sites.  Right scapula SUV decreased from 4.2 to 2.1, left femoral neck 9.1 to 5, right ischium 13 to 2.4 and L5 8.2 to 4.6.  Hypermetabolic activity at the site of the previous compression fractures had also improved.  There were no findings suspicious for new sites of disease or visceral metastatic disease.  MRI lumbar spine and left hip 1/29/23:  Metastatic disease involving right ischial tuberosity, left femoral neck and L3 vertebral body, similar in size to CT-PET scan 11/21/22:  L3  lesion showed interval progression with ventral and paraspinal extension causing moderate-to-severe narrowing of the spinal canal.  Left femoral neck lesion involved greater than 50% of the total diameter of the femoral neck.  She was treated with stereotactic XRT to L3 and underwent prophylactic IM nail of the left femoral neck 2/8/23.  CT C/A/P 4/24/23:  13 mm sclerotic met inferior right scapula, bandlike densities RUL and RLL secondary to previous XRT.  Stable thoracic spine compression fractures.  Increased density L4 vertebral body metastasis.  No evidence of visceral metastatic disease.  MRI L-spine/pelvis 4/24/23:  Stable L3 lesion with mild epidural extension and enhancement measuring 8 mm.  Sclerotic 10 mm focus of abnormal marrow signal right iliac wing.    Guardant 360 2/20/23:  Positive TP53 mutation, microsatellite stable     Prolia was resumed for her metastatic bone disease 4/11/23.  She completed SBRT to the right iliac wing metastasis 5/15/23 receiving 2700 cGy in 3 fractions.  Treatment was well tolerated.      MRI L-spine and pelvis 7/26/23:  Resolution of previous epidural enhancing mass posterior to L3 vertebral body.  Right iliac wing lesion 12 mm (previous 10 mm).  No new metastatic lesions.  CT C/A/P 7/28/23:  Stable sclerotic bone lesions right scapula, right iliac wing and L4.  Stable kyphoplasty changes T10, T11, T12 and L2.  Stable L1 compression deformity.  No new sites of disease identified.  Colonoscopy 7/25/23:  No polyps or other abnormalities.  Follow-up exam recommended in 7 years.  CT C/A/P 11/06/23:  S/p kyphoplasty at T10, T11, T12 and L2.  Stable L1 compression deformity.  Increased L4 height loss with increased sclerosis of the vertebral body.  Stable sclerotic lesion right iliac wing 13 mm.  Inferior right scapular lesion more vaguely seen.  No findings suspicious for new sites of disease.  MRI pelvis 11/10/23:  Interval increase in the size of the metastatic lesion  involving the right ischial tuberosity measuring 3.2 cm x 1.8 cm, previously 2.1 x 1.3 cm.  MRI L-spine 11/10/23:  Progressive metastatic disease at L5 with a new pathologic compression deformity.  MRI L-spine 2/22/24:  New L4 superior endplate compression fracture with minimal loss of height, otherwise unchanged from 11/10/23.  CT C/A/P 2/27/24:  L4 superior endplate compression fracture with minimal loss of height.  Otherwise, no evidence of disease progression or new sites of disease.    Seen in consultation by Dr. Lynch at [facility] 5/21/24.  No progressive disease on imaging.  Mitotane discontinued secondary to progressive fatigue and a level of 20.7.    CT C/A/P 6/17/24:  New 1.7 cm right hepatic met and 1.6 x 0.9 cm right adrenal gland nodule.  No abnormal lymphadenopathy.  MRI L-spine 6/19/24:  Slight progression of mild superior endplate height loss at L4.  Slight increased marrow enhancement at S1-S2.  No definite disease progression or new lesions.  CT liver biopsy 7/16/24:  Metastatic adrenocortical carcinoma.  QNS for NGS.    She received SBRT to the liver met receiving a total dose of 6000 cGy in 5 fractions and to the right adrenal met receiving 5000 cGy in 5 fractions completed 8/8/24.  She underwent additional kyphoplasty at L4 and 5 on 10/22/24.  Biopsies of both vertebral bodies were negative for malignancy.    CT C/A/P 10/1/24:  No metastatic disease in the chest.  1.4 cm hepatic hypodensity segment VIII with fiducials in place.  Stable 1.9 cm right adrenal nodule.  Multiple compression fractures lower thoracic and lumbar spine with previous kyphoplasty.  MRI L-spine 10/08/24:  Minimal progression of height loss at L4 with slight progression of associated spinal canal stenosis.  Otherwise stable.  MRI brain 11/18/24:  Metastatic heterogeneous enhancement superior aspect of the right orbit and right frontal bone.  No intra-axial metastasis.  CT C/A/P 12/4/24:  Expansile metastatic  lesion left anterior 3rd rib 2.5 x 5 cm, right medial lower scapula metastatic lesion.  Previously seen right hepatic dome lesion no longer visualized.  Unchanged right adrenal gland nodule 1.8 x 0.7 cm.  Diffuse osteopenia with multiple lower thoracic and lumbar compression fractures and previous vertebroplasty.  MRI Chest 1/02/25:  Metastatic lytic lesion caudal right scapula 1.6 x 4 x 4 cm with associated chronic pathologic fracture.  MRI orbits 3/13/25:  Grossly stable skeletal metastasis involving the frontal bone at the level of the right supraorbital rim, maximum diameter 3.7 cm.  CT C/A/P 4/8/25:  Clearing of soft tissue component left anterior 3rd rib, radiation pneumonitis anterior CORRIE.  No hilar or mediastinal lymphadenopathy.  Stable wall thickening distal 3rd of the esophagus.  Unchanged pathologic fracture inferior right scapula.  Stable 14 mm right adrenal gland nodule.  No suspicious hepatic lesions.  MRI Orbits 6/30/25: Interval decrease in enhancement of the chronic right supraorbital frontal calvarial lesion.  CT C/A/P 7/08/25:  Bilateral areas of consolidation with air bronchograms secondary to sites of previous radiation therapy.  New kyphoplasty changes at L2.  Right adrenal nodule 1.6 x 1.1 cm (stable).  No evidence of new or progressive metastatic disease.    Interval History  She returns to clinic today for a three-month follow-up visit accompanied by her .  Follow-up imaging after 4 cycles of pembrolizumab every 3 weeks showed stable disease without evidence of progression.  Dosing was changed to 400 mg every 6 weeks.  After her first infusion on 4/22/25, she felt poorly for several days and had mild stomatitis.  As result, she postponed her next treatment pending additional restaging studies.  She underwent kyphoplasty at L2 4/29/25.  She continues to have lower back pain and hip pain that comes and goes by her description.  She takes Norco 7.5 at bedtime and Advil during the day.   "She is requesting to increase her Norco dose at bedtime.  Restaging CT scans of the chest, abdomen and pelvis 7/8/25 showed stable sites of previously treated disease and postradiation changes in the lungs.  There were no findings suspicious for new sites of disease or progression.  Bone density exam 7/10/25 showed osteoporosis of the right femoral neck with a T-score of -3.1 (improved from -3.4).  Unable to assess lumbar spine or left hip secondary to previous surgeries.      Review of Systems   Constitutional:  Negative for appetite change, fatigue, fever and unexpected weight change.   HENT:  Negative for mouth sores, sore throat and trouble swallowing.    Eyes: Negative.    Respiratory:  Positive for cough. Negative for shortness of breath.    Cardiovascular:  Negative for chest pain, palpitations and leg swelling.   Gastrointestinal:  Negative for abdominal distention, abdominal pain, constipation, diarrhea and nausea.   Genitourinary:  Negative for dysuria, flank pain and frequency.   Musculoskeletal:  Positive for arthralgias. Negative for back pain and gait problem.        Bilateral hip pain   Integumentary:  Negative for pallor and rash.   Neurological:  Positive for numbness (RLE). Negative for dizziness, weakness and headaches.   Hematological:  Negative for adenopathy. Does not bruise/bleed easily.   Psychiatric/Behavioral: Negative.         PMHx:  Hyperlipidemia, osteoporosis  PSHx:  Tonsils, left cataract, ORIF left tibia/fib, left adrenalectomy, multiple vertebral kyphoplasties  SH:  Former smoker 1 PPD, quit 2009. + Social alcohol use. Lives in [place] with her . 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  FH:  Her mother had lymphoma.     Objective:        /85   Pulse 66   Temp 97.3 °F (36.3 °C)   Resp 15   Ht 5' 4" (1.626 m)   Wt 56 kg (123 lb 6.4 oz)   SpO2 99%   BMI 21.18 kg/m²    Physical Exam  Constitutional:       Comments: Well-developed white female in " NAD.   HENT:      Head: Normocephalic.      Mouth/Throat:      Mouth: Mucous membranes are moist.      Pharynx: Oropharynx is clear. No posterior oropharyngeal erythema.   Eyes:      General: No scleral icterus.     Extraocular Movements: Extraocular movements intact.      Pupils: Pupils are equal, round, and reactive to light.   Cardiovascular:      Rate and Rhythm: Normal rate and regular rhythm.      Heart sounds: No murmur heard.  Pulmonary:      Comments: Lungs clear to auscultation  Abdominal:      General: Bowel sounds are normal. There is no distension.      Palpations: Abdomen is soft. There is no mass.      Tenderness: There is no abdominal tenderness.   Musculoskeletal:         General: No swelling.      Cervical back: Neck supple. No tenderness.      Comments: No vertebral body or rib cage tenderness.    Skin:     General: Skin is warm and dry.      Findings: No rash.   Neurological:      General: No focal deficit present.      Mental Status: She is alert and oriented to person, place, and time.      Motor: No weakness.     ECOG SCORE    1 - Restricted in strenuous activity-ambulatory and able to carry out work of a light nature        LABORATORY  Laboratory reviewed in Epic and stable.    Assessment:   Metastatic adrenocortical carcinoma  Osteoporosis with multiple vertebral body compression fractures  Cancer related pain - stable    Plan:   Follow-up imaging shows stable disease without evidence of disease progression.  She continues to have a good functional status.  Increase Norco to 10/325 q.6 hours as needed for pain.  She will resume treatment today with Pembrolizumab 400 mg every 6 weeks.    She will notify me if she has any recurrent side-effects to determine if dose modification indicated.    RTC in 6 weeks for a follow-up visit with repeat laboratory testing.    Repeat imaging studies in 3-4 months.      MARY NORMAN MD    Other Physicians  Dr. Dion Holt Dr.  Jared Lynch ([facility])

## 2025-07-07 DIAGNOSIS — C74.02 MALIGNANT NEOPLASM OF CORTEX OF LEFT ADRENAL GLAND: ICD-10-CM

## 2025-07-08 RX ORDER — HYDROCORTISONE 20 MG/1
20 TABLET ORAL
Qty: 90 TABLET | Refills: 1 | Status: SHIPPED | OUTPATIENT
Start: 2025-07-08

## 2025-07-15 ENCOUNTER — LAB VISIT (OUTPATIENT)
Dept: LAB | Facility: HOSPITAL | Age: 65
End: 2025-07-15
Attending: INTERNAL MEDICINE
Payer: MEDICARE

## 2025-07-15 ENCOUNTER — INFUSION (OUTPATIENT)
Dept: INFUSION THERAPY | Facility: HOSPITAL | Age: 65
End: 2025-07-15
Attending: INTERNAL MEDICINE
Payer: MEDICARE

## 2025-07-15 ENCOUNTER — OFFICE VISIT (OUTPATIENT)
Dept: HEMATOLOGY/ONCOLOGY | Facility: CLINIC | Age: 65
End: 2025-07-15
Payer: MEDICARE

## 2025-07-15 VITALS
OXYGEN SATURATION: 99 % | HEART RATE: 66 BPM | TEMPERATURE: 97 F | BODY MASS INDEX: 21.06 KG/M2 | DIASTOLIC BLOOD PRESSURE: 85 MMHG | RESPIRATION RATE: 15 BRPM | WEIGHT: 123.38 LBS | SYSTOLIC BLOOD PRESSURE: 125 MMHG | HEIGHT: 64 IN

## 2025-07-15 DIAGNOSIS — C74.02 MALIGNANT NEOPLASM OF CORTEX OF LEFT ADRENAL GLAND: Primary | ICD-10-CM

## 2025-07-15 DIAGNOSIS — C79.51 SECONDARY MALIGNANT NEOPLASM OF BONE: ICD-10-CM

## 2025-07-15 DIAGNOSIS — Z13.29 SCREENING FOR HYPOTHYROIDISM: ICD-10-CM

## 2025-07-15 DIAGNOSIS — C74.02 MALIGNANT NEOPLASM OF CORTEX OF LEFT ADRENAL GLAND: ICD-10-CM

## 2025-07-15 DIAGNOSIS — C78.7 SECONDARY MALIGNANT NEOPLASM OF LIVER: ICD-10-CM

## 2025-07-15 DIAGNOSIS — C74.00 MALIGNANT NEOPLASM OF ADRENAL CORTEX, UNSPECIFIED LATERALITY: Primary | ICD-10-CM

## 2025-07-15 DIAGNOSIS — G89.3 CANCER ASSOCIATED PAIN: ICD-10-CM

## 2025-07-15 LAB
ALBUMIN SERPL-MCNC: 2.8 G/DL (ref 3.4–4.8)
ALBUMIN/GLOB SERPL: 0.8 RATIO (ref 1.1–2)
ALP SERPL-CCNC: 187 UNIT/L (ref 40–150)
ALT SERPL-CCNC: 12 UNIT/L (ref 0–55)
ANION GAP SERPL CALC-SCNC: 8 MEQ/L
AST SERPL-CCNC: 15 UNIT/L (ref 11–45)
BASOPHILS # BLD AUTO: 0.04 X10(3)/MCL
BASOPHILS NFR BLD AUTO: 0.4 %
BILIRUB SERPL-MCNC: 0.3 MG/DL
BUN SERPL-MCNC: 28.1 MG/DL (ref 9.8–20.1)
CALCIUM SERPL-MCNC: 8.4 MG/DL (ref 8.4–10.2)
CHLORIDE SERPL-SCNC: 104 MMOL/L (ref 98–107)
CO2 SERPL-SCNC: 25 MMOL/L (ref 23–31)
CORTIS SERPL-SCNC: 27.6 UG/DL
CREAT SERPL-MCNC: 0.64 MG/DL (ref 0.55–1.02)
CREAT/UREA NIT SERPL: 44
EOSINOPHIL # BLD AUTO: 0.95 X10(3)/MCL (ref 0–0.9)
EOSINOPHIL NFR BLD AUTO: 10.5 %
ERYTHROCYTE [DISTWIDTH] IN BLOOD BY AUTOMATED COUNT: 14.4 % (ref 11.5–17)
GFR SERPLBLD CREATININE-BSD FMLA CKD-EPI: >60 ML/MIN/1.73/M2
GLOBULIN SER-MCNC: 3.7 GM/DL (ref 2.4–3.5)
GLUCOSE SERPL-MCNC: 104 MG/DL (ref 82–115)
HCT VFR BLD AUTO: 37.9 % (ref 37–47)
HGB BLD-MCNC: 12.5 G/DL (ref 12–16)
IMM GRANULOCYTES # BLD AUTO: 0.04 X10(3)/MCL (ref 0–0.04)
IMM GRANULOCYTES NFR BLD AUTO: 0.4 %
LYMPHOCYTES # BLD AUTO: 1.59 X10(3)/MCL (ref 0.6–4.6)
LYMPHOCYTES NFR BLD AUTO: 17.6 %
MCH RBC QN AUTO: 32.6 PG (ref 27–31)
MCHC RBC AUTO-ENTMCNC: 33 G/DL (ref 33–36)
MCV RBC AUTO: 99 FL (ref 80–94)
MONOCYTES # BLD AUTO: 1.29 X10(3)/MCL (ref 0.1–1.3)
MONOCYTES NFR BLD AUTO: 14.3 %
NEUTROPHILS # BLD AUTO: 5.14 X10(3)/MCL (ref 2.1–9.2)
NEUTROPHILS NFR BLD AUTO: 56.8 %
PLATELET # BLD AUTO: 407 X10(3)/MCL (ref 130–400)
PMV BLD AUTO: 9.5 FL (ref 7.4–10.4)
POTASSIUM SERPL-SCNC: 3.8 MMOL/L (ref 3.5–5.1)
PROT SERPL-MCNC: 6.5 GM/DL (ref 5.8–7.6)
RBC # BLD AUTO: 3.83 X10(6)/MCL (ref 4.2–5.4)
SODIUM SERPL-SCNC: 137 MMOL/L (ref 136–145)
T4 FREE SERPL-MCNC: 1.22 NG/DL (ref 0.7–1.48)
TSH SERPL-ACNC: 0.67 UIU/ML (ref 0.35–4.94)
WBC # BLD AUTO: 9.05 X10(3)/MCL (ref 4.5–11.5)

## 2025-07-15 PROCEDURE — 99999 PR PBB SHADOW E&M-EST. PATIENT-LVL IV: CPT | Mod: PBBFAC,,, | Performed by: INTERNAL MEDICINE

## 2025-07-15 PROCEDURE — 82533 TOTAL CORTISOL: CPT

## 2025-07-15 PROCEDURE — 84439 ASSAY OF FREE THYROXINE: CPT

## 2025-07-15 PROCEDURE — 96413 CHEMO IV INFUSION 1 HR: CPT

## 2025-07-15 PROCEDURE — 85025 COMPLETE CBC W/AUTO DIFF WBC: CPT

## 2025-07-15 PROCEDURE — 99214 OFFICE O/P EST MOD 30 MIN: CPT | Mod: PBBFAC,25 | Performed by: INTERNAL MEDICINE

## 2025-07-15 PROCEDURE — 36415 COLL VENOUS BLD VENIPUNCTURE: CPT

## 2025-07-15 PROCEDURE — 99214 OFFICE O/P EST MOD 30 MIN: CPT | Mod: S$PBB,,, | Performed by: INTERNAL MEDICINE

## 2025-07-15 PROCEDURE — 25000003 PHARM REV CODE 250: Performed by: INTERNAL MEDICINE

## 2025-07-15 PROCEDURE — 80053 COMPREHEN METABOLIC PANEL: CPT

## 2025-07-15 PROCEDURE — 63600175 PHARM REV CODE 636 W HCPCS: Mod: JZ,TB | Performed by: INTERNAL MEDICINE

## 2025-07-15 PROCEDURE — 84443 ASSAY THYROID STIM HORMONE: CPT

## 2025-07-15 RX ORDER — HYDROCODONE BITARTRATE AND ACETAMINOPHEN 10; 325 MG/1; MG/1
1 TABLET ORAL EVERY 6 HOURS PRN
Qty: 60 TABLET | Refills: 0 | Status: SHIPPED | OUTPATIENT
Start: 2025-07-15

## 2025-07-15 RX ORDER — SODIUM CHLORIDE 0.9 % (FLUSH) 0.9 %
10 SYRINGE (ML) INJECTION
Status: DISCONTINUED | OUTPATIENT
Start: 2025-07-15 | End: 2025-07-15 | Stop reason: HOSPADM

## 2025-07-15 RX ORDER — EPINEPHRINE 0.3 MG/.3ML
0.3 INJECTION SUBCUTANEOUS ONCE AS NEEDED
Status: DISCONTINUED | OUTPATIENT
Start: 2025-07-15 | End: 2025-07-15 | Stop reason: HOSPADM

## 2025-07-15 RX ORDER — PROCHLORPERAZINE EDISYLATE 5 MG/ML
5 INJECTION INTRAMUSCULAR; INTRAVENOUS ONCE AS NEEDED
Status: DISCONTINUED | OUTPATIENT
Start: 2025-07-15 | End: 2025-07-15 | Stop reason: HOSPADM

## 2025-07-15 RX ORDER — DIPHENHYDRAMINE HYDROCHLORIDE 50 MG/ML
50 INJECTION, SOLUTION INTRAMUSCULAR; INTRAVENOUS ONCE AS NEEDED
Status: DISCONTINUED | OUTPATIENT
Start: 2025-07-15 | End: 2025-07-15 | Stop reason: HOSPADM

## 2025-07-15 RX ORDER — HEPARIN 100 UNIT/ML
500 SYRINGE INTRAVENOUS
Status: DISCONTINUED | OUTPATIENT
Start: 2025-07-15 | End: 2025-07-15 | Stop reason: HOSPADM

## 2025-07-15 RX ADMIN — SODIUM CHLORIDE 400 MG: 9 INJECTION, SOLUTION INTRAVENOUS at 11:07

## 2025-07-15 RX ADMIN — SODIUM CHLORIDE: 9 INJECTION, SOLUTION INTRAVENOUS at 10:07

## 2025-07-15 NOTE — PLAN OF CARE
Plan of care reviewed with patient. C6 completed. No appts currently scheduled for next lab/TD, treatment. Patient uses portal; instructed to call in one week if no appts scheduled.

## 2025-08-25 ENCOUNTER — LAB VISIT (OUTPATIENT)
Dept: LAB | Facility: HOSPITAL | Age: 65
End: 2025-08-25
Attending: INTERNAL MEDICINE
Payer: MEDICARE

## 2025-08-25 DIAGNOSIS — C78.7 SECONDARY MALIGNANT NEOPLASM OF LIVER: ICD-10-CM

## 2025-08-25 DIAGNOSIS — Z13.29 SCREENING FOR HYPOTHYROIDISM: ICD-10-CM

## 2025-08-25 DIAGNOSIS — C74.02 MALIGNANT NEOPLASM OF CORTEX OF LEFT ADRENAL GLAND: ICD-10-CM

## 2025-08-25 DIAGNOSIS — C79.51 SECONDARY MALIGNANT NEOPLASM OF BONE: ICD-10-CM

## 2025-08-25 LAB
ALBUMIN SERPL-MCNC: 3 G/DL (ref 3.4–4.8)
ALBUMIN/GLOB SERPL: 1 RATIO (ref 1.1–2)
ALP SERPL-CCNC: 137 UNIT/L (ref 40–150)
ALT SERPL-CCNC: 15 UNIT/L (ref 0–55)
ANION GAP SERPL CALC-SCNC: 8 MEQ/L
AST SERPL-CCNC: 16 UNIT/L (ref 11–45)
BASOPHILS # BLD AUTO: 0.03 X10(3)/MCL
BASOPHILS NFR BLD AUTO: 0.4 %
BILIRUB SERPL-MCNC: 0.3 MG/DL
BUN SERPL-MCNC: 22.8 MG/DL (ref 9.8–20.1)
CALCIUM SERPL-MCNC: 8.3 MG/DL (ref 8.4–10.2)
CHLORIDE SERPL-SCNC: 107 MMOL/L (ref 98–107)
CO2 SERPL-SCNC: 25 MMOL/L (ref 23–31)
CORTIS SERPL-SCNC: 27.4 UG/DL
CREAT SERPL-MCNC: 0.68 MG/DL (ref 0.55–1.02)
CREAT/UREA NIT SERPL: 34
EOSINOPHIL # BLD AUTO: 0.51 X10(3)/MCL (ref 0–0.9)
EOSINOPHIL NFR BLD AUTO: 6.1 %
ERYTHROCYTE [DISTWIDTH] IN BLOOD BY AUTOMATED COUNT: 16 % (ref 11.5–17)
GFR SERPLBLD CREATININE-BSD FMLA CKD-EPI: >60 ML/MIN/1.73/M2
GLOBULIN SER-MCNC: 2.9 GM/DL (ref 2.4–3.5)
GLUCOSE SERPL-MCNC: 94 MG/DL (ref 82–115)
HCT VFR BLD AUTO: 35.9 % (ref 37–47)
HGB BLD-MCNC: 11.5 G/DL (ref 12–16)
IMM GRANULOCYTES # BLD AUTO: 0.03 X10(3)/MCL (ref 0–0.04)
IMM GRANULOCYTES NFR BLD AUTO: 0.4 %
LYMPHOCYTES # BLD AUTO: 1.63 X10(3)/MCL (ref 0.6–4.6)
LYMPHOCYTES NFR BLD AUTO: 19.6 %
MCH RBC QN AUTO: 32.1 PG (ref 27–31)
MCHC RBC AUTO-ENTMCNC: 32 G/DL (ref 33–36)
MCV RBC AUTO: 100.3 FL (ref 80–94)
MONOCYTES # BLD AUTO: 1.02 X10(3)/MCL (ref 0.1–1.3)
MONOCYTES NFR BLD AUTO: 12.3 %
NEUTROPHILS # BLD AUTO: 5.09 X10(3)/MCL (ref 2.1–9.2)
NEUTROPHILS NFR BLD AUTO: 61.2 %
NRBC BLD AUTO-RTO: 0 %
PLATELET # BLD AUTO: 290 X10(3)/MCL (ref 130–400)
PMV BLD AUTO: 11.1 FL (ref 7.4–10.4)
POTASSIUM SERPL-SCNC: 4.6 MMOL/L (ref 3.5–5.1)
PROT SERPL-MCNC: 5.9 GM/DL (ref 5.8–7.6)
RBC # BLD AUTO: 3.58 X10(6)/MCL (ref 4.2–5.4)
SODIUM SERPL-SCNC: 140 MMOL/L (ref 136–145)
T4 FREE SERPL-MCNC: 1.08 NG/DL (ref 0.7–1.48)
TSH SERPL-ACNC: 0.5 UIU/ML (ref 0.35–4.94)
WBC # BLD AUTO: 8.31 X10(3)/MCL (ref 4.5–11.5)

## 2025-08-25 PROCEDURE — 80053 COMPREHEN METABOLIC PANEL: CPT

## 2025-08-25 PROCEDURE — 85025 COMPLETE CBC W/AUTO DIFF WBC: CPT

## 2025-08-25 PROCEDURE — 84439 ASSAY OF FREE THYROXINE: CPT

## 2025-08-25 PROCEDURE — 84443 ASSAY THYROID STIM HORMONE: CPT

## 2025-08-25 PROCEDURE — 36415 COLL VENOUS BLD VENIPUNCTURE: CPT

## 2025-08-25 PROCEDURE — 82533 TOTAL CORTISOL: CPT

## 2025-08-26 ENCOUNTER — OFFICE VISIT (OUTPATIENT)
Dept: HEMATOLOGY/ONCOLOGY | Facility: CLINIC | Age: 65
End: 2025-08-26
Payer: MEDICARE

## 2025-08-26 ENCOUNTER — INFUSION (OUTPATIENT)
Dept: INFUSION THERAPY | Facility: HOSPITAL | Age: 65
End: 2025-08-26
Attending: INTERNAL MEDICINE
Payer: MEDICARE

## 2025-08-26 VITALS
OXYGEN SATURATION: 100 % | WEIGHT: 123.38 LBS | DIASTOLIC BLOOD PRESSURE: 78 MMHG | HEART RATE: 79 BPM | BODY MASS INDEX: 21.06 KG/M2 | RESPIRATION RATE: 15 BRPM | TEMPERATURE: 98 F | HEIGHT: 64 IN | SYSTOLIC BLOOD PRESSURE: 134 MMHG

## 2025-08-26 VITALS — BODY MASS INDEX: 21.19 KG/M2 | WEIGHT: 123.44 LBS

## 2025-08-26 DIAGNOSIS — C79.51 SECONDARY MALIGNANT NEOPLASM OF BONE: ICD-10-CM

## 2025-08-26 DIAGNOSIS — C74.02 MALIGNANT NEOPLASM OF CORTEX OF LEFT ADRENAL GLAND: Primary | ICD-10-CM

## 2025-08-26 DIAGNOSIS — E07.9 THYROID DISEASE: ICD-10-CM

## 2025-08-26 DIAGNOSIS — R00.2 PALPITATIONS: ICD-10-CM

## 2025-08-26 DIAGNOSIS — C78.7 SECONDARY MALIGNANT NEOPLASM OF LIVER: ICD-10-CM

## 2025-08-26 DIAGNOSIS — C74.00 MALIGNANT NEOPLASM OF ADRENAL CORTEX, UNSPECIFIED LATERALITY: Primary | ICD-10-CM

## 2025-08-26 PROCEDURE — 99215 OFFICE O/P EST HI 40 MIN: CPT | Mod: PBBFAC,25 | Performed by: NURSE PRACTITIONER

## 2025-08-26 PROCEDURE — 96413 CHEMO IV INFUSION 1 HR: CPT

## 2025-08-26 PROCEDURE — 63600175 PHARM REV CODE 636 W HCPCS: Mod: JZ,TB | Performed by: NURSE PRACTITIONER

## 2025-08-26 PROCEDURE — 25000003 PHARM REV CODE 250: Performed by: NURSE PRACTITIONER

## 2025-08-26 PROCEDURE — G2211 COMPLEX E/M VISIT ADD ON: HCPCS | Mod: ,,, | Performed by: NURSE PRACTITIONER

## 2025-08-26 PROCEDURE — 99999 PR PBB SHADOW E&M-EST. PATIENT-LVL V: CPT | Mod: PBBFAC,,, | Performed by: NURSE PRACTITIONER

## 2025-08-26 PROCEDURE — 99215 OFFICE O/P EST HI 40 MIN: CPT | Mod: S$PBB,,, | Performed by: NURSE PRACTITIONER

## 2025-08-26 RX ORDER — HEPARIN 100 UNIT/ML
500 SYRINGE INTRAVENOUS
Status: DISCONTINUED | OUTPATIENT
Start: 2025-08-26 | End: 2025-08-26 | Stop reason: HOSPADM

## 2025-08-26 RX ORDER — SODIUM CHLORIDE 0.9 % (FLUSH) 0.9 %
10 SYRINGE (ML) INJECTION
Status: CANCELLED | OUTPATIENT
Start: 2025-08-26

## 2025-08-26 RX ORDER — PROCHLORPERAZINE EDISYLATE 5 MG/ML
5 INJECTION INTRAMUSCULAR; INTRAVENOUS ONCE AS NEEDED
Status: CANCELLED | OUTPATIENT
Start: 2025-08-26

## 2025-08-26 RX ORDER — PROCHLORPERAZINE EDISYLATE 5 MG/ML
5 INJECTION INTRAMUSCULAR; INTRAVENOUS ONCE AS NEEDED
Status: DISCONTINUED | OUTPATIENT
Start: 2025-08-26 | End: 2025-08-26 | Stop reason: HOSPADM

## 2025-08-26 RX ORDER — DIPHENHYDRAMINE HYDROCHLORIDE 50 MG/ML
50 INJECTION, SOLUTION INTRAMUSCULAR; INTRAVENOUS ONCE AS NEEDED
Status: DISCONTINUED | OUTPATIENT
Start: 2025-08-26 | End: 2025-08-26 | Stop reason: HOSPADM

## 2025-08-26 RX ORDER — EPINEPHRINE 0.3 MG/.3ML
0.3 INJECTION SUBCUTANEOUS ONCE AS NEEDED
Status: CANCELLED | OUTPATIENT
Start: 2025-08-26 | End: 2037-01-22

## 2025-08-26 RX ORDER — DIPHENHYDRAMINE HYDROCHLORIDE 50 MG/ML
50 INJECTION, SOLUTION INTRAMUSCULAR; INTRAVENOUS ONCE AS NEEDED
Status: CANCELLED | OUTPATIENT
Start: 2025-08-26 | End: 2037-01-22

## 2025-08-26 RX ORDER — EPINEPHRINE 0.3 MG/.3ML
0.3 INJECTION SUBCUTANEOUS ONCE AS NEEDED
Status: DISCONTINUED | OUTPATIENT
Start: 2025-08-26 | End: 2025-08-26 | Stop reason: HOSPADM

## 2025-08-26 RX ORDER — SODIUM CHLORIDE 0.9 % (FLUSH) 0.9 %
10 SYRINGE (ML) INJECTION
Status: DISCONTINUED | OUTPATIENT
Start: 2025-08-26 | End: 2025-08-26 | Stop reason: HOSPADM

## 2025-08-26 RX ORDER — HEPARIN 100 UNIT/ML
500 SYRINGE INTRAVENOUS
Status: CANCELLED | OUTPATIENT
Start: 2025-08-26

## 2025-08-26 RX ADMIN — SODIUM CHLORIDE 400 MG: 9 INJECTION, SOLUTION INTRAVENOUS at 11:08

## (undated) DEVICE — BIT DRILL 4.2MM 3 FLUTD 145MM

## (undated) DEVICE — DRESSING GAUZE XEROFORM 5X9

## (undated) DEVICE — TRAY CATH FOL SIL URIMTR 16FR

## (undated) DEVICE — GLOVE PROTEXIS BLUE LATEX 8

## (undated) DEVICE — ELECTRODE BLD EXT 6.50 ST DISP

## (undated) DEVICE — COVER SHOE FLUID RESISTANT XL

## (undated) DEVICE — APPLICATOR CHLORAPREP ORN 26ML

## (undated) DEVICE — TAPE SILK 3IN

## (undated) DEVICE — TOOL MR8 TELSCP MATCH 12CM 2.5

## (undated) DEVICE — DRESSING ISLAND TELFA 4X5IN

## (undated) DEVICE — SEE MEDLINE ITEM 157166

## (undated) DEVICE — DRAPE EQUIP BTTN WALTERLORENZ

## (undated) DEVICE — COVER FULLGUARD SHOE HIGH-TOP

## (undated) DEVICE — Device

## (undated) DEVICE — DRESSING ABSRBNT ISLAND 3.6X8

## (undated) DEVICE — SUT VICRYL BR 1 GEN 27 CT-1

## (undated) DEVICE — GLOVE PROTEXIS PI CRM 7

## (undated) DEVICE — DRAPE OPMI STERILE

## (undated) DEVICE — DRAPE C-ARMOR EQUIPMENT COVER

## (undated) DEVICE — STAPLER SKIN PROXIMATE WIDE

## (undated) DEVICE — DRESSING XEROFORM 5X9IN

## (undated) DEVICE — ADHESIVE DERMABOND ADVANCED

## (undated) DEVICE — DRAPE FLUID WARMER 44X44IN

## (undated) DEVICE — GOWN SMARTSLEEVE AAMI LVL4 XL

## (undated) DEVICE — GLOVE 8 PROTEXIS PI ORTHO

## (undated) DEVICE — SEE MEDLINE ITEM 157150

## (undated) DEVICE — GLOVE PROTEXIS BLUE LATEX 7

## (undated) DEVICE — DRAPE STERI U-SHAPED 47X51IN

## (undated) DEVICE — NDL HYPO 22GX1 1/2 SYR 10ML LL

## (undated) DEVICE — DRAPE C-ARM COVER EZ 36X28IN

## (undated) DEVICE — GLOVE PROTEXIS PI SYN SURG 6.0

## (undated) DEVICE — DRAPE IOBAN 2 STERI

## (undated) DEVICE — GAUZE SPONGE 4X4 12PLY

## (undated) DEVICE — MARKER WRITESITE SKIN CHLRAPRP

## (undated) DEVICE — DURAPREP SURG SCRUB 26ML

## (undated) DEVICE — COVER TABLE HVY DTY 60X90IN

## (undated) DEVICE — TUBING SILICON CLR 3/16IN 10FT

## (undated) DEVICE — SPONGE GAUZE 4X4 12 PLY STRL

## (undated) DEVICE — KIT SURGICAL TURNOVER

## (undated) DEVICE — GLOVE PROTEXIS LTX MICRO 8

## (undated) DEVICE — BENZOIN TINCTURE 0.66ML

## (undated) DEVICE — SOL NACL IRR 1000ML BTL

## (undated) DEVICE — COVER HD BACK TABLE 6FT

## (undated) DEVICE — GLOVE PROTEXIS BLUE LATEX 6.5

## (undated) DEVICE — SUT VICRYL 2-0 8-18 CP-2

## (undated) DEVICE — SPONGE NEURO 1/4X1/4

## (undated) DEVICE — DRAPE SURG W/TWL 17 5/8X23

## (undated) DEVICE — ELECTRODE BLADE E-Z CLEAN 4IN

## (undated) DEVICE — ELECTRODE REM POLYHESIVE II

## (undated) DEVICE — GLOVE PROTEXIS PI SYN SURG 7.5

## (undated) DEVICE — BLADE SURG CARBON STEEL #10

## (undated) DEVICE — GLOVE PROTEXIS PI SYN SURG 6.5

## (undated) DEVICE — DRAPE INCISE IOBAN 2 23X23IN

## (undated) DEVICE — KIT SURGIFLO HEMOSTATIC MATRIX

## (undated) DEVICE — RESERVOIR JACKSON-PRATT 100CC

## (undated) DEVICE — GOWN SMARTSLEEVE AAMI LVL4 LG

## (undated) DEVICE — ELECTRODE PATIENT RETURN DISP